# Patient Record
Sex: MALE | Race: WHITE | NOT HISPANIC OR LATINO | Employment: OTHER | URBAN - METROPOLITAN AREA
[De-identification: names, ages, dates, MRNs, and addresses within clinical notes are randomized per-mention and may not be internally consistent; named-entity substitution may affect disease eponyms.]

---

## 2017-01-16 ENCOUNTER — CARE COORDINATION (OUTPATIENT)
Dept: NEPHROLOGY | Facility: CLINIC | Age: 67
End: 2017-01-16

## 2017-01-17 NOTE — PROGRESS NOTES
"Reason for Call    Followed up with patient regarding home BP. States he's feeling great, no issues with medications. No symptoms at this time.    1/17: 143/85  1/16: 137/90  1/15: 153/84  1/14: 129/88  1/13: 142/86  1/12: 157/95  1/11: 151/89    Currently prescribed losartan 25mg daily, hydrochlorothiazide 25mg daily, and amlodipine 10mg.    Patient asked about 12/19 lab results, wasn't sure what they meant. Reviewed with Dr. Garcia, who said they were looking for abnormal cells, which they did not find. \"No concerns\".    Collaboration    Dr. Garcia updated. They look fine. Just low salt diet. Thank you.       Plan    1. Continue medications as prescribed.  2. Follow low sodium diet.    Patient was given an opportunity to ask questions and have those questions answered to his satisfaction.  Patient verbalized understanding of instructions provided and agreed to plan of care.    Arianna Rael RN     "

## 2017-02-10 ENCOUNTER — APPOINTMENT (RX ONLY)
Dept: URBAN - METROPOLITAN AREA CLINIC 143 | Facility: CLINIC | Age: 67
Setting detail: DERMATOLOGY
End: 2017-02-10

## 2017-02-10 DIAGNOSIS — Z85.820 PERSONAL HISTORY OF MALIGNANT MELANOMA OF SKIN: ICD-10-CM

## 2017-02-10 DIAGNOSIS — L57.0 ACTINIC KERATOSIS: ICD-10-CM

## 2017-02-10 DIAGNOSIS — L08.9 LOCAL INFECTION OF THE SKIN AND SUBCUTANEOUS TISSUE, UNSPECIFIED: ICD-10-CM

## 2017-02-10 DIAGNOSIS — L72.0 EPIDERMAL CYST: ICD-10-CM

## 2017-02-10 PROBLEM — D48.5 NEOPLASM OF UNCERTAIN BEHAVIOR OF SKIN: Status: ACTIVE | Noted: 2017-02-10

## 2017-02-10 PROCEDURE — 10060 I&D ABSCESS SIMPLE/SINGLE: CPT

## 2017-02-10 PROCEDURE — ? PRESCRIPTION

## 2017-02-10 PROCEDURE — 11100: CPT

## 2017-02-10 PROCEDURE — ? ORDER TESTS

## 2017-02-10 PROCEDURE — ? BIOPSY BY SHAVE METHOD

## 2017-02-10 PROCEDURE — ? DEFER

## 2017-02-10 PROCEDURE — 99203 OFFICE O/P NEW LOW 30 MIN: CPT | Mod: 25

## 2017-02-10 PROCEDURE — ? INCISION AND DRAINAGE

## 2017-02-10 PROCEDURE — ? COUNSELING

## 2017-02-10 RX ORDER — CEFDINIR 300 MG/1
CAPSULE ORAL
Qty: 20 | Refills: 0 | Status: ERX

## 2017-02-10 ASSESSMENT — LOCATION DETAILED DESCRIPTION DERM
LOCATION DETAILED: INFERIOR THORACIC SPINE
LOCATION DETAILED: RIGHT MEDIAL UPPER BACK
LOCATION DETAILED: RIGHT MID TEMPLE
LOCATION DETAILED: RIGHT MID TEMPLE
LOCATION DETAILED: RIGHT CENTRAL MALAR CHEEK
LOCATION DETAILED: RIGHT CENTRAL LATERAL NECK
LOCATION DETAILED: RIGHT INFERIOR CENTRAL MALAR CHEEK
LOCATION DETAILED: INFERIOR THORACIC SPINE

## 2017-02-10 ASSESSMENT — LOCATION SIMPLE DESCRIPTION DERM
LOCATION SIMPLE: RIGHT TEMPLE
LOCATION SIMPLE: RIGHT TEMPLE
LOCATION SIMPLE: NECK
LOCATION SIMPLE: UPPER BACK
LOCATION SIMPLE: RIGHT CHEEK
LOCATION SIMPLE: RIGHT UPPER BACK
LOCATION SIMPLE: UPPER BACK
LOCATION SIMPLE: RIGHT CHEEK

## 2017-02-10 ASSESSMENT — LOCATION ZONE DERM
LOCATION ZONE: FACE
LOCATION ZONE: TRUNK
LOCATION ZONE: NECK
LOCATION ZONE: TRUNK
LOCATION ZONE: FACE

## 2017-02-10 NOTE — PROCEDURE: BIOPSY BY SHAVE METHOD
Type Of Destruction Used: Curettage
Size Of Lesion In Cm: 0
Cryotherapy Text: The wound bed was treated with cryotherapy after the biopsy was performed.
Consent: Written consent was obtained and risks were reviewed including but not limited to scarring, infection, bleeding, scabbing, incomplete removal, nerve damage and allergy to anesthesia.
Wound Care: Petrolatum
Bill For Surgical Tray: no
Notification Instructions: Patient will be notified of biopsy results. However, patient instructed to call the office if not contacted within 2 weeks.
Billing Type: Third-Party Bill
Silver Nitrate Text: The wound bed was treated with silver nitrate after the biopsy was performed.
Anesthesia Type: 1% lidocaine with epinephrine
Path Notes (To The Dermatopathologist): R/O MM vs other
Electrodesiccation And Curettage Text: The wound bed was treated with electrodesiccation and curettage after the biopsy was performed.
Biopsy Method: Dermablade
Electrodesiccation Text: The wound bed was treated with electrodesiccation after the biopsy was performed.
Dressing: bandage
Post-Care Instructions: I reviewed with the patient in detail post-care instructions. Patient is to keep the biopsy site dry overnight, and then apply bacitracin twice daily until healed. Patient may apply hydrogen peroxide soaks to remove any crusting.
Hemostasis: Drysol
Anesthesia Volume In Cc: 0.5
Biopsy Type: H and E
Detail Level: Detailed

## 2017-02-10 NOTE — PROCEDURE: MIPS QUALITY
Quality 110: Preventive Care And Screening: Influenza Immunization: Influenza Immunization Administered during Influenza season
Quality 47: Advance Care Plan: Advance Care Planning discussed and documented; advance care plan or surrogate decision maker documented in the medical record.
Quality 111:Pneumonia Vaccination Status For Older Adults: Pneumococcal Vaccination Previously Received
Quality 155 (Denominator): Falls Plan Of Care: Plan of Care not Documented, Reason not Otherwise Specified
Quality 226: Preventive Care And Screening: Tobacco Use: Screening And Cessation Intervention: Patient screened for tobacco and never smoked
Quality 154 Part B: Falls: Risk Screening (Should Be Reported With Measure 155.): Patient screened for future fall risk; documentation of no falls in the past year or only one fall without injury in the past year
Quality 154 Part A: Falls: Risk Assessment (Should Be Reported With Measure 155.): Falls risk assessment completed and documented in the past 12 months.
Detail Level: Detailed
Quality 131: Pain Assessment And Follow-Up: Pain assessment using a standardized tool is documented as negative, no follow-up plan required
Quality 431: Preventive Care And Screening: Unhealthy Alcohol Use - Screening: Patient screened for unhealthy alcohol use using a single question and scores less than 2 times per year

## 2017-02-10 NOTE — PROCEDURE: INCISION AND DRAINAGE
Epidermal Sutures: 4-0 Ethilon
Consent was obtained and risks were reviewed including but not limited to delayed wound healing, infection, need for multiple I and D's, and pain.
Anesthesia Type: 1% lidocaine with epinephrine
Dressing: dry sterile dressing
Method: 11 blade
Lesion Type: Abscess
Drainage Type?: purulent  and cyst-like
Render Postcare In Note?: No
Post-Care Instructions: I reviewed with the patient in detail post-care instructions. Patient should keep wound covered and call the office should any redness, pain, swelling or worsening occur.
Detail Level: Detailed
Anesthesia Volume In Cc: 2
Preparation Text: The area was prepped in the usual clean fashion.
Drainage Amount?: moderate
Curette Text (Optional): After the contents were expressed a curette was used to partially remove the cyst wall.
Wound Care: Petrolatum
Suture Text: The incision was partially closed with
Curette: Yes
Epidermal Closure: simple interrupted
Size Of Lesion In Cm (Optional But May Be Required For Some Insurances): 0

## 2017-02-21 ENCOUNTER — TELEPHONE (OUTPATIENT)
Dept: TRANSPLANT | Facility: CLINIC | Age: 67
End: 2017-02-21

## 2017-02-21 NOTE — TELEPHONE ENCOUNTER
Call from Eric who is in [place], stating that he isn't feeling well.  Has fatigue, urinary frequency w/ no pain / burning, excess thirst.  No fever. Reviewed w/ Dr. Jimenez.  Please fax order to Eric's lab of choice asking for:  All post liver labs, UA / UC, Hgb A1C.  Pt is aware that he should be fasting.  Order faxed to [facility], [phone].

## 2017-02-21 NOTE — LETTER
OUTPATIENT OR TRANSITIONAL CARE  LABORATORY TEST ORDER  Eric Andrew  YOB: 1950  Issue Date: February 21, 2017   [facility] MR#: [id number]          Diagnoses: [x]      Liver Transplant (ICD-9 V42.7)   [x]      Urinary Frequency (ICD 10 R35.0)      Please draw the following lab tests:            [x]      Hemogram, White Blood Count, Platelets   [x]      Basic Metabolic Panel (Sodium, Potassium, Chloride, CO2, Creatinine, Urea Nitrogen, Glucose,     Calcium)  [x]      UA / UC  [x]      Hepatic panel (Albumin, Alk Phos, ALT, AST, LDH, Direct and Total Bili)  [x]      Hgb A1C        If you have any questions, please call Naomi in [facility]- [phone].                                        Please fax results to [phone] as soon as they are available.    .

## 2017-02-22 ENCOUNTER — TELEPHONE (OUTPATIENT)
Dept: TRANSPLANT | Facility: CLINIC | Age: 67
End: 2017-02-22

## 2017-02-22 NOTE — TELEPHONE ENCOUNTER
Call from Eric.  Had labs drawn this morning, I still haven't received the results.  He is still feeling poorly, stopped and bought a glucose meter.  Blood glucose per meter >500.  Suggested he go to local ER.  He wants to go to an urgent care, told him that's fine, he can explore but if they won't treat there he needs to go ER.  He will do.

## 2017-02-23 ENCOUNTER — TELEPHONE (OUTPATIENT)
Dept: GASTROENTEROLOGY | Facility: CLINIC | Age: 67
End: 2017-02-23

## 2017-02-23 NOTE — TELEPHONE ENCOUNTER
"Pt called back to tell Arianna neph RN about labs below. Spoke w/ Triage RN who assessed.    2/10/17: Sebaceous cyst on back removed, given antibiotics.    2/22/17: Polydipsia, polyuria, attributed to antibiotics. Glucose monitor showed fasting , went to ED. Labs showed BG of 581. Given fluids, 12 U insulin, BG decreased to 480. D/C'd w/ metformin.     2/23/17: 1st dose metformin 1100. Sx: polyuria, polydipsia (moderate, decreased from previous night), fatigue, felt \"punky\"/under the weather. Denied additional Sx. Time of call BG. 544. Ate Atkins bar, low carb cereal for breakfast at 0900. Triage RN advised pt return to ED, pt agreed. Message sent to Lashanda Liang LPN. FYI: Arianna Real, RN  "

## 2017-02-23 NOTE — TELEPHONE ENCOUNTER
DATE:  2/23/2017   TIME OF RECEIPT FROM LAB:  08:26  LAB TEST:  Fasting Blood Glucose  LAB VALUE:  565 on 2/22/17  RESULTS GIVEN WITH READ-BACK TO (PROVIDER):  KATI LARSON  TIME LAB VALUE REPORTED TO PROVIDER:   08:29

## 2017-02-27 ENCOUNTER — APPOINTMENT (RX ONLY)
Dept: URBAN - METROPOLITAN AREA CLINIC 143 | Facility: CLINIC | Age: 67
Setting detail: DERMATOLOGY
End: 2017-02-27

## 2017-02-27 DIAGNOSIS — Z85.820 PERSONAL HISTORY OF MALIGNANT MELANOMA OF SKIN: ICD-10-CM

## 2017-02-27 DIAGNOSIS — L21.8 OTHER SEBORRHEIC DERMATITIS: ICD-10-CM

## 2017-02-27 DIAGNOSIS — Z85.828 PERSONAL HISTORY OF OTHER MALIGNANT NEOPLASM OF SKIN: ICD-10-CM

## 2017-02-27 DIAGNOSIS — L81.4 OTHER MELANIN HYPERPIGMENTATION: ICD-10-CM

## 2017-02-27 DIAGNOSIS — D18.0 HEMANGIOMA: ICD-10-CM

## 2017-02-27 DIAGNOSIS — L57.0 ACTINIC KERATOSIS: ICD-10-CM

## 2017-02-27 DIAGNOSIS — D22 MELANOCYTIC NEVI: ICD-10-CM

## 2017-02-27 PROBLEM — D22.5 MELANOCYTIC NEVI OF TRUNK: Status: ACTIVE | Noted: 2017-02-27

## 2017-02-27 PROBLEM — D03.59 MELANOMA IN SITU OF OTHER PART OF TRUNK: Status: ACTIVE | Noted: 2017-02-27

## 2017-02-27 PROBLEM — D18.01 HEMANGIOMA OF SKIN AND SUBCUTANEOUS TISSUE: Status: ACTIVE | Noted: 2017-02-27

## 2017-02-27 PROBLEM — K75.9 INFLAMMATORY LIVER DISEASE, UNSPECIFIED: Status: ACTIVE | Noted: 2017-02-27

## 2017-02-27 PROCEDURE — ? PRESCRIPTION

## 2017-02-27 PROCEDURE — ? LIQUID NITROGEN

## 2017-02-27 PROCEDURE — 99214 OFFICE O/P EST MOD 30 MIN: CPT | Mod: 25

## 2017-02-27 PROCEDURE — ? COUNSELING

## 2017-02-27 PROCEDURE — 17000 DESTRUCT PREMALG LESION: CPT

## 2017-02-27 PROCEDURE — ? DEFER

## 2017-02-27 PROCEDURE — 17003 DESTRUCT PREMALG LES 2-14: CPT

## 2017-02-27 RX ORDER — SELENIUM SULFIDE 22.5 MG/ML
SHAMPOO TOPICAL
Qty: 1 | Refills: 3 | Status: ERX | COMMUNITY
Start: 2017-02-27

## 2017-02-27 RX ADMIN — SELENIUM SULFIDE: 22.5 SHAMPOO TOPICAL at 22:25

## 2017-02-27 ASSESSMENT — LOCATION DETAILED DESCRIPTION DERM
LOCATION DETAILED: RIGHT SUPERIOR PREAURICULAR CHEEK
LOCATION DETAILED: RIGHT SUPERIOR UPPER BACK
LOCATION DETAILED: LEFT SUPERIOR PARIETAL SCALP
LOCATION DETAILED: RIGHT SUPERIOR LATERAL NECK
LOCATION DETAILED: LEFT SUPERIOR LATERAL NECK
LOCATION DETAILED: GLABELLA

## 2017-02-27 ASSESSMENT — LOCATION SIMPLE DESCRIPTION DERM
LOCATION SIMPLE: RIGHT CHEEK
LOCATION SIMPLE: GLABELLA
LOCATION SIMPLE: NECK
LOCATION SIMPLE: RIGHT UPPER BACK
LOCATION SIMPLE: SCALP

## 2017-02-27 ASSESSMENT — LOCATION ZONE DERM
LOCATION ZONE: FACE
LOCATION ZONE: SCALP
LOCATION ZONE: TRUNK
LOCATION ZONE: NECK

## 2017-02-27 NOTE — PROCEDURE: MIPS QUALITY
Quality 226: Preventive Care And Screening: Tobacco Use: Screening And Cessation Intervention: Patient screened for tobacco and never smoked
Quality 110: Preventive Care And Screening: Influenza Immunization: Influenza Immunization Administered during Influenza season
Quality 431: Preventive Care And Screening: Unhealthy Alcohol Use - Screening: Patient screened for unhealthy alcohol use using a single question and scores less than 2 times per year
Quality 154 Part A: Falls: Risk Assessment (Should Be Reported With Measure 155.): Falls risk assessment completed and documented in the past 12 months.
Quality 155 (Denominator): Falls Plan Of Care: Plan of Care not Documented, Reason not Otherwise Specified
Detail Level: Detailed
Quality 131: Pain Assessment And Follow-Up: Pain assessment using a standardized tool is documented as negative, no follow-up plan required
Quality 154 Part B: Falls: Risk Screening (Should Be Reported With Measure 155.): Patient screened for future fall risk; documentation of no falls in the past year or only one fall without injury in the past year

## 2017-02-27 NOTE — PROCEDURE: LIQUID NITROGEN
Detail Level: Zone
Render Post-Care Instructions In Note?: no
Post-Care Instructions: I reviewed with the patient in detail post-care instructions. Patient is to wear sunprotection, and avoid picking at any of the treated lesions. Pt may apply Vaseline to crusted or scabbing areas.
Total Number Of Aks Treated: 4
Number Of Freeze-Thaw Cycles: 1 freeze-thaw cycle
Consent: The patient's consent was obtained including but not limited to risks of crusting, scabbing, blistering, scarring, darker or lighter pigmentary change, recurrence, incomplete removal and infection.
Duration Of Freeze Thaw-Cycle (Seconds): 0

## 2017-03-07 ENCOUNTER — APPOINTMENT (RX ONLY)
Dept: URBAN - METROPOLITAN AREA CLINIC 143 | Facility: CLINIC | Age: 67
Setting detail: DERMATOLOGY
End: 2017-03-07

## 2017-03-07 PROBLEM — D03.59 MELANOMA IN SITU OF OTHER PART OF TRUNK: Status: ACTIVE | Noted: 2017-03-07

## 2017-03-07 PROCEDURE — 11604 EXC TR-EXT MAL+MARG 3.1-4 CM: CPT | Mod: 79

## 2017-03-07 PROCEDURE — ? EXCISION

## 2017-03-07 NOTE — PROCEDURE: EXCISION
Secondary Defect Width (In Cm): 0
Epidermal Closure: simple interrupted
Tissue Cultured Epidermal Autograft Text: The defect edges were debeveled with a #15 scalpel blade.  Given the location of the defect, shape of the defect and the proximity to free margins a tissue cultured epidermal autograft was deemed most appropriate.  The graft was then trimmed to fit the size of the defect.  The graft was then placed in the primary defect and oriented appropriately.
Interpolation Flap Text: A decision was made to reconstruct the defect utilizing an interpolation axial flap and a staged reconstruction.  A telfa template was made of the defect.  This telfa template was then used to outline the interpolation flap.  The donor area for the pedicle flap was then injected with anesthesia.  The flap was excised through the skin and subcutaneous tissue down to the layer of the underlying musculature.  The interpolation flap was carefully excised within this deep plane to maintain its blood supply.  The edges of the donor site were undermined.   The donor site was closed in a primary fashion.  The pedicle was then rotated into position and sutured.  Once the tube was sutured into place, adequate blood supply was confirmed with blanching and refill.  The pedicle was then wrapped with xeroform gauze and dressed appropriately with a telfa and gauze bandage to ensure continued blood supply and protect the attached pedicle.
Modified Advancement Flap Text: The defect edges were debeveled with a #15 scalpel blade.  Given the location of the defect, shape of the defect and the proximity to free margins a modified advancement flap was deemed most appropriate.  Using a sterile surgical marker, an appropriate advancement flap was drawn incorporating the defect and placing the expected incisions within the relaxed skin tension lines where possible.    The area thus outlined was incised deep to adipose tissue with a #15 scalpel blade.  The skin margins were undermined to an appropriate distance in all directions utilizing iris scissors.
V-Y Flap Text: The defect edges were debeveled with a #15 scalpel blade.  Given the location of the defect, shape of the defect and the proximity to free margins a V-Y flap was deemed most appropriate.  Using a sterile surgical marker, an appropriate advancement flap was drawn incorporating the defect and placing the expected incisions within the relaxed skin tension lines where possible.    The area thus outlined was incised deep to adipose tissue with a #15 scalpel blade.  The skin margins were undermined to an appropriate distance in all directions utilizing iris scissors.
Mastoid Interpolation Flap Text: A decision was made to reconstruct the defect utilizing an interpolation axial flap and a staged reconstruction.  A telfa template was made of the defect.  This telfa template was then used to outline the mastoid interpolation flap.  The donor area for the pedicle flap was then injected with anesthesia.  The flap was excised through the skin and subcutaneous tissue down to the layer of the underlying musculature.  The pedicle flap was carefully excised within this deep plane to maintain its blood supply.  The edges of the donor site were undermined.   The donor site was closed in a primary fashion.  The pedicle was then rotated into position and sutured.  Once the tube was sutured into place, adequate blood supply was confirmed with blanching and refill.  The pedicle was then wrapped with xeroform gauze and dressed appropriately with a telfa and gauze bandage to ensure continued blood supply and protect the attached pedicle.
Accession #: [id number]
Anesthesia Type: 1% lidocaine with epinephrine
Render Post-Care Instructions In Note?: yes
Epidermal Sutures: 3-0 Nylon
Dermal Autograft Text: The defect edges were debeveled with a #15 scalpel blade.  Given the location of the defect, shape of the defect and the proximity to free margins a dermal autograft was deemed most appropriate.  Using a sterile surgical marker, the primary defect shape was transferred to the donor site. The area thus outlined was incised deep to adipose tissue with a #15 scalpel blade.  The harvested graft was then trimmed of adipose and epidermal tissue until only dermis was left.  The skin graft was then placed in the primary defect and oriented appropriately.
Burow's Advancement Flap Text: The defect edges were debeveled with a #15 scalpel blade.  Given the location of the defect and the proximity to free margins a Burow's advancement flap was deemed most appropriate.  Using a sterile surgical marker, the appropriate advancement flap was drawn incorporating the defect and placing the expected incisions within the relaxed skin tension lines where possible.    The area thus outlined was incised deep to adipose tissue with a #15 scalpel blade.  The skin margins were undermined to an appropriate distance in all directions utilizing iris scissors.
Intermediate / Complex Repair - Final Wound Length In Cm: 5
Detail Level: Detailed
Xenograft Text: The defect edges were debeveled with a #15 scalpel blade.  Given the location of the defect, shape of the defect and the proximity to free margins a xenograft was deemed most appropriate.  The graft was then trimmed to fit the size of the defect.  The graft was then placed in the primary defect and oriented appropriately.
Excisional Biopsy Additional Text (Leave Blank If You Do Not Want): The margin was drawn around the clinically apparent lesion. An elliptical shape was then drawn on the skin incorporating the lesion and margins.  Incisions were then made along these lines to the appropriate tissue plane and the lesion was extirpated.
Cartilage Graft Text: The defect edges were debeveled with a #15 scalpel blade.  Given the location of the defect, shape of the defect, the fact the defect involved a full thickness cartilage defect a cartilage graft was deemed most appropriate.  An appropriate donor site was identified, cleansed, and anesthetized. The cartilage graft was then harvested and transferred to the recipient site, oriented appropriately and then sutured into place.  The secondary defect was then repaired using a primary closure.
Mucosal Advancement Flap Text: Given the location of the defect, shape of the defect and the proximity to free margins a mucosal advancement flap was deemed most appropriate. Incisions were made with a 15 blade scalpel in the appropriate fashion along the cutaneous vermillion border and the mucosal lip. The remaining actinically damaged mucosal tissue was excised.  The mucosal advancement flap was then elevated to the gingival sulcus with care taken to preserve the neurovascular structures and advanced into the primary defect. Care was taken to ensure that precise realignment of the vermillion border was achieved.
Complex Repair And Single Advancement Flap Text: The defect edges were debeveled with a #15 scalpel blade.  The primary defect was closed partially with a complex linear closure.  Given the location of the remaining defect, shape of the defect and the proximity to free margins a single advancement flap was deemed most appropriate for complete closure of the defect.  Using a sterile surgical marker, an appropriate advancement flap was drawn incorporating the defect and placing the expected incisions within the relaxed skin tension lines where possible.    The area thus outlined was incised deep to adipose tissue with a #15 scalpel blade.  The skin margins were undermined to an appropriate distance in all directions utilizing iris scissors.
Cheek-To-Nose Interpolation Flap Text: A decision was made to reconstruct the defect utilizing an interpolation axial flap and a staged reconstruction.  A telfa template was made of the defect.  This telfa template was then used to outline the Cheek-To-Nose Interpolation flap.  The donor area for the pedicle flap was then injected with anesthesia.  The flap was excised through the skin and subcutaneous tissue down to the layer of the underlying musculature.  The interpolation flap was carefully excised within this deep plane to maintain its blood supply.  The edges of the donor site were undermined.   The donor site was closed in a primary fashion.  The pedicle was then rotated into position and sutured.  Once the tube was sutured into place, adequate blood supply was confirmed with blanching and refill.  The pedicle was then wrapped with xeroform gauze and dressed appropriately with a telfa and gauze bandage to ensure continued blood supply and protect the attached pedicle.
Complex Repair And Modified Advancement Flap Text: The defect edges were debeveled with a #15 scalpel blade.  The primary defect was closed partially with a complex linear closure.  Given the location of the remaining defect, shape of the defect and the proximity to free margins a modified advancement flap was deemed most appropriate for complete closure of the defect.  Using a sterile surgical marker, an appropriate advancement flap was drawn incorporating the defect and placing the expected incisions within the relaxed skin tension lines where possible.    The area thus outlined was incised deep to adipose tissue with a #15 scalpel blade.  The skin margins were undermined to an appropriate distance in all directions utilizing iris scissors.
Melolabial Interpolation Flap Text: A decision was made to reconstruct the defect utilizing an interpolation axial flap and a staged reconstruction.  A telfa template was made of the defect.  This telfa template was then used to outline the melolabial interpolation flap.  The donor area for the pedicle flap was then injected with anesthesia.  The flap was excised through the skin and subcutaneous tissue down to the layer of the underlying musculature.  The pedicle flap was carefully excised within this deep plane to maintain its blood supply.  The edges of the donor site were undermined.   The donor site was closed in a primary fashion.  The pedicle was then rotated into position and sutured.  Once the tube was sutured into place, adequate blood supply was confirmed with blanching and refill.  The pedicle was then wrapped with xeroform gauze and dressed appropriately with a telfa and gauze bandage to ensure continued blood supply and protect the attached pedicle.
Billing Type: Third-Party Bill
O-Z Plasty Text: The defect edges were debeveled with a #15 scalpel blade.  Given the location of the defect, shape of the defect and the proximity to free margins an O-Z plasty (double transposition flap) was deemed most appropriate.  Using a sterile surgical marker, the appropriate transposition flaps were drawn incorporating the defect and placing the expected incisions within the relaxed skin tension lines where possible.    The area thus outlined was incised deep to adipose tissue with a #15 scalpel blade.  The skin margins were undermined to an appropriate distance in all directions utilizing iris scissors.  Hemostasis was achieved with electrocautery.  The flaps were then transposed into place, one clockwise and the other counterclockwise, and anchored with interrupted buried subcutaneous sutures.
Complex Repair And Z Plasty Text: The defect edges were debeveled with a #15 scalpel blade.  The primary defect was closed partially with a complex linear closure.  Given the location of the remaining defect, shape of the defect and the proximity to free margins a Z plasty was deemed most appropriate for complete closure of the defect.  Using a sterile surgical marker, an appropriate advancement flap was drawn incorporating the defect and placing the expected incisions within the relaxed skin tension lines where possible.    The area thus outlined was incised deep to adipose tissue with a #15 scalpel blade.  The skin margins were undermined to an appropriate distance in all directions utilizing iris scissors.
O-T Plasty Text: The defect edges were debeveled with a #15 scalpel blade.  Given the location of the defect, shape of the defect and the proximity to free margins an O-T plasty was deemed most appropriate.  Using a sterile surgical marker, an appropriate O-T plasty was drawn incorporating the defect and placing the expected incisions within the relaxed skin tension lines where possible.    The area thus outlined was incised deep to adipose tissue with a #15 scalpel blade.  The skin margins were undermined to an appropriate distance in all directions utilizing iris scissors.
Crescentic Advancement Flap Text: The defect edges were debeveled with a #15 scalpel blade.  Given the location of the defect and the proximity to free margins a crescentic advancement flap was deemed most appropriate.  Using a sterile surgical marker, the appropriate advancement flap was drawn incorporating the defect and placing the expected incisions within the relaxed skin tension lines where possible.    The area thus outlined was incised deep to adipose tissue with a #15 scalpel blade.  The skin margins were undermined to an appropriate distance in all directions utilizing iris scissors.
Surgeon (Optional): Dr Ulrich
Patient Will Remove Sutures At Home?: No
Post-Care Instructions: I reviewed with the patient in detail post-care instructions. Patient is not to engage in any heavy lifting, exercise, or swimming for the next 14 days. Should the patient develop any fevers, chills, bleeding, severe pain patient will contact the office immediately.
Complex Repair And A-T Advancement Flap Text: The defect edges were debeveled with a #15 scalpel blade.  The primary defect was closed partially with a complex linear closure.  Given the location of the remaining defect, shape of the defect and the proximity to free margins an A-T advancement flap was deemed most appropriate for complete closure of the defect.  Using a sterile surgical marker, an appropriate advancement flap was drawn incorporating the defect and placing the expected incisions within the relaxed skin tension lines where possible.    The area thus outlined was incised deep to adipose tissue with a #15 scalpel blade.  The skin margins were undermined to an appropriate distance in all directions utilizing iris scissors.
Island Pedicle Flap-Requiring Vessel Identification Text: The defect edges were debeveled with a #15 scalpel blade.  Given the location of the defect, shape of the defect and the proximity to free margins an island pedicle advancement flap was deemed most appropriate.  Using a sterile surgical marker, an appropriate advancement flap was drawn, based on the axial vessel mentioned above, incorporating the defect, outlining the appropriate donor tissue and placing the expected incisions within the relaxed skin tension lines where possible.    The area thus outlined was incised deep to adipose tissue with a #15 scalpel blade.  The skin margins were undermined to an appropriate distance in all directions around the primary defect and laterally outward around the island pedicle utilizing iris scissors.  There was minimal undermining beneath the pedicle flap.
O-T Advancement Flap Text: The defect edges were debeveled with a #15 scalpel blade.  Given the location of the defect, shape of the defect and the proximity to free margins an O-T advancement flap was deemed most appropriate.  Using a sterile surgical marker, an appropriate advancement flap was drawn incorporating the defect and placing the expected incisions within the relaxed skin tension lines where possible.    The area thus outlined was incised deep to adipose tissue with a #15 scalpel blade.  The skin margins were undermined to an appropriate distance in all directions utilizing iris scissors.
Bilobed Flap Text: The defect edges were debeveled with a #15 scalpel blade.  Given the location of the defect and the proximity to free margins a bilobe flap was deemed most appropriate.  Using a sterile surgical marker, an appropriate bilobe flap drawn around the defect.    The area thus outlined was incised deep to adipose tissue with a #15 scalpel blade.  The skin margins were undermined to an appropriate distance in all directions utilizing iris scissors.
Perilesional Excision Additional Text (Leave Blank If You Do Not Want): The margin was drawn around the clinically apparent lesion. Incisions were then made along these lines to the appropriate tissue plane and the lesion was extirpated.
S Plasty Text: Given the location and shape of the defect, and the orientation of relaxed skin tension lines, an S-plasty was deemed most appropriate for repair.  Using a sterile surgical marker, the appropriate outline of the S-plasty was drawn, incorporating the defect and placing the expected incisions within the relaxed skin tension lines where possible.  The area thus outlined was incised deep to adipose tissue with a #15 scalpel blade.  The skin margins were undermined to an appropriate distance in all directions utilizing iris scissors. The skin flaps were advanced over the defect.  The opposing margins were then approximated with interrupted buried subcutaneous sutures.
Posterior Auricular Interpolation Flap Text: A decision was made to reconstruct the defect utilizing an interpolation axial flap and a staged reconstruction.  A telfa template was made of the defect.  This telfa template was then used to outline the posterior auricular interpolation flap.  The donor area for the pedicle flap was then injected with anesthesia.  The flap was excised through the skin and subcutaneous tissue down to the layer of the underlying musculature.  The pedicle flap was carefully excised within this deep plane to maintain its blood supply.  The edges of the donor site were undermined.   The donor site was closed in a primary fashion.  The pedicle was then rotated into position and sutured.  Once the tube was sutured into place, adequate blood supply was confirmed with blanching and refill.  The pedicle was then wrapped with xeroform gauze and dressed appropriately with a telfa and gauze bandage to ensure continued blood supply and protect the attached pedicle.
Bilobed Transposition Flap Text: The defect edges were debeveled with a #15 scalpel blade.  Given the location of the defect and the proximity to free margins a bilobed transposition flap was deemed most appropriate.  Using a sterile surgical marker, an appropriate bilobe flap drawn around the defect.    The area thus outlined was incised deep to adipose tissue with a #15 scalpel blade.  The skin margins were undermined to an appropriate distance in all directions utilizing iris scissors.
Z Plasty Text: The lesion was extirpated to the level of the fat with a #15 scalpel blade.  Given the location of the defect, shape of the defect and the proximity to free margins a Z-plasty was deemed most appropriate for repair.  Using a sterile surgical marker, the appropriate transposition arms of the Z-plasty were drawn incorporating the defect and placing the expected incisions within the relaxed skin tension lines where possible.    The area thus outlined was incised deep to adipose tissue with a #15 scalpel blade.  The skin margins were undermined to an appropriate distance in all directions utilizing iris scissors.  The opposing transposition arms were then transposed into place in opposite direction and anchored with interrupted buried subcutaneous sutures.
Estimated Blood Loss (Cc): minimal
Saucerization Excision Additional Text (Leave Blank If You Do Not Want): The margin was drawn around the clinically apparent lesion.  Incisions were then made along these lines, in a tangential fashion, to the appropriate tissue plane and the lesion was extirpated.
Island Pedicle Flap With Canthal Suspension Text: The defect edges were debeveled with a #15 scalpel blade.  Given the location of the defect, shape of the defect and the proximity to free margins an island pedicle advancement flap was deemed most appropriate.  Using a sterile surgical marker, an appropriate advancement flap was drawn incorporating the defect, outlining the appropriate donor tissue and placing the expected incisions within the relaxed skin tension lines where possible. The area thus outlined was incised deep to adipose tissue with a #15 scalpel blade.  The skin margins were undermined to an appropriate distance in all directions around the primary defect and laterally outward around the island pedicle utilizing iris scissors.  There was minimal undermining beneath the pedicle flap. A suspension suture was placed in the canthal tendon to prevent tension and prevent ectropion.
Complex Repair And Xenograft Text: The defect edges were debeveled with a #15 scalpel blade.  The primary defect was closed partially with a complex linear closure.  Given the location of the defect, shape of the defect and the proximity to free margins an tissue cultured epidermal autograft was deemed most appropriate to repair the remaining defect.  The graft was trimmed to fit the size of the remaining defect.  The graft was then placed in the primary defect, oriented appropriately, and sutured into place.
Complex Repair And Double Advancement Flap Text: The defect edges were debeveled with a #15 scalpel blade.  The primary defect was closed partially with a complex linear closure.  Given the location of the remaining defect, shape of the defect and the proximity to free margins a double advancement flap was deemed most appropriate for complete closure of the defect.  Using a sterile surgical marker, an appropriate advancement flap was drawn incorporating the defect and placing the expected incisions within the relaxed skin tension lines where possible.    The area thus outlined was incised deep to adipose tissue with a #15 scalpel blade.  The skin margins were undermined to an appropriate distance in all directions utilizing iris scissors.
Additional Anesthesia Volume In Cc: 6
Island Pedicle Flap Text: The defect edges were debeveled with a #15 scalpel blade.  Given the location of the defect, shape of the defect and the proximity to free margins an island pedicle advancement flap was deemed most appropriate.  Using a sterile surgical marker, an appropriate advancement flap was drawn incorporating the defect, outlining the appropriate donor tissue and placing the expected incisions within the relaxed skin tension lines where possible.    The area thus outlined was incised deep to adipose tissue with a #15 scalpel blade.  The skin margins were undermined to an appropriate distance in all directions around the primary defect and laterally outward around the island pedicle utilizing iris scissors.  There was minimal undermining beneath the pedicle flap.
Complex Repair And Bilobe Flap Text: The defect edges were debeveled with a #15 scalpel blade.  The primary defect was closed partially with a complex linear closure.  Given the location of the remaining defect, shape of the defect and the proximity to free margins a bilobe flap was deemed most appropriate for complete closure of the defect.  Using a sterile surgical marker, an appropriate advancement flap was drawn incorporating the defect and placing the expected incisions within the relaxed skin tension lines where possible.    The area thus outlined was incised deep to adipose tissue with a #15 scalpel blade.  The skin margins were undermined to an appropriate distance in all directions utilizing iris scissors.
O-L Flap Text: The defect edges were debeveled with a #15 scalpel blade.  Given the location of the defect, shape of the defect and the proximity to free margins an O-L flap was deemed most appropriate.  Using a sterile surgical marker, an appropriate advancement flap was drawn incorporating the defect and placing the expected incisions within the relaxed skin tension lines where possible.    The area thus outlined was incised deep to adipose tissue with a #15 scalpel blade.  The skin margins were undermined to an appropriate distance in all directions utilizing iris scissors.
Purse String (Intermediate) Text: Given the location of the defect and the characteristics of the surrounding skin a pursestring intermediate closure was deemed most appropriate.  Undermining was performed circumfirentially around the surgical defect.  A purstring suture was then placed and tightened.
Complex Repair Preamble Text (Leave Blank If You Do Not Want): Extensive wide undermining was performed.
Repair Type: None (Simple)
Complex Repair And W Plasty Text: The defect edges were debeveled with a #15 scalpel blade.  The primary defect was closed partially with a complex linear closure.  Given the location of the remaining defect, shape of the defect and the proximity to free margins a W plasty was deemed most appropriate for complete closure of the defect.  Using a sterile surgical marker, an appropriate advancement flap was drawn incorporating the defect and placing the expected incisions within the relaxed skin tension lines where possible.    The area thus outlined was incised deep to adipose tissue with a #15 scalpel blade.  The skin margins were undermined to an appropriate distance in all directions utilizing iris scissors.
Slit Excision Additional Text (Leave Blank If You Do Not Want): A linear line was drawn on the skin overlying the lesion. An incision was made slowly until the lesion was visualized.  Once visualized, the lesion was removed with blunt dissection.
Size Of Lesion In Cm: 1.5
Complex Repair And V-Y Plasty Text: The defect edges were debeveled with a #15 scalpel blade.  The primary defect was closed partially with a complex linear closure.  Given the location of the remaining defect, shape of the defect and the proximity to free margins a V-Y plasty was deemed most appropriate for complete closure of the defect.  Using a sterile surgical marker, an appropriate advancement flap was drawn incorporating the defect and placing the expected incisions within the relaxed skin tension lines where possible.    The area thus outlined was incised deep to adipose tissue with a #15 scalpel blade.  The skin margins were undermined to an appropriate distance in all directions utilizing iris scissors.
Complex Repair And O-L Flap Text: The defect edges were debeveled with a #15 scalpel blade.  The primary defect was closed partially with a complex linear closure.  Given the location of the remaining defect, shape of the defect and the proximity to free margins an O-L flap was deemed most appropriate for complete closure of the defect.  Using a sterile surgical marker, an appropriate flap was drawn incorporating the defect and placing the expected incisions within the relaxed skin tension lines where possible.    The area thus outlined was incised deep to adipose tissue with a #15 scalpel blade.  The skin margins were undermined to an appropriate distance in all directions utilizing iris scissors.
Muscle Hinge Flap Text: The defect edges were debeveled with a #15 scalpel blade.  Given the size, depth and location of the defect and the proximity to free margins a muscle hinge flap was deemed most appropriate.  Using a sterile surgical marker, an appropriate hinge flap was drawn incorporating the defect. The area thus outlined was incised with a #15 scalpel blade.  The skin margins were undermined to an appropriate distance in all directions utilizing iris scissors.
Advancement-Rotation Flap Text: The defect edges were debeveled with a #15 scalpel blade.  Given the location of the defect, shape of the defect and the proximity to free margins an advancement-rotation flap was deemed most appropriate.  Using a sterile surgical marker, an appropriate flap was drawn incorporating the defect and placing the expected incisions within the relaxed skin tension lines where possible. The area thus outlined was incised deep to adipose tissue with a #15 scalpel blade.  The skin margins were undermined to an appropriate distance in all directions utilizing iris scissors.
Complex Repair And O-T Advancement Flap Text: The defect edges were debeveled with a #15 scalpel blade.  The primary defect was closed partially with a complex linear closure.  Given the location of the remaining defect, shape of the defect and the proximity to free margins an O-T advancement flap was deemed most appropriate for complete closure of the defect.  Using a sterile surgical marker, an appropriate advancement flap was drawn incorporating the defect and placing the expected incisions within the relaxed skin tension lines where possible.    The area thus outlined was incised deep to adipose tissue with a #15 scalpel blade.  The skin margins were undermined to an appropriate distance in all directions utilizing iris scissors.
Ftsg Text: The defect edges were debeveled with a #15 scalpel blade.  Given the location of the defect, shape of the defect and the proximity to free margins a full thickness skin graft was deemed most appropriate.  Using a sterile surgical marker, the primary defect shape was transferred to the donor site. The area thus outlined was incised deep to adipose tissue with a #15 scalpel blade.  The harvested graft was then trimmed of adipose tissue until only dermis and epidermis was left.  The skin margins of the secondary defect were undermined to an appropriate distance in all directions utilizing iris scissors.  The secondary defect was closed with interrupted buried subcutaneous sutures.  The skin edges were then re-apposed with running  sutures.  The skin graft was then placed in the primary defect and oriented appropriately.
V-Y Plasty Text: The defect edges were debeveled with a #15 scalpel blade.  Given the location of the defect, shape of the defect and the proximity to free margins an V-Y advancement flap was deemed most appropriate.  Using a sterile surgical marker, an appropriate advancement flap was drawn incorporating the defect and placing the expected incisions within the relaxed skin tension lines where possible.    The area thus outlined was incised deep to adipose tissue with a #15 scalpel blade.  The skin margins were undermined to an appropriate distance in all directions utilizing iris scissors.
Dorsal Nasal Flap Text: The defect edges were debeveled with a #15 scalpel blade.  Given the location of the defect and the proximity to free margins a dorsal nasal flap was deemed most appropriate.  Using a sterile surgical marker, an appropriate dorsal nasal flap was drawn around the defect.    The area thus outlined was incised deep to adipose tissue with a #15 scalpel blade.  The skin margins were undermined to an appropriate distance in all directions utilizing iris scissors.
Deep Sutures: 3-0 Vicryl
Complex Repair And Ftsg Text: The defect edges were debeveled with a #15 scalpel blade.  The primary defect was closed partially with a complex linear closure.  Given the location of the defect, shape of the defect and the proximity to free margins a full thickness skin graft was deemed most appropriate to repair the remaining defect.  The graft was trimmed to fit the size of the remaining defect.  The graft was then placed in the primary defect, oriented appropriately, and sutured into place.
Complex Repair And Split-Thickness Skin Graft Text: The defect edges were debeveled with a #15 scalpel blade.  The primary defect was closed partially with a complex linear closure.  Given the location of the defect, shape of the defect and the proximity to free margins a split thickness skin graft was deemed most appropriate to repair the remaining defect.  The graft was trimmed to fit the size of the remaining defect.  The graft was then placed in the primary defect, oriented appropriately, and sutured into place.
Complex Repair And Dorsal Nasal Flap Text: The defect edges were debeveled with a #15 scalpel blade.  The primary defect was closed partially with a complex linear closure.  Given the location of the remaining defect, shape of the defect and the proximity to free margins a dorsal nasal flap was deemed most appropriate for complete closure of the defect.  Using a sterile surgical marker, an appropriate flap was drawn incorporating the defect and placing the expected incisions within the relaxed skin tension lines where possible.    The area thus outlined was incised deep to adipose tissue with a #15 scalpel blade.  The skin margins were undermined to an appropriate distance in all directions utilizing iris scissors.
Dressing: dry sterile dressing
Complex Repair And Melolabial Flap Text: The defect edges were debeveled with a #15 scalpel blade.  The primary defect was closed partially with a complex linear closure.  Given the location of the remaining defect, shape of the defect and the proximity to free margins a melolabial flap was deemed most appropriate for complete closure of the defect.  Using a sterile surgical marker, an appropriate advancement flap was drawn incorporating the defect and placing the expected incisions within the relaxed skin tension lines where possible.    The area thus outlined was incised deep to adipose tissue with a #15 scalpel blade.  The skin margins were undermined to an appropriate distance in all directions utilizing iris scissors.
Advancement Flap (Single) Text: The defect edges were debeveled with a #15 scalpel blade.  Given the location of the defect and the proximity to free margins a single advancement flap was deemed most appropriate.  Using a sterile surgical marker, an appropriate advancement flap was drawn incorporating the defect and placing the expected incisions within the relaxed skin tension lines where possible.    The area thus outlined was incised deep to adipose tissue with a #15 scalpel blade.  The skin margins were undermined to an appropriate distance in all directions utilizing iris scissors.
Anesthesia Volume In Cc: 3
Complex Repair And Double M Plasty Text: The defect edges were debeveled with a #15 scalpel blade.  The primary defect was closed partially with a complex linear closure.  Given the location of the remaining defect, shape of the defect and the proximity to free margins a double M plasty was deemed most appropriate for complete closure of the defect.  Using a sterile surgical marker, an appropriate advancement flap was drawn incorporating the defect and placing the expected incisions within the relaxed skin tension lines where possible.    The area thus outlined was incised deep to adipose tissue with a #15 scalpel blade.  The skin margins were undermined to an appropriate distance in all directions utilizing iris scissors.
Complex Repair And Rhombic Flap Text: The defect edges were debeveled with a #15 scalpel blade.  The primary defect was closed partially with a complex linear closure.  Given the location of the remaining defect, shape of the defect and the proximity to free margins a rhombic flap was deemed most appropriate for complete closure of the defect.  Using a sterile surgical marker, an appropriate advancement flap was drawn incorporating the defect and placing the expected incisions within the relaxed skin tension lines where possible.    The area thus outlined was incised deep to adipose tissue with a #15 scalpel blade.  The skin margins were undermined to an appropriate distance in all directions utilizing iris scissors.
Excision Depth: adipose tissue
Bilateral Helical Rim Advancement Flap Text: The defect edges were debeveled with a #15 blade scalpel.  Given the location of the defect and the proximity to free margins (helical rim) a bilateral helical rim advancement flap was deemed most appropriate.  Using a sterile surgical marker, the appropriate advancement flaps were drawn incorporating the defect and placing the expected incisions between the helical rim and antihelix where possible.  The area thus outlined was incised through and through with a #15 scalpel blade.  With a skin hook and iris scissors, the flaps were gently and sharply undermined and freed up.
Bi-Rhombic Flap Text: The defect edges were debeveled with a #15 scalpel blade.  Given the location of the defect and the proximity to free margins a bi-rhombic flap was deemed most appropriate.  Using a sterile surgical marker, an appropriate rhombic flap was drawn incorporating the defect. The area thus outlined was incised deep to adipose tissue with a #15 scalpel blade.  The skin margins were undermined to an appropriate distance in all directions utilizing iris scissors.
Suture Removal: 10 days
A-T Advancement Flap Text: The defect edges were debeveled with a #15 scalpel blade.  Given the location of the defect, shape of the defect and the proximity to free margins an A-T advancement flap was deemed most appropriate.  Using a sterile surgical marker, an appropriate advancement flap was drawn incorporating the defect and placing the expected incisions within the relaxed skin tension lines where possible.    The area thus outlined was incised deep to adipose tissue with a #15 scalpel blade.  The skin margins were undermined to an appropriate distance in all directions utilizing iris scissors.
Path Notes (To The Dermatopathologist): Please check margins.
Curvilinear Excision Additional Text (Leave Blank If You Do Not Want): The margin was drawn around the clinically apparent lesion.  A curvilinear shape was then drawn on the skin incorporating the lesion and margins.  Incisions were then made along these lines to the appropriate tissue plane and the lesion was extirpated.
Body Location Override (Optional - Billing Will Still Be Based On Selected Body Map Location If Applicable): Lt inferior lateral mid back
Excision Method: Elliptical
Double Island Pedicle Flap Text: The defect edges were debeveled with a #15 scalpel blade.  Given the location of the defect, shape of the defect and the proximity to free margins a double island pedicle advancement flap was deemed most appropriate.  Using a sterile surgical marker, an appropriate advancement flap was drawn incorporating the defect, outlining the appropriate donor tissue and placing the expected incisions within the relaxed skin tension lines where possible.    The area thus outlined was incised deep to adipose tissue with a #15 scalpel blade.  The skin margins were undermined to an appropriate distance in all directions around the primary defect and laterally outward around the island pedicle utilizing iris scissors.  There was minimal undermining beneath the pedicle flap.
Keystone Flap Text: The defect edges were debeveled with a #15 scalpel blade.  Given the location of the defect, shape of the defect a keystone flap was deemed most appropriate.  Using a sterile surgical marker, an appropriate keystone flap was drawn incorporating the defect, outlining the appropriate donor tissue and placing the expected incisions within the relaxed skin tension lines where possible. The area thus outlined was incised deep to adipose tissue with a #15 scalpel blade.  The skin margins were undermined to an appropriate distance in all directions around the primary defect and laterally outward around the flap utilizing iris scissors.
Epidermal Autograft Text: The defect edges were debeveled with a #15 scalpel blade.  Given the location of the defect, shape of the defect and the proximity to free margins an epidermal autograft was deemed most appropriate.  Using a sterile surgical marker, the primary defect shape was transferred to the donor site. The epidermal graft was then harvested.  The skin graft was then placed in the primary defect and oriented appropriately.
Rotation Flap Text: The defect edges were debeveled with a #15 scalpel blade.  Given the location of the defect, shape of the defect and the proximity to free margins a rotation flap was deemed most appropriate.  Using a sterile surgical marker, an appropriate rotation flap was drawn incorporating the defect and placing the expected incisions within the relaxed skin tension lines where possible.    The area thus outlined was incised deep to adipose tissue with a #15 scalpel blade.  The skin margins were undermined to an appropriate distance in all directions utilizing iris scissors.
Fusiform Excision Additional Text (Leave Blank If You Do Not Want): The margin was drawn around the clinically apparent lesion.  A fusiform shape was then drawn on the skin incorporating the lesion and margins.  Incisions were then made along these lines to the appropriate tissue plane and the lesion was extirpated.
No Repair - Repaired With Adjacent Surgical Defect Text (Leave Blank If You Do Not Want): After the excision the defect was repaired concurrently with another surgical defect which was in close approximation.
Hemostasis: Electrocautery
Advancement Flap (Double) Text: The defect edges were debeveled with a #15 scalpel blade.  Given the location of the defect and the proximity to free margins a double advancement flap was deemed most appropriate.  Using a sterile surgical marker, the appropriate advancement flaps were drawn incorporating the defect and placing the expected incisions within the relaxed skin tension lines where possible.    The area thus outlined was incised deep to adipose tissue with a #15 scalpel blade.  The skin margins were undermined to an appropriate distance in all directions utilizing iris scissors.
Complex Repair And M Plasty Text: The defect edges were debeveled with a #15 scalpel blade.  The primary defect was closed partially with a complex linear closure.  Given the location of the remaining defect, shape of the defect and the proximity to free margins an M plasty was deemed most appropriate for complete closure of the defect.  Using a sterile surgical marker, an appropriate advancement flap was drawn incorporating the defect and placing the expected incisions within the relaxed skin tension lines where possible.    The area thus outlined was incised deep to adipose tissue with a #15 scalpel blade.  The skin margins were undermined to an appropriate distance in all directions utilizing iris scissors.
Alar Island Pedicle Flap Text: The defect edges were debeveled with a #15 scalpel blade.  Given the location of the defect, shape of the defect and the proximity to the alar rim an island pedicle advancement flap was deemed most appropriate.  Using a sterile surgical marker, an appropriate advancement flap was drawn incorporating the defect, outlining the appropriate donor tissue and placing the expected incisions within the nasal ala running parallel to the alar rim. The area thus outlined was incised with a #15 scalpel blade.  The skin margins were undermined minimally to an appropriate distance in all directions around the primary defect and laterally outward around the island pedicle utilizing iris scissors.  There was minimal undermining beneath the pedicle flap.
Ear Star Wedge Flap Text: The defect edges were debeveled with a #15 blade scalpel.  Given the location of the defect and the proximity to free margins (helical rim) an ear star wedge flap was deemed most appropriate.  Using a sterile surgical marker, the appropriate flap was drawn incorporating the defect and placing the expected incisions between the helical rim and antihelix where possible.  The area thus outlined was incised through and through with a #15 scalpel blade.
Pre-Excision Curettage Text (Leave Blank If You Do Not Want): Prior to drawing the surgical margin the visible lesion was removed with electrodesiccation and curettage to clearly define the lesion size.
Paramedian Forehead Flap Text: A decision was made to reconstruct the defect utilizing an interpolation axial flap and a staged reconstruction.  A telfa template was made of the defect.  This telfa template was then used to outline the paramedian forehead pedicle flap.  The donor area for the pedicle flap was then injected with anesthesia.  The flap was excised through the skin and subcutaneous tissue down to the layer of the underlying musculature.  The pedicle flap was carefully excised within this deep plane to maintain its blood supply.  The edges of the donor site were undermined.   The donor site was closed in a primary fashion.  The pedicle was then rotated into position and sutured.  Once the tube was sutured into place, adequate blood supply was confirmed with blanching and refill.  The pedicle was then wrapped with xeroform gauze and dressed appropriately with a telfa and gauze bandage to ensure continued blood supply and protect the attached pedicle.
Complex Repair And Dermal Autograft Text: The defect edges were debeveled with a #15 scalpel blade.  The primary defect was closed partially with a complex linear closure.  Given the location of the defect, shape of the defect and the proximity to free margins an dermal autograft was deemed most appropriate to repair the remaining defect.  The graft was trimmed to fit the size of the remaining defect.  The graft was then placed in the primary defect, oriented appropriately, and sutured into place.
Melolabial Transposition Flap Text: The defect edges were debeveled with a #15 scalpel blade.  Given the location of the defect and the proximity to free margins a melolabial flap was deemed most appropriate.  Using a sterile surgical marker, an appropriate melolabial transposition flap was drawn incorporating the defect.    The area thus outlined was incised deep to adipose tissue with a #15 scalpel blade.  The skin margins were undermined to an appropriate distance in all directions utilizing iris scissors.
H Plasty Text: Given the location of the defect, shape of the defect and the proximity to free margins a H-plasty was deemed most appropriate for repair.  Using a sterile surgical marker, the appropriate advancement arms of the H-plasty were drawn incorporating the defect and placing the expected incisions within the relaxed skin tension lines where possible. The area thus outlined was incised deep to adipose tissue with a #15 scalpel blade. The skin margins were undermined to an appropriate distance in all directions utilizing iris scissors.  The opposing advancement arms were then advanced into place in opposite direction and anchored with interrupted buried subcutaneous sutures.
Elliptical Excision Additional Text (Leave Blank If You Do Not Want): The margin was drawn around the clinically apparent lesion.  An elliptical shape was then drawn on the skin incorporating the lesion and margins.  Incisions were then made along these lines to the appropriate tissue plane and the lesion was extirpated.
Complex Repair And Transposition Flap Text: The defect edges were debeveled with a #15 scalpel blade.  The primary defect was closed partially with a complex linear closure.  Given the location of the remaining defect, shape of the defect and the proximity to free margins a transposition flap was deemed most appropriate for complete closure of the defect.  Using a sterile surgical marker, an appropriate advancement flap was drawn incorporating the defect and placing the expected incisions within the relaxed skin tension lines where possible.    The area thus outlined was incised deep to adipose tissue with a #15 scalpel blade.  The skin margins were undermined to an appropriate distance in all directions utilizing iris scissors.
Cheek Interpolation Flap Text: A decision was made to reconstruct the defect utilizing an interpolation axial flap and a staged reconstruction.  A telfa template was made of the defect.  This telfa template was then used to outline the Cheek Interpolation flap.  The donor area for the pedicle flap was then injected with anesthesia.  The flap was excised through the skin and subcutaneous tissue down to the layer of the underlying musculature.  The interpolation flap was carefully excised within this deep plane to maintain its blood supply.  The edges of the donor site were undermined.   The donor site was closed in a primary fashion.  The pedicle was then rotated into position and sutured.  Once the tube was sutured into place, adequate blood supply was confirmed with blanching and refill.  The pedicle was then wrapped with xeroform gauze and dressed appropriately with a telfa and gauze bandage to ensure continued blood supply and protect the attached pedicle.
Skin Substitute Text: The defect edges were debeveled with a #15 scalpel blade.  Given the location of the defect, shape of the defect and the proximity to free margins a skin substitute graft was deemed most appropriate.  The graft material was trimmed to fit the size of the defect. The graft was then placed in the primary defect and oriented appropriately.
Rhombic Flap Text: The defect edges were debeveled with a #15 scalpel blade.  Given the location of the defect and the proximity to free margins a rhombic flap was deemed most appropriate.  Using a sterile surgical marker, an appropriate rhombic flap was drawn incorporating the defect.    The area thus outlined was incised deep to adipose tissue with a #15 scalpel blade.  The skin margins were undermined to an appropriate distance in all directions utilizing iris scissors.
Hatchet Flap Text: The defect edges were debeveled with a #15 scalpel blade.  Given the location of the defect, shape of the defect and the proximity to free margins a hatchet flap was deemed most appropriate.  Using a sterile surgical marker, an appropriate hatchet flap was drawn incorporating the defect and placing the expected incisions within the relaxed skin tension lines where possible.    The area thus outlined was incised deep to adipose tissue with a #15 scalpel blade.  The skin margins were undermined to an appropriate distance in all directions utilizing iris scissors.
Composite Graft Text: The defect edges were debeveled with a #15 scalpel blade.  Given the location of the defect, shape of the defect, the proximity to free margins and the fact the defect was full thickness a composite graft was deemed most appropriate.  The defect was outline and then transferred to the donor site.  A full thickness graft was then excised from the donor site. The graft was then placed in the primary defect, oriented appropriately and then sutured into place.  The secondary defect was then repaired using a primary closure.
W Plasty Text: The lesion was extirpated to the level of the fat with a #15 scalpel blade.  Given the location of the defect, shape of the defect and the proximity to free margins a W-plasty was deemed most appropriate for repair.  Using a sterile surgical marker, the appropriate transposition arms of the W-plasty were drawn incorporating the defect and placing the expected incisions within the relaxed skin tension lines where possible.    The area thus outlined was incised deep to adipose tissue with a #15 scalpel blade.  The skin margins were undermined to an appropriate distance in all directions utilizing iris scissors.  The opposing transposition arms were then transposed into place in opposite direction and anchored with interrupted buried subcutaneous sutures.
Transposition Flap Text: The defect edges were debeveled with a #15 scalpel blade.  Given the location of the defect and the proximity to free margins a transposition flap was deemed most appropriate.  Using a sterile surgical marker, an appropriate transposition flap was drawn incorporating the defect.    The area thus outlined was incised deep to adipose tissue with a #15 scalpel blade.  The skin margins were undermined to an appropriate distance in all directions utilizing iris scissors.
Home Suture Removal Text: Patient was provided a home suture removal kit and will remove their sutures at home.  If they have any questions or difficulties they will call the office.
Split-Thickness Skin Graft Text: The defect edges were debeveled with a #15 scalpel blade.  Given the location of the defect, shape of the defect and the proximity to free margins a split thickness skin graft was deemed most appropriate.  Using a sterile surgical marker, the primary defect shape was transferred to the donor site. The split thickness graft was then harvested.  The skin graft was then placed in the primary defect and oriented appropriately.
Trilobed Flap Text: The defect edges were debeveled with a #15 scalpel blade.  Given the location of the defect and the proximity to free margins a trilobed flap was deemed most appropriate.  Using a sterile surgical marker, an appropriate trilobed flap drawn around the defect.    The area thus outlined was incised deep to adipose tissue with a #15 scalpel blade.  The skin margins were undermined to an appropriate distance in all directions utilizing iris scissors.
Consent was obtained from the patient. The risks and benefits to therapy were discussed in detail. Specifically, the risks of infection, scarring, bleeding, prolonged wound healing, incomplete removal, allergy to anesthesia, nerve injury and recurrence were addressed. Prior to the procedure, the treatment site was clearly identified and confirmed by the patient. All components of Universal Protocol/PAUSE Rule completed.
Helical Rim Advancement Flap Text: The defect edges were debeveled with a #15 blade scalpel.  Given the location of the defect and the proximity to free margins (helical rim) a double helical rim advancement flap was deemed most appropriate.  Using a sterile surgical marker, the appropriate advancement flaps were drawn incorporating the defect and placing the expected incisions between the helical rim and antihelix where possible.  The area thus outlined was incised through and through with a #15 scalpel blade.  With a skin hook and iris scissors, the flaps were gently and sharply undermined and freed up.
Intermediate Repair Preamble Text (Leave Blank If You Do Not Want): Undermining was performed with blunt dissection.
Complex Repair And Skin Substitute Graft Text: The defect edges were debeveled with a #15 scalpel blade.  The primary defect was closed partially with a complex linear closure.  Given the location of the remaining defect, shape of the defect and the proximity to free margins a skin substitute graft was deemed most appropriate to repair the remaining defect.  The graft was trimmed to fit the size of the remaining defect.  The graft was then placed in the primary defect, oriented appropriately, and sutured into place.
Complex Repair And Epidermal Autograft Text: The defect edges were debeveled with a #15 scalpel blade.  The primary defect was closed partially with a complex linear closure.  Given the location of the defect, shape of the defect and the proximity to free margins an epidermal autograft was deemed most appropriate to repair the remaining defect.  The graft was trimmed to fit the size of the remaining defect.  The graft was then placed in the primary defect, oriented appropriately, and sutured into place.
Size Of Margin In Cm: 1
Wound Care: Vaseline
Complex Repair And Rotation Flap Text: The defect edges were debeveled with a #15 scalpel blade.  The primary defect was closed partially with a complex linear closure.  Given the location of the remaining defect, shape of the defect and the proximity to free margins a rotation flap was deemed most appropriate for complete closure of the defect.  Using a sterile surgical marker, an appropriate advancement flap was drawn incorporating the defect and placing the expected incisions within the relaxed skin tension lines where possible.    The area thus outlined was incised deep to adipose tissue with a #15 scalpel blade.  The skin margins were undermined to an appropriate distance in all directions utilizing iris scissors.
Lip Wedge Excision Repair Text: Given the location of the defect and the proximity to free margins a full thickness wedge repair was deemed most appropriate.  Using a sterile surgical marker, the appropriate repair was drawn incorporating the defect and placing the expected incisions perpendicular to the vermillion border.  The vermillion border was also meticulously outlined to ensure appropriate reapproximation during the repair.  The area thus outlined was incised through and through with a #15 scalpel blade.  The muscularis and dermis were reaproximated with deep sutures following hemostasis. Care was taken to realign the vermillion border before proceeding with the superficial closure.  Once the vermillion was realigned the superfical and mucosal closure was finished.
Spiral Flap Text: The defect edges were debeveled with a #15 scalpel blade.  Given the location of the defect, shape of the defect and the proximity to free margins a spiral flap was deemed most appropriate.  Using a sterile surgical marker, an appropriate rotation flap was drawn incorporating the defect and placing the expected incisions within the relaxed skin tension lines where possible. The area thus outlined was incised deep to adipose tissue with a #15 scalpel blade.  The skin margins were undermined to an appropriate distance in all directions utilizing iris scissors.
Scalpel Size: 15 blade

## 2017-03-08 ENCOUNTER — DOCUMENTATION ONLY (OUTPATIENT)
Dept: TRANSPLANT | Facility: CLINIC | Age: 67
End: 2017-03-08

## 2017-03-08 NOTE — PROGRESS NOTES
Annual chart review completed.    Pt doing labs at regular advised interval.  [facility] within the past year.

## 2017-03-21 ENCOUNTER — APPOINTMENT (RX ONLY)
Dept: URBAN - METROPOLITAN AREA CLINIC 143 | Facility: CLINIC | Age: 67
Setting detail: DERMATOLOGY
End: 2017-03-21

## 2017-03-21 DIAGNOSIS — Z48.02 ENCOUNTER FOR REMOVAL OF SUTURES: ICD-10-CM

## 2017-03-21 PROBLEM — Z85.828 PERSONAL HISTORY OF OTHER MALIGNANT NEOPLASM OF SKIN: Status: ACTIVE | Noted: 2017-03-21

## 2017-03-21 PROCEDURE — ? SUTURE REMOVAL (GLOBAL PERIOD)

## 2017-03-21 PROCEDURE — ? OTHER

## 2017-03-21 ASSESSMENT — LOCATION DETAILED DESCRIPTION DERM
LOCATION DETAILED: LEFT INFERIOR LATERAL MIDBACK
LOCATION DETAILED: LEFT INFERIOR MEDIAL MIDBACK

## 2017-03-21 ASSESSMENT — LOCATION SIMPLE DESCRIPTION DERM
LOCATION SIMPLE: LEFT LOWER BACK
LOCATION SIMPLE: LEFT LOWER BACK

## 2017-03-21 ASSESSMENT — LOCATION ZONE DERM
LOCATION ZONE: TRUNK
LOCATION ZONE: TRUNK

## 2017-03-21 NOTE — PROCEDURE: SUTURE REMOVAL (GLOBAL PERIOD)
Detail Level: Detailed
Add 62906 Cpt? (Important Note: In 2017 The Use Of 28578 Is Being Tracked By Cms To Determine Future Global Period Reimbursement For Global Periods): no

## 2017-03-27 ENCOUNTER — TELEPHONE (OUTPATIENT)
Dept: PHARMACY | Facility: CLINIC | Age: 67
End: 2017-03-27

## 2017-04-25 DIAGNOSIS — I10 HTN (HYPERTENSION): Primary | ICD-10-CM

## 2017-04-25 NOTE — NURSING NOTE
Labs per clinic 2A protocol.  Follow up/HTN  Last OV: 12/19/16  Birdie Rasheed LPN  Nephrology  [facility]  [phone]

## 2017-05-01 ENCOUNTER — OFFICE VISIT (OUTPATIENT)
Dept: NEPHROLOGY | Facility: CLINIC | Age: 67
End: 2017-05-01
Attending: INTERNAL MEDICINE
Payer: MEDICARE

## 2017-05-01 VITALS
WEIGHT: 259.2 LBS | HEIGHT: 75 IN | HEART RATE: 67 BPM | TEMPERATURE: 98.1 F | BODY MASS INDEX: 32.23 KG/M2 | SYSTOLIC BLOOD PRESSURE: 133 MMHG | OXYGEN SATURATION: 98 % | DIASTOLIC BLOOD PRESSURE: 82 MMHG

## 2017-05-01 DIAGNOSIS — I10 ESSENTIAL HYPERTENSION: ICD-10-CM

## 2017-05-01 DIAGNOSIS — Z94.4 LIVER REPLACED BY TRANSPLANT (H): ICD-10-CM

## 2017-05-01 DIAGNOSIS — I10 HTN (HYPERTENSION): ICD-10-CM

## 2017-05-01 DIAGNOSIS — N18.30 CKD (CHRONIC KIDNEY DISEASE) STAGE 3, GFR 30-59 ML/MIN (H): Primary | ICD-10-CM

## 2017-05-01 LAB
ALBUMIN SERPL-MCNC: 3.9 G/DL (ref 3.4–5)
ALP SERPL-CCNC: 79 U/L (ref 40–150)
ALT SERPL W P-5'-P-CCNC: 19 U/L (ref 0–70)
ANION GAP SERPL CALCULATED.3IONS-SCNC: 10 MMOL/L (ref 3–14)
AST SERPL W P-5'-P-CCNC: 12 U/L (ref 0–45)
BILIRUB DIRECT SERPL-MCNC: <0.1 MG/DL (ref 0–0.2)
BILIRUB SERPL-MCNC: 0.6 MG/DL (ref 0.2–1.3)
BUN SERPL-MCNC: 43 MG/DL (ref 7–30)
CALCIUM SERPL-MCNC: 8.8 MG/DL (ref 8.5–10.1)
CHLORIDE SERPL-SCNC: 106 MMOL/L (ref 94–109)
CO2 SERPL-SCNC: 26 MMOL/L (ref 20–32)
CREAT SERPL-MCNC: 1.65 MG/DL (ref 0.66–1.25)
CREAT UR-MCNC: 88 MG/DL
ERYTHROCYTE [DISTWIDTH] IN BLOOD BY AUTOMATED COUNT: 14.4 % (ref 10–15)
GFR SERPL CREATININE-BSD FRML MDRD: 42 ML/MIN/1.7M2
GLUCOSE SERPL-MCNC: 114 MG/DL (ref 70–99)
HCT VFR BLD AUTO: 37.1 % (ref 40–53)
HGB BLD-MCNC: 12.5 G/DL (ref 13.3–17.7)
MCH RBC QN AUTO: 31.3 PG (ref 26.5–33)
MCHC RBC AUTO-ENTMCNC: 33.7 G/DL (ref 31.5–36.5)
MCV RBC AUTO: 93 FL (ref 78–100)
PHOSPHATE SERPL-MCNC: 3 MG/DL (ref 2.5–4.5)
PLATELET # BLD AUTO: 115 10E9/L (ref 150–450)
POTASSIUM SERPL-SCNC: 4.4 MMOL/L (ref 3.4–5.3)
PROT SERPL-MCNC: 7.1 G/DL (ref 6.8–8.8)
PROT UR-MCNC: 0.24 G/L
PROT/CREAT 24H UR: 0.28 G/G CR (ref 0–0.2)
RBC # BLD AUTO: 3.99 10E12/L (ref 4.4–5.9)
SODIUM SERPL-SCNC: 142 MMOL/L (ref 133–144)
WBC # BLD AUTO: 5.8 10E9/L (ref 4–11)

## 2017-05-01 PROCEDURE — 99213 OFFICE O/P EST LOW 20 MIN: CPT | Mod: ZF

## 2017-05-01 PROCEDURE — 84075 ASSAY ALKALINE PHOSPHATASE: CPT | Performed by: INTERNAL MEDICINE

## 2017-05-01 PROCEDURE — 85027 COMPLETE CBC AUTOMATED: CPT | Performed by: INTERNAL MEDICINE

## 2017-05-01 PROCEDURE — 36415 COLL VENOUS BLD VENIPUNCTURE: CPT | Performed by: INTERNAL MEDICINE

## 2017-05-01 PROCEDURE — 82248 BILIRUBIN DIRECT: CPT | Performed by: INTERNAL MEDICINE

## 2017-05-01 PROCEDURE — 84450 TRANSFERASE (AST) (SGOT): CPT | Performed by: INTERNAL MEDICINE

## 2017-05-01 PROCEDURE — 84460 ALANINE AMINO (ALT) (SGPT): CPT | Performed by: INTERNAL MEDICINE

## 2017-05-01 PROCEDURE — 80069 RENAL FUNCTION PANEL: CPT | Performed by: INTERNAL MEDICINE

## 2017-05-01 PROCEDURE — 82247 BILIRUBIN TOTAL: CPT | Performed by: INTERNAL MEDICINE

## 2017-05-01 PROCEDURE — 84156 ASSAY OF PROTEIN URINE: CPT | Performed by: INTERNAL MEDICINE

## 2017-05-01 PROCEDURE — 84155 ASSAY OF PROTEIN SERUM: CPT | Performed by: INTERNAL MEDICINE

## 2017-05-01 ASSESSMENT — PAIN SCALES - GENERAL: PAINLEVEL: NO PAIN (0)

## 2017-05-01 NOTE — MR AVS SNAPSHOT
After Visit Summary   5/1/2017    Eric Andrew    MRN: [id number]           Patient Information     Date Of Birth          1950        Visit Information        Provider Department      5/1/2017 4:30 PM Yusef Jose MD [facility] Nephrology         Follow-ups after your visit        Follow-up notes from your care team     Return in about 6 months (around 11/1/2017).      Your next 10 appointments already scheduled     Jun 02, 2017  9:10 AM CDT   (Arrive by 8:55 AM)   Return Liver Transplant with Katherine Jimenez MD   [facility] Hepatology ([facility])    [address] [place] [id number] [phone]            Nov 20, 2017  3:05 PM CST   (Arrive by 2:35 PM)   Return Visit with Erum Bonner MD   [facility] Nephrology ([facility])    [address] [place] [address] [id number] [phone]              Who to contact     If you have questions or need follow up information about today's clinic visit or your schedule please contact [facility] NEPHROLOGY directly at [phone].  Normal or non-critical lab and imaging results will be communicated to you by MyChart, letter or phone within 4 business days after the clinic has received the results. If you do not hear from us within 7 days, please contact the clinic through Anjukehart or phone. If you have a critical or abnormal lab result, we will notify you by phone as soon as possible.  Submit refill requests through Viibar or call your pharmacy and they will forward the refill request to us. Please allow 3 business days for your refill to be completed.          Additional Information About Your Visit        Anjukehart Information     Viibar gives you secure access to your electronic health record. If you see a primary care provider, you can also send messages to your care team and make appointments. If you have questions, please call your primary  "care clinic.  If you do not have a primary care provider, please call [phone] and they will assist you.        Care EveryWhere ID     This is your Care EveryWhere ID. This could be used by other organizations to access your [place] medical records  [id number]        Your Vitals Were     Pulse Temperature Height Pulse Oximetry BMI (Body Mass Index)       67 98.1  F (36.7  C) (Oral) 1.905 m (6' 3\") 98% 32.4 kg/m2        Blood Pressure from Last 3 Encounters:   05/01/17 133/82   12/30/16 (!) 167/95   12/19/16 162/83    Weight from Last 3 Encounters:   05/01/17 117.6 kg (259 lb 3.2 oz)   12/19/16 117.4 kg (258 lb 12.8 oz)   12/09/16 117.8 kg (259 lb 9.6 oz)              Today, you had the following     No orders found for display         Today's Medication Changes          These changes are accurate as of: 5/1/17  5:26 PM.  If you have any questions, ask your nurse or doctor.               Stop taking these medicines if you haven't already. Please contact your care team if you have questions.     Amlodipine Besylate-Valsartan 5-160 MG Tabs   Stopped by:  Yusef Jose MD           cholecalciferol 1000 UNITS capsule   Commonly known as:  vitamin  -D   Stopped by:  Yusef Jose MD                    Primary Care Provider Office Phone # Fax #    [place] [phone]       [facility] [address] [address]        Thank you!     Thank you for choosing [facility] NEPH[facility]OLOG[facility]  for your care. Our goal is always to provide you with excellent care. Hearing back from our patients is one way we can continue to improve our services. Please take a few minutes to complete the written survey that you may receive in the mail after your visit with us. Thank you!             Your Updated Medication List - Protect others around you: Learn how to safely use, store and throw away your medicines at www.disposemymeds.org.          This list is accurate as of: 5/1/17  5:26 PM.  " Always use your most recent med list.                   Brand Name Dispense Instructions for use    AMLODIPINE BESYLATE PO      Take 10 mg by mouth       CALCIUM + D PO      Take 1 tablet by mouth daily       hydrochlorothiazide 12.5 MG capsule    MICROZIDE    30 capsule    Take 2 capsules (25 mg) by mouth daily       LANTUS SOLOSTAR 100 UNIT/ML injection   Generic drug:  insulin glargine          losartan 25 MG tablet    COZAAR    90 tablet    Take 1 tablet (25 mg) by mouth daily       metFORMIN 1000 MG tablet    GLUCOPHAGE         MULTIVITAL Tabs          sildenafil 50 MG cap/tab    REVATIO/VIAGRA     Take 50 mg by mouth daily as needed for erectile dysfunction       tacrolimus capsule     150 capsule    Take 2 mg in the morning and 3 mg in the evening

## 2017-05-01 NOTE — LETTER
5/1/2017      RE: Eric Andrew  [address] [address] [facility] [address]       [facility]  NEPHROLOGY CLINIC FOLLOW UP VISIT NOTE    Date of encounter 5/1/2017        Assessment and Plan:  67 year old pleasant male with PMH of alpha 1 anti trypsin deficiency, cirrhosis status post liver transplant in 3/2015, HTN  and  CKD since  1/2015, recent diagnosis of Diabetes in 2/2017 with Hba1c of 11.5 in 2/2017 . Exact etiology unclear.  Most likely in the setting of hemodynamic effects from  underlying liver disease in the past  . Baseline serum Cr appears to be 1.4-1.7. Long term use of CNI also may be contributing. Last seen in Nephrology clinic in 12 /2016.     Recommendations :-    1. CKD stage 3b . Baseline serum Cr appears to be 1.4-1.7. Exact etiology of CKD unclear. Patient noted to have decreased kidney function since 1/2015. Worsening of kidney function noticed around transplant time. No RRT required in past. No hematuria or proteinuria in past. Underlying CKD most likely from hemodynamic  changes in the setting of liver disease and the use of CNI since transplant may also be contributing. Also from long standing HTN  Serum Cr 1.65 during clinic appointment.  Mild proteinuria of 0.28 g . No RBC or WBC in UA. Normal kidney imaging in March 2015 which rules out structural kidney disease.    - avoid nephrotoxins. Monitor serum tacrolimus levels closely.    - renal dosing of medications.    - normal SPEP,UPEP, serum and urine immunofixation and serum light chains.     2. BP :- good BP control.  Continue amlodipine 10 mg daily, HCTZ 25 mg daily and losartan 25 mg daily.     3. euvolemic on exam.     4. Serum K 4.4.     5. Elevated PTH of 168--> 123. Serum phosphorus 3.0 . Serum vitamin D 27.         6. Hb 12.5 . Normal iron stores.    Discussed with Dr Bonner. Follow up with Dr Bonner in 6 months. Patient to get renal panel, Hb, urine protein studies, vitamin D level.       Assessment and plan was  discussed with patient and he voiced his understanding and agreement.    Reason for Visit:  Mr. Andrew is here for follow up of CKD and HTN    HPI:     66 year old pleasant male with PMH of alpha 1 anti trypsin deficiency, cirrhosis status post liver transplant in 3/2015, HTN  referred to Nephrology clinic for CKD evaluation and management. Patient with CKD since  1/2015. Exact etiology unclear.  Most likely in the setting of hemodynamic effects from  underlying liver disease in the past  . Baseline serum Cr appears to be 1.4-1.7. Long term use of CNI also may be contributing. Last seen in Nephrology clinic in 12/2016.        Since last appointment in Nephrology clinic patient was started on losartan  for BP control. Patient was in [place] for winter and he had a hospitalization for symptomatic hyperglycemia and new diagnosis of diabetes.      patient reported feeling well. Good appetite. No nausea, vomiting or diarrhea. No SOB.       ROS:   A comprehensive review of systems was obtained and negative, except as noted in the HPI or PMH.    Active Medical Problems:  Patient Active Problem List   Diagnosis     Alpha-1-antitrypsin deficiency (H)     Anemia     Thrombocytopenia (H)     Liver replaced by transplant (H)     Immunosuppressed status (H)     EZEQUIEL (acute kidney injury) (H)     Edema     Hives     Hypomagnesemia       Personal Hx:  Social History     Social History     Marital status:      Spouse name: N/A     Number of children: N/A     Years of education: N/A     Occupational History     Not on file.     Social History Main Topics     Smoking status: Never Smoker     Smokeless tobacco: Never Used      Comment: [id number] occational smoker     Alcohol use 0.0 oz/week     0 Standard drinks or equivalent per week      Comment: 1 glass of wine per month     Drug use: No     Sexual activity: No     Other Topics Concern     Not on file     Social History Narrative       Allergies:  Allergies   Allergen  "Reactions     Lisinopril Cough     Meropenem Hives and Swelling     Developed hives and lip swelling while on meropenem and micafungin.  Resolved with discontinuation.  Unclear which was cause.     Micafungin Hives and Swelling     Developed hives and lip swelling while on meropenem and micafungin.  Resolved with discontinuation.  Unclear which was cause.       Medications:  Prior to Admission medications    Medication Sig Start Date End Date Taking? Authorizing Provider   losartan (COZAAR) 25 MG tablet Take 1 tablet (25 mg) by mouth daily 12/19/16  Yes Erum Bonner MD   PROGRAF 1 MG PO CAPSULE Take 2 mg in the morning and 3 mg in the evening 12/9/16  Yes Katherine Jimenez MD   hydrochlorothiazide (MICROZIDE) 12.5 MG capsule Take 2 capsules (25 mg) by mouth daily 9/27/16  Yes Katherine Jimenez MD   cholecalciferol (VITAMIN  -D) 1000 UNITS capsule Take 1 capsule (1,000 Units) by mouth daily 8/8/16  Yes Erum Bonner MD   Amlodipine Besylate-Valsartan 5-160 MG TABS Take 10 mg by mouth daily 3/16/16  Yes Reported, Patient   sildenafil (VIAGRA) 50 MG tablet Take 50 mg by mouth daily as needed for erectile dysfunction   Yes Reported, Patient   Calcium Carbonate-Vitamin D (CALCIUM + D PO) Take 1 tablet by mouth daily    Yes Reported, Patient   Multiple Vitamins-Minerals (MULTIVITAL) TABS    Yes Reported, Patient       Vitals:  /82 (BP Location: Left arm, Cuff Size: Adult Large)  Pulse 67  Temp 98.1  F (36.7  C) (Oral)  Ht 1.905 m (6' 3\")  Wt 117.6 kg (259 lb 3.2 oz)  SpO2 98%  BMI 32.4 kg/m2    Exam:   GENERAL APPEARANCE: alert and no distress  HENT: mouth without ulcers or lesions  LYMPHATICS: no cervical or supraclavicular nodes  RESP: lungs clear to auscultation - no rales, rhonchi or wheezes  CV: regular rhythm, normal rate, no rub, no murmur  EDEMA: no LE edema bilaterally  ABDOMEN: soft, nondistended, nontender, bowel sounds normal  MS: extremities normal - no gross " deformities noted, no evidence of inflammation in joints, no muscle tenderness  SKIN: no rash    Results:     Electrolytes/Renal -   Recent Labs   Lab Test  05/01/17   1531  12/30/16   1333  12/19/16   1339   08/06/16   0945   10/29/15   1129  09/22/15   0910  08/31/15   0905   NA  142  140  141   < >  142   < >  141  141  142   POTASSIUM  4.4  3.7  3.6   < >  4.7   < >  4.8  5.2  5.3   CHLORIDE  106  105  105   < >  109   < >  110*  110*  112*   CO2  26  27  28   < >  27   < >  25  26  25   BUN  43*  38*  38*   < >  35*   < >  46*  41*  43*   CR  1.65*  1.69*  1.75*   < >  1.50*   < >  1.56*  1.52*  1.66*   GLC  114*  206*  223*   < >  157*   < >  118*  95  129*   SHAYNE  8.8  8.9  9.0   < >  8.7   < >  8.6  8.9  8.4*   MAG   --    --    --    --    --    --   1.7  2.3  1.7   PHOS  3.0   --   2.8   --   2.3*   --   3.4  2.9  3.4    < > = values in this interval not displayed.       CBC -   Recent Labs   Lab Test  05/01/17   1531  12/19/16   1339  12/09/16   0823   WBC  5.8  5.4  4.8   HGB  12.5*  13.2*  13.8   PLT  115*  114*  119*       LFTs -   Recent Labs   Lab Test  05/01/17   1531  12/19/16   1339  12/09/16   0823  09/29/16   0928   ALKPHOS  79   --   100  92   BILITOTAL  0.6   --   0.5  0.5   ALT  19   --   23  20   AST  12   --   12  17   PROTTOTAL  7.1   --   7.2  7.3   ALBUMIN  3.9  3.7  3.8  3.9       Coags -   Recent Labs   Lab Test  04/30/15   0907  04/11/15   2000  04/06/15   0521   03/31/15   1250  03/31/15   1142   INR  1.28*  1.05  1.24*   < >  2.76*  3.04*   PTT   --   31   --    --   111*  62*    < > = values in this interval not displayed.       Iron Panel -   Recent Labs   Lab Test  08/06/16   0945  11/10/14   1051  10/30/14   1443   IRON  62  142  131   IRONSAT  29  98*  83*   RAINE  125  673*  905*       Endocrine -   Recent Labs   Lab Test  04/19/16   0852  04/01/15   0358  07/15/14   0711   A1C  5.6  Unable to Calculate  MICAELA MCGARRY IN U4D @0808 BY HN     --    TSH   --    --   3.66         Attestation:  This patient has been seen and evaluated by me, Erum Bonner MD on 5/1/17 .  Discussed with the fellow or resident and agree with the findings and plan in this note.     I have reviewed  Medications, Vital Signs and Labs.    Erum Bonner MD  [facility]  Department of Medicine  Division of Renal Disease and Hypertension  [phone]

## 2017-05-01 NOTE — NURSING NOTE
"Chief Complaint   Patient presents with     RECHECK     Kidney follow up       Initial /82 (BP Location: Left arm, Cuff Size: Adult Large)  Pulse 67  Temp 98.1  F (36.7  C) (Oral)  Ht 1.905 m (6' 3\")  Wt 117.6 kg (259 lb 3.2 oz)  SpO2 98%  BMI 32.4 kg/m2 Estimated body mass index is 32.4 kg/(m^2) as calculated from the following:    Height as of this encounter: 1.905 m (6' 3\").    Weight as of this encounter: 117.6 kg (259 lb 3.2 oz).  Medication Reconciliation: complete   JES JEAN CMA      "

## 2017-05-05 NOTE — PROGRESS NOTES
[facility]  NEPHROLOGY CLINIC FOLLOW UP VISIT NOTE    Date of encounter 5/1/2017        Assessment and Plan:  67 year old pleasant male with PMH of alpha 1 anti trypsin deficiency, cirrhosis status post liver transplant in 3/2015, HTN  and  CKD since  1/2015, recent diagnosis of Diabetes in 2/2017 with Hba1c of 11.5 in 2/2017 . Exact etiology unclear.  Most likely in the setting of hemodynamic effects from  underlying liver disease in the past  . Baseline serum Cr appears to be 1.4-1.7. Long term use of CNI also may be contributing. Last seen in Nephrology clinic in 12 /2016.     Recommendations :-    1. CKD stage 3b . Baseline serum Cr appears to be 1.4-1.7. Exact etiology of CKD unclear. Patient noted to have decreased kidney function since 1/2015. Worsening of kidney function noticed around transplant time. No RRT required in past. No hematuria or proteinuria in past. Underlying CKD most likely from hemodynamic  changes in the setting of liver disease and the use of CNI since transplant may also be contributing. Also from long standing HTN  Serum Cr 1.65 during clinic appointment.  Mild proteinuria of 0.28 g . No RBC or WBC in UA. Normal kidney imaging in March 2015 which rules out structural kidney disease.    - avoid nephrotoxins. Monitor serum tacrolimus levels closely.    - renal dosing of medications.    - normal SPEP,UPEP, serum and urine immunofixation and serum light chains.     2. BP :- good BP control.  Continue amlodipine 10 mg daily, HCTZ 25 mg daily and losartan 25 mg daily.     3. euvolemic on exam.     4. Serum K 4.4.     5. Elevated PTH of 168--> 123. Serum phosphorus 3.0 . Serum vitamin D 27.         6. Hb 12.5 . Normal iron stores.    Discussed with Dr Bonner. Follow up with Dr Bonner in 6 months. Patient to get renal panel, Hb, urine protein studies, vitamin D level.       Assessment and plan was discussed with patient and he voiced his understanding and agreement.    Reason  for Visit:  Mr. Andrew is here for follow up of CKD and HTN    HPI:     66 year old pleasant male with PMH of alpha 1 anti trypsin deficiency, cirrhosis status post liver transplant in 3/2015, HTN  referred to Nephrology clinic for CKD evaluation and management. Patient with CKD since  1/2015. Exact etiology unclear.  Most likely in the setting of hemodynamic effects from  underlying liver disease in the past  . Baseline serum Cr appears to be 1.4-1.7. Long term use of CNI also may be contributing. Last seen in Nephrology clinic in 12/2016.        Since last appointment in Nephrology clinic patient was started on losartan  for BP control. Patient was in [place] for winter and he had a hospitalization for symptomatic hyperglycemia and new diagnosis of diabetes.      patient reported feeling well. Good appetite. No nausea, vomiting or diarrhea. No SOB.       ROS:   A comprehensive review of systems was obtained and negative, except as noted in the HPI or PMH.    Active Medical Problems:  Patient Active Problem List   Diagnosis     Alpha-1-antitrypsin deficiency (H)     Anemia     Thrombocytopenia (H)     Liver replaced by transplant (H)     Immunosuppressed status (H)     EZEQUIEL (acute kidney injury) (H)     Edema     Hives     Hypomagnesemia       Personal Hx:  Social History     Social History     Marital status:      Spouse name: N/A     Number of children: N/A     Years of education: N/A     Occupational History     Not on file.     Social History Main Topics     Smoking status: Never Smoker     Smokeless tobacco: Never Used      Comment: [id number] occational smoker     Alcohol use 0.0 oz/week     0 Standard drinks or equivalent per week      Comment: 1 glass of wine per month     Drug use: No     Sexual activity: No     Other Topics Concern     Not on file     Social History Narrative       Allergies:  Allergies   Allergen Reactions     Lisinopril Cough     Meropenem Hives and Swelling     Developed hives  "and lip swelling while on meropenem and micafungin.  Resolved with discontinuation.  Unclear which was cause.     Micafungin Hives and Swelling     Developed hives and lip swelling while on meropenem and micafungin.  Resolved with discontinuation.  Unclear which was cause.       Medications:  Prior to Admission medications    Medication Sig Start Date End Date Taking? Authorizing Provider   losartan (COZAAR) 25 MG tablet Take 1 tablet (25 mg) by mouth daily 12/19/16  Yes Erum Bonner MD   PROGRAF 1 MG PO CAPSULE Take 2 mg in the morning and 3 mg in the evening 12/9/16  Yes Katherine Jimenez MD   hydrochlorothiazide (MICROZIDE) 12.5 MG capsule Take 2 capsules (25 mg) by mouth daily 9/27/16  Yes Katherine Jimenez MD   cholecalciferol (VITAMIN  -D) 1000 UNITS capsule Take 1 capsule (1,000 Units) by mouth daily 8/8/16  Yes Erum Bonner MD   Amlodipine Besylate-Valsartan 5-160 MG TABS Take 10 mg by mouth daily 3/16/16  Yes Reported, Patient   sildenafil (VIAGRA) 50 MG tablet Take 50 mg by mouth daily as needed for erectile dysfunction   Yes Reported, Patient   Calcium Carbonate-Vitamin D (CALCIUM + D PO) Take 1 tablet by mouth daily    Yes Reported, Patient   Multiple Vitamins-Minerals (MULTIVITAL) TABS    Yes Reported, Patient       Vitals:  /82 (BP Location: Left arm, Cuff Size: Adult Large)  Pulse 67  Temp 98.1  F (36.7  C) (Oral)  Ht 1.905 m (6' 3\")  Wt 117.6 kg (259 lb 3.2 oz)  SpO2 98%  BMI 32.4 kg/m2    Exam:   GENERAL APPEARANCE: alert and no distress  HENT: mouth without ulcers or lesions  LYMPHATICS: no cervical or supraclavicular nodes  RESP: lungs clear to auscultation - no rales, rhonchi or wheezes  CV: regular rhythm, normal rate, no rub, no murmur  EDEMA: no LE edema bilaterally  ABDOMEN: soft, nondistended, nontender, bowel sounds normal  MS: extremities normal - no gross deformities noted, no evidence of inflammation in joints, no muscle tenderness  SKIN: " no rash    Results:     Electrolytes/Renal -   Recent Labs   Lab Test  05/01/17   1531  12/30/16   1333  12/19/16   1339   08/06/16   0945   10/29/15   1129  09/22/15   0910  08/31/15   0905   NA  142  140  141   < >  142   < >  141  141  142   POTASSIUM  4.4  3.7  3.6   < >  4.7   < >  4.8  5.2  5.3   CHLORIDE  106  105  105   < >  109   < >  110*  110*  112*   CO2  26  27  28   < >  27   < >  25  26  25   BUN  43*  38*  38*   < >  35*   < >  46*  41*  43*   CR  1.65*  1.69*  1.75*   < >  1.50*   < >  1.56*  1.52*  1.66*   GLC  114*  206*  223*   < >  157*   < >  118*  95  129*   SHAYNE  8.8  8.9  9.0   < >  8.7   < >  8.6  8.9  8.4*   MAG   --    --    --    --    --    --   1.7  2.3  1.7   PHOS  3.0   --   2.8   --   2.3*   --   3.4  2.9  3.4    < > = values in this interval not displayed.       CBC -   Recent Labs   Lab Test  05/01/17   1531  12/19/16   1339  12/09/16   0823   WBC  5.8  5.4  4.8   HGB  12.5*  13.2*  13.8   PLT  115*  114*  119*       LFTs -   Recent Labs   Lab Test  05/01/17   1531  12/19/16   1339  12/09/16   0823  09/29/16   0928   ALKPHOS  79   --   100  92   BILITOTAL  0.6   --   0.5  0.5   ALT  19   --   23  20   AST  12   --   12  17   PROTTOTAL  7.1   --   7.2  7.3   ALBUMIN  3.9  3.7  3.8  3.9       Coags -   Recent Labs   Lab Test  04/30/15   0907  04/11/15   2000  04/06/15   0521   03/31/15   1250  03/31/15   1142   INR  1.28*  1.05  1.24*   < >  2.76*  3.04*   PTT   --   31   --    --   111*  62*    < > = values in this interval not displayed.       Iron Panel -   Recent Labs   Lab Test  08/06/16   0945  11/10/14   1051  10/30/14   1443   IRON  62  142  131   IRONSAT  29  98*  83*   RAINE  125  673*  905*       Endocrine -   Recent Labs   Lab Test  04/19/16   0852  04/01/15   0358  07/15/14   0711   A1C  5.6  Unable to Calculate  MICAELA VÁZQUEZELISSA IN U4D @0808 BY HN     --    TSH   --    --   3.66        Attestation:  This patient has been seen and evaluated by me, Erum Bonner MD on  5/1/17 .  Discussed with the fellow or resident and agree with the findings and plan in this note.     I have reviewed  Medications, Vital Signs and Labs.    Erum Bonner MD  [facility]  Department of Medicine  Division of Renal Disease and Hypertension  [phone]

## 2017-05-24 DIAGNOSIS — I10 BENIGN ESSENTIAL HYPERTENSION: ICD-10-CM

## 2017-05-24 DIAGNOSIS — N18.30 CKD (CHRONIC KIDNEY DISEASE) STAGE 3, GFR 30-59 ML/MIN (H): ICD-10-CM

## 2017-05-24 RX ORDER — LOSARTAN POTASSIUM 25 MG/1
25 TABLET ORAL DAILY
Qty: 90 TABLET | Refills: 3 | Status: SHIPPED | OUTPATIENT
Start: 2017-05-24 | End: 2018-06-29

## 2017-05-24 NOTE — TELEPHONE ENCOUNTER
Last Office Visit with Nephrologist:  5/1/17.  Medication refilled per Nephrology Clinic protocol.     Arianna Real RN

## 2017-06-01 ENCOUNTER — TELEPHONE (OUTPATIENT)
Dept: TRANSPLANT | Facility: CLINIC | Age: 67
End: 2017-06-01

## 2017-06-01 DIAGNOSIS — Z94.4 LIVER REPLACED BY TRANSPLANT (H): Primary | ICD-10-CM

## 2017-06-01 DIAGNOSIS — R73.9 HYPERGLYCEMIA: ICD-10-CM

## 2017-06-01 NOTE — PROGRESS NOTES
"S: 67 year old male s/p DDLT 3/31/15 for alpha-1-antitrypsin deficiency  EXPLANT: NO HCC.  IS: Prograf. Kidney function has not been normal, but has been stable. Has mild proteinuria.  LABS: Up to date. Liver tests look great. ALT 19, AST 12. Aphos 79.   REJECTION: None.  BILIARY ISSUES: None  STENT: Removed 5/28/15 by endoscopy  KIDNEY FUNCTION:  Sees Dr. Bonner. Elevated creatinine since just prior to LT  Creatinine   Date Value Ref Range Status   05/01/2017 1.65 (H) 0.66 - 1.25 mg/dL Final     Creatinine   Date Value Ref Range Status   06/02/2017 1.53 (H) 0.66 - 1.25 mg/dL Final   ]  BP: Good. Amlodipine. Managed by PCP and nephrology. BP at home in 130s.  PREV: UTD on screening (colonoscopy 2014, derm within last year, has h/o melanoma)  DISEASE RECURRENCE: N/A  OTHER ISSUES: Weight gain.  New dx of diabetes on insulin and metformin. Has diarrhea in am about 1-2 times per week. 4-5 BM per day.    SOC: Here with wife. They continue with traveling. They are going to [facility] and [place] by train.   ROS: 10 point ROS neg other than the symptoms noted above in the HPI.    O  Vitals: /82  Pulse 60  Temp 97.9  F (36.6  C) (Oral)  Ht 1.905 m (6' 3\")  Wt 118.3 kg (260 lb 12.8 oz)  SpO2 97%  BMI 32.6 kg/m2  BMI= Body mass index is 32.6 kg/(m^2).GENERAL:  Very pleasant, well-appearing, in no acute distress.    HEENT:  No icterus, no oral lesions.    LYMPH:  No supraclavicular or cervical lymphadenopathy.    CARDIOVASCULAR:  Regular rate and rhythm.    CHEST:  Lungs are clear.    ABDOMEN:  Bowel sounds are present.  Abdomen is soft, nontender, nondistended.  Scar is well healed.      EXTREMITIES:  No edema.    SKIN:  No rash.    NEUROLOGIC:  Speech is fluent and clear.  No asterixis or tremor.      Creatinine   Date Value Ref Range Status   05/01/2017 1.65 (H) 0.66 - 1.25 mg/dL Final   ]   Lab Results   Component Value Date    BILITOTAL 0.6 05/01/2017    BILITOTAL 0.5 12/09/2016    BILITOTAL 0.5 09/29/2016 "    BILITOTAL 0.5 08/06/2016    BILITOTAL 0.5 06/09/2016      Lab Results   Component Value Date    ALT 19 05/01/2017      Lab Results   Component Value Date    ALBUMIN 3.9 05/01/2017       Hemoglobin   Date Value Ref Range Status   05/01/2017 12.5 (L) 13.3 - 17.7 g/dL Final   ]    Current Outpatient Prescriptions   Medication     losartan (COZAAR) 25 MG tablet     insulin glargine (LANTUS SOLOSTAR) 100 UNIT/ML injection     metFORMIN (GLUCOPHAGE) 1000 MG tablet     AMLODIPINE BESYLATE PO     PROGRAF 1 MG PO CAPSULE     hydrochlorothiazide (MICROZIDE) 12.5 MG capsule     sildenafil (VIAGRA) 50 MG tablet     Calcium Carbonate-Vitamin D (CALCIUM + D PO)     Multiple Vitamins-Minerals (MULTIVITAL) TABS     No current facility-administered medications for this visit.          A/P  67 year old male s/p LT 2015 for A1AT.   Medically doing well.     CKD, stable. No changes to IS as I do not think we will have much movement given creat elevated before LT and has been stable for all of this time. Will keep tac at around 5.    No changes to medications today. Labs up to date as is cancer screening.   Labs every 3 months.  We discussed long-term complications of liver transplantation/immunosuppression, including kidney disease, cardiovascular disease, DM, HTN, and skin cancer, as well as recommendations for cancer screening. Will see back in 1 year.   This was a 25 minute visit, over 50% counseling and coordination of care.

## 2017-06-01 NOTE — TELEPHONE ENCOUNTER
Call from Eric - he will be coming in for an appt w/ Dr. Jimenez tomorrow, wants to have labs tomorrow morning.  Megha, please place standing order for the following labs:  Metabolic panel, CBCp, hepatic panel, prograf level q month.  Tomorrow he also needs:  Lipid panel, UA, urine protein creat ratio and Hgb A1C.

## 2017-06-02 ENCOUNTER — TELEPHONE (OUTPATIENT)
Dept: TRANSPLANT | Facility: CLINIC | Age: 67
End: 2017-06-02

## 2017-06-02 ENCOUNTER — OFFICE VISIT (OUTPATIENT)
Dept: GASTROENTEROLOGY | Facility: CLINIC | Age: 67
End: 2017-06-02
Attending: INTERNAL MEDICINE
Payer: MEDICARE

## 2017-06-02 VITALS
WEIGHT: 260.8 LBS | TEMPERATURE: 97.9 F | HEART RATE: 60 BPM | BODY MASS INDEX: 32.43 KG/M2 | SYSTOLIC BLOOD PRESSURE: 159 MMHG | DIASTOLIC BLOOD PRESSURE: 82 MMHG | OXYGEN SATURATION: 97 % | HEIGHT: 75 IN

## 2017-06-02 DIAGNOSIS — Z94.4 LIVER TRANSPLANTED (H): ICD-10-CM

## 2017-06-02 DIAGNOSIS — Z79.52 LONG TERM CURRENT USE OF SYSTEMIC STEROIDS: ICD-10-CM

## 2017-06-02 DIAGNOSIS — K76.9 CHRONIC LIVER DISEASE: Primary | ICD-10-CM

## 2017-06-02 DIAGNOSIS — Z94.4 LIVER REPLACED BY TRANSPLANT (H): ICD-10-CM

## 2017-06-02 DIAGNOSIS — Z94.4 LIVER REPLACED BY TRANSPLANT (H): Primary | ICD-10-CM

## 2017-06-02 DIAGNOSIS — R73.9 HYPERGLYCEMIA: ICD-10-CM

## 2017-06-02 DIAGNOSIS — D84.9 IMMUNOSUPPRESSED STATUS (H): ICD-10-CM

## 2017-06-02 LAB
ALBUMIN SERPL-MCNC: 3.8 G/DL (ref 3.4–5)
ALBUMIN UR-MCNC: 30 MG/DL
ALP SERPL-CCNC: 80 U/L (ref 40–150)
ALT SERPL W P-5'-P-CCNC: 17 U/L (ref 0–70)
ANION GAP SERPL CALCULATED.3IONS-SCNC: 7 MMOL/L (ref 3–14)
APPEARANCE UR: CLEAR
AST SERPL W P-5'-P-CCNC: 13 U/L (ref 0–45)
BILIRUB DIRECT SERPL-MCNC: <0.1 MG/DL (ref 0–0.2)
BILIRUB SERPL-MCNC: 0.4 MG/DL (ref 0.2–1.3)
BILIRUB UR QL STRIP: NEGATIVE
BUN SERPL-MCNC: 34 MG/DL (ref 7–30)
CALCIUM SERPL-MCNC: 8.7 MG/DL (ref 8.5–10.1)
CHLORIDE SERPL-SCNC: 107 MMOL/L (ref 94–109)
CHOLEST SERPL-MCNC: 153 MG/DL
CO2 SERPL-SCNC: 27 MMOL/L (ref 20–32)
COLOR UR AUTO: YELLOW
CREAT SERPL-MCNC: 1.53 MG/DL (ref 0.66–1.25)
CREAT UR-MCNC: 117 MG/DL
ERYTHROCYTE [DISTWIDTH] IN BLOOD BY AUTOMATED COUNT: 14.6 % (ref 10–15)
GFR SERPL CREATININE-BSD FRML MDRD: 46 ML/MIN/1.7M2
GLUCOSE SERPL-MCNC: 131 MG/DL (ref 70–99)
GLUCOSE UR STRIP-MCNC: NEGATIVE MG/DL
HBA1C MFR BLD: 5.5 % (ref 4.3–6)
HCT VFR BLD AUTO: 38.8 % (ref 40–53)
HDLC SERPL-MCNC: 36 MG/DL
HGB BLD-MCNC: 13 G/DL (ref 13.3–17.7)
HGB UR QL STRIP: NEGATIVE
HYALINE CASTS #/AREA URNS LPF: 1 /LPF (ref 0–2)
KETONES UR STRIP-MCNC: NEGATIVE MG/DL
LDLC SERPL CALC-MCNC: 89 MG/DL
LEUKOCYTE ESTERASE UR QL STRIP: NEGATIVE
MCH RBC QN AUTO: 30.9 PG (ref 26.5–33)
MCHC RBC AUTO-ENTMCNC: 33.5 G/DL (ref 31.5–36.5)
MCV RBC AUTO: 92 FL (ref 78–100)
MUCOUS THREADS #/AREA URNS LPF: PRESENT /LPF
NITRATE UR QL: NEGATIVE
NONHDLC SERPL-MCNC: 118 MG/DL
PH UR STRIP: 5 PH (ref 5–7)
PLATELET # BLD AUTO: 106 10E9/L (ref 150–450)
POTASSIUM SERPL-SCNC: 4.2 MMOL/L (ref 3.4–5.3)
PROT SERPL-MCNC: 7.3 G/DL (ref 6.8–8.8)
PROT UR-MCNC: 0.33 G/L
PROT/CREAT 24H UR: 0.28 G/G CR (ref 0–0.2)
RBC # BLD AUTO: 4.21 10E12/L (ref 4.4–5.9)
RBC #/AREA URNS AUTO: 0 /HPF (ref 0–2)
SODIUM SERPL-SCNC: 141 MMOL/L (ref 133–144)
SP GR UR STRIP: 1.02 (ref 1–1.03)
TACROLIMUS BLD-MCNC: 5.3 UG/L (ref 5–15)
TME LAST DOSE: NORMAL H
TRIGL SERPL-MCNC: 144 MG/DL
URN SPEC COLLECT METH UR: ABNORMAL
UROBILINOGEN UR STRIP-MCNC: 0 MG/DL (ref 0–2)
WBC # BLD AUTO: 4.8 10E9/L (ref 4–11)
WBC #/AREA URNS AUTO: <1 /HPF (ref 0–2)

## 2017-06-02 PROCEDURE — 99212 OFFICE O/P EST SF 10 MIN: CPT | Mod: ZF

## 2017-06-02 PROCEDURE — 80076 HEPATIC FUNCTION PANEL: CPT | Performed by: INTERNAL MEDICINE

## 2017-06-02 PROCEDURE — 80197 ASSAY OF TACROLIMUS: CPT | Performed by: INTERNAL MEDICINE

## 2017-06-02 PROCEDURE — 80048 BASIC METABOLIC PNL TOTAL CA: CPT | Performed by: INTERNAL MEDICINE

## 2017-06-02 PROCEDURE — 81001 URINALYSIS AUTO W/SCOPE: CPT | Performed by: INTERNAL MEDICINE

## 2017-06-02 PROCEDURE — 85027 COMPLETE CBC AUTOMATED: CPT | Performed by: INTERNAL MEDICINE

## 2017-06-02 PROCEDURE — 36415 COLL VENOUS BLD VENIPUNCTURE: CPT | Performed by: INTERNAL MEDICINE

## 2017-06-02 PROCEDURE — 84156 ASSAY OF PROTEIN URINE: CPT | Performed by: INTERNAL MEDICINE

## 2017-06-02 PROCEDURE — 83036 HEMOGLOBIN GLYCOSYLATED A1C: CPT | Performed by: INTERNAL MEDICINE

## 2017-06-02 PROCEDURE — 80061 LIPID PANEL: CPT | Performed by: INTERNAL MEDICINE

## 2017-06-02 ASSESSMENT — PAIN SCALES - GENERAL: PAINLEVEL: NO PAIN (0)

## 2017-06-02 NOTE — LETTER
"6/2/2017      RE: Eric Andrew  [address] [facility] [address]       S: 67 year old male s/p DDLT 3/31/15 for alpha-1-antitrypsin deficiency  EXPLANT: NO HCC.  IS: Prograf. Kidney function has not been normal, but has been stable. Has mild proteinuria.  LABS: Up to date. Liver tests look great. ALT 19, AST 12. Aphos 79.   REJECTION: None.  BILIARY ISSUES: None  STENT: Removed 5/28/15 by endoscopy  KIDNEY FUNCTION:  Sees Dr. Bonner. Elevated creatinine since just prior to LT  Creatinine   Date Value Ref Range Status   05/01/2017 1.65 (H) 0.66 - 1.25 mg/dL Final     Creatinine   Date Value Ref Range Status   06/02/2017 1.53 (H) 0.66 - 1.25 mg/dL Final   ]  BP: Good. Amlodipine. Managed by PCP and nephrology. BP at home in 130s.  PREV: UTD on screening (colonoscopy 2014, derm within last year, has h/o melanoma)  DISEASE RECURRENCE: N/A  OTHER ISSUES: Weight gain.  New dx of diabetes on insulin and metformin. Has diarrhea in am about 1-2 times per week. 4-5 BM per day.    SOC: Here with wife. They continue with traveling. They are going to [facility] and [place] by train.   ROS: 10 point ROS neg other than the symptoms noted above in the HPI.    O  Vitals: /82  Pulse 60  Temp 97.9  F (36.6  C) (Oral)  Ht 1.905 m (6' 3\")  Wt 118.3 kg (260 lb 12.8 oz)  SpO2 97%  BMI 32.6 kg/m2  BMI= Body mass index is 32.6 kg/(m^2).GENERAL:  Very pleasant, well-appearing, in no acute distress.    HEENT:  No icterus, no oral lesions.    LYMPH:  No supraclavicular or cervical lymphadenopathy.    CARDIOVASCULAR:  Regular rate and rhythm.    CHEST:  Lungs are clear.    ABDOMEN:  Bowel sounds are present.  Abdomen is soft, nontender, nondistended.  Scar is well healed.      EXTREMITIES:  No edema.    SKIN:  No rash.    NEUROLOGIC:  Speech is fluent and clear.  No asterixis or tremor.      Creatinine   Date Value Ref Range Status   05/01/2017 1.65 (H) 0.66 - 1.25 mg/dL Final   ]   Lab Results   Component Value Date    " BILITOTAL 0.6 05/01/2017    BILITOTAL 0.5 12/09/2016    BILITOTAL 0.5 09/29/2016    BILITOTAL 0.5 08/06/2016    BILITOTAL 0.5 06/09/2016      Lab Results   Component Value Date    ALT 19 05/01/2017      Lab Results   Component Value Date    ALBUMIN 3.9 05/01/2017       Hemoglobin   Date Value Ref Range Status   05/01/2017 12.5 (L) 13.3 - 17.7 g/dL Final   ]    Current Outpatient Prescriptions   Medication     losartan (COZAAR) 25 MG tablet     insulin glargine (LANTUS SOLOSTAR) 100 UNIT/ML injection     metFORMIN (GLUCOPHAGE) 1000 MG tablet     AMLODIPINE BESYLATE PO     PROGRAF 1 MG PO CAPSULE     hydrochlorothiazide (MICROZIDE) 12.5 MG capsule     sildenafil (VIAGRA) 50 MG tablet     Calcium Carbonate-Vitamin D (CALCIUM + D PO)     Multiple Vitamins-Minerals (MULTIVITAL) TABS     No current facility-administered medications for this visit.          A/P  67 year old male s/p LT 2015 for A1AT.   Medically doing well.     CKD, stable. No changes to IS as I do not think we will have much movement given creat elevated before LT and has been stable for all of this time. Will keep tac at around 5.    No changes to medications today. Labs up to date as is cancer screening.   Labs every 3 months.  We discussed long-term complications of liver transplantation/immunosuppression, including kidney disease, cardiovascular disease, DM, HTN, and skin cancer, as well as recommendations for cancer screening. Will see back in 1 year.   This was a 25 minute visit, over 50% counseling and coordination of care.     Katherine Jimenez MD

## 2017-06-02 NOTE — TELEPHONE ENCOUNTER
Per Dr. Jimenez, Eric is due to for repeat DEXA scan due to liver disease, post liver transplant, long term use of medications, use of steroids.  Order placed.  LM for Eric asking him to call and schedule

## 2017-06-02 NOTE — MR AVS SNAPSHOT
After Visit Summary   6/2/2017    Eric Andrew    MRN: [id number]           Patient Information     Date Of Birth          1950        Visit Information        Provider Department      6/2/2017 9:10 AM Katherine Jimenez MD [facility] Hepatology        Today's Diagnoses     Liver replaced by transplant (H)    -  1    Immunosuppressed status (H)           Follow-ups after your visit        Follow-up notes from your care team     Return in about 1 year (around 6/2/2018).      Your next 10 appointments already scheduled     Nov 20, 2017  3:05 PM CST   (Arrive by 2:35 PM)   Return Visit with Erum Bonner MD   [facility] Nephrology ([facility] [place])    [address]  [address] [facility] [address] [phone]              Future tests that were ordered for you today     Open Standing Orders        Priority Remaining Interval Expires Ordered    Basic metabolic panel Routine 11/12 Monthly 6/1/2018 6/1/2017    CBC with platelets Routine 11/12 Monthly 6/1/2018 6/1/2017    Hepatic panel Routine 11/12 Monthly 6/1/2018 6/1/2017    Tacrolimus level Routine 11/12 Monthly 6/1/2018 6/1/2017            Who to contact     If you have questions or need follow up information about today's clinic visit or your schedule please contact [facility] HEPATOLOGY directly at [phone].  Normal or non-critical lab and imaging results will be communicated to you by MyChart, letter or phone within 4 business days after the clinic has received the results. If you do not hear from us within 7 days, please contact the clinic through MyChart or phone. If you have a critical or abnormal lab result, we will notify you by phone as soon as possible.  Submit refill requests through [employer] or call your pharmacy and they will forward the refill request to us. Please allow 3 business days for your refill to be completed.          Additional Information About Your Visit        MyChart  "Information     [employer] gives you secure access to your electronic health record. If you see a primary care provider, you can also send messages to your care team and make appointments. If you have questions, please call your primary care clinic.  If you do not have a primary care provider, please call [phone] and they will assist you.        Care EveryWhere ID     This is your Care EveryWhere ID. This could be used by other organizations to access your [place] medical records  [id number]        Your Vitals Were     Pulse Temperature Height Pulse Oximetry BMI (Body Mass Index)       60 97.9  F (36.6  C) (Oral) 1.905 m (6' 3\") 97% 32.6 kg/m2        Blood Pressure from Last 3 Encounters:   06/02/17 159/82   05/01/17 133/82   12/30/16 (!) 167/95    Weight from Last 3 Encounters:   06/02/17 118.3 kg (260 lb 12.8 oz)   05/01/17 117.6 kg (259 lb 3.2 oz)   12/19/16 117.4 kg (258 lb 12.8 oz)              Today, you had the following     No orders found for display       Primary Care Provider Office Phone # Fax #    A[facility] Devine [phone]       [facility] [address] [address]        Thank you!     Thank you for choosing [facility] HEPATOLOGY  for your care. Our goal is always to provide you with excellent care. Hearing back from our patients is one way we can continue to improve our services. Please take a few minutes to complete the written survey that you may receive in the mail after your visit with us. Thank you!             Your Updated Medication List - Protect others around you: Learn how to safely use, store and throw away your medicines at www.disposemymeds.org.          This list is accurate as of: 6/2/17 10:24 AM.  Always use your most recent med list.                   Brand Name Dispense Instructions for use    AMLODIPINE BESYLATE PO      Take 10 mg by mouth       CALCIUM + D PO      Take 1 tablet by mouth daily       hydrochlorothiazide 12.5 MG capsule "    MICROZIDE    30 capsule    Take 2 capsules (25 mg) by mouth daily       LANTUS SOLOSTAR 100 UNIT/ML injection   Generic drug:  insulin glargine          losartan 25 MG tablet    COZAAR    90 tablet    Take 1 tablet (25 mg) by mouth daily       metFORMIN 1000 MG tablet    GLUCOPHAGE         MULTIVITAL Tabs          sildenafil 50 MG cap/tab    REVATIO/VIAGRA     Take 50 mg by mouth daily as needed for erectile dysfunction       tacrolimus capsule     150 capsule    Take 2 mg in the morning and 3 mg in the evening

## 2017-06-02 NOTE — NURSING NOTE
Chief Complaint   Patient presents with     RECHECK     Post Liver TXP   Pt roomed, vitals, meds, and allergies reviewed with pt. Pt ready for provider.  Brian Pope, CMA

## 2017-08-18 DIAGNOSIS — Z94.4 LIVER TRANSPLANTED (H): ICD-10-CM

## 2017-08-18 RX ORDER — TACROLIMUS 1 MG/1
2 CAPSULE, GELATIN COATED ORAL 2 TIMES DAILY
Qty: 120 CAPSULE | Refills: 11 | Status: SHIPPED | OUTPATIENT
Start: 2017-08-18 | End: 2018-10-08

## 2017-08-18 NOTE — TELEPHONE ENCOUNTER
Called pt, says his Prograf was changed to 2 mg bid after his last labs in June. Will refill for this and pt will check labs in a couple weeks.

## 2017-08-18 NOTE — TELEPHONE ENCOUNTER
Patient states new dose is 2mg bid, please send new rx    Thank you    Agata Lorenzo   [facility]  [phone]

## 2017-08-29 ENCOUNTER — HOSPITAL ENCOUNTER (OUTPATIENT)
Dept: LAB | Facility: CLINIC | Age: 67
Discharge: HOME | End: 2017-08-29
Attending: INTERNAL MEDICINE | Admitting: INTERNAL MEDICINE
Payer: MEDICARE

## 2017-08-29 DIAGNOSIS — Z94.4 LIVER REPLACED BY TRANSPLANT (H): ICD-10-CM

## 2017-08-29 LAB
ALBUMIN SERPL-MCNC: 3.9 G/DL (ref 3.4–5)
ALP SERPL-CCNC: 83 U/L (ref 40–150)
ALT SERPL W P-5'-P-CCNC: 23 U/L (ref 0–70)
ANION GAP SERPL CALCULATED.3IONS-SCNC: 6 MMOL/L (ref 3–14)
AST SERPL W P-5'-P-CCNC: 13 U/L (ref 0–45)
BILIRUB DIRECT SERPL-MCNC: 0.1 MG/DL (ref 0–0.2)
BILIRUB SERPL-MCNC: 0.4 MG/DL (ref 0.2–1.3)
BUN SERPL-MCNC: 32 MG/DL (ref 7–30)
CALCIUM SERPL-MCNC: 8.8 MG/DL (ref 8.5–10.1)
CHLORIDE SERPL-SCNC: 106 MMOL/L (ref 94–109)
CO2 SERPL-SCNC: 27 MMOL/L (ref 20–32)
CREAT SERPL-MCNC: 1.63 MG/DL (ref 0.66–1.25)
ERYTHROCYTE [DISTWIDTH] IN BLOOD BY AUTOMATED COUNT: 14.8 % (ref 10–15)
GFR SERPL CREATININE-BSD FRML MDRD: 42 ML/MIN/1.7M2
GLUCOSE SERPL-MCNC: 133 MG/DL (ref 70–99)
HCT VFR BLD AUTO: 39.5 % (ref 40–53)
HGB BLD-MCNC: 13.5 G/DL (ref 13.3–17.7)
MCH RBC QN AUTO: 31.8 PG (ref 26.5–33)
MCHC RBC AUTO-ENTMCNC: 34.2 G/DL (ref 31.5–36.5)
MCV RBC AUTO: 93 FL (ref 78–100)
PLATELET # BLD AUTO: 116 10E9/L (ref 150–450)
POTASSIUM SERPL-SCNC: 4.2 MMOL/L (ref 3.4–5.3)
PROT SERPL-MCNC: 7.4 G/DL (ref 6.8–8.8)
RBC # BLD AUTO: 4.25 10E12/L (ref 4.4–5.9)
SODIUM SERPL-SCNC: 139 MMOL/L (ref 133–144)
TACROLIMUS BLD-MCNC: 4.5 UG/L (ref 5–15)
TME LAST DOSE: ABNORMAL H
WBC # BLD AUTO: 5.3 10E9/L (ref 4–11)

## 2017-08-29 PROCEDURE — 80076 HEPATIC FUNCTION PANEL: CPT | Performed by: INTERNAL MEDICINE

## 2017-08-29 PROCEDURE — 85027 COMPLETE CBC AUTOMATED: CPT | Performed by: INTERNAL MEDICINE

## 2017-08-29 PROCEDURE — 80048 BASIC METABOLIC PNL TOTAL CA: CPT | Performed by: INTERNAL MEDICINE

## 2017-08-29 PROCEDURE — 36415 COLL VENOUS BLD VENIPUNCTURE: CPT | Performed by: INTERNAL MEDICINE

## 2017-08-29 PROCEDURE — 80197 ASSAY OF TACROLIMUS: CPT | Performed by: INTERNAL MEDICINE

## 2017-10-31 ENCOUNTER — HOSPITAL ENCOUNTER (OUTPATIENT)
Dept: LAB | Facility: CLINIC | Age: 67
Discharge: HOME | End: 2017-10-31
Attending: INTERNAL MEDICINE | Admitting: INTERNAL MEDICINE
Payer: MEDICARE

## 2017-10-31 DIAGNOSIS — Z94.4 LIVER REPLACED BY TRANSPLANT (H): ICD-10-CM

## 2017-10-31 LAB
ALBUMIN SERPL-MCNC: 4 G/DL (ref 3.4–5)
ALP SERPL-CCNC: 87 U/L (ref 40–150)
ALT SERPL W P-5'-P-CCNC: 21 U/L (ref 0–70)
ANION GAP SERPL CALCULATED.3IONS-SCNC: 7 MMOL/L (ref 3–14)
AST SERPL W P-5'-P-CCNC: 12 U/L (ref 0–45)
BILIRUB DIRECT SERPL-MCNC: 0.1 MG/DL (ref 0–0.2)
BILIRUB SERPL-MCNC: 0.4 MG/DL (ref 0.2–1.3)
BUN SERPL-MCNC: 36 MG/DL (ref 7–30)
CALCIUM SERPL-MCNC: 9 MG/DL (ref 8.5–10.1)
CHLORIDE SERPL-SCNC: 105 MMOL/L (ref 94–109)
CO2 SERPL-SCNC: 28 MMOL/L (ref 20–32)
CREAT SERPL-MCNC: 1.65 MG/DL (ref 0.66–1.25)
ERYTHROCYTE [DISTWIDTH] IN BLOOD BY AUTOMATED COUNT: 14.8 % (ref 10–15)
GFR SERPL CREATININE-BSD FRML MDRD: 42 ML/MIN/1.7M2
GLUCOSE SERPL-MCNC: 114 MG/DL (ref 70–99)
HCT VFR BLD AUTO: 40.1 % (ref 40–53)
HGB BLD-MCNC: 13.8 G/DL (ref 13.3–17.7)
MCH RBC QN AUTO: 31.5 PG (ref 26.5–33)
MCHC RBC AUTO-ENTMCNC: 34.4 G/DL (ref 31.5–36.5)
MCV RBC AUTO: 92 FL (ref 78–100)
PLATELET # BLD AUTO: 116 10E9/L (ref 150–450)
POTASSIUM SERPL-SCNC: 3.9 MMOL/L (ref 3.4–5.3)
PROT SERPL-MCNC: 7.5 G/DL (ref 6.8–8.8)
RBC # BLD AUTO: 4.38 10E12/L (ref 4.4–5.9)
SODIUM SERPL-SCNC: 140 MMOL/L (ref 133–144)
TACROLIMUS BLD-MCNC: 5.4 UG/L (ref 5–15)
TME LAST DOSE: NORMAL H
WBC # BLD AUTO: 4.9 10E9/L (ref 4–11)

## 2017-10-31 PROCEDURE — 36415 COLL VENOUS BLD VENIPUNCTURE: CPT | Performed by: INTERNAL MEDICINE

## 2017-10-31 PROCEDURE — 85027 COMPLETE CBC AUTOMATED: CPT | Performed by: INTERNAL MEDICINE

## 2017-10-31 PROCEDURE — 80076 HEPATIC FUNCTION PANEL: CPT | Performed by: INTERNAL MEDICINE

## 2017-10-31 PROCEDURE — 80048 BASIC METABOLIC PNL TOTAL CA: CPT | Performed by: INTERNAL MEDICINE

## 2017-10-31 PROCEDURE — 80197 ASSAY OF TACROLIMUS: CPT | Performed by: INTERNAL MEDICINE

## 2017-11-14 DIAGNOSIS — N18.30 CKD (CHRONIC KIDNEY DISEASE) STAGE 3, GFR 30-59 ML/MIN (H): Primary | ICD-10-CM

## 2017-11-19 ASSESSMENT — ENCOUNTER SYMPTOMS
SYNCOPE: 0
HYPERTENSION: 0
SLEEP DISTURBANCES DUE TO BREATHING: 0
ORTHOPNEA: 0
HYPOTENSION: 0
PALPITATIONS: 0
EXERCISE INTOLERANCE: 1
LEG PAIN: 0
LIGHT-HEADEDNESS: 0

## 2017-11-22 ENCOUNTER — OFFICE VISIT (OUTPATIENT)
Dept: NEPHROLOGY | Facility: CLINIC | Age: 67
End: 2017-11-22
Attending: INTERNAL MEDICINE
Payer: MEDICARE

## 2017-11-22 VITALS
DIASTOLIC BLOOD PRESSURE: 79 MMHG | HEART RATE: 71 BPM | HEIGHT: 75 IN | OXYGEN SATURATION: 97 % | WEIGHT: 274 LBS | BODY MASS INDEX: 34.07 KG/M2 | SYSTOLIC BLOOD PRESSURE: 123 MMHG

## 2017-11-22 DIAGNOSIS — I15.1 HYPERTENSION SECONDARY TO OTHER RENAL DISORDERS: ICD-10-CM

## 2017-11-22 DIAGNOSIS — N18.30 CKD (CHRONIC KIDNEY DISEASE) STAGE 3, GFR 30-59 ML/MIN (H): Primary | ICD-10-CM

## 2017-11-22 PROCEDURE — 99212 OFFICE O/P EST SF 10 MIN: CPT | Mod: ZF

## 2017-11-22 ASSESSMENT — PAIN SCALES - GENERAL: PAINLEVEL: NO PAIN (0)

## 2017-11-22 NOTE — PATIENT INSTRUCTIONS
Your kidney function is stable in stage 3b kidney disease.  If the plan after Monday to do cath or angiogram, then would recommends to hold HCTZ,Losartan, Metformin prior to the procedure, also recommends you to receive IV fluid normal saline hydration during the procedure.  Please call our clinic if you are scheduled for IV contrast.   Will follow up with you in 3 month.

## 2017-11-22 NOTE — MR AVS SNAPSHOT
After Visit Summary   11/22/2017    Eric Andrew    MRN: [id number]           Patient Information     Date Of Birth          1950        Visit Information        Provider Department      11/22/2017 2:00 PM Padma Wagoner MD [facility] Nephrology        Care Instructions    Your kidney function is stable in stage 3b kidney disease.  If the plan after Monday to do cath or angiogram, then would recommends to hold HCTZ,Losartan, Metformin prior to the procedure, also recommends you to receive IV fluid normal saline hydration during the procedure.  Please call our clinic if you are scheduled for IV contrast.   Will follow up with you in 3 month.           Follow-ups after your visit        Follow-up notes from your care team     Return in about 3 months (around 2/22/2018).      Your next 10 appointments already scheduled     Jun 05, 2018 10:10 AM CDT   (Arrive by 9:55 AM)   Return Liver Transplant with Katherine Jimenez MD   [facility] Hepatology ([facility])    [address] [place] [id number] [phone]              Who to contact     If you have questions or need follow up information about today's clinic visit or your schedule please contact [facility] NEPHROLOGY directly at [phone].  Normal or non-critical lab and imaging results will be communicated to you by [employer], letter or phone within 4 business days after the clinic has received the results. If you do not hear from us within 7 days, please contact the clinic through [employer] or phone. If you have a critical or abnormal lab result, we will notify you by phone as soon as possible.  Submit refill requests through [email or web address] or call your pharmacy and they will forward the refill request to us. Please allow 3 business days for your refill to be completed.          Additional Information About Your Visit        [employer] Information     [email or web address] gives you secure access to your  "electronic health record. If you see a primary care provider, you can also send messages to your care team and make appointments. If you have questions, please call your primary care clinic.  If you do not have a primary care provider, please call [phone] and they will assist you.        Care EveryWhere ID     This is your Care EveryWhere ID. This could be used by other organizations to access your [facility] medical records  [id number]        Your Vitals Were     Pulse Height Pulse Oximetry BMI (Body Mass Index)          71 1.905 m (6' 3\") 97% 34.25 kg/m2         Blood Pressure from Last 3 Encounters:   11/22/17 123/79   06/02/17 159/82   05/01/17 133/82    Weight from Last 3 Encounters:   11/22/17 124.3 kg (274 lb)   06/02/17 118.3 kg (260 lb 12.8 oz)   05/01/17 117.6 kg (259 lb 3.2 oz)              Today, you had the following     No orders found for display       Primary Care Provider Office Phone # Fax #    [place] [phone]       [facility] [address]   [facility] [address]        Equal Access to Services     T[facility] HospitalROSEANNE : Hadii aad ku hadasho Soomaali, waaxda luqadaha, qaybta kaalmada adeegyada, natali shore . So [facility] [phone].    ATENCIÓN: Si habla español, tiene a garza disposición servicios gratuitos de asistencia lingüística. Llame al [phone].    We comply with applicable federal civil rights laws and [place] laws. We do not discriminate on the basis of race, color, national origin, age, disability, sex, sexual orientation, or gender identity.            Thank you!     Thank you for choosing [facility] NEPHROLOGY  for your care. Our goal is always to provide you with excellent care. Hearing back from our patients is one way we can continue to improve our services. Please take a few minutes to complete the written survey that you may receive in the mail after your visit with us. Thank you!             Your Updated " Medication List - Protect others around you: Learn how to safely use, store and throw away your medicines at www.disposemymeds.org.          This list is accurate as of: 11/22/17  3:43 PM.  Always use your most recent med list.                   Brand Name Dispense Instructions for use Diagnosis    AMLODIPINE BESYLATE PO      Take 10 mg by mouth        CALCIUM + D PO      Take 1 tablet by mouth daily        hydrochlorothiazide 12.5 MG capsule    MICROZIDE    30 capsule    Take 2 capsules (25 mg) by mouth daily        LANTUS SOLOSTAR 100 UNIT/ML injection   Generic drug:  insulin glargine           losartan 25 MG tablet    COZAAR    90 tablet    Take 1 tablet (25 mg) by mouth daily    Benign essential hypertension, CKD (chronic kidney disease) stage 3, GFR 30-59 ml/min       metFORMIN 1000 MG tablet    GLUCOPHAGE          MULTIVITAL Tabs           sildenafil 50 MG tablet    VIAGRA     Take 50 mg by mouth daily as needed for erectile dysfunction        tacrolimus 1 MG capsule     120 capsule    Take 2 capsules (2 mg) by mouth 2 times daily    Liver transplanted (H)

## 2017-11-22 NOTE — NURSING NOTE
"Chief Complaint   Patient presents with     RECHECK     Follow up for EZEQUIEL       Initial /79  Pulse 71  Ht 1.905 m (6' 3\")  Wt 124.3 kg (274 lb)  SpO2 97%  BMI 34.25 kg/m2 Estimated body mass index is 34.25 kg/(m^2) as calculated from the following:    Height as of this encounter: 1.905 m (6' 3\").    Weight as of this encounter: 124.3 kg (274 lb).  Medication Reconciliation: complete   Hilaria Jaramillo CMA    "

## 2017-11-22 NOTE — PROGRESS NOTES
[facility]  NEPHROLOGY CLINIC FOLLOW UP VISIT NOTE    Date of encounter 11/22/2017        Assessment and Plan:  67 year old pleasant male with PMH of alpha 1 anti trypsin deficiency, cirrhosis status post liver transplant in 3/2015, HTN  and  CKD since  1/2015, recent diagnosis of Diabetes in 2/2017 with Hba1c of 11.5 in 2/2017 . Exact etiology unclear.  Most likely in the setting of hemodynamic effects from  underlying liver disease in the past  . Baseline serum Cr appears to be 1.4-1.7. Long term use of CNI also may be contributing.  Recommendations :-    1. CKD stage 3b . Baseline serum Cr appears to be 1.4-1.7. Kidney function declined since his liver transplant 2015  and since then he remain in stage 3B CKD.   - now Cr 1.6 with no proteinuria.   - he is scheduled for stress test cardiac evaluation Monday and possible cath if the result is +ve.  Will communicate with his cardiologiest if cath is needed then the recommendation is to stop HCTZ, Losartan, Metformin prior to the procedure. Hydration with IVF normal saline during the procedure. Try to use minimal contrast as possible.     2.  HTN - common after transplant and generally CNI causes increased activity of thiazide sensitive sodium channel and patients with CNI tend to do better with calcium channel blocker.   BP is at goal.  Continue amlodipine 10 mg daily, HCTZ 25 mg daily and losartan 25 mg daily.      3. euvolemic on exam.     4. Serum K 3.9.     5. Elevated PTH of 168--> 123. Ca 9, Serum phosphorus 3.0 . Serum vitamin D 27.         6. Hb 13.8 . Normal iron stores.    Patient seen and discussed  with Dr. Bonner.  Padma Wagoner  Nephrology Fellow  Pager: [phone]       Assessment and plan was discussed with patient and he voiced his understanding and agreement.    Attestation:  This patient has been seen and evaluated by me, Erum Bonner MD on 11/22/17 .  Discussed with the fellow or resident and agree with the findings and  plan in this note.     I have reviewed  Medications, Vital Signs and Labs    Erum Bonner MD  [facility]  Department of Medicine  Division of Renal Disease and Hypertension  [phone]       Reason for Visit:  Mr. Andrew is here for follow up of CKD and HTN    HPI:     66 year old pleasant male with PMH of alpha 1 anti trypsin deficiency, cirrhosis status post liver transplant in 3/2015, HTN  referred to Nephrology clinic for CKD evaluation and management. Patient with CKD since  1/2015. Exact etiology unclear.  Most likely in the setting of hemodynamic effects from  underlying liver disease in the past  . Baseline serum Cr appears to be 1.4-1.7. Long term use of CNI also may be contributing. 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 He is scheduled for stress test next Monday and if that +ve he might need an angiogram.   He denies any other complaint.             ROS:   A comprehensive review of systems was obtained and negative, except as noted in the HPI or PMH.    Active Medical Problems:  Patient Active Problem List   Diagnosis     Alpha-1-antitrypsin deficiency (H)     Anemia     Thrombocytopenia (H)     Liver replaced by transplant (H)     Immunosuppressed status (H)     EZEQUIEL (acute kidney injury) (H)     Edema     Hives     Hypomagnesemia       Personal Hx:  Social History     Social History     Marital status:      Spouse name: N/A     Number of children: N/A     Years of education: N/A     Occupational History     Not on file.     Social History Main Topics     Smoking status: Never Smoker     Smokeless tobacco: Never Used      Comment: [id number] occational smoker     Alcohol use 0.0 oz/week     0 Standard drinks or  "equivalent per week      Comment: 1 glass of wine per month     Drug use: No     Sexual activity: No     Other Topics Concern     Not on file     Social History Narrative       Allergies:  Allergies   Allergen Reactions     Lisinopril Cough     Meropenem Hives and Swelling     Developed hives and lip swelling while on meropenem and micafungin.  Resolved with discontinuation.  Unclear which was cause.     Micafungin Hives and Swelling     Developed hives and lip swelling while on meropenem and micafungin.  Resolved with discontinuation.  Unclear which was cause.       Medications:    Current Outpatient Prescriptions on File Prior to Visit:  PROGRAF (BRAND) 1 MG CAPSULE Take 2 capsules (2 mg) by mouth 2 times daily   losartan (COZAAR) 25 MG tablet Take 1 tablet (25 mg) by mouth daily   insulin glargine (LANTUS SOLOSTAR) 100 UNIT/ML injection    metFORMIN (GLUCOPHAGE) 1000 MG tablet    AMLODIPINE BESYLATE PO Take 10 mg by mouth   hydrochlorothiazide (MICROZIDE) 12.5 MG capsule Take 2 capsules (25 mg) by mouth daily   sildenafil (VIAGRA) 50 MG tablet Take 50 mg by mouth daily as needed for erectile dysfunction   Calcium Carbonate-Vitamin D (CALCIUM + D PO) Take 1 tablet by mouth daily    Multiple Vitamins-Minerals (MULTIVITAL) TABS      No current facility-administered medications on file prior to visit.         Vitals:  /79  Pulse 71  Ht 1.905 m (6' 3\")  Wt 124.3 kg (274 lb)  SpO2 97%  BMI 34.25 kg/m2    Exam:   GENERAL APPEARANCE: alert and no distress  HENT: mouth without ulcers or lesions  LYMPHATICS: no cervical or supraclavicular nodes  RESP: lungs clear to auscultation - no rales, rhonchi or wheezes  CV: regular rhythm, normal rate, no rub, no murmur  EDEMA: no LE edema bilaterally  ABDOMEN: soft, nondistended, nontender, bowel sounds normal  MS: extremities normal - no gross deformities noted, no evidence of inflammation in joints, no muscle tenderness  SKIN: no rash    Results:     Electrolytes/Renal " -   Recent Labs   Lab Test  10/31/17   0813  08/29/17   0841  06/02/17   0859  05/01/17   1531   12/19/16   1339   08/06/16   0945   10/29/15   1129  09/22/15   0910  08/31/15   0905   NA  140  139  141  142   < >  141   < >  142   < >  141  141  142   POTASSIUM  3.9  4.2  4.2  4.4   < >  3.6   < >  4.7   < >  4.8  5.2  5.3   CHLORIDE  105  106  107  106   < >  105   < >  109   < >  110*  110*  112*   CO2  28  27  27  26   < >  28   < >  27   < >  25  26  25   BUN  36*  32*  34*  43*   < >  38*   < >  35*   < >  46*  41*  43*   CR  1.65*  1.63*  1.53*  1.65*   < >  1.75*   < >  1.50*   < >  1.56*  1.52*  1.66*   GLC  114*  133*  131*  114*   < >  223*   < >  157*   < >  118*  95  129*   SHAYNE  9.0  8.8  8.7  8.8   < >  9.0   < >  8.7   < >  8.6  8.9  8.4*   MAG   --    --    --    --    --    --    --    --    --   1.7  2.3  1.7   PHOS   --    --    --   3.0   --   2.8   --   2.3*   --   3.4  2.9  3.4    < > = values in this interval not displayed.       CBC -   Recent Labs   Lab Test  10/31/17   0813  08/29/17   0841  06/02/17   0859   WBC  4.9  5.3  4.8   HGB  13.8  13.5  13.0*   PLT  116*  116*  106*       LFTs -   Recent Labs   Lab Test  10/31/17   0813  08/29/17   0841  06/02/17   0859   ALKPHOS  87  83  80   BILITOTAL  0.4  0.4  0.4   ALT  21  23  17   AST  12  13  13   PROTTOTAL  7.5  7.4  7.3   ALBUMIN  4.0  3.9  3.8       Coags -   Recent Labs   Lab Test  04/30/15   0907  04/11/15   2000  04/06/15   0521   03/31/15   1250  03/31/15   1142   INR  1.28*  1.05  1.24*   < >  2.76*  3.04*   PTT   --   31   --    --   111*  62*    < > = values in this interval not displayed.       Iron Panel -   Recent Labs   Lab Test  08/06/16   0945  11/10/14   1051  10/30/14   1443   IRON  62  142  131   IRONSAT  29  98*  83*   RAINE  125  673*  905*       Endocrine -   Recent Labs   Lab Test  06/02/17   0859  04/19/16   0852  04/01/15   0358  07/15/14   0711   A1C  5.5  5.6  Unable to Calculate  MICAELA MCGARRY IN U4D @0808 BY  HN     --    TSH   --    --    --   3.66         Answers for HPI/ROS submitted by the patient on 11/19/2017   General Symptoms: No  Skin Symptoms: No  HENT Symptoms: No  EYE SYMPTOMS: No  HEART SYMPTOMS: Yes  LUNG SYMPTOMS: No  INTESTINAL SYMPTOMS: No  URINARY SYMPTOMS: No  REPRODUCTIVE SYMPTOMS: Yes  SKELETAL SYMPTOMS: No  BLOOD SYMPTOMS: No  NERVOUS SYSTEM SYMPTOMS: No  MENTAL HEALTH SYMPTOMS: No  Chest pain or pressure: Yes  Fast or irregular heartbeat: No  Pain in legs with walking: No  Trouble breathing while lying down: No  Fingers or toes appear blue: No  High blood pressure: No  Low blood pressure: No  Fainting: No  Murmurs: No  Pacemaker: No  Varicose veins: No  Edema or swelling: No  Wake up at night with shortness of breath: No  Light-headedness: No  Exercise intolerance: Yes  Scrotal pain or swelling: No  Erectile dysfunction: Yes  Penile discharge: No  Genital ulcers: No  Reduced libido: No

## 2017-11-22 NOTE — LETTER
11/22/2017      RE: Eric Andrew  [address] [address] [facility] [address]       [facility]  NEPHROLOGY CLINIC FOLLOW UP VISIT NOTE    Date of encounter 11/22/2017        Assessment and Plan:  67 year old pleasant male with PMH of alpha 1 anti trypsin deficiency, cirrhosis status post liver transplant in 3/2015, HTN  and  CKD since  1/2015, recent diagnosis of Diabetes in 2/2017 with Hba1c of 11.5 in 2/2017 . Exact etiology unclear.  Most likely in the setting of hemodynamic effects from  underlying liver disease in the past  . Baseline serum Cr appears to be 1.4-1.7. Long term use of CNI also may be contributing.  Recommendations :-    1. CKD stage 3b . Baseline serum Cr appears to be 1.4-1.7. Kidney function declined since his liver transplant 2015  and since then he remain in stage 3B CKD.   - now Cr 1.6 with no proteinuria.   - he is scheduled for stress test cardiac evaluation Monday and possible cath if the result is +ve.  Will communicate with his cardiologiest if cath is needed then the recommendation is to stop HCTZ, Losartan, Metformin prior to the procedure. Hydration with IVF normal saline during the procedure. Try to use minimal contrast as possible.     2.  HTN - common after transplant and generally CNI causes increased activity of thiazide sensitive sodium channel and patients with CNI tend to do better with calcium channel blocker.   BP is at goal.  Continue amlodipine 10 mg daily, HCTZ 25 mg daily and losartan 25 mg daily.      3. euvolemic on exam.     4. Serum K 3.9.     5. Elevated PTH of 168--> 123. Ca 9, Serum phosphorus 3.0 . Serum vitamin D 27.         6. Hb 13.8 . Normal iron stores.    Patient seen and discussed  with Dr. Bonner.  Padma Wagoner  Nephrology Fellow  Pager: [phone]       Assessment and plan was discussed with patient and he voiced his understanding and agreement.    Attestation:  This patient has been seen and evaluated by me, Erum Bonner MD  on 11/22/17 .  Discussed with the fellow or resident and agree with the findings and plan in this note.     I have reviewed  Medications, Vital Signs and Labs    Erum Bonner MD  [facility]  Department of Medicine  Division of Renal Disease and Hypertension  [phone]       Reason for Visit:  Mr. Andrew is here for follow up of CKD and HTN    HPI:     66 year old pleasant male with PMH of alpha 1 anti trypsin deficiency, cirrhosis status post liver transplant in 3/2015, HTN  referred to Nephrology clinic for CKD evaluation and management. Patient with CKD since  1/2015. Exact etiology unclear.  Most likely in the setting of hemodynamic effects from  underlying liver disease in the past  . Baseline serum Cr appears to be 1.4-1.7. Long term use of CNI also may be contributing. 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 He is scheduled for stress test next Monday and if that +ve he might need an angiogram.   He denies any other complaint.             ROS:   A comprehensive review of systems was obtained and negative, except as noted in the HPI or PMH.    Active Medical Problems:  Patient Active Problem List   Diagnosis     Alpha-1-antitrypsin deficiency (H)     Anemia     Thrombocytopenia (H)     Liver replaced by transplant (H)     Immunosuppressed status (H)     EZEQUIEL (acute kidney injury) (H)     Edema     Hives     Hypomagnesemia       Personal Hx:  Social History     Social History     Marital status:      Spouse name: N/A     Number of children: N/A     Years of education: N/A     Occupational History     Not on file.     Social History Main Topics     Smoking status: Never Smoker     Smokeless tobacco: Never Used      Comment:  "[id number] occational smoker     Alcohol use 0.0 oz/week     0 Standard drinks or equivalent per week      Comment: 1 glass of wine per month     Drug use: No     Sexual activity: No     Other Topics Concern     Not on file     Social History Narrative       Allergies:  Allergies   Allergen Reactions     Lisinopril Cough     Meropenem Hives and Swelling     Developed hives and lip swelling while on meropenem and micafungin.  Resolved with discontinuation.  Unclear which was cause.     Micafungin Hives and Swelling     Developed hives and lip swelling while on meropenem and micafungin.  Resolved with discontinuation.  Unclear which was cause.       Medications:    Current Outpatient Prescriptions on File Prior to Visit:  PROGRAF (BRAND) 1 MG CAPSULE Take 2 capsules (2 mg) by mouth 2 times daily   losartan (COZAAR) 25 MG tablet Take 1 tablet (25 mg) by mouth daily   insulin glargine (LANTUS SOLOSTAR) 100 UNIT/ML injection    metFORMIN (GLUCOPHAGE) 1000 MG tablet    AMLODIPINE BESYLATE PO Take 10 mg by mouth   hydrochlorothiazide (MICROZIDE) 12.5 MG capsule Take 2 capsules (25 mg) by mouth daily   sildenafil (VIAGRA) 50 MG tablet Take 50 mg by mouth daily as needed for erectile dysfunction   Calcium Carbonate-Vitamin D (CALCIUM + D PO) Take 1 tablet by mouth daily    Multiple Vitamins-Minerals (MULTIVITAL) TABS      No current facility-administered medications on file prior to visit.         Vitals:  /79  Pulse 71  Ht 1.905 m (6' 3\")  Wt 124.3 kg (274 lb)  SpO2 97%  BMI 34.25 kg/m2    Exam:   GENERAL APPEARANCE: alert and no distress  HENT: mouth without ulcers or lesions  LYMPHATICS: no cervical or supraclavicular nodes  RESP: lungs clear to auscultation - no rales, rhonchi or wheezes  CV: regular rhythm, normal rate, no rub, no murmur  EDEMA: no LE edema bilaterally  ABDOMEN: soft, nondistended, nontender, bowel sounds normal  MS: extremities normal - no gross deformities noted, no evidence of inflammation " in joints, no muscle tenderness  SKIN: no rash    Results:     Electrolytes/Renal -   Recent Labs   Lab Test  10/31/17   0813  08/29/17   0841  06/02/17   0859  05/01/17   1531   12/19/16   1339   08/06/16   0945   10/29/15   1129  09/22/15   0910  08/31/15   0905   NA  140  139  141  142   < >  141   < >  142   < >  141  141  142   POTASSIUM  3.9  4.2  4.2  4.4   < >  3.6   < >  4.7   < >  4.8  5.2  5.3   CHLORIDE  105  106  107  106   < >  105   < >  109   < >  110*  110*  112*   CO2  28  27  27  26   < >  28   < >  27   < >  25  26  25   BUN  36*  32*  34*  43*   < >  38*   < >  35*   < >  46*  41*  43*   CR  1.65*  1.63*  1.53*  1.65*   < >  1.75*   < >  1.50*   < >  1.56*  1.52*  1.66*   GLC  114*  133*  131*  114*   < >  223*   < >  157*   < >  118*  95  129*   SHAYNE  9.0  8.8  8.7  8.8   < >  9.0   < >  8.7   < >  8.6  8.9  8.4*   MAG   --    --    --    --    --    --    --    --    --   1.7  2.3  1.7   PHOS   --    --    --   3.0   --   2.8   --   2.3*   --   3.4  2.9  3.4    < > = values in this interval not displayed.       CBC -   Recent Labs   Lab Test  10/31/17   0813  08/29/17   0841  06/02/17   0859   WBC  4.9  5.3  4.8   HGB  13.8  13.5  13.0*   PLT  116*  116*  106*       LFTs -   Recent Labs   Lab Test  10/31/17   0813  08/29/17   0841  06/02/17   0859   ALKPHOS  87  83  80   BILITOTAL  0.4  0.4  0.4   ALT  21  23  17   AST  12  13  13   PROTTOTAL  7.5  7.4  7.3   ALBUMIN  4.0  3.9  3.8       Coags -   Recent Labs   Lab Test  04/30/15   0907  04/11/15   2000  04/06/15   0521   03/31/15   1250  03/31/15   1142   INR  1.28*  1.05  1.24*   < >  2.76*  3.04*   PTT   --   31   --    --   111*  62*    < > = values in this interval not displayed.       Iron Panel -   Recent Labs   Lab Test  08/06/16   0945  11/10/14   1051  10/30/14   1443   IRON  62  142  131   IRONSAT  29  98*  83*   RAINE  125  673*  905*       Endocrine -   Recent Labs   Lab Test  06/02/17   0859  04/19/16   0852  04/01/15   0358   07/15/14   0711   A1C  5.5  5.6  Unable to Calculate  MICAELA MCGARRY IN U4D @0808 BY HN     --    TSH   --    --    --   3.66       Padma Wagoner MD

## 2017-11-30 ENCOUNTER — TELEPHONE (OUTPATIENT)
Dept: TRANSPLANT | Facility: CLINIC | Age: 67
End: 2017-11-30

## 2017-12-20 ENCOUNTER — TELEPHONE (OUTPATIENT)
Dept: TRANSPLANT | Facility: CLINIC | Age: 67
End: 2017-12-20

## 2018-02-12 ENCOUNTER — DOCUMENTATION ONLY (OUTPATIENT)
Dept: TRANSPLANT | Facility: CLINIC | Age: 68
End: 2018-02-12

## 2018-03-20 ENCOUNTER — TELEPHONE (OUTPATIENT)
Dept: PHARMACY | Facility: CLINIC | Age: 68
End: 2018-03-20

## 2018-04-05 ENCOUNTER — HOSPITAL ENCOUNTER (OUTPATIENT)
Dept: LAB | Facility: CLINIC | Age: 68
Discharge: HOME | End: 2018-04-05
Attending: INTERNAL MEDICINE | Admitting: INTERNAL MEDICINE
Payer: MEDICARE

## 2018-04-05 DIAGNOSIS — Z94.4 LIVER REPLACED BY TRANSPLANT (H): ICD-10-CM

## 2018-04-05 LAB
ALBUMIN SERPL-MCNC: 3.8 G/DL (ref 3.4–5)
ALP SERPL-CCNC: 92 U/L (ref 40–150)
ALT SERPL W P-5'-P-CCNC: 21 U/L (ref 0–70)
ANION GAP SERPL CALCULATED.3IONS-SCNC: 6 MMOL/L (ref 3–14)
AST SERPL W P-5'-P-CCNC: 12 U/L (ref 0–45)
BILIRUB DIRECT SERPL-MCNC: 0.1 MG/DL (ref 0–0.2)
BILIRUB SERPL-MCNC: 0.5 MG/DL (ref 0.2–1.3)
BUN SERPL-MCNC: 36 MG/DL (ref 7–30)
CALCIUM SERPL-MCNC: 8.8 MG/DL (ref 8.5–10.1)
CHLORIDE SERPL-SCNC: 108 MMOL/L (ref 94–109)
CO2 SERPL-SCNC: 27 MMOL/L (ref 20–32)
CREAT SERPL-MCNC: 1.58 MG/DL (ref 0.66–1.25)
ERYTHROCYTE [DISTWIDTH] IN BLOOD BY AUTOMATED COUNT: 14.6 % (ref 10–15)
GFR SERPL CREATININE-BSD FRML MDRD: 44 ML/MIN/1.7M2
GLUCOSE SERPL-MCNC: 176 MG/DL (ref 70–99)
HCT VFR BLD AUTO: 41.1 % (ref 40–53)
HGB BLD-MCNC: 13.8 G/DL (ref 13.3–17.7)
MCH RBC QN AUTO: 31.5 PG (ref 26.5–33)
MCHC RBC AUTO-ENTMCNC: 33.6 G/DL (ref 31.5–36.5)
MCV RBC AUTO: 94 FL (ref 78–100)
PLATELET # BLD AUTO: 114 10E9/L (ref 150–450)
POTASSIUM SERPL-SCNC: 4.4 MMOL/L (ref 3.4–5.3)
PROT SERPL-MCNC: 7.5 G/DL (ref 6.8–8.8)
RBC # BLD AUTO: 4.38 10E12/L (ref 4.4–5.9)
SODIUM SERPL-SCNC: 141 MMOL/L (ref 133–144)
TACROLIMUS BLD-MCNC: 5.5 UG/L (ref 5–15)
TME LAST DOSE: NORMAL H
WBC # BLD AUTO: 5.8 10E9/L (ref 4–11)

## 2018-04-05 PROCEDURE — 80197 ASSAY OF TACROLIMUS: CPT | Performed by: INTERNAL MEDICINE

## 2018-04-05 PROCEDURE — 80076 HEPATIC FUNCTION PANEL: CPT | Performed by: INTERNAL MEDICINE

## 2018-04-05 PROCEDURE — 85027 COMPLETE CBC AUTOMATED: CPT | Performed by: INTERNAL MEDICINE

## 2018-04-05 PROCEDURE — 36415 COLL VENOUS BLD VENIPUNCTURE: CPT | Performed by: INTERNAL MEDICINE

## 2018-04-05 PROCEDURE — 80048 BASIC METABOLIC PNL TOTAL CA: CPT | Performed by: INTERNAL MEDICINE

## 2018-06-06 ENCOUNTER — TELEPHONE (OUTPATIENT)
Dept: TRANSPLANT | Facility: CLINIC | Age: 68
End: 2018-06-06

## 2018-06-06 ENCOUNTER — HOSPITAL ENCOUNTER (OUTPATIENT)
Dept: LAB | Facility: CLINIC | Age: 68
Discharge: HOME | End: 2018-06-06
Attending: INTERNAL MEDICINE | Admitting: INTERNAL MEDICINE
Payer: MEDICARE

## 2018-06-06 DIAGNOSIS — Z94.4 LIVER REPLACED BY TRANSPLANT (H): Primary | ICD-10-CM

## 2018-06-06 DIAGNOSIS — Z94.4 LIVER REPLACED BY TRANSPLANT (H): ICD-10-CM

## 2018-06-06 LAB
ALBUMIN SERPL-MCNC: 3.8 G/DL (ref 3.4–5)
ALP SERPL-CCNC: 89 U/L (ref 40–150)
ALT SERPL W P-5'-P-CCNC: 16 U/L (ref 0–70)
ANION GAP SERPL CALCULATED.3IONS-SCNC: 6 MMOL/L (ref 3–14)
AST SERPL W P-5'-P-CCNC: 15 U/L (ref 0–45)
BILIRUB DIRECT SERPL-MCNC: <0.1 MG/DL (ref 0–0.2)
BILIRUB SERPL-MCNC: 0.3 MG/DL (ref 0.2–1.3)
BUN SERPL-MCNC: 35 MG/DL (ref 7–30)
CALCIUM SERPL-MCNC: 8.9 MG/DL (ref 8.5–10.1)
CHLORIDE SERPL-SCNC: 111 MMOL/L (ref 94–109)
CO2 SERPL-SCNC: 25 MMOL/L (ref 20–32)
CREAT SERPL-MCNC: 1.67 MG/DL (ref 0.66–1.25)
ERYTHROCYTE [DISTWIDTH] IN BLOOD BY AUTOMATED COUNT: 14.8 % (ref 10–15)
GFR SERPL CREATININE-BSD FRML MDRD: 41 ML/MIN/1.7M2
GLUCOSE SERPL-MCNC: 171 MG/DL (ref 70–99)
HCT VFR BLD AUTO: 39.6 % (ref 40–53)
HGB BLD-MCNC: 13.3 G/DL (ref 13.3–17.7)
MCH RBC QN AUTO: 31.5 PG (ref 26.5–33)
MCHC RBC AUTO-ENTMCNC: 33.6 G/DL (ref 31.5–36.5)
MCV RBC AUTO: 94 FL (ref 78–100)
PLATELET # BLD AUTO: 119 10E9/L (ref 150–450)
POTASSIUM SERPL-SCNC: 4.2 MMOL/L (ref 3.4–5.3)
PROT SERPL-MCNC: 7.2 G/DL (ref 6.8–8.8)
RBC # BLD AUTO: 4.22 10E12/L (ref 4.4–5.9)
SODIUM SERPL-SCNC: 142 MMOL/L (ref 133–144)
TACROLIMUS BLD-MCNC: 5.5 UG/L (ref 5–15)
TME LAST DOSE: NORMAL H
WBC # BLD AUTO: 5.2 10E9/L (ref 4–11)

## 2018-06-06 PROCEDURE — 80076 HEPATIC FUNCTION PANEL: CPT | Performed by: INTERNAL MEDICINE

## 2018-06-06 PROCEDURE — 80048 BASIC METABOLIC PNL TOTAL CA: CPT | Performed by: INTERNAL MEDICINE

## 2018-06-06 PROCEDURE — 85027 COMPLETE CBC AUTOMATED: CPT | Performed by: INTERNAL MEDICINE

## 2018-06-06 PROCEDURE — 80197 ASSAY OF TACROLIMUS: CPT | Performed by: INTERNAL MEDICINE

## 2018-06-06 PROCEDURE — 36415 COLL VENOUS BLD VENIPUNCTURE: CPT | Performed by: INTERNAL MEDICINE

## 2018-06-06 NOTE — TELEPHONE ENCOUNTER
Provider Call: Transplant Lab/Orders  Route to LPN  Post Transplant Days: 1163  When patient is less than 60 days post-transplant, route high priority  Reason for Call: Annual lab reorder  Liver patients reporting abnormal lab results: Route to RN and Page  Document lab facility information when provider is calling about annual lab orders. Delete facility wildcards when not needed.  Facility Name: [facility]  Facility Location: [facility]    Callback needed? No     Lab is drawing the pt this morning 6/6/18,  but needs UTD order in the EPIC.

## 2018-06-08 ENCOUNTER — OFFICE VISIT (OUTPATIENT)
Dept: GASTROENTEROLOGY | Facility: CLINIC | Age: 68
End: 2018-06-08
Attending: INTERNAL MEDICINE
Payer: MEDICARE

## 2018-06-08 VITALS
DIASTOLIC BLOOD PRESSURE: 84 MMHG | WEIGHT: 274.6 LBS | OXYGEN SATURATION: 98 % | HEART RATE: 75 BPM | BODY MASS INDEX: 37.19 KG/M2 | TEMPERATURE: 97.9 F | SYSTOLIC BLOOD PRESSURE: 172 MMHG | HEIGHT: 72 IN

## 2018-06-08 DIAGNOSIS — R79.89 ELEVATED SERUM CREATININE: ICD-10-CM

## 2018-06-08 DIAGNOSIS — Z94.4 LIVER REPLACED BY TRANSPLANT (H): Primary | ICD-10-CM

## 2018-06-08 PROCEDURE — G0463 HOSPITAL OUTPT CLINIC VISIT: HCPCS | Mod: ZF

## 2018-06-08 RX ORDER — METOPROLOL SUCCINATE 25 MG/1
50 TABLET, EXTENDED RELEASE ORAL EVERY MORNING
COMMUNITY
Start: 2017-11-30

## 2018-06-08 RX ORDER — ATORVASTATIN CALCIUM 20 MG/1
20 TABLET, FILM COATED ORAL EVERY EVENING
COMMUNITY
Start: 2017-12-22 | End: 2022-05-19

## 2018-06-08 ASSESSMENT — PAIN SCALES - GENERAL: PAINLEVEL: NO PAIN (0)

## 2018-06-08 NOTE — NURSING NOTE
Here for post liver transplant follow-up. Looking great but has gained weight.  Has been travelling a lot.  Reports feeling well.   Creatinine is stable high at 1.67.  We discussed the possibility of taking off prograf and starting a different IS regimen.  Eric is very reluctant to do this.  He has diabetes and HTN.  We asked him to pay close attention to his BP and address w/ his PCP if elevated.  He is seeing his PCP and dermatology (history of melanoma and basal cell CA) next week. Dr. Jimenez would like Eric to see nephrology again to discuss elevated creatinine and HTN.   Reviewed recent labs and assisted with interpretation.     Complaints:  none    Current immunosuppression:    Prograf monotherapy    Med changes: None today.  Considering taking off prograf    Lab frequency:  q 2-3 mos    Follow-up:  Hepatology, derm and PCP annually. Reviewed need for annual follow-up here with hepatology and dermatology.

## 2018-06-08 NOTE — PROGRESS NOTES
"A/P  67 year old male s/p LT 2015 for A1AT.   Medically doing well.      CKD, stable. Discussed possibility of weaning off tac on to another regimen to attempt to address kidney function. He is happy with his IS right now and will think about it, knowing there is no guarantee that it will get better and underlying DM and HTN can play a role as well..     No changes to medications today. Labs up to date as is cancer screening.   Labs every 3 months.  We discussed long-term complications of liver transplantation/immunosuppression, including kidney disease, cardiovascular disease, DM, HTN, and skin cancer, as well as recommendations for cancer screening. Will see back in 3-4 mo.   This was a 25 minute visit, over 50% counseling and coordination of care.   Katherine Jimenez MD  Hepatology/Liver Transplant  Medical Director, Liver Transplantation  [facility]  ===================================================================          S: 68 year old male s/p DDLT 3/31/15 for alpha-1-antitrypsin deficiency  EXPLANT: NO HCC.  IS: Prograf. Kidney function has not been normal, but has been stable. Has mild proteinuria.  LABS: Up to date. Liver tests look great, normal.   REJECTION: None.  BILIARY ISSUES: None  STENT: Removed 5/28/15 by endoscopy  KIDNEY FUNCTION:  Sees Dr. Bonner. Elevated creatinine since just prior to LT  Creatinine   Date Value Ref Range Status   06/06/2018 1.67 (H) 0.66 - 1.25 mg/dL Final   ]    BP: Good. Amlodipine. Managed by PCP and nephrology. BP at home in 130s.Today is high because he was running late.  PREV: UTD on screening (colonoscopy 2014, derm next week has h/o melanoma)  DISEASE RECURRENCE: N/A  OTHER ISSUES: Weight gain.  New dx of diabetes on insulin and metformin. Cotnrol is \"fine\"      SOC: He and his wife continue with traveling.   ROS: 10 point ROS neg other than the symptoms noted above in the HPI.     O  Vitals: /84  Pulse 75  Temp 97.9  F (36.6  C) (Oral)  " Ht 1.829 m (6')  Wt 124.6 kg (274 lb 9.6 oz)  SpO2 98%  BMI 37.24 kg/m2  BMI= Body mass index is 37.24 kg/(m^2).  GENERAL:  Very pleasant, well-appearing, in no acute distress.    HEENT:  No icterus, no oral lesions.    LYMPH:  No supraclavicular or cervical lymphadenopathy.    CARDIOVASCULAR:  Regular rate and rhythm.    CHEST:  Lungs are clear.    ABDOMEN:  Bowel sounds are present.  Abdomen is soft, nontender, nondistended.  Scar is well healed.      EXTREMITIES:  No edema.    SKIN:  No rash.    NEUROLOGIC:  Speech is fluent and clear.  No asterixis or tremor.             Creatinine   Date Value Ref Range Status   05/01/2017 1.65 (H) 0.66 - 1.25 mg/dL Final   ]         Lab Results   Component Value Date     BILITOTAL 0.6 05/01/2017     BILITOTAL 0.5 12/09/2016     BILITOTAL 0.5 09/29/2016     BILITOTAL 0.5 08/06/2016     BILITOTAL 0.5 06/09/2016             Lab Results   Component Value Date     ALT 19 05/01/2017             Lab Results   Component Value Date     ALBUMIN 3.9 05/01/2017             Hemoglobin   Date Value Ref Range Status   05/01/2017 12.5 (L) 13.3 - 17.7 g/dL Final   ]         Current Outpatient Prescriptions   Medication     losartan (COZAAR) 25 MG tablet     insulin glargine (LANTUS SOLOSTAR) 100 UNIT/ML injection     metFORMIN (GLUCOPHAGE) 1000 MG tablet     AMLODIPINE BESYLATE PO     PROGRAF 1 MG PO CAPSULE     hydrochlorothiazide (MICROZIDE) 12.5 MG capsule     sildenafil (VIAGRA) 50 MG tablet     Calcium Carbonate-Vitamin D (CALCIUM + D PO)     Multiple Vitamins-Minerals (MULTIVITAL) TABS      No current facility-administered medications for this visit.

## 2018-06-08 NOTE — LETTER
"6/8/2018      RE: Eric Andrew  [address] [facility] [address]       A/P  67 year old male s/p LT 2015 for A1AT.   Medically doing well.      CKD, stable. Discussed possibility of weaning off tac on to another regimen to attempt to address kidney function. He is happy with his IS right now and will think about it, knowing there is no guarantee that it will get better and underlying DM and HTN can play a role as well..     No changes to medications today. Labs up to date as is cancer screening.   Labs every 3 months.  We discussed long-term complications of liver transplantation/immunosuppression, including kidney disease, cardiovascular disease, DM, HTN, and skin cancer, as well as recommendations for cancer screening. Will see back in 3-4 mo.   This was a 25 minute visit, over 50% counseling and coordination of care.   Katherine Jimenez MD  Hepatology/Liver Transplant  Medical Director, Liver Transplantation  [facility]  ===================================================================          S: 68 year old male s/p DDLT 3/31/15 for alpha-1-antitrypsin deficiency  EXPLANT: NO HCC.  IS: Prograf. Kidney function has not been normal, but has been stable. Has mild proteinuria.  LABS: Up to date. Liver tests look great, normal.   REJECTION: None.  BILIARY ISSUES: None  STENT: Removed 5/28/15 by endoscopy  KIDNEY FUNCTION:  Sees Dr. Bonner. Elevated creatinine since just prior to LT  Creatinine   Date Value Ref Range Status   06/06/2018 1.67 (H) 0.66 - 1.25 mg/dL Final   ]    BP: Good. Amlodipine. Managed by PCP and nephrology. BP at home in 130s.Today is high because he was running late.  PREV: UTD on screening (colonoscopy 2014, derm next week has h/o melanoma)  DISEASE RECURRENCE: N/A  OTHER ISSUES: Weight gain.  New dx of diabetes on insulin and metformin. Cotnrol is \"fine\"      SOC: He and his wife continue with traveling.   ROS: 10 point ROS neg other than the symptoms noted above " in the HPI.     O  Vitals: /84  Pulse 75  Temp 97.9  F (36.6  C) (Oral)  Ht 1.829 m (6')  Wt 124.6 kg (274 lb 9.6 oz)  SpO2 98%  BMI 37.24 kg/m2  BMI= Body mass index is 37.24 kg/(m^2).  GENERAL:  Very pleasant, well-appearing, in no acute distress.    HEENT:  No icterus, no oral lesions.    LYMPH:  No supraclavicular or cervical lymphadenopathy.    CARDIOVASCULAR:  Regular rate and rhythm.    CHEST:  Lungs are clear.    ABDOMEN:  Bowel sounds are present.  Abdomen is soft, nontender, nondistended.  Scar is well healed.      EXTREMITIES:  No edema.    SKIN:  No rash.    NEUROLOGIC:  Speech is fluent and clear.  No asterixis or tremor.             Creatinine   Date Value Ref Range Status   05/01/2017 1.65 (H) 0.66 - 1.25 mg/dL Final   ]         Lab Results   Component Value Date     BILITOTAL 0.6 05/01/2017     BILITOTAL 0.5 12/09/2016     BILITOTAL 0.5 09/29/2016     BILITOTAL 0.5 08/06/2016     BILITOTAL 0.5 06/09/2016             Lab Results   Component Value Date     ALT 19 05/01/2017             Lab Results   Component Value Date     ALBUMIN 3.9 05/01/2017             Hemoglobin   Date Value Ref Range Status   05/01/2017 12.5 (L) 13.3 - 17.7 g/dL Final   ]         Current Outpatient Prescriptions   Medication     losartan (COZAAR) 25 MG tablet     insulin glargine (LANTUS SOLOSTAR) 100 UNIT/ML injection     metFORMIN (GLUCOPHAGE) 1000 MG tablet     AMLODIPINE BESYLATE PO     PROGRAF 1 MG PO CAPSULE     hydrochlorothiazide (MICROZIDE) 12.5 MG capsule     sildenafil (VIAGRA) 50 MG tablet     Calcium Carbonate-Vitamin D (CALCIUM + D PO)     Multiple Vitamins-Minerals (MULTIVITAL) TABS      No current facility-administered medications for this visit.            Katherine Jimenez MD

## 2018-06-08 NOTE — MR AVS SNAPSHOT
After Visit Summary   6/8/2018    Eric Andrew    MRN: [id number]           Patient Information     Date Of Birth          1950        Visit Information        Provider Department      6/8/2018 9:30 AM Katherine Jimenez MD [facility] Hepatology        Today's Diagnoses     Liver replaced by transplant (H)    -  1    Elevated serum creatinine           Follow-ups after your visit        Additional Services     NEPHROLOGY ADULT REFERRAL       Your provider has referred you to:  Adult nephrology for discussin of HTN management and ? Need to wean off prograf    Please be aware that coverage of these services is subject to the terms and limitations of your health insurance plan.  Call member services at your health plan with any benefit or coverage questions.      Reason for referral:  Hypertension and elevated creat in post liver transplant pt on prograf  Please bring the following to your appointment:    >>   Any x-rays, CTs or MRIs which have been performed.  Contact the facility where they were done to arrange for  prior to your scheduled appointment.  Any new CT, MRI or other procedures ordered by your specialist must be performed at a [place] facility or coordinated by your clinic's referral office.    >>   List of current medications   >>   This referral request   >>   Any documents/labs given to you for this referral                  Follow-up notes from your care team     Return in about 3 months (around 9/8/2018).      Your next 10 appointments already scheduled     Jun 14, 2018 10:00 AM CDT   (Arrive by 9:45 AM)   NEW GENERAL with Vicki Mantilla OD   [facility] Ophthalmology ([facility])    [address] [place] [address]  [address] [facility] [id number] [phone]            Dec 14, 2018  9:30 AM CST   (Arrive by 9:15 AM)   Return Liver Transplant with Katherine Jimenez MD   [facility] Hepatology ([facility])     [address]  [address]  [facility] [id number] [phone]              Who to contact     If you have questions or need follow up information about today's clinic visit or your schedule please contact [facility] HEPATOLOGY directly at [phone].  Normal or non-critical lab and imaging results will be communicated to you by MyChart, letter or phone within 4 business days after the clinic has received the results. If you do not hear from us within 7 days, please contact the clinic through [employer] or phone. If you have a critical or abnormal lab result, we will notify you by phone as soon as possible.  Submit refill requests through [facility] or call your pharmacy and they will forward the refill request to us. Please allow 3 business days for your refill to be completed.          Additional Information About Your Visit        [employer] Information     [facility] gives you secure access to your electronic health record. If you see a primary care provider, you can also send messages to your care team and make appointments. If you have questions, please call your primary care clinic.  If you do not have a primary care provider, please call [phone] and they will assist you.        Care EveryWhere ID     This is your Care EveryWhere ID. This could be used by other organizations to access your [facility] medical records  [id number]        Your Vitals Were     Pulse Temperature Height Pulse Oximetry BMI (Body Mass Index)       75 97.9  F (36.6  C) (Oral) 1.829 m (6') 98% 37.24 kg/m2        Blood Pressure from Last 3 Encounters:   06/08/18 172/84   11/22/17 123/79   06/02/17 159/82    Weight from Last 3 Encounters:   06/08/18 124.6 kg (274 lb 9.6 oz)   11/22/17 124.3 kg (274 lb)   06/02/17 118.3 kg (260 lb 12.8 oz)              We Performed the Following     NEPHROLOGY ADULT REFERRAL          Today's Medication Changes          These changes are accurate as of 6/8/18 11:59 PM.  If you have any questions, ask  your nurse or doctor.               Stop taking these medicines if you haven't already. Please contact your care team if you have questions.     hydrochlorothiazide 12.5 MG capsule   Commonly known as:  MICROZIDE   Stopped by:  Katherine Jimenez MD                    Primary Care Provider Office Phone # Fax #    [place]ine [phone]       [facility] [address]   [facility] [address]        Equal Access to Services     [facility]: Hadii aad ku hadasho Soomaali, waaxda luqadaha, qaybta kaalmada adeegyada, waxay idiin hayaan adeeg kharash la'aan . So [facility] [phone].    ATENCIÓN: Si habla español, tiene a garza disposición servicios gratuitos de asistencia lingüística. [facility] [phone].    We comply with applicable federal civil rights laws and [place] laws. We do not discriminate on the basis of race, color, national origin, age, disability, sex, sexual orientation, or gender identity.            Thank you!     Thank you for choosing [facility] HEPATOLOGY  for your care. Our goal is always to provide you with excellent care. Hearing back from our patients is one way we can continue to improve our services. Please take a few minutes to complete the written survey that you may receive in the mail after your visit with us. Thank you!             Your Updated Medication List - Protect others around you: Learn how to safely use, store and throw away your medicines at www.disposemymeds.org.          This list is accurate as of 6/8/18 11:59 PM.  Always use your most recent med list.                   Brand Name Dispense Instructions for use Diagnosis    AMLODIPINE BESYLATE PO      Take 10 mg by mouth        atorvastatin 20 MG tablet    LIPITOR          CALCIUM + D PO      Take 1 tablet by mouth daily        LANTUS SOLOSTAR 100 UNIT/ML injection   Generic drug:  insulin glargine           losartan 25 MG tablet    COZAAR    90 tablet    Take 1 tablet (25 mg) by mouth  daily    Benign essential hypertension, CKD (chronic kidney disease) stage 3, GFR 30-59 ml/min       metFORMIN 1000 MG tablet    GLUCOPHAGE          metoprolol succinate 25 MG 24 hr tablet    TOPROL-XL          MULTIVITAL Tabs           sildenafil 50 MG tablet    VIAGRA     Take 50 mg by mouth daily as needed for erectile dysfunction        tacrolimus 1 MG capsule     120 capsule    Take 2 capsules (2 mg) by mouth 2 times daily    Liver transplanted (H)

## 2018-06-14 ENCOUNTER — OFFICE VISIT (OUTPATIENT)
Dept: OPHTHALMOLOGY | Facility: CLINIC | Age: 68
End: 2018-06-14
Payer: MEDICARE

## 2018-06-14 DIAGNOSIS — H25.13 NUCLEAR SENILE CATARACT OF BOTH EYES: Primary | ICD-10-CM

## 2018-06-14 DIAGNOSIS — H01.021 SQUAMOUS BLEPHARITIS OF UPPER EYELIDS OF BOTH EYES: ICD-10-CM

## 2018-06-14 DIAGNOSIS — H52.4 MYOPIA OF BOTH EYES WITH ASTIGMATISM AND PRESBYOPIA: ICD-10-CM

## 2018-06-14 DIAGNOSIS — H52.13 MYOPIA OF BOTH EYES WITH ASTIGMATISM AND PRESBYOPIA: ICD-10-CM

## 2018-06-14 DIAGNOSIS — H52.203 MYOPIA OF BOTH EYES WITH ASTIGMATISM AND PRESBYOPIA: ICD-10-CM

## 2018-06-14 DIAGNOSIS — H01.024 SQUAMOUS BLEPHARITIS OF UPPER EYELIDS OF BOTH EYES: ICD-10-CM

## 2018-06-14 ASSESSMENT — REFRACTION_MANIFEST
OD_ADD: +2.25
OS_AXIS: 040
OD_CYLINDER: +1.25
OS_CYLINDER: +0.50
OD_SPHERE: -7.50
OS_ADD: +2.25
OD_AXIS: 175
OS_SPHERE: -8.50

## 2018-06-14 ASSESSMENT — VISUAL ACUITY
OD_CC: 20/20
METHOD: SNELLEN - LINEAR
CORRECTION_TYPE: CONTACTS
OS_CC: 20/25

## 2018-06-14 ASSESSMENT — CUP TO DISC RATIO
OD_RATIO: 0.20
OS_RATIO: 0.20

## 2018-06-14 ASSESSMENT — REFRACTION_WEARINGRX
OD_AXIS: 177
SPECS_TYPE: BIFOCAL
OS_ADD: +2.25
OD_SPHERE: -8.25
OD_CYLINDER: +1.00
OS_SPHERE: -9.00
OS_AXIS: 029
OS_CYLINDER: +1.50
OD_ADD: +2.25

## 2018-06-14 ASSESSMENT — SLIT LAMP EXAM - LIDS
COMMENTS: BLEPHARITIS
COMMENTS: BLEPHARITIS

## 2018-06-14 ASSESSMENT — TONOMETRY
OS_IOP_MMHG: 15
OD_IOP_MMHG: 15
IOP_METHOD: ICARE

## 2018-06-14 ASSESSMENT — CONF VISUAL FIELD
METHOD: COUNTING FINGERS
OS_NORMAL: 1
OD_NORMAL: 1

## 2018-06-14 NOTE — PROGRESS NOTES
A/P  1.) Cataracts OU  -Not visually symptomatic at this time  -Monitor    2.) h/o Melanoma   -No ocular abnormalities today  -Iris nevus OS, nonsuspicious  -Continue to monitor. Reviewed with pt to self-monitor ocular surface at home. We will continue to monitor whole eye annually    3.) Myopia/Astigmatism/Presbyopia OU  -CL wearer, no update today per pt preference    4.) Blepharitis OU  -Asymptomatic. Lid scrubs prn    Monitor 1 year routine, sooner prn    I have confirmed the patient's CC, HPI and reviewed Past Medical History, Past Surgical History, Social History, Family History, Problem List, Medication List and agree with Tech note.     Vicki Mantilla, AMANDA FAAO

## 2018-06-14 NOTE — MR AVS SNAPSHOT
After Visit Summary   6/14/2018    Eric Andrew    MRN: [id number]           Patient Information     Date Of Birth          1950        Visit Information        Provider Department      6/14/2018 10:00 AM Vicki Mantilla OD [facility] Ophthalmology        Today's Diagnoses     Nuclear senile cataract of both eyes    -  1    Squamous blepharitis of upper eyelids of both eyes        Myopia of both eyes with astigmatism and presbyopia           Follow-ups after your visit        Your next 10 appointments already scheduled     Dec 14, 2018  9:30 AM CST   (Arrive by 9:15 AM)   Return Liver Transplant with Katherine Jimenez MD   [facility] Hepatology ([facility])    [address]  [address]  [facility] [address] [phone]              Who to contact     Please call your clinic at [phone] to:    Ask questions about your health    Make or cancel appointments    Discuss your medicines    Learn about your test results    Speak to your doctor            Additional Information About Your Visit        [employer] Information     [employer] gives you secure access to your electronic health record. If you see a primary care provider, you can also send messages to your care team and make appointments. If you have questions, please call your primary care clinic.  If you do not have a primary care provider, please call [phone] and they will assist you.      [employer] is an electronic gateway that provides easy, online access to your medical records. With [employer], you can request a clinic appointment, read your test results, renew a prescription or communicate with your care team.     To access your existing account, please contact your [facility] or call [phone] for assistance.        Care EveryWhere ID     This is your Care EveryWhere ID. This could be used by other organizations to access your [facility]  records  [id number]         Blood Pressure from Last 3 Encounters:   06/08/18 172/84   11/22/17 123/79   06/02/17 159/82    Weight from Last 3 Encounters:   06/08/18 124.6 kg (274 lb 9.6 oz)   11/22/17 124.3 kg (274 lb)   06/02/17 118.3 kg (260 lb 12.8 oz)              Today, you had the following     No orders found for display       Primary Care Provider Office Phone # Fax #    [place] [phone]       [facility] [address]   [facility] [address]        Equal Access to Services     [facility]: Hadii marissa [place], waaxda marla, qaybta kaalmada andi, natali shore . So [facility] [phone].    ATENCIÓN: Si habla español, tiene a garza disposición servicios gratuitos de asistencia lingüística. Llame al [phone].    We comply with applicable federal civil rights laws and [place] laws. We do not discriminate on the basis of race, color, national origin, age, disability, sex, sexual orientation, or gender identity.            Thank you!     Thank you for choosing [facility] your care. Our goal is always to provide you with excellent care. Hearing back from our patients is one way we can continue to improve our services. Please take a few minutes to complete the written survey that you may receive in the mail after your visit with us. Thank you!             Your Updated Medication List - Protect others around you: Learn how to safely use, store and throw away your medicines at www.disposemymeds.org.          This list is accurate as of 6/14/18 10:51 AM.  Always use your most recent med list.                   Brand Name Dispense Instructions for use Diagnosis    AMLODIPINE BESYLATE PO      Take 10 mg by mouth        atorvastatin 20 MG tablet    LIPITOR          CALCIUM + D PO      Take 1 tablet by mouth daily        LANTUS SOLOSTAR 100 UNIT/ML injection   Generic drug:  insulin glargine           losartan 25 MG  tablet    COZAAR    90 tablet    Take 1 tablet (25 mg) by mouth daily    Benign essential hypertension, CKD (chronic kidney disease) stage 3, GFR 30-59 ml/min       metFORMIN 1000 MG tablet    GLUCOPHAGE          metoprolol succinate 25 MG 24 hr tablet    TOPROL-XL          MULTIVITAL Tabs           sildenafil 50 MG tablet    VIAGRA     Take 50 mg by mouth daily as needed for erectile dysfunction        tacrolimus 1 MG capsule     120 capsule    Take 2 capsules (2 mg) by mouth 2 times daily    Liver transplanted (H)

## 2018-06-14 NOTE — NURSING NOTE
Chief Complaints and History of Present Illnesses   Patient presents with     Annual Eye Exam     HPI    Affected eye(s):  Both   Symptoms:     No blurred vision      Frequency:  Constant       Do you have eye pain now?:  No      Comments:  Patient states vision has been stable since last eye exam, both eyes. Denies eye pain or irritation. Does not take eye drops.    Wears CLs dailies but does not need them today    Sophy MARTINEZ 10:01 AM June 14, 2018

## 2018-06-29 ENCOUNTER — TELEPHONE (OUTPATIENT)
Dept: NEPHROLOGY | Facility: CLINIC | Age: 68
End: 2018-06-29

## 2018-06-29 DIAGNOSIS — N18.30 CKD (CHRONIC KIDNEY DISEASE) STAGE 3, GFR 30-59 ML/MIN (H): ICD-10-CM

## 2018-06-29 DIAGNOSIS — I10 BENIGN ESSENTIAL HYPERTENSION: ICD-10-CM

## 2018-06-29 RX ORDER — LOSARTAN POTASSIUM 25 MG/1
25 TABLET ORAL DAILY
Qty: 90 TABLET | Refills: 3 | Status: SHIPPED | OUTPATIENT
Start: 2018-06-29

## 2018-07-24 ENCOUNTER — TELEPHONE (OUTPATIENT)
Dept: TRANSPLANT | Facility: CLINIC | Age: 68
End: 2018-07-24

## 2018-07-24 NOTE — TELEPHONE ENCOUNTER
Patient Call: General  Route to LPN    Reason for call: Pt left VM 7/24/18 at 10:29am. Pt has developed gout and would like to talk to his transplant team regarding treatments and what to expect w/ regarding his transplant.     Call back needed? Yes    Return Call Needed  Same as documented in contacts section  When to return call?: Greater than one day: Route standard priority

## 2018-07-26 NOTE — TELEPHONE ENCOUNTER
Spoke to Eric.  He says his gout is getting better but was wondering if this happens again if if would be ok for him to get steroids.  I told him yes.  I also mentioned that he could consider colchicine / allopurinol.

## 2018-07-30 DIAGNOSIS — R60.9 EDEMA: Primary | ICD-10-CM

## 2018-07-30 DIAGNOSIS — E55.9 VITAMIN D DEFICIENCY: ICD-10-CM

## 2018-07-30 DIAGNOSIS — R79.89 ELEVATED SERUM CREATININE: ICD-10-CM

## 2018-08-01 ENCOUNTER — OFFICE VISIT (OUTPATIENT)
Dept: NEPHROLOGY | Facility: CLINIC | Age: 68
End: 2018-08-01
Attending: INTERNAL MEDICINE
Payer: MEDICARE

## 2018-08-01 VITALS
HEART RATE: 73 BPM | DIASTOLIC BLOOD PRESSURE: 85 MMHG | HEIGHT: 72 IN | WEIGHT: 273.4 LBS | SYSTOLIC BLOOD PRESSURE: 131 MMHG | BODY MASS INDEX: 37.03 KG/M2 | RESPIRATION RATE: 16 BRPM

## 2018-08-01 DIAGNOSIS — R79.89 ELEVATED SERUM CREATININE: ICD-10-CM

## 2018-08-01 DIAGNOSIS — R60.9 EDEMA: ICD-10-CM

## 2018-08-01 DIAGNOSIS — Z94.4 LIVER REPLACED BY TRANSPLANT (H): ICD-10-CM

## 2018-08-01 DIAGNOSIS — E55.9 VITAMIN D DEFICIENCY: ICD-10-CM

## 2018-08-01 LAB
ALBUMIN SERPL-MCNC: 3.9 G/DL (ref 3.4–5)
ALBUMIN UR-MCNC: 30 MG/DL
ANION GAP SERPL CALCULATED.3IONS-SCNC: 8 MMOL/L (ref 3–14)
APPEARANCE UR: CLEAR
BILIRUB UR QL STRIP: NEGATIVE
BUN SERPL-MCNC: 35 MG/DL (ref 7–30)
CALCIUM SERPL-MCNC: 9.4 MG/DL (ref 8.5–10.1)
CHLORIDE SERPL-SCNC: 106 MMOL/L (ref 94–109)
CHOLEST SERPL-MCNC: 124 MG/DL
CO2 SERPL-SCNC: 26 MMOL/L (ref 20–32)
COLOR UR AUTO: YELLOW
CREAT SERPL-MCNC: 1.68 MG/DL (ref 0.66–1.25)
CREAT UR-MCNC: 127 MG/DL
ERYTHROCYTE [DISTWIDTH] IN BLOOD BY AUTOMATED COUNT: 14.4 % (ref 10–15)
GFR SERPL CREATININE-BSD FRML MDRD: 41 ML/MIN/1.7M2
GLUCOSE SERPL-MCNC: 116 MG/DL (ref 70–99)
GLUCOSE UR STRIP-MCNC: NEGATIVE MG/DL
HCT VFR BLD AUTO: 40.9 % (ref 40–53)
HDLC SERPL-MCNC: 28 MG/DL
HGB BLD-MCNC: 13.3 G/DL (ref 13.3–17.7)
HGB UR QL STRIP: NEGATIVE
KETONES UR STRIP-MCNC: NEGATIVE MG/DL
LDLC SERPL CALC-MCNC: 43 MG/DL
LEUKOCYTE ESTERASE UR QL STRIP: NEGATIVE
MCH RBC QN AUTO: 30.6 PG (ref 26.5–33)
MCHC RBC AUTO-ENTMCNC: 32.5 G/DL (ref 31.5–36.5)
MCV RBC AUTO: 94 FL (ref 78–100)
MICROALBUMIN UR-MCNC: 143 MG/L
MICROALBUMIN/CREAT UR: 112.6 MG/G CR (ref 0–17)
MUCOUS THREADS #/AREA URNS LPF: PRESENT /LPF
NITRATE UR QL: NEGATIVE
NONHDLC SERPL-MCNC: 96 MG/DL
PH UR STRIP: 5 PH (ref 5–7)
PHOSPHATE SERPL-MCNC: 3.4 MG/DL (ref 2.5–4.5)
PLATELET # BLD AUTO: 140 10E9/L (ref 150–450)
POTASSIUM SERPL-SCNC: 4.8 MMOL/L (ref 3.4–5.3)
PROT UR-MCNC: 0.34 G/L
PROT/CREAT 24H UR: 0.27 G/G CR (ref 0–0.2)
PTH-INTACT SERPL-MCNC: 102 PG/ML (ref 18–80)
RBC # BLD AUTO: 4.34 10E12/L (ref 4.4–5.9)
RBC #/AREA URNS AUTO: 0 /HPF (ref 0–2)
SODIUM SERPL-SCNC: 140 MMOL/L (ref 133–144)
SOURCE: ABNORMAL
SP GR UR STRIP: 1.02 (ref 1–1.03)
TRIGL SERPL-MCNC: 264 MG/DL
UROBILINOGEN UR STRIP-MCNC: 0 MG/DL (ref 0–2)
WBC # BLD AUTO: 6.1 10E9/L (ref 4–11)
WBC #/AREA URNS AUTO: 0 /HPF (ref 0–5)

## 2018-08-01 PROCEDURE — 36415 COLL VENOUS BLD VENIPUNCTURE: CPT | Performed by: INTERNAL MEDICINE

## 2018-08-01 PROCEDURE — 85027 COMPLETE CBC AUTOMATED: CPT | Performed by: INTERNAL MEDICINE

## 2018-08-01 PROCEDURE — 82306 VITAMIN D 25 HYDROXY: CPT | Performed by: INTERNAL MEDICINE

## 2018-08-01 PROCEDURE — 80061 LIPID PANEL: CPT | Performed by: INTERNAL MEDICINE

## 2018-08-01 PROCEDURE — 84156 ASSAY OF PROTEIN URINE: CPT | Performed by: INTERNAL MEDICINE

## 2018-08-01 PROCEDURE — G0463 HOSPITAL OUTPT CLINIC VISIT: HCPCS | Mod: ZF

## 2018-08-01 PROCEDURE — 80069 RENAL FUNCTION PANEL: CPT | Performed by: INTERNAL MEDICINE

## 2018-08-01 PROCEDURE — 82043 UR ALBUMIN QUANTITATIVE: CPT | Performed by: INTERNAL MEDICINE

## 2018-08-01 PROCEDURE — 81001 URINALYSIS AUTO W/SCOPE: CPT | Performed by: INTERNAL MEDICINE

## 2018-08-01 PROCEDURE — 83970 ASSAY OF PARATHORMONE: CPT | Performed by: INTERNAL MEDICINE

## 2018-08-01 RX ORDER — INSULIN GLARGINE 100 [IU]/ML
45 INJECTION, SOLUTION SUBCUTANEOUS AT BEDTIME
COMMUNITY
Start: 2018-06-08

## 2018-08-01 ASSESSMENT — PAIN SCALES - GENERAL: PAINLEVEL: NO PAIN (0)

## 2018-08-01 NOTE — PROGRESS NOTES
Assessment and Plan:   68 year old pleasant male with PMH of alpha 1 anti trypsin deficiency, cirrhosis status post liver transplant in 3/2015, HTN  and  CKD since  1/2015, recent diagnosis of Diabetes in 2/2017 with Hba1c of 11.5 in 2/2017 . Exact etiology unclear.  Most likely in the setting of hemodynamic effects from  underlying liver disease in the past  . Baseline serum Cr appears to be 1.6 to 1.7. Long term use of CNI also may be contributing.    CKD stage 3b: baseline cr 1.6, no proteinuria, pt had jose after liver transplant since 2015 and never recovered now cr 1.6, at baseline.  -- cont suppurative care, avoid nephrotoxic medication   -- follow up in 6 month.      BP and volume: controlled, on losartan 25mg and metoprolol 25mg, amlodipine 10 mg, euvolemic in exam.     Electrolyte and acid base: K 4.8, bicrab 26 no issue.      Anemia: hgb stable 13.3 no issue      BMD: ca 9.4, albumin 3.9, phos 3.4.  Vit D 27,  no issue     S/p Liver transplant: 3/31/2015 on tacrolimus 2mg bid, tacro level 5.5    DM2: controlled now, last A1c 5.5.  --  on metformin 1000 mg.        Patient seen and discussed  with Dr. aCrol Wagoner  Nephrology Fellow  Pager: [phone]      Assessment and plan was discussed with patient and he voiced his understanding and agreement.    Reason for Visit:  Eric Andrew is a 68 year old male with CKD from unresolved JOSE during livertransplant, who presents for routine follow up.     Interval history:  Since last visit pt had a follow up with cardiology and had heart cath no major coronary disease only 10 to 20% blockage, he has no specific complaint now, he feels fine, energy level is okay he is active exercising 3 times a week no chest pain no shortness breath, appetite is good, no nausea vomiting or diarrhea, no major weight changes, no fever chills or sweating, no leg edema, no skin changes, no headache, no lightheadedness or dizziness, no urinary symptoms.    Home  BP: controlled    Baseline Cr: 1.6    ROS:  A comprehensive review of systems was obtained and negative, except as noted in the HPI or PMH.    Active Medical Problems:  Patient Active Problem List   Diagnosis     Alpha-1-antitrypsin deficiency (H)     Anemia     Thrombocytopenia (H)     Liver replaced by transplant (H)     Immunosuppressed status (H)     EZEQUIEL (acute kidney injury) (H)     Edema     Hives     Hypomagnesemia       Personal Hx:   Social History     Social History     Marital status:      Spouse name: N/A     Number of children: N/A     Years of education: N/A     Occupational History     Not on file.     Social History Main Topics     Smoking status: Never Smoker     Smokeless tobacco: Never Used      Comment: [id number] occational smoker     Alcohol use 0.0 oz/week     0 Standard drinks or equivalent per week      Comment: 1 glass of wine per month     Drug use: No     Sexual activity: No     Other Topics Concern     Not on file     Social History Narrative       Allergies:  Allergies   Allergen Reactions     Lisinopril Cough     Meropenem Hives and Swelling     Developed hives and lip swelling while on meropenem and micafungin.  Resolved with discontinuation.  Unclear which was cause.     Micafungin Hives and Swelling     Developed hives and lip swelling while on meropenem and micafungin.  Resolved with discontinuation.  Unclear which was cause.       Medications:  Current Outpatient Prescriptions   Medication     AMLODIPINE BESYLATE PO     atorvastatin (LIPITOR) 20 MG tablet     BASAGLAR 100 UNIT/ML injection     Calcium Carbonate-Vitamin D (CALCIUM + D PO)     losartan (COZAAR) 25 MG tablet     metFORMIN (GLUCOPHAGE) 1000 MG tablet     metoprolol succinate (TOPROL-XL) 25 MG 24 hr tablet     Multiple Vitamins-Minerals (MULTIVITAL) TABS     PROGRAF (BRAND) 1 MG CAPSULE     sildenafil (VIAGRA) 50 MG tablet     No current facility-administered medications for this visit.        Vitals:  /85   Pulse 73  Resp 16  Ht 1.829 m (6')  Wt 124 kg (273 lb 6.4 oz)  BMI 37.08 kg/m2    Exam:  GENERAL APPEARANCE: alert and no distress  HENT: mouth without ulcers or lesions  LYMPHATICS: no cervical or supraclavicular nodes  RESP: lungs clear to auscultation - no rales, rhonchi or wheezes  CV: regular rhythm, normal rate, no rub, no murmur  EDEMA: no LE edema bilaterally  ABDOMEN: soft, nondistended, nontender, bowel sounds normal  MS: extremities normal - no gross deformities noted, no evidence of inflammation in joints, no muscle tenderness  SKIN: no rash    Results:  Electrolytes/Renal -   Recent Labs   Lab Test  08/01/18   1420  06/06/18   0955  04/05/18   0822   05/01/17   1531   12/19/16   1339   10/29/15   1129  09/22/15   0910  08/31/15   0905   NA  140  142  141   < >  142   < >  141   < >  141  141  142   POTASSIUM  4.8  4.2  4.4   < >  4.4   < >  3.6   < >  4.8  5.2  5.3   CHLORIDE  106  111*  108   < >  106   < >  105   < >  110*  110*  112*   CO2  26  25  27   < >  26   < >  28   < >  25  26  25   BUN  35*  35*  36*   < >  43*   < >  38*   < >  46*  41*  43*   CR  1.68*  1.67*  1.58*   < >  1.65*   < >  1.75*   < >  1.56*  1.52*  1.66*   GLC  116*  171*  176*   < >  114*   < >  223*   < >  118*  95  129*   SHAYNE  9.4  8.9  8.8   < >  8.8   < >  9.0   < >  8.6  8.9  8.4*   MAG   --    --    --    --    --    --    --    --   1.7  2.3  1.7   PHOS  3.4   --    --    --   3.0   --   2.8   < >  3.4  2.9  3.4    < > = values in this interval not displayed.       CBC -   Recent Labs   Lab Test  08/01/18   1420  06/06/18   0955  04/05/18   0822   WBC  6.1  5.2  5.8   HGB  13.3  13.3  13.8   PLT  140*  119*  114*       LFTs -   Recent Labs   Lab Test  08/01/18   1420  06/06/18   0955  04/05/18   0822  10/31/17   0813   ALKPHOS   --   89  92  87   BILITOTAL   --   0.3  0.5  0.4   ALT   --   16  21  21   AST   --   15  12  12   PROTTOTAL   --   7.2  7.5  7.5   ALBUMIN  3.9  3.8  3.8  4.0       Coags -   Recent  Labs   Lab Test  04/30/15   0907  04/11/15   2000  04/06/15   0521   03/31/15   1250  03/31/15   1142   INR  1.28*  1.05  1.24*   < >  2.76*  3.04*   PTT   --   31   --    --   111*  62*    < > = values in this interval not displayed.       Iron Panel -   Recent Labs   Lab Test  08/06/16   0945  11/10/14   1051  10/30/14   1443   IRON  62  142  131   IRONSAT  29  98*  83*   RAINE  125  673*  905*       Endocrine -   Recent Labs   Lab Test  06/02/17   0859  04/19/16   0852  04/01/15   0358  07/15/14   0711   A1C  5.5  5.6  Unable to Calculate  MICAELA MCGARRY IN U4D @0808 BY HN     --    TSH   --    --    --   3.66     This patient has been evaluated by me, Arnel Medina MD, on 8/1/18.  I discussed with the fellow, Dr. Wagoner, and agree with the findings and plan in this note.  I have reviewed 8/1/18 medications, vital signs and labs.        Total time I spent was >40 minutes, and more than 50% of face to face time with the patient was spent in counseling and/or coordination of care regarding principles of multidisciplinary care, medication management, and CKD education.    Arnel Medina MD

## 2018-08-01 NOTE — LETTER
8/1/2018      RE: Eric Andrew  [address] [facility] [address]       Assessment and Plan:   68 year old pleasant male with PMH of alpha 1 anti trypsin deficiency, cirrhosis status post liver transplant in 3/2015, HTN  and  CKD since  1/2015, recent diagnosis of Diabetes in 2/2017 with Hba1c of 11.5 in 2/2017 . Exact etiology unclear.  Most likely in the setting of hemodynamic effects from  underlying liver disease in the past  . Baseline serum Cr appears to be 1.6 to 1.7. Long term use of CNI also may be contributing.    CKD stage 3b: baseline cr 1.6, no proteinuria, pt had jose after liver transplant since 2015 and never recovered now cr 1.6, at baseline.  -- cont suppurative care, avoid nephrotoxic medication   -- follow up in 6 month.      BP and volume: controlled, on losartan 25mg and metoprolol 25mg, amlodipine 10 mg, euvolemic in exam.     Electrolyte and acid base: K 4.8, bicrab 26 no issue.      Anemia: hgb stable 13.3 no issue      BMD: ca 9.4, albumin 3.9, phos 3.4.  Vit D 27,  no issue     S/p Liver transplant: 3/31/2015 on tacrolimus 2mg bid, tacro level 5.5    DM2: controlled now, last A1c 5.5.  --  on metformin 1000 mg.        Patient seen and discussed  with Dr. Carol Wagoner  Nephrology Fellow  Pager: [phone]      Assessment and plan was discussed with patient and he voiced his understanding and agreement.    Reason for Visit:  Eric Andrew is a 68 year old male with CKD from unresolved JOSE during livertransplant, who presents for routine follow up.     Interval history:  Since last visit pt had a follow up with cardiology and had heart cath no major coronary disease only 10 to 20% blockage, he has no specific complaint now, he feels fine, energy level is okay he is active exercising 3 times a week no chest pain no shortness breath, appetite is good, no nausea vomiting or diarrhea, no major weight changes, no fever chills or sweating, no leg edema, no skin  changes, no headache, no lightheadedness or dizziness, no urinary symptoms.    Home BP: controlled    Baseline Cr: 1.6    ROS:  A comprehensive review of systems was obtained and negative, except as noted in the HPI or PMH.    Active Medical Problems:  Patient Active Problem List   Diagnosis     Alpha-1-antitrypsin deficiency (H)     Anemia     Thrombocytopenia (H)     Liver replaced by transplant (H)     Immunosuppressed status (H)     EZEQUIEL (acute kidney injury) (H)     Edema     Hives     Hypomagnesemia       Personal Hx:   Social History     Social History     Marital status:      Spouse name: N/A     Number of children: N/A     Years of education: N/A     Occupational History     Not on file.     Social History Main Topics     Smoking status: Never Smoker     Smokeless tobacco: Never Used      Comment: [id number] occational smoker     Alcohol use 0.0 oz/week     0 Standard drinks or equivalent per week      Comment: 1 glass of wine per month     Drug use: No     Sexual activity: No     Other Topics Concern     Not on file     Social History Narrative       Allergies:  Allergies   Allergen Reactions     Lisinopril Cough     Meropenem Hives and Swelling     Developed hives and lip swelling while on meropenem and micafungin.  Resolved with discontinuation.  Unclear which was cause.     Micafungin Hives and Swelling     Developed hives and lip swelling while on meropenem and micafungin.  Resolved with discontinuation.  Unclear which was cause.       Medications:  Current Outpatient Prescriptions   Medication     AMLODIPINE BESYLATE PO     atorvastatin (LIPITOR) 20 MG tablet     BASAGLAR 100 UNIT/ML injection     Calcium Carbonate-Vitamin D (CALCIUM + D PO)     losartan (COZAAR) 25 MG tablet     metFORMIN (GLUCOPHAGE) 1000 MG tablet     metoprolol succinate (TOPROL-XL) 25 MG 24 hr tablet     Multiple Vitamins-Minerals (MULTIVITAL) TABS     PROGRAF (BRAND) 1 MG CAPSULE     sildenafil (VIAGRA) 50 MG tablet     No  current facility-administered medications for this visit.        Vitals:  /85  Pulse 73  Resp 16  Ht 1.829 m (6')  Wt 124 kg (273 lb 6.4 oz)  BMI 37.08 kg/m2    Exam:  GENERAL APPEARANCE: alert and no distress  HENT: mouth without ulcers or lesions  LYMPHATICS: no cervical or supraclavicular nodes  RESP: lungs clear to auscultation - no rales, rhonchi or wheezes  CV: regular rhythm, normal rate, no rub, no murmur  EDEMA: no LE edema bilaterally  ABDOMEN: soft, nondistended, nontender, bowel sounds normal  MS: extremities normal - no gross deformities noted, no evidence of inflammation in joints, no muscle tenderness  SKIN: no rash    Results:  Electrolytes/Renal -   Recent Labs   Lab Test  08/01/18   1420  06/06/18   0955  04/05/18   0822   05/01/17   1531   12/19/16   1339   10/29/15   1129  09/22/15   0910  08/31/15   0905   NA  140  142  141   < >  142   < >  141   < >  141  141  142   POTASSIUM  4.8  4.2  4.4   < >  4.4   < >  3.6   < >  4.8  5.2  5.3   CHLORIDE  106  111*  108   < >  106   < >  105   < >  110*  110*  112*   CO2  26  25  27   < >  26   < >  28   < >  25  26  25   BUN  35*  35*  36*   < >  43*   < >  38*   < >  46*  41*  43*   CR  1.68*  1.67*  1.58*   < >  1.65*   < >  1.75*   < >  1.56*  1.52*  1.66*   GLC  116*  171*  176*   < >  114*   < >  223*   < >  118*  95  129*   SHAYNE  9.4  8.9  8.8   < >  8.8   < >  9.0   < >  8.6  8.9  8.4*   MAG   --    --    --    --    --    --    --    --   1.7  2.3  1.7   PHOS  3.4   --    --    --   3.0   --   2.8   < >  3.4  2.9  3.4    < > = values in this interval not displayed.       CBC -   Recent Labs   Lab Test  08/01/18   1420  06/06/18   0955  04/05/18   0822   WBC  6.1  5.2  5.8   HGB  13.3  13.3  13.8   PLT  140*  119*  114*       LFTs -   Recent Labs   Lab Test  08/01/18   1420  06/06/18   0955  04/05/18   0822  10/31/17   0813   ALKPHOS   --   89  92  87   BILITOTAL   --   0.3  0.5  0.4   ALT   --   16  21  21   AST   --   15  12  12    PROTTOTAL   --   7.2  7.5  7.5   ALBUMIN  3.9  3.8  3.8  4.0       Coags -   Recent Labs   Lab Test  04/30/15   0907  04/11/15   2000  04/06/15   0521   03/31/15   1250  03/31/15   1142   INR  1.28*  1.05  1.24*   < >  2.76*  3.04*   PTT   --   31   --    --   111*  62*    < > = values in this interval not displayed.       Iron Panel -   Recent Labs   Lab Test  08/06/16   0945  11/10/14   1051  10/30/14   1443   IRON  62  142  131   IRONSAT  29  98*  83*   RAINE  125  673*  905*       Endocrine -   Recent Labs   Lab Test  06/02/17   0859  04/19/16   0852  04/01/15   0358  07/15/14   0711   A1C  5.5  5.6  Unable to Calculate  MICAELA MCGARRY IN U4D @0808 BY HN     --    TSH   --    --    --   3.66     This patient has been evaluated by me, Arnel Medina MD, on 8/1/18.  I discussed with the fellow, Dr. Wagoner, and agree with the findings and plan in this note.  I have reviewed 8/1/18 medications, vital signs and labs.        Total time I spent was >40 minutes, and more than 50% of face to face time with the patient was spent in counseling and/or coordination of care regarding principles of multidisciplinary care, medication management, and CKD education.    MD Padma Gordon MD

## 2018-08-01 NOTE — NURSING NOTE
Chief Complaint   Patient presents with     RECHECK     Kidney follow up     /85  Pulse 73  Resp 16  Ht 1.829 m (6')  Wt 124 kg (273 lb 6.4 oz)  BMI 37.08 kg/m2.  JES JEAN CMA

## 2018-08-01 NOTE — MR AVS SNAPSHOT
After Visit Summary   8/1/2018    Eric Andrew    MRN: [id number]           Patient Information     Date Of Birth          1950        Visit Information        Provider Department      8/1/2018 2:00 PM Padma Wagoner MD [facility]        Today's Diagnoses     Elevated serum creatinine          Care Instructions    Key function stable , follow up in 6 month           Follow-ups after your visit        Follow-up notes from your care team     Return in about 6 months (around 2/1/2019).      Your next 10 appointments already scheduled     Dec 14, 2018  9:30 AM CST   (Arrive by 9:15 AM)   Return Liver Transplant with Katherine Jimenez MD   [facility] ([facility])    [address] [place] [address]  [address]  [facility] [address] [phone]            Apr 24, 2019  2:00 PM CDT   (Arrive by 1:30 PM)   Return Visit with Padma Wagoner MD   [facility] ([facility])    [address] [place] [address]  [address]  [facility] [address] [phone]              Who to contact     If you have questions or need follow up information about today's clinic visit or your schedule please contact [facility] directly at [phone].  Normal or non-critical lab and imaging results will be communicated to you by [employer], letter or phone within 4 business days after the clinic has received the results. If you do not hear from us within 7 days, please contact the clinic through [employer] or phone. If you have a critical or abnormal lab result, we will notify you by phone as soon as possible.  Submit refill requests through [employer] or call your pharmacy and they will forward the refill request to us. Please allow 3 business days for your refill to be completed.          Additional Information About Your Visit        [employer] Information     [employer] gives you secure access to your electronic health record. If you see a primary  care provider, you can also send messages to your care team and make appointments. If you have questions, please call your primary care clinic.  If you do not have a primary care provider, please call [phone] and they will assist you.        Care EveryWhere ID     This is your Care EveryWhere ID. This could be used by other organizations to access your [place] medical records  [id number]        Your Vitals Were     Pulse Respirations Height BMI (Body Mass Index)          73 16 1.829 m (6') 37.08 kg/m2         Blood Pressure from Last 3 Encounters:   08/01/18 131/85   06/08/18 172/84   11/22/17 123/79    Weight from Last 3 Encounters:   08/01/18 124 kg (273 lb 6.4 oz)   06/08/18 124.6 kg (274 lb 9.6 oz)   11/22/17 124.3 kg (274 lb)              Today, you had the following     No orders found for display       Primary Care Provider Office Phone # Fax #    [facility] [phone]       [facility] [address] [facility] [address]  [facility] [address]        Equal Access to Services     [facility]: Hadii aad ku hadasho Soomaali, waaxda luqadaha, qaybta kaalmada adeegyada, natali abarca hayolayinka shore . So [facility] [phone].    ATENCIÓN: Si habla español, tiene a garza disposición servicios gratuitos de asistencia lingüística. [facility] [phone].    We comply with applicable federal civil rights laws and [place] laws. We do not discriminate on the basis of race, color, national origin, age, disability, sex, sexual orientation, or gender identity.            Thank you!     Thank you for choosing [facility] NEPHROLOG[facility]  for your care. Our goal is always to provide you with excellent care. Hearing back from our patients is one way we can continue to improve our services. Please take a few minutes to complete the written survey that you may receive in the mail after your visit with us. Thank you!             Your Updated Medication List - Protect others around you: Learn how to safely  use, store and throw away your medicines at www.disposemymeds.org.          This list is accurate as of 8/1/18 11:59 PM.  Always use your most recent med list.                   Brand Name Dispense Instructions for use Diagnosis    AMLODIPINE BESYLATE PO      Take 10 mg by mouth        atorvastatin 20 MG tablet    LIPITOR          BASAGLAR 100 UNIT/ML injection      Inject 38 Units Subcutaneous        CALCIUM + D PO      Take 1 tablet by mouth daily        losartan 25 MG tablet    COZAAR    90 tablet    Take 1 tablet (25 mg) by mouth daily    Benign essential hypertension, CKD (chronic kidney disease) stage 3, GFR 30-59 ml/min       metFORMIN 1000 MG tablet    GLUCOPHAGE          metoprolol succinate 25 MG 24 hr tablet    TOPROL-XL          MULTIVITAL Tabs           sildenafil 50 MG tablet    VIAGRA     Take 50 mg by mouth daily as needed for erectile dysfunction        tacrolimus 1 MG capsule     120 capsule    Take 2 capsules (2 mg) by mouth 2 times daily    Liver transplanted (H)

## 2018-08-02 LAB
DEPRECATED CALCIDIOL+CALCIFEROL SERPL-MC: <45 UG/L (ref 20–75)
VITAMIN D2 SERPL-MCNC: <5 UG/L
VITAMIN D3 SERPL-MCNC: 40 UG/L

## 2018-09-17 ENCOUNTER — HOSPITAL ENCOUNTER (OUTPATIENT)
Dept: LAB | Facility: CLINIC | Age: 68
Discharge: HOME | End: 2018-09-17
Attending: INTERNAL MEDICINE | Admitting: INTERNAL MEDICINE
Payer: MEDICARE

## 2018-09-17 DIAGNOSIS — Z94.4 LIVER REPLACED BY TRANSPLANT (H): ICD-10-CM

## 2018-09-17 LAB
ALBUMIN SERPL-MCNC: 3.8 G/DL (ref 3.4–5)
ALP SERPL-CCNC: 89 U/L (ref 40–150)
ALT SERPL W P-5'-P-CCNC: 20 U/L (ref 0–70)
ANION GAP SERPL CALCULATED.3IONS-SCNC: 9 MMOL/L (ref 3–14)
AST SERPL W P-5'-P-CCNC: 11 U/L (ref 0–45)
BILIRUB DIRECT SERPL-MCNC: <0.1 MG/DL (ref 0–0.2)
BILIRUB SERPL-MCNC: 0.3 MG/DL (ref 0.2–1.3)
BUN SERPL-MCNC: 36 MG/DL (ref 7–30)
CALCIUM SERPL-MCNC: 9.1 MG/DL (ref 8.5–10.1)
CHLORIDE SERPL-SCNC: 106 MMOL/L (ref 94–109)
CO2 SERPL-SCNC: 24 MMOL/L (ref 20–32)
CREAT SERPL-MCNC: 1.54 MG/DL (ref 0.66–1.25)
ERYTHROCYTE [DISTWIDTH] IN BLOOD BY AUTOMATED COUNT: 15.1 % (ref 10–15)
GFR SERPL CREATININE-BSD FRML MDRD: 45 ML/MIN/1.7M2
GLUCOSE SERPL-MCNC: 184 MG/DL (ref 70–99)
HCT VFR BLD AUTO: 38.3 % (ref 40–53)
HGB BLD-MCNC: 12.6 G/DL (ref 13.3–17.7)
MCH RBC QN AUTO: 31 PG (ref 26.5–33)
MCHC RBC AUTO-ENTMCNC: 32.9 G/DL (ref 31.5–36.5)
MCV RBC AUTO: 94 FL (ref 78–100)
PLATELET # BLD AUTO: 115 10E9/L (ref 150–450)
POTASSIUM SERPL-SCNC: 4.2 MMOL/L (ref 3.4–5.3)
PROT SERPL-MCNC: 7.4 G/DL (ref 6.8–8.8)
RBC # BLD AUTO: 4.06 10E12/L (ref 4.4–5.9)
SODIUM SERPL-SCNC: 139 MMOL/L (ref 133–144)
TACROLIMUS BLD-MCNC: 5.1 UG/L (ref 5–15)
TME LAST DOSE: NORMAL H
WBC # BLD AUTO: 5.9 10E9/L (ref 4–11)

## 2018-09-17 PROCEDURE — 80197 ASSAY OF TACROLIMUS: CPT | Performed by: INTERNAL MEDICINE

## 2018-09-17 PROCEDURE — 80076 HEPATIC FUNCTION PANEL: CPT | Performed by: INTERNAL MEDICINE

## 2018-09-17 PROCEDURE — 80048 BASIC METABOLIC PNL TOTAL CA: CPT | Performed by: INTERNAL MEDICINE

## 2018-09-17 PROCEDURE — 85027 COMPLETE CBC AUTOMATED: CPT | Performed by: INTERNAL MEDICINE

## 2018-09-17 PROCEDURE — 36415 COLL VENOUS BLD VENIPUNCTURE: CPT | Performed by: INTERNAL MEDICINE

## 2018-10-08 DIAGNOSIS — Z94.4 LIVER TRANSPLANTED (H): ICD-10-CM

## 2018-10-08 RX ORDER — TACROLIMUS 1 MG/1
2 CAPSULE, GELATIN COATED ORAL 2 TIMES DAILY
Qty: 120 CAPSULE | Refills: 11 | Status: SHIPPED | OUTPATIENT
Start: 2018-10-08 | End: 2018-12-17

## 2018-10-08 RX ORDER — TACROLIMUS 1 MG/1
2 CAPSULE, GELATIN COATED ORAL 2 TIMES DAILY
Qty: 120 CAPSULE | Refills: 11 | Status: CANCELLED | OUTPATIENT
Start: 2018-10-08

## 2018-10-08 NOTE — TELEPHONE ENCOUNTER
Tacrolimus 1MG  Qty 360  Last Fill 07/02/2018    Thank you  Hilaria Becker [facility]

## 2018-12-07 ENCOUNTER — TELEPHONE (OUTPATIENT)
Dept: GASTROENTEROLOGY | Facility: CLINIC | Age: 68
End: 2018-12-07

## 2018-12-07 NOTE — TELEPHONE ENCOUNTER
[facility] Call Center    Phone Message    May a detailed message be left on voicemail: yes    Reason for Call: Other: Patient would like a call to discuss care plan moving forward as his 12/14  appt. was cancelled. Patient will be out of [place] for the winter and next avail appt. with Dr. Jimenez isn't until end of February. Patient wants to know what he should do.  Requesting to speak with his transplant coordinator Naomi.    Action Taken: Message routed to:  [facility] KARAN

## 2018-12-10 NOTE — TELEPHONE ENCOUNTER
"Renal appt note says \"patient in [place] until April\".    Dr. Jimenez had to bump her 12/14 clinic, and he likely wants to wait until he comes back in April to see her again.  "

## 2018-12-10 NOTE — TELEPHONE ENCOUNTER
[facility] Call Center     Phone Message     May a detailed message be left on voicemail: yes     Reason for Call: Other: Patient would like a call to discuss care plan moving forward as his 12/14  appt. was cancelled. Patient will be out of [place] for the winter and next avail appt. with Dr. Jimenez isn't until end of February. Patient wants to know what he should do.  Requesting to speak with his transplant coordinator Naomi.     Action Taken: Message routed to:  [facility] KARAN

## 2018-12-10 NOTE — TELEPHONE ENCOUNTER
Call to Eric.  Apologized for cancellation of appt, suggested he call and reschedule, told him to call if he still needs me.

## 2018-12-10 NOTE — TELEPHONE ENCOUNTER
Call back to Eric.  He will get labs this week and reschedule appt w/ Dr. Jimenez for when he returns in April

## 2018-12-14 ENCOUNTER — HOSPITAL ENCOUNTER (OUTPATIENT)
Dept: LAB | Facility: CLINIC | Age: 68
Discharge: HOME | End: 2018-12-14
Attending: INTERNAL MEDICINE | Admitting: INTERNAL MEDICINE
Payer: MEDICARE

## 2018-12-14 DIAGNOSIS — Z94.4 LIVER REPLACED BY TRANSPLANT (H): ICD-10-CM

## 2018-12-14 LAB
ALBUMIN SERPL-MCNC: 3.6 G/DL (ref 3.4–5)
ALP SERPL-CCNC: 86 U/L (ref 40–150)
ALT SERPL W P-5'-P-CCNC: 18 U/L (ref 0–70)
ANION GAP SERPL CALCULATED.3IONS-SCNC: 5 MMOL/L (ref 3–14)
AST SERPL W P-5'-P-CCNC: 9 U/L (ref 0–45)
BILIRUB DIRECT SERPL-MCNC: 0.1 MG/DL (ref 0–0.2)
BILIRUB SERPL-MCNC: 0.4 MG/DL (ref 0.2–1.3)
BUN SERPL-MCNC: 30 MG/DL (ref 7–30)
CALCIUM SERPL-MCNC: 8.6 MG/DL (ref 8.5–10.1)
CHLORIDE SERPL-SCNC: 108 MMOL/L (ref 94–109)
CO2 SERPL-SCNC: 26 MMOL/L (ref 20–32)
CREAT SERPL-MCNC: 1.66 MG/DL (ref 0.66–1.25)
ERYTHROCYTE [DISTWIDTH] IN BLOOD BY AUTOMATED COUNT: 14.6 % (ref 10–15)
GFR SERPL CREATININE-BSD FRML MDRD: 41 ML/MIN/1.7M2
GLUCOSE SERPL-MCNC: 170 MG/DL (ref 70–99)
HCT VFR BLD AUTO: 40.6 % (ref 40–53)
HGB BLD-MCNC: 13.3 G/DL (ref 13.3–17.7)
MCH RBC QN AUTO: 30.9 PG (ref 26.5–33)
MCHC RBC AUTO-ENTMCNC: 32.8 G/DL (ref 31.5–36.5)
MCV RBC AUTO: 94 FL (ref 78–100)
PLATELET # BLD AUTO: 124 10E9/L (ref 150–450)
POTASSIUM SERPL-SCNC: 4.5 MMOL/L (ref 3.4–5.3)
PROT SERPL-MCNC: 7.2 G/DL (ref 6.8–8.8)
RBC # BLD AUTO: 4.3 10E12/L (ref 4.4–5.9)
SODIUM SERPL-SCNC: 139 MMOL/L (ref 133–144)
TACROLIMUS BLD-MCNC: 6.8 UG/L (ref 5–15)
TME LAST DOSE: NORMAL H
WBC # BLD AUTO: 5.9 10E9/L (ref 4–11)

## 2018-12-14 PROCEDURE — 80048 BASIC METABOLIC PNL TOTAL CA: CPT | Performed by: INTERNAL MEDICINE

## 2018-12-14 PROCEDURE — 80197 ASSAY OF TACROLIMUS: CPT | Performed by: INTERNAL MEDICINE

## 2018-12-14 PROCEDURE — 80076 HEPATIC FUNCTION PANEL: CPT | Performed by: INTERNAL MEDICINE

## 2018-12-14 PROCEDURE — 85027 COMPLETE CBC AUTOMATED: CPT | Performed by: INTERNAL MEDICINE

## 2018-12-14 PROCEDURE — 36415 COLL VENOUS BLD VENIPUNCTURE: CPT | Performed by: INTERNAL MEDICINE

## 2018-12-17 ENCOUNTER — TELEPHONE (OUTPATIENT)
Dept: TRANSPLANT | Facility: CLINIC | Age: 68
End: 2018-12-17

## 2018-12-17 DIAGNOSIS — Z94.4 LIVER TRANSPLANTED (H): Primary | ICD-10-CM

## 2018-12-17 NOTE — TELEPHONE ENCOUNTER
Tacrolimus level 6.8, creat 1.66.    Please instruct patient to decrease tacrolimus dose to 1 mg in the morning and 2 mg in the evening.

## 2018-12-17 NOTE — TELEPHONE ENCOUNTER
Called Eric and he will change his dose as recommended. He is also not on Brand Prograf so will change to Tacrolimus

## 2018-12-28 RX ORDER — TACROLIMUS 1 MG/1
CAPSULE ORAL
Qty: 90 CAPSULE | Refills: 11 | Status: SHIPPED | OUTPATIENT
Start: 2018-12-28 | End: 2019-04-03

## 2019-01-18 ENCOUNTER — APPOINTMENT (RX ONLY)
Dept: URBAN - METROPOLITAN AREA CLINIC 142 | Facility: CLINIC | Age: 69
Setting detail: DERMATOLOGY
End: 2019-01-18

## 2019-01-18 DIAGNOSIS — L81.4 OTHER MELANIN HYPERPIGMENTATION: ICD-10-CM

## 2019-01-18 DIAGNOSIS — L57.0 ACTINIC KERATOSIS: ICD-10-CM

## 2019-01-18 DIAGNOSIS — D18.0 HEMANGIOMA: ICD-10-CM

## 2019-01-18 DIAGNOSIS — Z86.006 PERSONAL HISTORY OF MELANOMA IN-SITU: ICD-10-CM

## 2019-01-18 DIAGNOSIS — D22 MELANOCYTIC NEVI: ICD-10-CM

## 2019-01-18 DIAGNOSIS — Z85.828 PERSONAL HISTORY OF OTHER MALIGNANT NEOPLASM OF SKIN: ICD-10-CM

## 2019-01-18 PROBLEM — I10 ESSENTIAL (PRIMARY) HYPERTENSION: Status: ACTIVE | Noted: 2019-01-18

## 2019-01-18 PROBLEM — D18.01 HEMANGIOMA OF SKIN AND SUBCUTANEOUS TISSUE: Status: ACTIVE | Noted: 2019-01-18

## 2019-01-18 PROBLEM — L85.3 XEROSIS CUTIS: Status: ACTIVE | Noted: 2019-01-18

## 2019-01-18 PROBLEM — D48.5 NEOPLASM OF UNCERTAIN BEHAVIOR OF SKIN: Status: ACTIVE | Noted: 2019-01-18

## 2019-01-18 PROBLEM — Z85.820 PERSONAL HISTORY OF MALIGNANT MELANOMA OF SKIN: Status: ACTIVE | Noted: 2019-01-18

## 2019-01-18 PROBLEM — D22.5 MELANOCYTIC NEVI OF TRUNK: Status: ACTIVE | Noted: 2019-01-18

## 2019-01-18 PROCEDURE — 17004 DESTROY PREMAL LESIONS 15/>: CPT

## 2019-01-18 PROCEDURE — 99214 OFFICE O/P EST MOD 30 MIN: CPT | Mod: 25

## 2019-01-18 PROCEDURE — ? LIQUID NITROGEN

## 2019-01-18 PROCEDURE — ? BIOPSY BY SHAVE METHOD

## 2019-01-18 PROCEDURE — ? COUNSELING

## 2019-01-18 PROCEDURE — 11102 TANGNTL BX SKIN SINGLE LES: CPT | Mod: 59

## 2019-01-18 ASSESSMENT — LOCATION DETAILED DESCRIPTION DERM
LOCATION DETAILED: RIGHT CENTRAL PARIETAL SCALP
LOCATION DETAILED: LEFT INFERIOR LATERAL MALAR CHEEK
LOCATION DETAILED: RIGHT INFERIOR CENTRAL MALAR CHEEK
LOCATION DETAILED: RIGHT PROXIMAL DORSAL FOREARM
LOCATION DETAILED: LEFT SUPERIOR LATERAL MALAR CHEEK
LOCATION DETAILED: LEFT INFERIOR UPPER BACK
LOCATION DETAILED: LEFT LATERAL MALAR CHEEK
LOCATION DETAILED: RIGHT LATERAL FOREHEAD
LOCATION DETAILED: RIGHT SUPERIOR UPPER BACK
LOCATION DETAILED: RIGHT LATERAL MANDIBULAR CHEEK
LOCATION DETAILED: RIGHT SUPERIOR CENTRAL MALAR CHEEK
LOCATION DETAILED: RIGHT CENTRAL MALAR CHEEK
LOCATION DETAILED: NASAL SUPRATIP
LOCATION DETAILED: RIGHT SUPERIOR LATERAL MALAR CHEEK
LOCATION DETAILED: LEFT CENTRAL MALAR CHEEK
LOCATION DETAILED: LEFT ANTERIOR EARLOBE
LOCATION DETAILED: RIGHT VENTRAL LATERAL PROXIMAL FOREARM
LOCATION DETAILED: RIGHT SUPERIOR LATERAL BUCCAL CHEEK
LOCATION DETAILED: LEFT SUPERIOR HELIX
LOCATION DETAILED: LEFT LATERAL MALAR CHEEK

## 2019-01-18 ASSESSMENT — LOCATION SIMPLE DESCRIPTION DERM
LOCATION SIMPLE: RIGHT FOREARM
LOCATION SIMPLE: LEFT CHEEK
LOCATION SIMPLE: LEFT CHEEK
LOCATION SIMPLE: RIGHT FOREARM
LOCATION SIMPLE: RIGHT CHEEK
LOCATION SIMPLE: SCALP
LOCATION SIMPLE: NOSE
LOCATION SIMPLE: LEFT UPPER BACK
LOCATION SIMPLE: RIGHT FOREHEAD
LOCATION SIMPLE: LEFT EAR
LOCATION SIMPLE: RIGHT UPPER BACK

## 2019-01-18 ASSESSMENT — LOCATION ZONE DERM
LOCATION ZONE: SCALP
LOCATION ZONE: TRUNK
LOCATION ZONE: ARM
LOCATION ZONE: FACE
LOCATION ZONE: ARM
LOCATION ZONE: EAR
LOCATION ZONE: FACE
LOCATION ZONE: NOSE
LOCATION ZONE: TRUNK

## 2019-01-18 NOTE — PROCEDURE: BIOPSY BY SHAVE METHOD
Size Of Lesion In Cm: 0
Billing Type: Third-Party Bill
Wound Care: Vaseline
Type Of Destruction Used: Curettage
Bill 53983 For Specimen Handling/Conveyance To Laboratory?: no
Notification Instructions: Patient will be notified of biopsy results. However, patient instructed to call the office if not contacted within 2 weeks.
Consent: Written consent was obtained and risks were reviewed including but not limited to scarring, infection, bleeding, scabbing, incomplete removal, nerve damage and allergy to anesthesia.
Biopsy Type: H and E
Anesthesia Volume In Cc: 0.5
Post-Care Instructions: I reviewed with the patient in detail post-care instructions. Patient is to keep the biopsy site dry overnight, and then apply bacitracin twice daily until healed. Patient may apply hydrogen peroxide soaks to remove any crusting.
Depth Of Biopsy: dermis
Dressing: bandage
Electrodesiccation And Curettage Text: The wound bed was treated with electrodesiccation and curettage after the biopsy was performed.
Was A Bandage Applied: Yes
Detail Level: Detailed
Biopsy Method: Dermablade
Silver Nitrate Text: The wound bed was treated with silver nitrate after the biopsy was performed.
Anesthesia Type: 1% lidocaine with epinephrine
Hemostasis: Drysol
Cryotherapy Text: The wound bed was treated with cryotherapy after the biopsy was performed.
Electrodesiccation Text: The wound bed was treated with electrodesiccation after the biopsy was performed.

## 2019-01-18 NOTE — PROCEDURE: MIPS QUALITY
Quality 131: Pain Assessment And Follow-Up: Pain assessment using a standardized tool is documented as negative, no follow-up plan required
Quality 111:Pneumonia Vaccination Status For Older Adults: Pneumococcal Vaccination Previously Received
Quality 110: Preventive Care And Screening: Influenza Immunization: Influenza Immunization Administered during Influenza season
Quality 226: Preventive Care And Screening: Tobacco Use: Screening And Cessation Intervention: Patient screened for tobacco use and is an ex/non-smoker
Quality 431: Preventive Care And Screening: Unhealthy Alcohol Use - Screening: Patient screened for unhealthy alcohol use using a single question and scores less than 2 times per year
Detail Level: Detailed
Quality 155 (Denominator): Falls Plan Of Care: Plan of Care not Documented, Reason not Otherwise Specified
Quality 154 Part B: Falls: Risk Screening (Should Be Reported With Measure 155.): Patient screened for future fall risk; documentation of no falls in the past year or only one fall without injury in the past year
Quality 47: Advance Care Plan: Advance Care Planning discussed and documented; advance care plan or surrogate decision maker documented in the medical record.
Quality 154 Part A: Falls: Risk Assessment (Should Be Reported With Measure 155.): Falls risk assessment completed and documented in the past 12 months.

## 2019-01-31 ENCOUNTER — APPOINTMENT (RX ONLY)
Dept: URBAN - METROPOLITAN AREA CLINIC 142 | Facility: CLINIC | Age: 69
Setting detail: DERMATOLOGY
End: 2019-01-31

## 2019-01-31 PROBLEM — C44.329 SQUAMOUS CELL CARCINOMA OF SKIN OF OTHER PARTS OF FACE: Status: ACTIVE | Noted: 2019-01-31

## 2019-01-31 PROCEDURE — ? EXCISION

## 2019-01-31 PROCEDURE — 11642 EXC F/E/E/N/L MAL+MRG 1.1-2: CPT

## 2019-01-31 PROCEDURE — 12052 INTMD RPR FACE/MM 2.6-5.0 CM: CPT

## 2019-01-31 NOTE — PROCEDURE: EXCISION
Complex Repair And Tissue Cultured Epidermal Autograft Text: The defect edges were debeveled with a #15 scalpel blade.  The primary defect was closed partially with a complex linear closure.  Given the location of the defect, shape of the defect and the proximity to free margins an tissue cultured epidermal autograft was deemed most appropriate to repair the remaining defect.  The graft was trimmed to fit the size of the remaining defect.  The graft was then placed in the primary defect, oriented appropriately, and sutured into place.
Rhombic Flap Text: The defect edges were debeveled with a #15 scalpel blade.  Given the location of the defect and the proximity to free margins a rhombic flap was deemed most appropriate.  Using a sterile surgical marker, an appropriate rhombic flap was drawn incorporating the defect.    The area thus outlined was incised deep to adipose tissue with a #15 scalpel blade.  The skin margins were undermined to an appropriate distance in all directions utilizing iris scissors.
Show Repair Anesthesia Variables: Yes
Mercedes Flap Text: The defect edges were debeveled with a #15 scalpel blade.  Given the location of the defect, shape of the defect and the proximity to free margins a Mercedes flap was deemed most appropriate.  Using a sterile surgical marker, an appropriate advancement flap was drawn incorporating the defect and placing the expected incisions within the relaxed skin tension lines where possible. The area thus outlined was incised deep to adipose tissue with a #15 scalpel blade.  The skin margins were undermined to an appropriate distance in all directions utilizing iris scissors.
Render Path Notes In Note?: no
Complex Repair And Dermal Autograft Text: The defect edges were debeveled with a #15 scalpel blade.  The primary defect was closed partially with a complex linear closure.  Given the location of the defect, shape of the defect and the proximity to free margins an dermal autograft was deemed most appropriate to repair the remaining defect.  The graft was trimmed to fit the size of the remaining defect.  The graft was then placed in the primary defect, oriented appropriately, and sutured into place.
Complex Repair And Transposition Flap Text: The defect edges were debeveled with a #15 scalpel blade.  The primary defect was closed partially with a complex linear closure.  Given the location of the remaining defect, shape of the defect and the proximity to free margins a transposition flap was deemed most appropriate for complete closure of the defect.  Using a sterile surgical marker, an appropriate advancement flap was drawn incorporating the defect and placing the expected incisions within the relaxed skin tension lines where possible.    The area thus outlined was incised deep to adipose tissue with a #15 scalpel blade.  The skin margins were undermined to an appropriate distance in all directions utilizing iris scissors.
M-Plasty Intermediate Repair Preamble Text (Leave Blank If You Do Not Want): Undermining was performed with blunt dissection.
Repair Performed By Another Provider Text (Leave Blank If You Do Not Want): After the tissue was excised the defect was repaired by another provider.
Post-Care Instructions: I reviewed with the patient in detail post-care instructions. Patient is not to engage in any heavy lifting, exercise, or swimming for the next 14 days. Should the patient develop any fevers, chills, bleeding, severe pain patient will contact the office immediately.
Double M-Plasty Complex Repair Preamble Text (Leave Blank If You Do Not Want): Extensive wide undermining was performed.
Fusiform Excision Additional Text (Leave Blank If You Do Not Want): The margin was drawn around the clinically apparent lesion.  A fusiform shape was then drawn on the skin incorporating the lesion and margins.  Incisions were then made along these lines to the appropriate tissue plane and the lesion was extirpated.
Previous Accession (Optional): [id number]
H Plasty Text: Given the location of the defect, shape of the defect and the proximity to free margins a H-plasty was deemed most appropriate for repair.  Using a sterile surgical marker, the appropriate advancement arms of the H-plasty were drawn incorporating the defect and placing the expected incisions within the relaxed skin tension lines where possible. The area thus outlined was incised deep to adipose tissue with a #15 scalpel blade. The skin margins were undermined to an appropriate distance in all directions utilizing iris scissors.  The opposing advancement arms were then advanced into place in opposite direction and anchored with interrupted buried subcutaneous sutures.
Z Plasty Text: The lesion was extirpated to the level of the fat with a #15 scalpel blade.  Given the location of the defect, shape of the defect and the proximity to free margins a Z-plasty was deemed most appropriate for repair.  Using a sterile surgical marker, the appropriate transposition arms of the Z-plasty were drawn incorporating the defect and placing the expected incisions within the relaxed skin tension lines where possible.    The area thus outlined was incised deep to adipose tissue with a #15 scalpel blade.  The skin margins were undermined to an appropriate distance in all directions utilizing iris scissors.  The opposing transposition arms were then transposed into place in opposite direction and anchored with interrupted buried subcutaneous sutures.
Primary Defect Width (In Cm): 0
Melolabial Interpolation Flap Text: A decision was made to reconstruct the defect utilizing an interpolation axial flap and a staged reconstruction.  A telfa template was made of the defect.  This telfa template was then used to outline the melolabial interpolation flap.  The donor area for the pedicle flap was then injected with anesthesia.  The flap was excised through the skin and subcutaneous tissue down to the layer of the underlying musculature.  The pedicle flap was carefully excised within this deep plane to maintain its blood supply.  The edges of the donor site were undermined.   The donor site was closed in a primary fashion.  The pedicle was then rotated into position and sutured.  Once the tube was sutured into place, adequate blood supply was confirmed with blanching and refill.  The pedicle was then wrapped with xeroform gauze and dressed appropriately with a telfa and gauze bandage to ensure continued blood supply and protect the attached pedicle.
Anesthesia Volume In Cc: 2.5
Bilobed Flap Text: The defect edges were debeveled with a #15 scalpel blade.  Given the location of the defect and the proximity to free margins a bilobe flap was deemed most appropriate.  Using a sterile surgical marker, an appropriate bilobe flap drawn around the defect.    The area thus outlined was incised deep to adipose tissue with a #15 scalpel blade.  The skin margins were undermined to an appropriate distance in all directions utilizing iris scissors.
Complex Repair And M Plasty Text: The defect edges were debeveled with a #15 scalpel blade.  The primary defect was closed partially with a complex linear closure.  Given the location of the remaining defect, shape of the defect and the proximity to free margins an M plasty was deemed most appropriate for complete closure of the defect.  Using a sterile surgical marker, an appropriate advancement flap was drawn incorporating the defect and placing the expected incisions within the relaxed skin tension lines where possible.    The area thus outlined was incised deep to adipose tissue with a #15 scalpel blade.  The skin margins were undermined to an appropriate distance in all directions utilizing iris scissors.
Curvilinear Excision Additional Text (Leave Blank If You Do Not Want): The margin was drawn around the clinically apparent lesion.  A curvilinear shape was then drawn on the skin incorporating the lesion and margins.  Incisions were then made along these lines to the appropriate tissue plane and the lesion was extirpated.
Island Pedicle Flap With Canthal Suspension Text: The defect edges were debeveled with a #15 scalpel blade.  Given the location of the defect, shape of the defect and the proximity to free margins an island pedicle advancement flap was deemed most appropriate.  Using a sterile surgical marker, an appropriate advancement flap was drawn incorporating the defect, outlining the appropriate donor tissue and placing the expected incisions within the relaxed skin tension lines where possible. The area thus outlined was incised deep to adipose tissue with a #15 scalpel blade.  The skin margins were undermined to an appropriate distance in all directions around the primary defect and laterally outward around the island pedicle utilizing iris scissors.  There was minimal undermining beneath the pedicle flap. A suspension suture was placed in the canthal tendon to prevent tension and prevent ectropion.
Anesthesia Type: 1% lidocaine with epinephrine
Epidermal Autograft Text: The defect edges were debeveled with a #15 scalpel blade.  Given the location of the defect, shape of the defect and the proximity to free margins an epidermal autograft was deemed most appropriate.  Using a sterile surgical marker, the primary defect shape was transferred to the donor site. The epidermal graft was then harvested.  The skin graft was then placed in the primary defect and oriented appropriately.
Paramedian Forehead Flap Text: A decision was made to reconstruct the defect utilizing an interpolation axial flap and a staged reconstruction.  A telfa template was made of the defect.  This telfa template was then used to outline the paramedian forehead pedicle flap.  The donor area for the pedicle flap was then injected with anesthesia.  The flap was excised through the skin and subcutaneous tissue down to the layer of the underlying musculature.  The pedicle flap was carefully excised within this deep plane to maintain its blood supply.  The edges of the donor site were undermined.   The donor site was closed in a primary fashion.  The pedicle was then rotated into position and sutured.  Once the tube was sutured into place, adequate blood supply was confirmed with blanching and refill.  The pedicle was then wrapped with xeroform gauze and dressed appropriately with a telfa and gauze bandage to ensure continued blood supply and protect the attached pedicle.
Repair Type: Intermediate
Alar Island Pedicle Flap Text: The defect edges were debeveled with a #15 scalpel blade.  Given the location of the defect, shape of the defect and the proximity to the alar rim an island pedicle advancement flap was deemed most appropriate.  Using a sterile surgical marker, an appropriate advancement flap was drawn incorporating the defect, outlining the appropriate donor tissue and placing the expected incisions within the nasal ala running parallel to the alar rim. The area thus outlined was incised with a #15 scalpel blade.  The skin margins were undermined minimally to an appropriate distance in all directions around the primary defect and laterally outward around the island pedicle utilizing iris scissors.  There was minimal undermining beneath the pedicle flap.
Estimated Blood Loss (Cc): minimal
Complex Repair And Single Advancement Flap Text: The defect edges were debeveled with a #15 scalpel blade.  The primary defect was closed partially with a complex linear closure.  Given the location of the remaining defect, shape of the defect and the proximity to free margins a single advancement flap was deemed most appropriate for complete closure of the defect.  Using a sterile surgical marker, an appropriate advancement flap was drawn incorporating the defect and placing the expected incisions within the relaxed skin tension lines where possible.    The area thus outlined was incised deep to adipose tissue with a #15 scalpel blade.  The skin margins were undermined to an appropriate distance in all directions utilizing iris scissors.
Cartilage Graft Text: The defect edges were debeveled with a #15 scalpel blade.  Given the location of the defect, shape of the defect, the fact the defect involved a full thickness cartilage defect a cartilage graft was deemed most appropriate.  An appropriate donor site was identified, cleansed, and anesthetized. The cartilage graft was then harvested and transferred to the recipient site, oriented appropriately and then sutured into place.  The secondary defect was then repaired using a primary closure.
Complex Repair And Double M Plasty Text: The defect edges were debeveled with a #15 scalpel blade.  The primary defect was closed partially with a complex linear closure.  Given the location of the remaining defect, shape of the defect and the proximity to free margins a double M plasty was deemed most appropriate for complete closure of the defect.  Using a sterile surgical marker, an appropriate advancement flap was drawn incorporating the defect and placing the expected incisions within the relaxed skin tension lines where possible.    The area thus outlined was incised deep to adipose tissue with a #15 scalpel blade.  The skin margins were undermined to an appropriate distance in all directions utilizing iris scissors.
Complex Repair And O-T Advancement Flap Text: The defect edges were debeveled with a #15 scalpel blade.  The primary defect was closed partially with a complex linear closure.  Given the location of the remaining defect, shape of the defect and the proximity to free margins an O-T advancement flap was deemed most appropriate for complete closure of the defect.  Using a sterile surgical marker, an appropriate advancement flap was drawn incorporating the defect and placing the expected incisions within the relaxed skin tension lines where possible.    The area thus outlined was incised deep to adipose tissue with a #15 scalpel blade.  The skin margins were undermined to an appropriate distance in all directions utilizing iris scissors.
Partial Purse String (Simple) Text: Given the location of the defect and the characteristics of the surrounding skin a simple purse string closure was deemed most appropriate.  Undermining was performed circumferentially around the surgical defect.  A purse string suture was then placed and tightened. Wound tension of the circular defect prevented complete closure of the wound.
Composite Graft Text: The defect edges were debeveled with a #15 scalpel blade.  Given the location of the defect, shape of the defect, the proximity to free margins and the fact the defect was full thickness a composite graft was deemed most appropriate.  The defect was outline and then transferred to the donor site.  A full thickness graft was then excised from the donor site. The graft was then placed in the primary defect, oriented appropriately and then sutured into place.  The secondary defect was then repaired using a primary closure.
O-T Plasty Text: The defect edges were debeveled with a #15 scalpel blade.  Given the location of the defect, shape of the defect and the proximity to free margins an O-T plasty was deemed most appropriate.  Using a sterile surgical marker, an appropriate O-T plasty was drawn incorporating the defect and placing the expected incisions within the relaxed skin tension lines where possible.    The area thus outlined was incised deep to adipose tissue with a #15 scalpel blade.  The skin margins were undermined to an appropriate distance in all directions utilizing iris scissors.
Banner Transposition Flap Text: The defect edges were debeveled with a #15 scalpel blade.  Given the location of the defect and the proximity to free margins a Banner transposition flap was deemed most appropriate.  Using a sterile surgical marker, an appropriate flap drawn around the defect. The area thus outlined was incised deep to adipose tissue with a #15 scalpel blade.  The skin margins were undermined to an appropriate distance in all directions utilizing iris scissors.
Split-Thickness Skin Graft Text: The defect edges were debeveled with a #15 scalpel blade.  Given the location of the defect, shape of the defect and the proximity to free margins a split thickness skin graft was deemed most appropriate.  Using a sterile surgical marker, the primary defect shape was transferred to the donor site. The split thickness graft was then harvested.  The skin graft was then placed in the primary defect and oriented appropriately.
Epidermal Sutures: 6-0 Nylon
Advancement Flap (Double) Text: The defect edges were debeveled with a #15 scalpel blade.  Given the location of the defect and the proximity to free margins a double advancement flap was deemed most appropriate.  Using a sterile surgical marker, the appropriate advancement flaps were drawn incorporating the defect and placing the expected incisions within the relaxed skin tension lines where possible.    The area thus outlined was incised deep to adipose tissue with a #15 scalpel blade.  The skin margins were undermined to an appropriate distance in all directions utilizing iris scissors.
Island Pedicle Flap-Requiring Vessel Identification Text: The defect edges were debeveled with a #15 scalpel blade.  Given the location of the defect, shape of the defect and the proximity to free margins an island pedicle advancement flap was deemed most appropriate.  Using a sterile surgical marker, an appropriate advancement flap was drawn, based on the axial vessel mentioned above, incorporating the defect, outlining the appropriate donor tissue and placing the expected incisions within the relaxed skin tension lines where possible.    The area thus outlined was incised deep to adipose tissue with a #15 scalpel blade.  The skin margins were undermined to an appropriate distance in all directions around the primary defect and laterally outward around the island pedicle utilizing iris scissors.  There was minimal undermining beneath the pedicle flap.
Spiral Flap Text: The defect edges were debeveled with a #15 scalpel blade.  Given the location of the defect, shape of the defect and the proximity to free margins a spiral flap was deemed most appropriate.  Using a sterile surgical marker, an appropriate rotation flap was drawn incorporating the defect and placing the expected incisions within the relaxed skin tension lines where possible. The area thus outlined was incised deep to adipose tissue with a #15 scalpel blade.  The skin margins were undermined to an appropriate distance in all directions utilizing iris scissors.
Epidermal Closure Graft Donor Site (Optional): simple interrupted
Anesthesia Volume In Cc: 6
Transposition Flap Text: The defect edges were debeveled with a #15 scalpel blade.  Given the location of the defect and the proximity to free margins a transposition flap was deemed most appropriate.  Using a sterile surgical marker, an appropriate transposition flap was drawn incorporating the defect.    The area thus outlined was incised deep to adipose tissue with a #15 scalpel blade.  The skin margins were undermined to an appropriate distance in all directions utilizing iris scissors.
Graft Donor Site Bandage (Optional-Leave Blank If You Don't Want In Note): Steri-strips and a pressure bandage were applied to the donor site.
Hatchet Flap Text: The defect edges were debeveled with a #15 scalpel blade.  Given the location of the defect, shape of the defect and the proximity to free margins a hatchet flap was deemed most appropriate.  Using a sterile surgical marker, an appropriate hatchet flap was drawn incorporating the defect and placing the expected incisions within the relaxed skin tension lines where possible.    The area thus outlined was incised deep to adipose tissue with a #15 scalpel blade.  The skin margins were undermined to an appropriate distance in all directions utilizing iris scissors.
Advancement Flap (Single) Text: The defect edges were debeveled with a #15 scalpel blade.  Given the location of the defect and the proximity to free margins a single advancement flap was deemed most appropriate.  Using a sterile surgical marker, an appropriate advancement flap was drawn incorporating the defect and placing the expected incisions within the relaxed skin tension lines where possible.    The area thus outlined was incised deep to adipose tissue with a #15 scalpel blade.  The skin margins were undermined to an appropriate distance in all directions utilizing iris scissors.
O-L Flap Text: The defect edges were debeveled with a #15 scalpel blade.  Given the location of the defect, shape of the defect and the proximity to free margins an O-L flap was deemed most appropriate.  Using a sterile surgical marker, an appropriate advancement flap was drawn incorporating the defect and placing the expected incisions within the relaxed skin tension lines where possible.    The area thus outlined was incised deep to adipose tissue with a #15 scalpel blade.  The skin margins were undermined to an appropriate distance in all directions utilizing iris scissors.
Mucosal Advancement Flap Text: Given the location of the defect, shape of the defect and the proximity to free margins a mucosal advancement flap was deemed most appropriate. Incisions were made with a 15 blade scalpel in the appropriate fashion along the cutaneous vermillion border and the mucosal lip. The remaining actinically damaged mucosal tissue was excised.  The mucosal advancement flap was then elevated to the gingival sulcus with care taken to preserve the neurovascular structures and advanced into the primary defect. Care was taken to ensure that precise realignment of the vermillion border was achieved.
Melolabial Transposition Flap Text: The defect edges were debeveled with a #15 scalpel blade.  Given the location of the defect and the proximity to free margins a melolabial flap was deemed most appropriate.  Using a sterile surgical marker, an appropriate melolabial transposition flap was drawn incorporating the defect.    The area thus outlined was incised deep to adipose tissue with a #15 scalpel blade.  The skin margins were undermined to an appropriate distance in all directions utilizing iris scissors.
Excision Depth: adipose tissue
Saucerization Excision Additional Text (Leave Blank If You Do Not Want): The margin was drawn around the clinically apparent lesion.  Incisions were then made along these lines, in a tangential fashion, to the appropriate tissue plane and the lesion was extirpated.
Complex Repair And Skin Substitute Graft Text: The defect edges were debeveled with a #15 scalpel blade.  The primary defect was closed partially with a complex linear closure.  Given the location of the remaining defect, shape of the defect and the proximity to free margins a skin substitute graft was deemed most appropriate to repair the remaining defect.  The graft was trimmed to fit the size of the remaining defect.  The graft was then placed in the primary defect, oriented appropriately, and sutured into place.
Size Of Margin In Cm: 0.2
Complex Repair And Split-Thickness Skin Graft Text: The defect edges were debeveled with a #15 scalpel blade.  The primary defect was closed partially with a complex linear closure.  Given the location of the defect, shape of the defect and the proximity to free margins a split thickness skin graft was deemed most appropriate to repair the remaining defect.  The graft was trimmed to fit the size of the remaining defect.  The graft was then placed in the primary defect, oriented appropriately, and sutured into place.
Bilateral Helical Rim Advancement Flap Text: The defect edges were debeveled with a #15 blade scalpel.  Given the location of the defect and the proximity to free margins (helical rim) a bilateral helical rim advancement flap was deemed most appropriate.  Using a sterile surgical marker, the appropriate advancement flaps were drawn incorporating the defect and placing the expected incisions between the helical rim and antihelix where possible.  The area thus outlined was incised through and through with a #15 scalpel blade.  With a skin hook and iris scissors, the flaps were gently and sharply undermined and freed up.
Bi-Rhombic Flap Text: The defect edges were debeveled with a #15 scalpel blade.  Given the location of the defect and the proximity to free margins a bi-rhombic flap was deemed most appropriate.  Using a sterile surgical marker, an appropriate rhombic flap was drawn incorporating the defect. The area thus outlined was incised deep to adipose tissue with a #15 scalpel blade.  The skin margins were undermined to an appropriate distance in all directions utilizing iris scissors.
Xenograft Text: The defect edges were debeveled with a #15 scalpel blade.  Given the location of the defect, shape of the defect and the proximity to free margins a xenograft was deemed most appropriate.  The graft was then trimmed to fit the size of the defect.  The graft was then placed in the primary defect and oriented appropriately.
A-T Advancement Flap Text: The defect edges were debeveled with a #15 scalpel blade.  Given the location of the defect, shape of the defect and the proximity to free margins an A-T advancement flap was deemed most appropriate.  Using a sterile surgical marker, an appropriate advancement flap was drawn incorporating the defect and placing the expected incisions within the relaxed skin tension lines where possible.    The area thus outlined was incised deep to adipose tissue with a #15 scalpel blade.  The skin margins were undermined to an appropriate distance in all directions utilizing iris scissors.
Pre-Excision Curettage Text (Leave Blank If You Do Not Want): Prior to drawing the surgical margin the visible lesion was removed with electrodesiccation and curettage to clearly define the lesion size.
Excisional Biopsy Additional Text (Leave Blank If You Do Not Want): The margin was drawn around the clinically apparent lesion. An elliptical shape was then drawn on the skin incorporating the lesion and margins.  Incisions were then made along these lines to the appropriate tissue plane and the lesion was extirpated.
Helical Rim Advancement Flap Text: The defect edges were debeveled with a #15 blade scalpel.  Given the location of the defect and the proximity to free margins (helical rim) a double helical rim advancement flap was deemed most appropriate.  Using a sterile surgical marker, the appropriate advancement flaps were drawn incorporating the defect and placing the expected incisions between the helical rim and antihelix where possible.  The area thus outlined was incised through and through with a #15 scalpel blade.  With a skin hook and iris scissors, the flaps were gently and sharply undermined and freed up.
Detail Level: Detailed
S Plasty Text: Given the location and shape of the defect, and the orientation of relaxed skin tension lines, an S-plasty was deemed most appropriate for repair.  Using a sterile surgical marker, the appropriate outline of the S-plasty was drawn, incorporating the defect and placing the expected incisions within the relaxed skin tension lines where possible.  The area thus outlined was incised deep to adipose tissue with a #15 scalpel blade.  The skin margins were undermined to an appropriate distance in all directions utilizing iris scissors. The skin flaps were advanced over the defect.  The opposing margins were then approximated with interrupted buried subcutaneous sutures.
V-Y Flap Text: The defect edges were debeveled with a #15 scalpel blade.  Given the location of the defect, shape of the defect and the proximity to free margins a V-Y flap was deemed most appropriate.  Using a sterile surgical marker, an appropriate advancement flap was drawn incorporating the defect and placing the expected incisions within the relaxed skin tension lines where possible.    The area thus outlined was incised deep to adipose tissue with a #15 scalpel blade.  The skin margins were undermined to an appropriate distance in all directions utilizing iris scissors.
Size Of Lesion In Cm: 0.7
Complex Repair And Rhombic Flap Text: The defect edges were debeveled with a #15 scalpel blade.  The primary defect was closed partially with a complex linear closure.  Given the location of the remaining defect, shape of the defect and the proximity to free margins a rhombic flap was deemed most appropriate for complete closure of the defect.  Using a sterile surgical marker, an appropriate advancement flap was drawn incorporating the defect and placing the expected incisions within the relaxed skin tension lines where possible.    The area thus outlined was incised deep to adipose tissue with a #15 scalpel blade.  The skin margins were undermined to an appropriate distance in all directions utilizing iris scissors.
Cheek-To-Nose Interpolation Flap Text: A decision was made to reconstruct the defect utilizing an interpolation axial flap and a staged reconstruction.  A telfa template was made of the defect.  This telfa template was then used to outline the Cheek-To-Nose Interpolation flap.  The donor area for the pedicle flap was then injected with anesthesia.  The flap was excised through the skin and subcutaneous tissue down to the layer of the underlying musculature.  The interpolation flap was carefully excised within this deep plane to maintain its blood supply.  The edges of the donor site were undermined.   The donor site was closed in a primary fashion.  The pedicle was then rotated into position and sutured.  Once the tube was sutured into place, adequate blood supply was confirmed with blanching and refill.  The pedicle was then wrapped with xeroform gauze and dressed appropriately with a telfa and gauze bandage to ensure continued blood supply and protect the attached pedicle.
Excision Method: Elliptical
Burow's Advancement Flap Text: The defect edges were debeveled with a #15 scalpel blade.  Given the location of the defect and the proximity to free margins a Burow's advancement flap was deemed most appropriate.  Using a sterile surgical marker, the appropriate advancement flap was drawn incorporating the defect and placing the expected incisions within the relaxed skin tension lines where possible.    The area thus outlined was incised deep to adipose tissue with a #15 scalpel blade.  The skin margins were undermined to an appropriate distance in all directions utilizing iris scissors.
Posterior Auricular Interpolation Flap Text: A decision was made to reconstruct the defect utilizing an interpolation axial flap and a staged reconstruction.  A telfa template was made of the defect.  This telfa template was then used to outline the posterior auricular interpolation flap.  The donor area for the pedicle flap was then injected with anesthesia.  The flap was excised through the skin and subcutaneous tissue down to the layer of the underlying musculature.  The pedicle flap was carefully excised within this deep plane to maintain its blood supply.  The edges of the donor site were undermined.   The donor site was closed in a primary fashion.  The pedicle was then rotated into position and sutured.  Once the tube was sutured into place, adequate blood supply was confirmed with blanching and refill.  The pedicle was then wrapped with xeroform gauze and dressed appropriately with a telfa and gauze bandage to ensure continued blood supply and protect the attached pedicle.
Complex Repair And A-T Advancement Flap Text: The defect edges were debeveled with a #15 scalpel blade.  The primary defect was closed partially with a complex linear closure.  Given the location of the remaining defect, shape of the defect and the proximity to free margins an A-T advancement flap was deemed most appropriate for complete closure of the defect.  Using a sterile surgical marker, an appropriate advancement flap was drawn incorporating the defect and placing the expected incisions within the relaxed skin tension lines where possible.    The area thus outlined was incised deep to adipose tissue with a #15 scalpel blade.  The skin margins were undermined to an appropriate distance in all directions utilizing iris scissors.
Complex Repair And O-L Flap Text: The defect edges were debeveled with a #15 scalpel blade.  The primary defect was closed partially with a complex linear closure.  Given the location of the remaining defect, shape of the defect and the proximity to free margins an O-L flap was deemed most appropriate for complete closure of the defect.  Using a sterile surgical marker, an appropriate flap was drawn incorporating the defect and placing the expected incisions within the relaxed skin tension lines where possible.    The area thus outlined was incised deep to adipose tissue with a #15 scalpel blade.  The skin margins were undermined to an appropriate distance in all directions utilizing iris scissors.
Bilobed Transposition Flap Text: The defect edges were debeveled with a #15 scalpel blade.  Given the location of the defect and the proximity to free margins a bilobed transposition flap was deemed most appropriate.  Using a sterile surgical marker, an appropriate bilobe flap drawn around the defect.    The area thus outlined was incised deep to adipose tissue with a #15 scalpel blade.  The skin margins were undermined to an appropriate distance in all directions utilizing iris scissors.
Billing Type: Third-Party Bill
Cheek Interpolation Flap Text: A decision was made to reconstruct the defect utilizing an interpolation axial flap and a staged reconstruction.  A telfa template was made of the defect.  This telfa template was then used to outline the Cheek Interpolation flap.  The donor area for the pedicle flap was then injected with anesthesia.  The flap was excised through the skin and subcutaneous tissue down to the layer of the underlying musculature.  The interpolation flap was carefully excised within this deep plane to maintain its blood supply.  The edges of the donor site were undermined.   The donor site was closed in a primary fashion.  The pedicle was then rotated into position and sutured.  Once the tube was sutured into place, adequate blood supply was confirmed with blanching and refill.  The pedicle was then wrapped with xeroform gauze and dressed appropriately with a telfa and gauze bandage to ensure continued blood supply and protect the attached pedicle.
Partial Purse String (Intermediate) Text: Given the location of the defect and the characteristics of the surrounding skin an intermediate purse string closure was deemed most appropriate.  Undermining was performed circumferentially around the surgical defect.  A purse string suture was then placed and tightened. Wound tension of the circular defect prevented complete closure of the wound.
Rhomboid Transposition Flap Text: The defect edges were debeveled with a #15 scalpel blade.  Given the location of the defect and the proximity to free margins a rhomboid transposition flap was deemed most appropriate.  Using a sterile surgical marker, an appropriate rhomboid flap was drawn incorporating the defect.    The area thus outlined was incised deep to adipose tissue with a #15 scalpel blade.  The skin margins were undermined to an appropriate distance in all directions utilizing iris scissors.
Home Suture Removal Text: Patient was provided a home suture removal kit and will remove their sutures at home.  If they have any questions or difficulties they will call the office.
V-Y Plasty Text: The defect edges were debeveled with a #15 scalpel blade.  Given the location of the defect, shape of the defect and the proximity to free margins an V-Y advancement flap was deemed most appropriate.  Using a sterile surgical marker, an appropriate advancement flap was drawn incorporating the defect and placing the expected incisions within the relaxed skin tension lines where possible.    The area thus outlined was incised deep to adipose tissue with a #15 scalpel blade.  The skin margins were undermined to an appropriate distance in all directions utilizing iris scissors.
Complex Repair And Melolabial Flap Text: The defect edges were debeveled with a #15 scalpel blade.  The primary defect was closed partially with a complex linear closure.  Given the location of the remaining defect, shape of the defect and the proximity to free margins a melolabial flap was deemed most appropriate for complete closure of the defect.  Using a sterile surgical marker, an appropriate advancement flap was drawn incorporating the defect and placing the expected incisions within the relaxed skin tension lines where possible.    The area thus outlined was incised deep to adipose tissue with a #15 scalpel blade.  The skin margins were undermined to an appropriate distance in all directions utilizing iris scissors.
Double Island Pedicle Flap Text: The defect edges were debeveled with a #15 scalpel blade.  Given the location of the defect, shape of the defect and the proximity to free margins a double island pedicle advancement flap was deemed most appropriate.  Using a sterile surgical marker, an appropriate advancement flap was drawn incorporating the defect, outlining the appropriate donor tissue and placing the expected incisions within the relaxed skin tension lines where possible.    The area thus outlined was incised deep to adipose tissue with a #15 scalpel blade.  The skin margins were undermined to an appropriate distance in all directions around the primary defect and laterally outward around the island pedicle utilizing iris scissors.  There was minimal undermining beneath the pedicle flap.
Slit Excision Additional Text (Leave Blank If You Do Not Want): A linear line was drawn on the skin overlying the lesion. An incision was made slowly until the lesion was visualized.  Once visualized, the lesion was removed with blunt dissection.
Suture Removal: 7 days
Mastoid Interpolation Flap Text: A decision was made to reconstruct the defect utilizing an interpolation axial flap and a staged reconstruction.  A telfa template was made of the defect.  This telfa template was then used to outline the mastoid interpolation flap.  The donor area for the pedicle flap was then injected with anesthesia.  The flap was excised through the skin and subcutaneous tissue down to the layer of the underlying musculature.  The pedicle flap was carefully excised within this deep plane to maintain its blood supply.  The edges of the donor site were undermined.   The donor site was closed in a primary fashion.  The pedicle was then rotated into position and sutured.  Once the tube was sutured into place, adequate blood supply was confirmed with blanching and refill.  The pedicle was then wrapped with xeroform gauze and dressed appropriately with a telfa and gauze bandage to ensure continued blood supply and protect the attached pedicle.
Complex Repair And W Plasty Text: The defect edges were debeveled with a #15 scalpel blade.  The primary defect was closed partially with a complex linear closure.  Given the location of the remaining defect, shape of the defect and the proximity to free margins a W plasty was deemed most appropriate for complete closure of the defect.  Using a sterile surgical marker, an appropriate advancement flap was drawn incorporating the defect and placing the expected incisions within the relaxed skin tension lines where possible.    The area thus outlined was incised deep to adipose tissue with a #15 scalpel blade.  The skin margins were undermined to an appropriate distance in all directions utilizing iris scissors.
Complex Repair And Ftsg Text: The defect edges were debeveled with a #15 scalpel blade.  The primary defect was closed partially with a complex linear closure.  Given the location of the defect, shape of the defect and the proximity to free margins a full thickness skin graft was deemed most appropriate to repair the remaining defect.  The graft was trimmed to fit the size of the remaining defect.  The graft was then placed in the primary defect, oriented appropriately, and sutured into place.
Wound Care: Vaseline
No Repair - Repaired With Adjacent Surgical Defect Text (Leave Blank If You Do Not Want): After the excision the defect was repaired concurrently with another surgical defect which was in close approximation.
W Plasty Text: The lesion was extirpated to the level of the fat with a #15 scalpel blade.  Given the location of the defect, shape of the defect and the proximity to free margins a W-plasty was deemed most appropriate for repair.  Using a sterile surgical marker, the appropriate transposition arms of the W-plasty were drawn incorporating the defect and placing the expected incisions within the relaxed skin tension lines where possible.    The area thus outlined was incised deep to adipose tissue with a #15 scalpel blade.  The skin margins were undermined to an appropriate distance in all directions utilizing iris scissors.  The opposing transposition arms were then transposed into place in opposite direction and anchored with interrupted buried subcutaneous sutures.
Dermal Autograft Text: The defect edges were debeveled with a #15 scalpel blade.  Given the location of the defect, shape of the defect and the proximity to free margins a dermal autograft was deemed most appropriate.  Using a sterile surgical marker, the primary defect shape was transferred to the donor site. The area thus outlined was incised deep to adipose tissue with a #15 scalpel blade.  The harvested graft was then trimmed of adipose and epidermal tissue until only dermis was left.  The skin graft was then placed in the primary defect and oriented appropriately.
Tissue Cultured Epidermal Autograft Text: The defect edges were debeveled with a #15 scalpel blade.  Given the location of the defect, shape of the defect and the proximity to free margins a tissue cultured epidermal autograft was deemed most appropriate.  The graft was then trimmed to fit the size of the defect.  The graft was then placed in the primary defect and oriented appropriately.
Crescentic Advancement Flap Text: The defect edges were debeveled with a #15 scalpel blade.  Given the location of the defect and the proximity to free margins a crescentic advancement flap was deemed most appropriate.  Using a sterile surgical marker, the appropriate advancement flap was drawn incorporating the defect and placing the expected incisions within the relaxed skin tension lines where possible.    The area thus outlined was incised deep to adipose tissue with a #15 scalpel blade.  The skin margins were undermined to an appropriate distance in all directions utilizing iris scissors.
Purse String (Intermediate) Text: Given the location of the defect and the characteristics of the surrounding skin a pursestring intermediate closure was deemed most appropriate.  Undermining was performed circumfirentially around the surgical defect.  A purstring suture was then placed and tightened.
Double O-Z Plasty Text: The defect edges were debeveled with a #15 scalpel blade.  Given the location of the defect, shape of the defect and the proximity to free margins a Double O-Z plasty (double transposition flap) was deemed most appropriate.  Using a sterile surgical marker, the appropriate transposition flaps were drawn incorporating the defect and placing the expected incisions within the relaxed skin tension lines where possible. The area thus outlined was incised deep to adipose tissue with a #15 scalpel blade.  The skin margins were undermined to an appropriate distance in all directions utilizing iris scissors.  Hemostasis was achieved with electrocautery.  The flaps were then transposed into place, one clockwise and the other counterclockwise, and anchored with interrupted buried subcutaneous sutures.
Star Wedge Flap Text: The defect edges were debeveled with a #15 scalpel blade.  Given the location of the defect, shape of the defect and the proximity to free margins a star wedge flap was deemed most appropriate.  Using a sterile surgical marker, an appropriate rotation flap was drawn incorporating the defect and placing the expected incisions within the relaxed skin tension lines where possible. The area thus outlined was incised deep to adipose tissue with a #15 scalpel blade.  The skin margins were undermined to an appropriate distance in all directions utilizing iris scissors.
Elliptical Excision Additional Text (Leave Blank If You Do Not Want): The margin was drawn around the clinically apparent lesion.  An elliptical shape was then drawn on the skin incorporating the lesion and margins.  Incisions were then made along these lines to the appropriate tissue plane and the lesion was extirpated.
Complex Repair And Double Advancement Flap Text: The defect edges were debeveled with a #15 scalpel blade.  The primary defect was closed partially with a complex linear closure.  Given the location of the remaining defect, shape of the defect and the proximity to free margins a double advancement flap was deemed most appropriate for complete closure of the defect.  Using a sterile surgical marker, an appropriate advancement flap was drawn incorporating the defect and placing the expected incisions within the relaxed skin tension lines where possible.    The area thus outlined was incised deep to adipose tissue with a #15 scalpel blade.  The skin margins were undermined to an appropriate distance in all directions utilizing iris scissors.
Rotation Flap Text: The defect edges were debeveled with a #15 scalpel blade.  Given the location of the defect, shape of the defect and the proximity to free margins a rotation flap was deemed most appropriate.  Using a sterile surgical marker, an appropriate rotation flap was drawn incorporating the defect and placing the expected incisions within the relaxed skin tension lines where possible.    The area thus outlined was incised deep to adipose tissue with a #15 scalpel blade.  The skin margins were undermined to an appropriate distance in all directions utilizing iris scissors.
O-T Advancement Flap Text: The defect edges were debeveled with a #15 scalpel blade.  Given the location of the defect, shape of the defect and the proximity to free margins an O-T advancement flap was deemed most appropriate.  Using a sterile surgical marker, an appropriate advancement flap was drawn incorporating the defect and placing the expected incisions within the relaxed skin tension lines where possible.    The area thus outlined was incised deep to adipose tissue with a #15 scalpel blade.  The skin margins were undermined to an appropriate distance in all directions utilizing iris scissors.
Dorsal Nasal Flap Text: The defect edges were debeveled with a #15 scalpel blade.  Given the location of the defect and the proximity to free margins a dorsal nasal flap was deemed most appropriate.  Using a sterile surgical marker, an appropriate dorsal nasal flap was drawn around the defect.    The area thus outlined was incised deep to adipose tissue with a #15 scalpel blade.  The skin margins were undermined to an appropriate distance in all directions utilizing iris scissors.
Island Pedicle Flap Text: The defect edges were debeveled with a #15 scalpel blade.  Given the location of the defect, shape of the defect and the proximity to free margins an island pedicle advancement flap was deemed most appropriate.  Using a sterile surgical marker, an appropriate advancement flap was drawn incorporating the defect, outlining the appropriate donor tissue and placing the expected incisions within the relaxed skin tension lines where possible.    The area thus outlined was incised deep to adipose tissue with a #15 scalpel blade.  The skin margins were undermined to an appropriate distance in all directions around the primary defect and laterally outward around the island pedicle utilizing iris scissors.  There was minimal undermining beneath the pedicle flap.
O-Z Plasty Text: The defect edges were debeveled with a #15 scalpel blade.  Given the location of the defect, shape of the defect and the proximity to free margins an O-Z plasty (double transposition flap) was deemed most appropriate.  Using a sterile surgical marker, the appropriate transposition flaps were drawn incorporating the defect and placing the expected incisions within the relaxed skin tension lines where possible.    The area thus outlined was incised deep to adipose tissue with a #15 scalpel blade.  The skin margins were undermined to an appropriate distance in all directions utilizing iris scissors.  Hemostasis was achieved with electrocautery.  The flaps were then transposed into place, one clockwise and the other counterclockwise, and anchored with interrupted buried subcutaneous sutures.
Complex Repair And V-Y Plasty Text: The defect edges were debeveled with a #15 scalpel blade.  The primary defect was closed partially with a complex linear closure.  Given the location of the remaining defect, shape of the defect and the proximity to free margins a V-Y plasty was deemed most appropriate for complete closure of the defect.  Using a sterile surgical marker, an appropriate advancement flap was drawn incorporating the defect and placing the expected incisions within the relaxed skin tension lines where possible.    The area thus outlined was incised deep to adipose tissue with a #15 scalpel blade.  The skin margins were undermined to an appropriate distance in all directions utilizing iris scissors.
Hemostasis: Electrocautery
Interpolation Flap Text: A decision was made to reconstruct the defect utilizing an interpolation axial flap and a staged reconstruction.  A telfa template was made of the defect.  This telfa template was then used to outline the interpolation flap.  The donor area for the pedicle flap was then injected with anesthesia.  The flap was excised through the skin and subcutaneous tissue down to the layer of the underlying musculature.  The interpolation flap was carefully excised within this deep plane to maintain its blood supply.  The edges of the donor site were undermined.   The donor site was closed in a primary fashion.  The pedicle was then rotated into position and sutured.  Once the tube was sutured into place, adequate blood supply was confirmed with blanching and refill.  The pedicle was then wrapped with xeroform gauze and dressed appropriately with a telfa and gauze bandage to ensure continued blood supply and protect the attached pedicle.
Perilesional Excision Additional Text (Leave Blank If You Do Not Want): The margin was drawn around the clinically apparent lesion. Incisions were then made along these lines to the appropriate tissue plane and the lesion was extirpated.
Information: Selecting Yes will display possible errors in your note based on the variables you have selected. This validation is only offered as a suggestion for you. PLEASE NOTE THAT THE VALIDATION TEXT WILL BE REMOVED WHEN YOU FINALIZE YOUR NOTE. IF YOU WANT TO FAX A PRELIMINARY NOTE YOU WILL NEED TO TOGGLE THIS TO 'NO' IF YOU DO NOT WANT IT IN YOUR FAXED NOTE.
Ear Star Wedge Flap Text: The defect edges were debeveled with a #15 blade scalpel.  Given the location of the defect and the proximity to free margins (helical rim) an ear star wedge flap was deemed most appropriate.  Using a sterile surgical marker, the appropriate flap was drawn incorporating the defect and placing the expected incisions between the helical rim and antihelix where possible.  The area thus outlined was incised through and through with a #15 scalpel blade.
Complex Repair And Epidermal Autograft Text: The defect edges were debeveled with a #15 scalpel blade.  The primary defect was closed partially with a complex linear closure.  Given the location of the defect, shape of the defect and the proximity to free margins an epidermal autograft was deemed most appropriate to repair the remaining defect.  The graft was trimmed to fit the size of the remaining defect.  The graft was then placed in the primary defect, oriented appropriately, and sutured into place.
Positioning (Leave Blank If You Do Not Want): The patient was placed in a comfortable position exposing the surgical site.
Deep Sutures: 5-0 Vicryl
Complex Repair And Rotation Flap Text: The defect edges were debeveled with a #15 scalpel blade.  The primary defect was closed partially with a complex linear closure.  Given the location of the remaining defect, shape of the defect and the proximity to free margins a rotation flap was deemed most appropriate for complete closure of the defect.  Using a sterile surgical marker, an appropriate advancement flap was drawn incorporating the defect and placing the expected incisions within the relaxed skin tension lines where possible.    The area thus outlined was incised deep to adipose tissue with a #15 scalpel blade.  The skin margins were undermined to an appropriate distance in all directions utilizing iris scissors.
Modified Advancement Flap Text: The defect edges were debeveled with a #15 scalpel blade.  Given the location of the defect, shape of the defect and the proximity to free margins a modified advancement flap was deemed most appropriate.  Using a sterile surgical marker, an appropriate advancement flap was drawn incorporating the defect and placing the expected incisions within the relaxed skin tension lines where possible.    The area thus outlined was incised deep to adipose tissue with a #15 scalpel blade.  The skin margins were undermined to an appropriate distance in all directions utilizing iris scissors.
Path Notes (To The Dermatopathologist): Please check margins.
Ftsg Text: The defect edges were debeveled with a #15 scalpel blade.  Given the location of the defect, shape of the defect and the proximity to free margins a full thickness skin graft was deemed most appropriate.  Using a sterile surgical marker, the primary defect shape was transferred to the donor site. The area thus outlined was incised deep to adipose tissue with a #15 scalpel blade.  The harvested graft was then trimmed of adipose tissue until only dermis and epidermis was left.  The skin margins of the secondary defect were undermined to an appropriate distance in all directions utilizing iris scissors.  The secondary defect was closed with interrupted buried subcutaneous sutures.  The skin edges were then re-apposed with running  sutures.  The skin graft was then placed in the primary defect and oriented appropriately.
Scalpel Size: 15 blade
Skin Substitute Text: The defect edges were debeveled with a #15 scalpel blade.  Given the location of the defect, shape of the defect and the proximity to free margins a skin substitute graft was deemed most appropriate.  The graft material was trimmed to fit the size of the defect. The graft was then placed in the primary defect and oriented appropriately.
Complex Repair And Z Plasty Text: The defect edges were debeveled with a #15 scalpel blade.  The primary defect was closed partially with a complex linear closure.  Given the location of the remaining defect, shape of the defect and the proximity to free margins a Z plasty was deemed most appropriate for complete closure of the defect.  Using a sterile surgical marker, an appropriate advancement flap was drawn incorporating the defect and placing the expected incisions within the relaxed skin tension lines where possible.    The area thus outlined was incised deep to adipose tissue with a #15 scalpel blade.  The skin margins were undermined to an appropriate distance in all directions utilizing iris scissors.
Dressing: pressure dressing
Trilobed Flap Text: The defect edges were debeveled with a #15 scalpel blade.  Given the location of the defect and the proximity to free margins a trilobed flap was deemed most appropriate.  Using a sterile surgical marker, an appropriate trilobed flap drawn around the defect.    The area thus outlined was incised deep to adipose tissue with a #15 scalpel blade.  The skin margins were undermined to an appropriate distance in all directions utilizing iris scissors.
Purse String (Simple) Text: Given the location of the defect and the characteristics of the surrounding skin a purse string simple closure was deemed most appropriate.  Undermining was performed circumferentially around the surgical defect.  A purse string suture was then placed and tightened.
Consent was obtained from the patient. The risks and benefits to therapy were discussed in detail. Specifically, the risks of infection, scarring, bleeding, prolonged wound healing, incomplete removal, allergy to anesthesia, nerve injury and recurrence were addressed. Prior to the procedure, the treatment site was clearly identified and confirmed by the patient. All components of Universal Protocol/PAUSE Rule completed.
Intermediate / Complex Repair - Final Wound Length In Cm: 3.8
Muscle Hinge Flap Text: The defect edges were debeveled with a #15 scalpel blade.  Given the size, depth and location of the defect and the proximity to free margins a muscle hinge flap was deemed most appropriate.  Using a sterile surgical marker, an appropriate hinge flap was drawn incorporating the defect. The area thus outlined was incised with a #15 scalpel blade.  The skin margins were undermined to an appropriate distance in all directions utilizing iris scissors.
Keystone Flap Text: The defect edges were debeveled with a #15 scalpel blade.  Given the location of the defect, shape of the defect a keystone flap was deemed most appropriate.  Using a sterile surgical marker, an appropriate keystone flap was drawn incorporating the defect, outlining the appropriate donor tissue and placing the expected incisions within the relaxed skin tension lines where possible. The area thus outlined was incised deep to adipose tissue with a #15 scalpel blade.  The skin margins were undermined to an appropriate distance in all directions around the primary defect and laterally outward around the flap utilizing iris scissors.
Complex Repair And Bilobe Flap Text: The defect edges were debeveled with a #15 scalpel blade.  The primary defect was closed partially with a complex linear closure.  Given the location of the remaining defect, shape of the defect and the proximity to free margins a bilobe flap was deemed most appropriate for complete closure of the defect.  Using a sterile surgical marker, an appropriate advancement flap was drawn incorporating the defect and placing the expected incisions within the relaxed skin tension lines where possible.    The area thus outlined was incised deep to adipose tissue with a #15 scalpel blade.  The skin margins were undermined to an appropriate distance in all directions utilizing iris scissors.
Lip Wedge Excision Repair Text: Given the location of the defect and the proximity to free margins a full thickness wedge repair was deemed most appropriate.  Using a sterile surgical marker, the appropriate repair was drawn incorporating the defect and placing the expected incisions perpendicular to the vermillion border.  The vermillion border was also meticulously outlined to ensure appropriate reapproximation during the repair.  The area thus outlined was incised through and through with a #15 scalpel blade.  The muscularis and dermis were reaproximated with deep sutures following hemostasis. Care was taken to realign the vermillion border before proceeding with the superficial closure.  Once the vermillion was realigned the superfical and mucosal closure was finished.
Complex Repair And Dorsal Nasal Flap Text: The defect edges were debeveled with a #15 scalpel blade.  The primary defect was closed partially with a complex linear closure.  Given the location of the remaining defect, shape of the defect and the proximity to free margins a dorsal nasal flap was deemed most appropriate for complete closure of the defect.  Using a sterile surgical marker, an appropriate flap was drawn incorporating the defect and placing the expected incisions within the relaxed skin tension lines where possible.    The area thus outlined was incised deep to adipose tissue with a #15 scalpel blade.  The skin margins were undermined to an appropriate distance in all directions utilizing iris scissors.
Complex Repair And Modified Advancement Flap Text: The defect edges were debeveled with a #15 scalpel blade.  The primary defect was closed partially with a complex linear closure.  Given the location of the remaining defect, shape of the defect and the proximity to free margins a modified advancement flap was deemed most appropriate for complete closure of the defect.  Using a sterile surgical marker, an appropriate advancement flap was drawn incorporating the defect and placing the expected incisions within the relaxed skin tension lines where possible.    The area thus outlined was incised deep to adipose tissue with a #15 scalpel blade.  The skin margins were undermined to an appropriate distance in all directions utilizing iris scissors.
Advancement-Rotation Flap Text: The defect edges were debeveled with a #15 scalpel blade.  Given the location of the defect, shape of the defect and the proximity to free margins an advancement-rotation flap was deemed most appropriate.  Using a sterile surgical marker, an appropriate flap was drawn incorporating the defect and placing the expected incisions within the relaxed skin tension lines where possible. The area thus outlined was incised deep to adipose tissue with a #15 scalpel blade.  The skin margins were undermined to an appropriate distance in all directions utilizing iris scissors.

## 2019-01-31 NOTE — PROCEDURE: MIPS QUALITY
Quality 111:Pneumonia Vaccination Status For Older Adults: Pneumococcal Vaccination Previously Received
Quality 431: Preventive Care And Screening: Unhealthy Alcohol Use - Screening: Patient screened for unhealthy alcohol use using a single question and scores less than 2 times per year
Quality 226: Preventive Care And Screening: Tobacco Use: Screening And Cessation Intervention: Patient screened for tobacco use and is an ex/non-smoker
Quality 131: Pain Assessment And Follow-Up: Pain assessment using a standardized tool is documented as negative, no follow-up plan required
Quality 110: Preventive Care And Screening: Influenza Immunization: Influenza Immunization Administered during Influenza season
Quality 154 Part A: Falls: Risk Assessment (Should Be Reported With Measure 155.): Falls risk assessment completed and documented in the past 12 months.
Quality 47: Advance Care Plan: Advance Care Planning discussed and documented; advance care plan or surrogate decision maker documented in the medical record.
Quality 155 (Denominator): Falls Plan Of Care: Plan of Care not Documented, Reason not Otherwise Specified
Detail Level: Detailed
Quality 154 Part B: Falls: Risk Screening (Should Be Reported With Measure 155.): Patient screened for future fall risk; documentation of no falls in the past year or only one fall without injury in the past year

## 2019-02-07 ENCOUNTER — APPOINTMENT (RX ONLY)
Dept: URBAN - METROPOLITAN AREA CLINIC 142 | Facility: CLINIC | Age: 69
Setting detail: DERMATOLOGY
End: 2019-02-07

## 2019-02-07 ENCOUNTER — APPOINTMENT (RX ONLY)
Dept: URBAN - METROPOLITAN AREA CLINIC 143 | Facility: CLINIC | Age: 69
Setting detail: DERMATOLOGY
End: 2019-02-07

## 2019-02-07 DIAGNOSIS — Z48.02 ENCOUNTER FOR REMOVAL OF SUTURES: ICD-10-CM

## 2019-02-07 PROCEDURE — ? SUTURE REMOVAL (GLOBAL PERIOD)

## 2019-02-07 ASSESSMENT — LOCATION SIMPLE DESCRIPTION DERM
LOCATION SIMPLE: LEFT CHEEK
LOCATION SIMPLE: LEFT CHEEK

## 2019-02-07 ASSESSMENT — LOCATION DETAILED DESCRIPTION DERM
LOCATION DETAILED: LEFT SUPERIOR LATERAL MALAR CHEEK
LOCATION DETAILED: LEFT SUPERIOR LATERAL MALAR CHEEK

## 2019-02-07 ASSESSMENT — LOCATION ZONE DERM
LOCATION ZONE: FACE
LOCATION ZONE: FACE

## 2019-02-07 NOTE — PROCEDURE: SUTURE REMOVAL (GLOBAL PERIOD)
Add 46923 Cpt? (Important Note: In 2017 The Use Of 80971 Is Being Tracked By Cms To Determine Future Global Period Reimbursement For Global Periods): no
Detail Level: Detailed

## 2019-03-07 ENCOUNTER — TRANSFERRED RECORDS (OUTPATIENT)
Dept: HEALTH INFORMATION MANAGEMENT | Facility: CLINIC | Age: 69
End: 2019-03-07

## 2019-03-07 ENCOUNTER — APPOINTMENT (RX ONLY)
Dept: URBAN - METROPOLITAN AREA CLINIC 143 | Facility: CLINIC | Age: 69
Setting detail: DERMATOLOGY
End: 2019-03-07

## 2019-03-07 DIAGNOSIS — L57.0 ACTINIC KERATOSIS: ICD-10-CM

## 2019-03-07 PROBLEM — D48.5 NEOPLASM OF UNCERTAIN BEHAVIOR OF SKIN: Status: ACTIVE | Noted: 2019-03-07

## 2019-03-07 PROCEDURE — ? BIOPSY BY PUNCH METHOD

## 2019-03-07 PROCEDURE — 17003 DESTRUCT PREMALG LES 2-14: CPT

## 2019-03-07 PROCEDURE — 17000 DESTRUCT PREMALG LESION: CPT | Mod: 59

## 2019-03-07 PROCEDURE — ? COUNSELING

## 2019-03-07 PROCEDURE — ? LIQUID NITROGEN

## 2019-03-07 PROCEDURE — 11104 PUNCH BX SKIN SINGLE LESION: CPT

## 2019-03-07 ASSESSMENT — LOCATION SIMPLE DESCRIPTION DERM
LOCATION SIMPLE: LEFT ZYGOMA
LOCATION SIMPLE: LEFT CHEEK

## 2019-03-07 ASSESSMENT — LOCATION DETAILED DESCRIPTION DERM
LOCATION DETAILED: LEFT LATERAL MALAR CHEEK
LOCATION DETAILED: LEFT CENTRAL ZYGOMA

## 2019-03-07 ASSESSMENT — LOCATION ZONE DERM: LOCATION ZONE: FACE

## 2019-03-07 NOTE — PROCEDURE: MIPS QUALITY
Quality 111:Pneumonia Vaccination Status For Older Adults: Pneumococcal Vaccination Previously Received
Quality 154 Part A: Falls: Risk Assessment (Should Be Reported With Measure 155.): Falls risk assessment completed and documented in the past 12 months.
Quality 154 Part B: Falls: Risk Screening (Should Be Reported With Measure 155.): Patient screened for future fall risk; documentation of no falls in the past year or only one fall without injury in the past year
Quality 110: Preventive Care And Screening: Influenza Immunization: Influenza Immunization Administered during Influenza season
Quality 131: Pain Assessment And Follow-Up: Pain assessment using a standardized tool is documented as negative, no follow-up plan required
Quality 431: Preventive Care And Screening: Unhealthy Alcohol Use - Screening: Patient screened for unhealthy alcohol use using a single question and scores less than 2 times per year
Quality 226: Preventive Care And Screening: Tobacco Use: Screening And Cessation Intervention: Patient screened for tobacco use and is an ex/non-smoker
Quality 155 (Denominator): Falls Plan Of Care: Plan of Care not Documented, Reason not Otherwise Specified
Quality 47: Advance Care Plan: Advance Care Planning discussed and documented; advance care plan or surrogate decision maker documented in the medical record.
Detail Level: Detailed

## 2019-03-07 NOTE — PROCEDURE: BIOPSY BY PUNCH METHOD
Dressing: bandage
Biopsy Type: H and E
X Depth Of Punch In Cm (Optional): 0
Anesthesia Type: 1% lidocaine with epinephrine
Detail Level: Detailed
Notification Instructions: Patient will be notified of biopsy results. However, patient instructed to call the office if not contacted within 2 weeks.
Patient Will Remove Sutures At Home?: No
Post-Care Instructions: I reviewed with the patient in detail post-care instructions. Patient is to keep the biopsy site dry overnight, and then apply bacitracin twice daily until healed. Patient may apply hydrogen peroxide soaks to remove any crusting.
Home Suture Removal Text: Patient was provided a home suture removal kit and will remove their sutures at home.  If they have any questions or difficulties they will call the office.
Epidermal Sutures: 3-0 Nylon
Suture Removal: 14 days
Consent: Written consent was obtained and risks were reviewed including but not limited to scarring, infection, bleeding, scabbing, incomplete removal, nerve damage and allergy to anesthesia.
Hemostasis: None
Billing Type: Third-Party Bill
Wound Care: Vaseline
Was A Bandage Applied: Yes
Anesthesia Volume In Cc: 0.5
Punch Size In Mm: 5

## 2019-03-14 ENCOUNTER — APPOINTMENT (RX ONLY)
Dept: URBAN - METROPOLITAN AREA CLINIC 143 | Facility: CLINIC | Age: 69
Setting detail: DERMATOLOGY
End: 2019-03-14

## 2019-03-14 DIAGNOSIS — Z48.02 ENCOUNTER FOR REMOVAL OF SUTURES: ICD-10-CM

## 2019-03-14 PROCEDURE — ? SUTURE REMOVAL (GLOBAL PERIOD)

## 2019-03-14 ASSESSMENT — LOCATION SIMPLE DESCRIPTION DERM: LOCATION SIMPLE: LEFT CHEEK

## 2019-03-14 ASSESSMENT — LOCATION DETAILED DESCRIPTION DERM: LOCATION DETAILED: LEFT INFERIOR PREAURICULAR CHEEK

## 2019-03-14 ASSESSMENT — LOCATION ZONE DERM: LOCATION ZONE: FACE

## 2019-03-14 NOTE — PROCEDURE: MIPS QUALITY
Quality 131: Pain Assessment And Follow-Up: Pain assessment using a standardized tool is documented as negative, no follow-up plan required
Quality 110: Preventive Care And Screening: Influenza Immunization: Influenza Immunization Administered during Influenza season
Quality 226: Preventive Care And Screening: Tobacco Use: Screening And Cessation Intervention: Patient screened for tobacco use and is an ex/non-smoker
Quality 431: Preventive Care And Screening: Unhealthy Alcohol Use - Screening: Patient screened for unhealthy alcohol use using a single question and scores less than 2 times per year
Quality 154 Part B: Falls: Risk Screening (Should Be Reported With Measure 155.): Patient screened for future fall risk; documentation of no falls in the past year or only one fall without injury in the past year
Quality 47: Advance Care Plan: Advance Care Planning discussed and documented; advance care plan or surrogate decision maker documented in the medical record.
Detail Level: Detailed
Quality 155 (Denominator): Falls Plan Of Care: Plan of Care not Documented, Reason not Otherwise Specified
Quality 154 Part A: Falls: Risk Assessment (Should Be Reported With Measure 155.): Falls risk assessment completed and documented in the past 12 months.
Quality 111:Pneumonia Vaccination Status For Older Adults: Pneumococcal Vaccination Previously Received

## 2019-03-14 NOTE — PROCEDURE: SUTURE REMOVAL (GLOBAL PERIOD)
Detail Level: Detailed
Add 69459 Cpt? (Important Note: In 2017 The Use Of 08561 Is Being Tracked By Cms To Determine Future Global Period Reimbursement For Global Periods): no

## 2019-03-21 ENCOUNTER — TELEPHONE (OUTPATIENT)
Dept: TRANSPLANT | Facility: CLINIC | Age: 69
End: 2019-03-21

## 2019-03-21 NOTE — TELEPHONE ENCOUNTER
"Call to Eric.   Eric noticed a lump (size of a nickel but thicker)on his left jaw, close to his ear in Feb, went to see a dermatologist in [place] and had it biopsied.  Jhony came back as invasive squamous cell carcinoma.  He will send report.  He jumped on a plane and came back here to see his dermatologist in [place] who told him he believes this is \"out of his league\" and suggested he call us.  At the time that I spoke to Eric he was boarding a plane back to [place].  He will come back here for treatment / any appointments that we can make for him.  He would prefer it be next week, Thursday or later.    Call to Dr. Jimenez asking her to call me to advise.  "

## 2019-03-21 NOTE — TELEPHONE ENCOUNTER
Call from Eric's local dermatologist, Dr. Gallo in [place].  He wanted to stress urgency of getting Eric here to be seen.  Message to Dr. Leventhal for guidance as  is on vacation.

## 2019-03-21 NOTE — TELEPHONE ENCOUNTER
Patient Call:  Eric, just found out he has cancer.  Also, he is leaving on a flight this AM., [phone]   Flying out in one hour    Call back needed? Yes    Return Call Needed  Same as documented in contacts section  When to return call?: Same day: Route High Priority

## 2019-03-21 NOTE — TELEPHONE ENCOUNTER
Spoke to ENT triage nurse, gave her all information.  She asked me to place referral to ENT.  Referral placed.

## 2019-03-21 NOTE — TELEPHONE ENCOUNTER
Left detailed message requesting labs tomorrow however would like to discuss with pt having a trough level drawn.

## 2019-03-21 NOTE — TELEPHONE ENCOUNTER
Spoke to Eric, explained that we really want to make sure his prograf level is as low as possible in light of new cancer.  Last level was in the 6's in December.    Also told him that if he hasn't heard about an appointment w/ ENT / head and neck surgeon by Monday to give me a call.

## 2019-03-21 NOTE — TELEPHONE ENCOUNTER
Spoke to pt who states that he will be in [place] until April 4th. Pt is hoping he can wait until then to have blood drawn as he has not had a good experience getting it done in [place]. Please advise.

## 2019-03-21 NOTE — TELEPHONE ENCOUNTER
Reviewed plan with Dr. Leventhal (Dr. Jimenez on vacation).  Awaiting call from ENT  Would like to run prograf level 3-5.   Last prograf level was in December and was 6 and dose was lowered.  Please call Eric and ask him to get repeat labs w/ accurate drug level (remind of importance of 12 hour trough( tomorrow if possible).  Goal for prograf is 3-5.

## 2019-03-25 ENCOUNTER — TELEPHONE (OUTPATIENT)
Dept: TRANSPLANT | Facility: CLINIC | Age: 69
End: 2019-03-25

## 2019-03-25 NOTE — TELEPHONE ENCOUNTER
Email from lenard stating that he still hasn't heard about an appointment.  Called and spoke to supervisor in ENT clinic.  Said she will call me back.

## 2019-03-26 DIAGNOSIS — C76.0 HEAD AND NECK CANCER (H): Primary | ICD-10-CM

## 2019-03-26 NOTE — TELEPHONE ENCOUNTER
Date of appointment: 4/1/19   Diagnosis/reason for appointment:  Referring provider/facility:  Who called:    Recent Studies  Imaging: PET Scheduled for 4/1/19  Pathology:Requested slides from [facility] in [place]-  Labs:  Previous chemo/radiation (if known):    Records requested from:  Records received from:    Additional information:I was asked to collect records and schedule a PET scan, even though apt was scheduled by someone else

## 2019-03-28 PROCEDURE — 00000346 ZZHCL STATISTIC REVIEW OUTSIDE SLIDES TC 88321: Performed by: OTOLARYNGOLOGY

## 2019-03-30 NOTE — PROGRESS NOTES
Dear Dr. Jimenez:    I had the pleasure of meeting Eric Andrew in consultation today at the [facility] at your request.     History of Present Illness:   Eric Andrew is a 68-year-old man who is referred for.  He reportedly noticed a mass on the mandible back in February 2019.  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  He underwent surgery by dermatologist in [place] for this.  Per his report this was not done with Mohs surgery but was told that he had negative margins.  He does not recall it being very big or very deep.  He says that shortly after the surgery he noticed a lump along his mandible/below the ear.  He went in for his 1 month follow-up with a dermatologist and at that time the mass had increased in size.  She thought it was a reactive node but offered a biopsy.  It looks like from review of the notes that they proceeded with a punch biopsy of the parotid mass.  This was consistent with invasive squamous cell carcinoma without any epidermal involvement.  He returned to [place] to see his dermatologist in [place].  He contacted his transplant team for recommendations but at that point had returned back to [place].  In the interim his Prograf level has been adjusted by the transplant team.  He he is scheduled to have a PET scan later today.  He denies any facial weakness or numbness.  He has no ear pain.    He has had multiple previous skin cancers.  He has a history of fair skin.  He never had blistering sunburns as a child.  He now is vigilant about using sunscreen.    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      Past medical history: Alpha I antitrypsin deficiency, liver cirrhosis, history of liver transplant in March 2015, hypertension, chronic kidney disease, diabetes, thrombocytopenia    Past surgical history: liver transplant, bilateral knee surgery - no anesthesia or bleeding  issues    Social history: Spends the shin in [place].  .  He is a retired .  He smokes for a few years and quit back in 1973.  He has no chewing tobacco use.  He has 1 alcoholic beverage about 3 times per week.  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    Family history: Family members with skin cancers.      MEDICATIONS:     Current Outpatient Medications   Medication Sig Dispense Refill     AMLODIPINE BESYLATE PO Take 10 mg by mouth       atorvastatin (LIPITOR) 20 MG tablet        BASAGLAR 100 UNIT/ML injection Inject 38 Units Subcutaneous       Calcium Carbonate-Vitamin D (CALCIUM + D PO) Take 1 tablet by mouth daily        losartan (COZAAR) 25 MG tablet Take 1 tablet (25 mg) by mouth daily 90 tablet 3     metFORMIN (GLUCOPHAGE) 1000 MG tablet        metoprolol succinate (TOPROL-XL) 25 MG 24 hr tablet        Multiple Vitamins-Minerals (MULTIVITAL) TABS        sildenafil (VIAGRA) 50 MG tablet Take 50 mg by mouth daily as needed for erectile dysfunction       tacrolimus (GENERIC EQUIVALENT) 1 MG capsule Take 1 mg in the AM, and 2 mg in the PM. 12 hours apart 90 capsule 11       ALLERGIES:    Allergies   Allergen Reactions     Lisinopril Cough     Meropenem Hives and Swelling     Developed hives and lip swelling while on meropenem and micafungin.  Resolved with discontinuation.  Unclear which was cause.     Micafungin Hives and Swelling     Developed hives and lip swelling while on meropenem and micafungin.  Resolved with discontinuation.  Unclear which was cause.       HABITS/SOCIAL HISTORY:   Spends the shin in [place].  .  He is a retired .   He smokes for a few years and quit back in 1973.  He has no chewing tobacco use.  He has 1 alcoholic beverage about 3 times per week.   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    Social History     Socioeconomic History     Marital status:      Spouse name: Not on file     Number of children: Not on file     Years of education: Not on  file     Highest education level: Not on file   Occupational History     Not on file   Social Needs     Financial resource strain: Not on file     Food insecurity:     Worry: Not on file     Inability: Not on file     Transportation needs:     Medical: Not on file     Non-medical: Not on file   Tobacco Use     Smoking status: Never Smoker     Smokeless tobacco: Never Used     Tobacco comment: [id number] occational smoker   Substance and Sexual Activity     Alcohol use: Yes     Alcohol/week: 0.0 oz     Comment: 1 glass of wine per month     Drug use: No     Sexual activity: No   Lifestyle     Physical activity:     Days per week: Not on file     Minutes per session: Not on file     Stress: Not on file   Relationships     Social connections:     Talks on phone: Not on file     Gets together: Not on file     Attends Hindu service: Not on file     Active member of club or organization: Not on file     Attends meetings of clubs or organizations: Not on file     Relationship status: Not on file     Intimate partner violence:     Fear of current or ex partner: Not on file     Emotionally abused: Not on file     Physically abused: Not on file     Forced sexual activity: Not on file   Other Topics Concern     Parent/sibling w/ CABG, MI or angioplasty before 65F 55M? Not Asked   Social History Narrative     Not on file       PAST MEDICAL HISTORY:   Past Medical History:   Diagnosis Date     Alpha-1-antitrypsin deficiency (H)      Ascites     Pre-transplant     Cirrhosis (H)     Alpha-1-antitrypsin deficiency, s/p liver transplant 3/31/15     HTN (hypertension)      Lentigo maligna melanoma (H) 2009    lentigo maligna melanoma excised on 01/29/2009 with a repeat wide excision on 03/30/2009.      Liver replaced by transplant (H) 03/31/2015     Nephrolithiasis      Osteoarthritis      Portal hypertension (H)     Pre-transplant        PAST SURGICAL HISTORY:   Past Surgical History:   Procedure Laterality Date     COLONOSCOPY  N/A 2014    Procedure: COLONOSCOPY;  Surgeon: Katherine Jimenez MD;  Location:  GI     ESOPHAGOSCOPY, GASTROSCOPY, DUODENOSCOPY (EGD), COMBINED N/A 2014    Procedure: COMBINED ESOPHAGOSCOPY, GASTROSCOPY, DUODENOSCOPY (EGD), BIOPSY SINGLE OR MULTIPLE;  Surgeon: Katherine Jimenez MD;  Location:  GI     ESOPHAGOSCOPY, GASTROSCOPY, DUODENOSCOPY (EGD), COMBINED N/A 2015    Procedure: COMBINED ESOPHAGOSCOPY, GASTROSCOPY, DUODENOSCOPY (EGD), REMOVE FOREIGN BODY;  Surgeon: Katherine Jimenez MD;  Location:  GI     ORTHOPEDIC SURGERY       TRANSPLANT LIVER RECIPIENT  DONOR N/A 3/31/2015    Procedure: TRANSPLANT LIVER RECIPIENT  DONOR;  Surgeon: Elia Mehta MD;  Location:  OR       FAMILY HISTORY:    Family History   Problem Relation Age of Onset     Glaucoma No family hx of      Macular Degeneration No family hx of        REVIEW OF SYSTEMS:  12 point ROS was negative other than the symptoms noted above in the HPI.  Patient Supplied Answers to Review of Systems  [facility] ROS 3/28/2019   Musculoskeletal Back pain         PHYSICAL EXAMINATION:   Ht 1.829 m (6')   Wt 125.2 kg (276 lb)   BMI 37.43 kg/m     Appearance:   normal; NAD, age-appropriate appearance, well-developed, normal habitus   Communication:   normal; communicates verbally, normal voice quality   Head/Face:   inspection -  Area of healed punch biopsy overlying left parotid mass   Salivary glands -visible fullness of the left parotid gland, palpable partially 1.5 cm mass within the superficial lobe of the parotid just anterior to the lobule, nontender, mobile, not involving the overlying skin   Facial strength -  Normal and symmetric bilateral; H/B I/VI   Skin:  normal, no rash   Ocular Motility:  normal occular movements   Ears:  auricle (AD) -  normal  EAC (AD) -  cerumen  TM (AD) -  Unable to visualize  auricle (AS) -  normal  EAC (AS) -  normal  TM (AS) -  Normal,no  effusion  Normal clinical speech reception   Nose:  Ext. inspection -  Normal   Oral Cavity:  lips -  Normal mucosa, oral competence, and stoma size   Age-appropriate dentition, healthy gingival mucosa   Hard palate, buccal, floor of mouth mucosa normal   Tongue - normal movement, no lesions   Oropharynx:  mucosa -  Normal, no lesions  soft palate -  Normal, no lesions, no asymmetry, normal elevation   Neck: No visible mass or asymmetry   Normal palpation, no tenderness, no tracheal deviation  Normal range of motion   Lymphatic:  no abnormal nodes   Cardiovascular:  warm, pink, well-perfused extremities without swelling, tenderness, or edema   Respiratory:  Normal respiratory effort, no stridor   Neuro/Psych.:  mood/affect -  normal  mental status -  normal  cranial nerves -  normal            RESULTS REVIEWED:   I reviewed the pathology report from [place] and the multiple transplant clinic telephone encounters     IMPRESSION AND PLAN:   Eric Andrew is a 68-year-old man who has a history of a liver transplant and recent squamous cell carcinoma of the left cheek who now has 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  I discussed with the patient that typically treatment is with surgical resection followed by postoperative radiation with or without chemotherapy.    He is scheduled for a PET scan later today.  We will plan on reviewing it at tumor conference to finalize his treatment plan.  I would anticipate that he is going to be recommended for a parotidectomy with a neck dissection.    We reviewed the surgical procedure in detail.  We reviewed the planned surgical incision.  Given the fact that the dermatologist performed a punch biopsy of the parotid mass based on our review of the pathology report he will require excision of the overlying skin.  I would anticipate that he can have primary closure with some wide undermining using redundant tissue.  We discussed the parotidectomy in detail including the risks with  the procedure.  We spent considerable time reviewing the facial nerve anatomy and the risk of postoperative weakness.  He does not have obvious nerve involvement but the tumor is sitting over the location of the marginal mandibular nerve.  If there is gross tumor involvement of the nerve it would have to be sacrificed.  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  Even if the nerve was preserved there may be some weakness of the nerve from the dissection.  We will plan on close facial nerve monitoring during the procedure.  We discussed the possibility of using steroids postoperatively to help with any swelling and expedite his recovery.  He would also likely benefit from facial nerve rehab pending his outcome.      We also discussed the neck dissection.  He is concerned because his left carotid is the only one that has blood flow.  We discussed that there is a low risk of carotid injury based on the information that we have at hand there is no obvious tumor involvement in the carotid.  We are still waiting on the final results of the PET scan.  We reviewed the fact that we regularly dissect the nodes off the carotid without difficulty but we will certainly take extra precaution for him.  We discussed the nerves that are at risk with the surgery including the hypoglossal nerve, spinal accessory nerve, phrenic nerve, vagus/recurrent laryngeal nerve.  We discussed that he will have numbness of his incision at least temporary if not permanent numbness of the earlobe.  I am concerned that the greater auricular nerve fully to be sacrificed given the location of the tumor.    We would plan on a postoperative admission and consultation of the transplant team while he is in the hospital to ensure he gets his medications appropriately.  He would have ALEKSEY drains in place and will likely go home with the ALEKSEY drains.  He and his wife are comfortable with this  since they have had this with their previous surgeries.    We did review that he will likely need postoperative radiation.  Pending the final pathology he may need chemotherapy.  We reviewed the time course of radiation and chemotherapy.  We will work on getting him a preoperative visit with the radiation team in anticipation.  We discussed dental clearance before radiation.  He has an appointment with his own dentist in about a week.  We gave him paperwork so he can try to get a radiation clearance at that time.  We had him briefly meet with speech pathology today to discuss their role in his treatment.    We have scheduled him for a PAC appointment and have found a surgical date for him.  I will see him back on the day of surgery    Thank you very much for the opportunity to participate in the care of your patient.      Johanna Moreland M.D.  Otolaryngology- Head & Neck Surgery      This note was dictated with voice recognition software and then edited. Please excuse any unintentional errors.         CC:  Katherine Jimenez MD  [address]  [facility] [address]      Naomi Rodriguez RN  Liver Coordinator  [facility]      Aston Devine MD  [facility]  [address]  [facility] [id number]

## 2019-04-01 ENCOUNTER — HOSPITAL ENCOUNTER (OUTPATIENT)
Dept: PET IMAGING | Facility: CLINIC | Age: 69
End: 2019-04-01
Attending: OTOLARYNGOLOGY
Payer: MEDICARE

## 2019-04-01 ENCOUNTER — PRE VISIT (OUTPATIENT)
Dept: OTOLARYNGOLOGY | Facility: CLINIC | Age: 69
End: 2019-04-01

## 2019-04-01 ENCOUNTER — TELEPHONE (OUTPATIENT)
Dept: OTOLARYNGOLOGY | Facility: CLINIC | Age: 69
End: 2019-04-01

## 2019-04-01 ENCOUNTER — TELEPHONE (OUTPATIENT)
Dept: TRANSPLANT | Facility: CLINIC | Age: 69
End: 2019-04-01

## 2019-04-01 ENCOUNTER — HOSPITAL ENCOUNTER (OUTPATIENT)
Dept: PET IMAGING | Facility: CLINIC | Age: 69
Discharge: HOME | End: 2019-04-01
Attending: OTOLARYNGOLOGY | Admitting: OTOLARYNGOLOGY
Payer: MEDICARE

## 2019-04-01 ENCOUNTER — OFFICE VISIT (OUTPATIENT)
Dept: OTOLARYNGOLOGY | Facility: CLINIC | Age: 69
End: 2019-04-01
Payer: MEDICARE

## 2019-04-01 ENCOUNTER — ALLIED HEALTH/NURSE VISIT (OUTPATIENT)
Dept: SPEECH THERAPY | Facility: CLINIC | Age: 69
End: 2019-04-01

## 2019-04-01 VITALS — BODY MASS INDEX: 37.38 KG/M2 | HEIGHT: 72 IN | WEIGHT: 276 LBS

## 2019-04-01 DIAGNOSIS — C76.0 HEAD AND NECK CANCER (H): ICD-10-CM

## 2019-04-01 DIAGNOSIS — C07 MALIGNANT NEOPLASM OF PAROTID GLAND (H): Primary | ICD-10-CM

## 2019-04-01 DIAGNOSIS — Z94.4 LIVER REPLACED BY TRANSPLANT (H): ICD-10-CM

## 2019-04-01 LAB
ALBUMIN SERPL-MCNC: 3.9 G/DL (ref 3.4–5)
ALP SERPL-CCNC: 103 U/L (ref 40–150)
ALT SERPL W P-5'-P-CCNC: 32 U/L (ref 0–70)
ANION GAP SERPL CALCULATED.3IONS-SCNC: 8 MMOL/L (ref 3–14)
AST SERPL W P-5'-P-CCNC: 22 U/L (ref 0–45)
BILIRUB DIRECT SERPL-MCNC: 0.1 MG/DL (ref 0–0.2)
BILIRUB SERPL-MCNC: 0.4 MG/DL (ref 0.2–1.3)
BUN SERPL-MCNC: 35 MG/DL (ref 7–30)
CALCIUM SERPL-MCNC: 9.2 MG/DL (ref 8.5–10.1)
CHLORIDE SERPL-SCNC: 107 MMOL/L (ref 94–109)
CO2 SERPL-SCNC: 23 MMOL/L (ref 20–32)
CREAT SERPL-MCNC: 1.57 MG/DL (ref 0.66–1.25)
ERYTHROCYTE [DISTWIDTH] IN BLOOD BY AUTOMATED COUNT: 14.8 % (ref 10–15)
GFR SERPL CREATININE-BSD FRML MDRD: 45 ML/MIN/{1.73_M2}
GLUCOSE BLDC GLUCOMTR-MCNC: 134 MG/DL (ref 70–99)
GLUCOSE SERPL-MCNC: 198 MG/DL (ref 70–99)
HCT VFR BLD AUTO: 41.8 % (ref 40–53)
HGB BLD-MCNC: 13.5 G/DL (ref 13.3–17.7)
MCH RBC QN AUTO: 29.9 PG (ref 26.5–33)
MCHC RBC AUTO-ENTMCNC: 32.3 G/DL (ref 31.5–36.5)
MCV RBC AUTO: 93 FL (ref 78–100)
PLATELET # BLD AUTO: 103 10E9/L (ref 150–450)
POTASSIUM SERPL-SCNC: 4.4 MMOL/L (ref 3.4–5.3)
PROT SERPL-MCNC: 7.3 G/DL (ref 6.8–8.8)
RBC # BLD AUTO: 4.51 10E12/L (ref 4.4–5.9)
SODIUM SERPL-SCNC: 138 MMOL/L (ref 133–144)
TACROLIMUS BLD-MCNC: 6.5 UG/L (ref 5–15)
TME LAST DOSE: NORMAL H
WBC # BLD AUTO: 3.9 10E9/L (ref 4–11)

## 2019-04-01 PROCEDURE — 71260 CT THORAX DX C+: CPT

## 2019-04-01 PROCEDURE — 25000128 H RX IP 250 OP 636: Performed by: OTOLARYNGOLOGY

## 2019-04-01 PROCEDURE — 82962 GLUCOSE BLOOD TEST: CPT

## 2019-04-01 PROCEDURE — 74177 CT ABD & PELVIS W/CONTRAST: CPT

## 2019-04-01 PROCEDURE — A9552 F18 FDG: HCPCS | Performed by: OTOLARYNGOLOGY

## 2019-04-01 PROCEDURE — 70491 CT SOFT TISSUE NECK W/DYE: CPT

## 2019-04-01 PROCEDURE — 34300033 ZZH RX 343: Performed by: OTOLARYNGOLOGY

## 2019-04-01 RX ORDER — IOPAMIDOL 755 MG/ML
45-135 INJECTION, SOLUTION INTRAVASCULAR ONCE
Status: COMPLETED | OUTPATIENT
Start: 2019-04-01 | End: 2019-04-01

## 2019-04-01 RX ORDER — CLINDAMYCIN PHOSPHATE 600 MG/50ML
600 INJECTION, SOLUTION INTRAVENOUS
Status: CANCELLED | OUTPATIENT
Start: 2019-04-11

## 2019-04-01 RX ADMIN — IOPAMIDOL 135 ML: 755 INJECTION, SOLUTION INTRAVENOUS at 14:32

## 2019-04-01 RX ADMIN — FLUDEOXYGLUCOSE F-18 16.1 MCI.: 500 INJECTION, SOLUTION INTRAVENOUS at 13:30

## 2019-04-01 ASSESSMENT — PAIN SCALES - GENERAL: PAINLEVEL: NO PAIN (1)

## 2019-04-01 ASSESSMENT — MIFFLIN-ST. JEOR: SCORE: 2064.93

## 2019-04-01 NOTE — LETTER
4/1/2019       RE: Eric Andrew  [address] [place] [address]     Dear Colleague,    Thank you for referring your patient, Eric Andrew, to the [facility] at [facility]. Please see a copy of my visit note below.    Dear Dr. Jimenez:    I had the pleasure of meeting Eric Andrew in consultation today at the [facility] at your request.     History of Present Illness:   Eric Andrew is a 68-year-old man who is referred for.  He reportedly noticed a mass on the mandible back in February 2019.  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  He underwent surgery by dermatologist in [place] for this.  Per his report this was not done with Mohs surgery but was told that he had negative margins.  He does not recall it being very big or very deep.  He says that shortly after the surgery he noticed a lump along his mandible/below the ear.  He went in for his 1 month follow-up with a dermatologist and at that time the mass had increased in size.  She thought it was a reactive node but offered a biopsy.  It looks like from review of the notes that they proceeded with a punch biopsy of the parotid mass.  This was consistent with invasive squamous cell carcinoma without any epidermal involvement.  He returned to [place] to see his dermatologist in [place].  He contacted his transplant team for recommendations but at that point had returned back to [place].  In the interim his Prograf level has been adjusted by the transplant team.  He he is scheduled to have a PET scan later today.  He denies any facial weakness or numbness.  He has no ear pain.    He has had multiple previous skin cancers.  He has a history of fair skin.  He never had blistering sunburns as a child.  He now is vigilant about using sunscreen.    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      Past medical history: Alpha I antitrypsin deficiency, liver cirrhosis, history of liver transplant in March 2015, hypertension, chronic kidney disease, diabetes, thrombocytopenia    Past surgical history: liver transplant, bilateral knee surgery - no anesthesia or bleeding issues    Social history: Spends the shin in [place].  .  He is a retired .  He smokes for a few years and quit back in 1973.  He has no chewing tobacco use.  He has 1 alcoholic beverage about 3 times per week.  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    Family history: Family members with skin cancers.      MEDICATIONS:     Current Outpatient Medications   Medication Sig Dispense Refill     AMLODIPINE BESYLATE PO Take 10 mg by mouth       atorvastatin (LIPITOR) 20 MG tablet        BASAGLAR 100 UNIT/ML injection Inject 38 Units Subcutaneous       Calcium Carbonate-Vitamin D (CALCIUM + D PO) Take 1 tablet by mouth daily        losartan (COZAAR) 25 MG tablet Take 1 tablet (25 mg) by mouth daily 90 tablet 3     metFORMIN (GLUCOPHAGE) 1000 MG tablet        metoprolol succinate (TOPROL-XL) 25 MG 24 hr tablet        Multiple Vitamins-Minerals (MULTIVITAL) TABS        sildenafil (VIAGRA) 50 MG tablet Take 50 mg by mouth daily as needed for erectile dysfunction       tacrolimus (GENERIC EQUIVALENT) 1 MG capsule Take 1 mg in the AM, and 2 mg in the PM. 12 hours apart 90 capsule 11       ALLERGIES:    Allergies   Allergen Reactions     Lisinopril Cough     Meropenem Hives and Swelling     Developed hives and lip swelling while on meropenem and micafungin.  Resolved with discontinuation.  Unclear which was cause.     Micafungin Hives and Swelling     Developed hives and lip swelling while on meropenem and micafungin.  Resolved with discontinuation.  Unclear which was cause.       HABITS/SOCIAL HISTORY:   Spends the shin in [place].  .  He is a retired .   He smokes for a few years and quit back in 1973.  He has  no chewing tobacco use.  He has 1 alcoholic beverage about 3 times per week.   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    Social History     Socioeconomic History     Marital status:      Spouse name: Not on file     Number of children: Not on file     Years of education: Not on file     Highest education level: Not on file   Occupational History     Not on file   Social Needs     Financial resource strain: Not on file     Food insecurity:     Worry: Not on file     Inability: Not on file     Transportation needs:     Medical: Not on file     Non-medical: Not on file   Tobacco Use     Smoking status: Never Smoker     Smokeless tobacco: Never Used     Tobacco comment: 6420-1842 occational smoker   Substance and Sexual Activity     Alcohol use: Yes     Alcohol/week: 0.0 oz     Comment: 1 glass of wine per month     Drug use: No     Sexual activity: No   Lifestyle     Physical activity:     Days per week: Not on file     Minutes per session: Not on file     Stress: Not on file   Relationships     Social connections:     Talks on phone: Not on file     Gets together: Not on file     Attends Protestant service: Not on file     Active member of club or organization: Not on file     Attends meetings of clubs or organizations: Not on file     Relationship status: Not on file     Intimate partner violence:     Fear of current or ex partner: Not on file     Emotionally abused: Not on file     Physically abused: Not on file     Forced sexual activity: Not on file   Other Topics Concern     Parent/sibling w/ CABG, MI or angioplasty before 65F 55M? Not Asked   Social History Narrative     Not on file       PAST MEDICAL HISTORY:   Past Medical History:   Diagnosis Date     Alpha-1-antitrypsin deficiency (H)      Ascites     Pre-transplant     Cirrhosis (H)     Alpha-1-antitrypsin deficiency, s/p liver transplant 3/31/15     HTN (hypertension)      Lentigo maligna melanoma (H) 2009    lentigo maligna melanoma excised on  2009 with a repeat wide excision on 2009.      Liver replaced by transplant (H) 2015     Nephrolithiasis      Osteoarthritis      Portal hypertension (H)     Pre-transplant        PAST SURGICAL HISTORY:   Past Surgical History:   Procedure Laterality Date     COLONOSCOPY N/A 2014    Procedure: COLONOSCOPY;  Surgeon: Katherine Jimenez MD;  Location:  GI     ESOPHAGOSCOPY, GASTROSCOPY, DUODENOSCOPY (EGD), COMBINED N/A 2014    Procedure: COMBINED ESOPHAGOSCOPY, GASTROSCOPY, DUODENOSCOPY (EGD), BIOPSY SINGLE OR MULTIPLE;  Surgeon: Katherine Jimenez MD;  Location:  GI     ESOPHAGOSCOPY, GASTROSCOPY, DUODENOSCOPY (EGD), COMBINED N/A 2015    Procedure: COMBINED ESOPHAGOSCOPY, GASTROSCOPY, DUODENOSCOPY (EGD), REMOVE FOREIGN BODY;  Surgeon: Katherine Jimenez MD;  Location:  GI     ORTHOPEDIC SURGERY       TRANSPLANT LIVER RECIPIENT  DONOR N/A 3/31/2015    Procedure: TRANSPLANT LIVER RECIPIENT  DONOR;  Surgeon: Elia Mehta MD;  Location:  OR       FAMILY HISTORY:    Family History   Problem Relation Age of Onset     Glaucoma No family hx of      Macular Degeneration No family hx of        REVIEW OF SYSTEMS:  12 point ROS was negative other than the symptoms noted above in the HPI.  Patient Supplied Answers to Review of Systems  [facility] ENT ROS 3/28/2019   Musculoskeletal Back pain         PHYSICAL EXAMINATION:   Ht 1.829 m (6')   Wt 125.2 kg (276 lb)   BMI 37.43 kg/m      Appearance:   normal; NAD, age-appropriate appearance, well-developed, normal habitus   Communication:   normal; communicates verbally, normal voice quality   Head/Face:   inspection -  Area of healed punch biopsy overlying left parotid mass   Salivary glands -visible fullness of the left parotid gland, palpable partially 1.5 cm mass within the superficial lobe of the parotid just anterior to the lobule, nontender, mobile, not involving the overlying skin    Facial strength -  Normal and symmetric bilateral; H/B I/VI   Skin:  normal, no rash   Ocular Motility:  normal occular movements   Ears:  auricle (AD) -  normal  EAC (AD) -  cerumen  TM (AD) -  Unable to visualize  auricle (AS) -  normal  EAC (AS) -  normal  TM (AS) -  Normal,no effusion  Normal clinical speech reception   Nose:  Ext. inspection -  Normal   Oral Cavity:  lips -  Normal mucosa, oral competence, and stoma size   Age-appropriate dentition, healthy gingival mucosa   Hard palate, buccal, floor of mouth mucosa normal   Tongue - normal movement, no lesions   Oropharynx:  mucosa -  Normal, no lesions  soft palate -  Normal, no lesions, no asymmetry, normal elevation   Neck: No visible mass or asymmetry   Normal palpation, no tenderness, no tracheal deviation  Normal range of motion   Lymphatic:  no abnormal nodes   Cardiovascular:  warm, pink, well-perfused extremities without swelling, tenderness, or edema   Respiratory:  Normal respiratory effort, no stridor   Neuro/Psych.:  mood/affect -  normal  mental status -  normal  cranial nerves -  normal            RESULTS REVIEWED:   I reviewed the pathology report from [place] and the multiple transplant clinic telephone encounters     IMPRESSION AND PLAN:   Eric Andrew is a 68-year-old man who has a history of a liver transplant and recent squamous cell carcinoma of the left cheek who now has 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  I discussed with the patient that typically treatment is with surgical resection followed by postoperative radiation with or without chemotherapy.    He is scheduled for a PET scan later today.  We will plan on reviewing it at tumor conference to finalize his treatment plan.  I would anticipate that he is going to be recommended for a parotidectomy with a neck dissection.    We reviewed the surgical procedure in detail.  We reviewed the planned surgical incision.  Given the fact that the dermatologist performed a punch biopsy of  the parotid mass based on our review of the pathology report he will require excision of the overlying skin.  I would anticipate that he can have primary closure with some wide undermining using redundant tissue.  We discussed the parotidectomy in detail including the risks with the procedure.  We spent considerable time reviewing the facial nerve anatomy and the risk of postoperative weakness.  He does not have obvious nerve involvement but the tumor is sitting over the location of the marginal mandibular nerve.  If there is gross tumor involvement of the nerve it would have to be sacrificed.  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  Even if the nerve was preserved there may be some weakness of the nerve from the dissection.  We will plan on close facial nerve monitoring during the procedure.  We discussed the possibility of using steroids postoperatively to help with any swelling and expedite his recovery.  He would also likely benefit from facial nerve rehab pending his outcome.      We also discussed the neck dissection.  He is concerned because his left carotid is the only one that has blood flow.  We discussed that there is a low risk of carotid injury based on the information that we have at hand there is no obvious tumor involvement in the carotid.  We are still waiting on the final results of the PET scan.  We reviewed the fact that we regularly dissect the nodes off the carotid without difficulty but we will certainly take extra precaution for him.  We discussed the nerves that are at risk with the surgery including the hypoglossal nerve, spinal accessory nerve, phrenic nerve, vagus/recurrent laryngeal nerve.  We discussed that he will have numbness of his incision at least temporary if not permanent numbness of the earlobe.  I am concerned that the greater auricular nerve fully to be sacrificed given the location of the tumor.    We  would plan on a postoperative admission and consultation of the transplant team while he is in the hospital to ensure he gets his medications appropriately.  He would have ALEKSEY drains in place and will likely go home with the ALEKSEY drains.  He and his wife are comfortable with this since they have had this with their previous surgeries.    We did review that he will likely need postoperative radiation.  Pending the final pathology he may need chemotherapy.  We reviewed the time course of radiation and chemotherapy.  We will work on getting him a preoperative visit with the radiation team in anticipation.  We discussed dental clearance before radiation.  He has an appointment with his own dentist in about a week.  We gave him paperwork so he can try to get a radiation clearance at that time.  We had him briefly meet with speech pathology today to discuss their role in his treatment.    We have scheduled him for a PAC appointment and have found a surgical date for him.  I will see him back on the day of surgery    Thank you very much for the opportunity to participate in the care of your patient.      Johanna Moreland M.D.  Otolaryngology- Head & Neck Surgery      This note was dictated with voice recognition software and then edited. Please excuse any unintentional errors.         CC:  Katherine Jimenez MD  [address]  [facility] [address]      Naomi Rodriguez, RN  Liver Coordinator  [facility]      Aston Devine MD  [facility]  [address]  [facility] [address]

## 2019-04-01 NOTE — NURSING NOTE
Chief Complaint   Patient presents with     Consult     new SCC under jaw - mass left     Tamir Ware, EMT

## 2019-04-01 NOTE — TELEPHONE ENCOUNTER
Patient is scheduled for surgery with .  Spoke or left message with: patient while in ENT Clinic today.  Date of Surgery: 4/11/19  Location:  OR [facility]  Informed patient they will need an adult  Yes  Pre-op with surgeon (if applicable): N/A  H&P: Scheduled with PAC  Additional imaging/appointments: N/A  Surgery packet: Given to patient by nurse coordinator while in ENT Clinic today.  Additional comments: Postop appt 4/19/19

## 2019-04-01 NOTE — TELEPHONE ENCOUNTER
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 Moreland  Who called:Pool message      Previous chemo/radiation (if known):No    Records in Epic    Additional information:

## 2019-04-01 NOTE — PROGRESS NOTES
Pt seen for allied health provider visit at the request of Dr. Moreland. Introduced self and services. Gave patient and his family a brief overview of SLP role in his care. He was inquiring about his vacation planned in late July. Discussed briefly the changes he may experience such as mucositis, dysgeusia, and xerostomia along with possible need for PEG placement as complications to his planned trip. Pt will benefit from formal evaluation and full education/training following surgery and definitive plan of care. Time spent with patient 7 minutes.

## 2019-04-02 DIAGNOSIS — Z94.4 LIVER TRANSPLANTED (H): ICD-10-CM

## 2019-04-03 RX ORDER — TACROLIMUS 1 MG/1
1 CAPSULE ORAL EVERY 12 HOURS
Qty: 180 CAPSULE | Refills: 3 | Status: SHIPPED | OUTPATIENT
Start: 2019-04-03 | End: 2020-04-06

## 2019-04-04 ENCOUNTER — PATIENT OUTREACH (OUTPATIENT)
Dept: OTOLARYNGOLOGY | Facility: CLINIC | Age: 69
End: 2019-04-04

## 2019-04-04 NOTE — PROGRESS NOTES
Department of Radiation Oncology  [address] [address]  [phone]       Consultation Note    Name: Eric Andrew MRN: [id number]   : 1950   Date of Service: 2019  Referring: Dr. 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 Regarding his current complaint, he first presented with a squamous cell carcinoma of the left cheek in 2019.  Per patient, he underwent surgery by dermatologist in [place] with negative margins.  He was told that it was not Mohs surgery. Shortly after the surgery, he noticed a lump along his mandible and below his left ear.  At 1 month follow-up with his dermatologist, he underwent a punch biopsy of the mass that reportedly was consistent with invasive squamous cell carcinoma without any epidermal involvement.  These slides are being reviewed by [facility] pathology.    Mr. Andrew was referred to Dr. Moreland 4/1 chest 19.  At that visit, Dr. Moreland noted a palpable, nontender, mobile, 1.5 cm mass within the superficial lobe of the left parotid.  There were no cranial nerve deficits or lymphadenopathy.  Dr. Moreland ordered a PET scan with plans to discuss the results and ENT tumor board today.  PET/CT on 19 did not demonstrate any evidence of metastatic disease.    ***      Past Medical History:    Alpha-1 antitrypsin deficiency    Liver cirrhosis status post orthotopic liver transplant in 3/2015    Hypertension    Chronic kidney disease    Diabetes mellitus    Thrombocytopenia    Past Surgical History:    Liver transplant    Bilateral knee surgery    Chemotherapy  History:  Denies    Radiation History:  Denies    Pregnant: Not Applicable  Implanted Cardiac Devices: No    Medications:    Amlodipine    Atorvastatin    Vascular insulin    Calcium carbonate-vitamin D    Losartan    Metformin    Metoprolol succinate    Multivitamin    Sildenafil    Tacrolimus    Allergies:    Lisinopril    Meropenem/micafungin: Developed hives and lip swelling while on meropenem and micafungin.  Resolved with discontinuation.  Unclear which was cause.    Social History:  Tobacco: Former smoker, quit in 1973  Alcohol: 3 beverages per week  Employment: Former   , spends the shin in [place].    Family History:    Multiple family members with skin cancer    Review of Systems   A 10-point review of systems was performed. Pertinent findings are noted in the HPI.    Physical Exam   ECOG Status: 0    Vitals:  B/P: ***  T: ***  P: ***  Weight: ***  Pain: {0-10:247743}/10    Gen: Alert, in NAD  Head: NC/AT  Eyes: PERRL, EOMI, sclera anicteric  Ears: No external auricular lesions  Nose/sinus: No rhinorrhea or epistaxis  Oral cavity/oropharynx: MMM, no visible oral cavity lesions, FOM and BOT are soft to palpation  Neck: Full ROM, supple, no palpable adenopathy  Pulm: No wheezing, stridor or respiratory distress  CV: Extremities are warm and well-perfused, no cyanosis, no pedal edema  Abdominal: Normal bowel sounds, soft, nontender, no masses  Musculoskeletal: Normal bulk and tone  Skin: Normal color and turgor  Neuro: A/Ox3, CN II-XII intact, normal gait    Imaging/Path/Labs   Imaging: Reviewed  PET/CT 4/1/19  IMPRESSION:   In this patient with newly diagnosed squamous cell carcinoma of left  inferior preauricular cheek:  1. No evidence for metastatic disease in the chest abdomen pelvis.   2. A few tiny lung nodules, 2 of which are demonstrated to be  unchanged from abdominal CT 9/29/2013.   3. Please refer to head neck PET/CT report for pertinent information.    Path: Reviewed outside  pathology, [facility] consult pending    Labs: Reviewed    Assessment    Mr. 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  He is planning to undergo resection with neck dissection by Dr. Moreland.     Plan   We discussed with the patient that his exact treatment plan would depend on the results of his surgery and pathology, however it is likely that he will require between 5-7 weeks of daily external beam radiotherapy.  The addition of chemotherapy is unlikely but may be considered if necessary.    We reviewed the risks, benefits, and technical aspects of radiotherapy to this area.  The patient and his wife had many questions, which were answered to the best of her ability.  They agreed with the tentative plan as outlined above.    We will plan to bring the patient back for follow-up and CT simulation approximately 3 weeks after his surgery, with the plan to begin treatment between 4-6 weeks post surgery.    Thank you for allowing us to assist in the care of this patient.  We are happy to answer any questions or follow-up if necessary.    The patient was seen and examined with  ***, who agrees with the above assessment and plan.    Guero Shaikh MD PGY-2   Radiation Oncology, [facility], [place]  [phone] clinic  Pager [phone]

## 2019-04-04 NOTE — PROGRESS NOTES
Called patient with the following PET scan results:  IMPRESSION:     1.9 x 1.3 cm FDG avid enhancing left parotid mass most compatible with  metastasis from known history of skin squamous cell cancer.  Please refer to the whole body PET CT performed as a separate report,  for the findings of the remainder of the body.                                                                     IMPRESSION:   In this patient with newly diagnosed squamous cell carcinoma of left  inferior preauricular cheek:  1. No evidence for metastatic disease in the chest abdomen pelvis.   2. A few tiny lung nodules, 2 of which are demonstrated to be  unchanged from abdominal CT 9/29/2013.   3. Please refer to head neck PET/CT report for pertinent information.      Patient will be reviewed at tumor board tomorrow and we will call with recommendations. Current plan is for total parotidectomy and neck dissection with post-op radiation +/- chemo depending on pathology results. Patient scheduled for consult with Dr. Floyd tomorrow and surgery for 4/12. Patient was encouraged to call with further questions or concerns.    Lolis Montez, RN, BSN

## 2019-04-05 ENCOUNTER — PRE VISIT (OUTPATIENT)
Dept: RADIATION ONCOLOGY | Facility: CLINIC | Age: 69
End: 2019-04-05

## 2019-04-05 ENCOUNTER — OFFICE VISIT (OUTPATIENT)
Dept: SURGERY | Facility: CLINIC | Age: 69
End: 2019-04-05
Payer: MEDICARE

## 2019-04-05 ENCOUNTER — ANESTHESIA EVENT (OUTPATIENT)
Dept: SURGERY | Facility: CLINIC | Age: 69
DRG: 827 | End: 2019-04-05
Payer: MEDICARE

## 2019-04-05 ENCOUNTER — OFFICE VISIT (OUTPATIENT)
Dept: RADIATION ONCOLOGY | Facility: CLINIC | Age: 69
End: 2019-04-05
Attending: RADIOLOGY
Payer: MEDICARE

## 2019-04-05 VITALS
SYSTOLIC BLOOD PRESSURE: 128 MMHG | WEIGHT: 280 LBS | DIASTOLIC BLOOD PRESSURE: 76 MMHG | HEART RATE: 88 BPM | BODY MASS INDEX: 37.97 KG/M2

## 2019-04-05 VITALS
HEART RATE: 88 BPM | DIASTOLIC BLOOD PRESSURE: 76 MMHG | SYSTOLIC BLOOD PRESSURE: 128 MMHG | TEMPERATURE: 98 F | BODY MASS INDEX: 37.93 KG/M2 | OXYGEN SATURATION: 98 % | HEIGHT: 72 IN | RESPIRATION RATE: 18 BRPM | WEIGHT: 280 LBS

## 2019-04-05 DIAGNOSIS — C44.320 SQUAMOUS CELL CARCINOMA OF SKIN OF FACE: ICD-10-CM

## 2019-04-05 DIAGNOSIS — Z01.818 PRE-OP EXAMINATION: ICD-10-CM

## 2019-04-05 DIAGNOSIS — C07 MALIGNANT NEOPLASM OF PAROTID GLAND (H): Primary | ICD-10-CM

## 2019-04-05 DIAGNOSIS — C07 MALIGNANT NEOPLASM OF PAROTID GLAND (H): ICD-10-CM

## 2019-04-05 LAB
COPATH REPORT: NORMAL
HBA1C MFR BLD: 6.2 % (ref 0–5.6)

## 2019-04-05 PROCEDURE — G0463 HOSPITAL OUTPT CLINIC VISIT: HCPCS | Performed by: RADIOLOGY

## 2019-04-05 ASSESSMENT — ENCOUNTER SYMPTOMS
DOUBLE VISION: 0
FOCAL WEAKNESS: 0
DIARRHEA: 0
CONSTIPATION: 0
INSOMNIA: 0
PHOTOPHOBIA: 0
HALLUCINATIONS: 0
FEVER: 0
WEIGHT LOSS: 0
STRIDOR: 0
WEAKNESS: 0
MEMORY LOSS: 0
FLANK PAIN: 0
BRUISES/BLEEDS EASILY: 0
COUGH: 1
HEARTBURN: 0
CHILLS: 0
NERVOUS/ANXIOUS: 0
DIZZINESS: 0
POLYDIPSIA: 0
SENSORY CHANGE: 0
TINGLING: 0
LOSS OF CONSCIOUSNESS: 0
SPEECH CHANGE: 0
BACK PAIN: 0
NAUSEA: 0
FALLS: 0
SINUS PAIN: 0
EYE PAIN: 0
WHEEZING: 0
SPUTUM PRODUCTION: 0
PND: 0
EYE REDNESS: 0
BLURRED VISION: 0
VOMITING: 0
ORTHOPNEA: 0
HEMATURIA: 0
DEPRESSION: 0
BLOOD IN STOOL: 0
SORE THROAT: 0
SEIZURES: 0
DIAPHORESIS: 0
CLAUDICATION: 0
FREQUENCY: 0
EYE DISCHARGE: 0
TREMORS: 0
DYSURIA: 0
SHORTNESS OF BREATH: 0
NECK PAIN: 0
MYALGIAS: 0
PALPITATIONS: 0
ABDOMINAL PAIN: 0
HEADACHES: 0
HEMOPTYSIS: 0

## 2019-04-05 ASSESSMENT — LIFESTYLE VARIABLES
TOBACCO_USE: 1
SUBSTANCE_ABUSE: 0

## 2019-04-05 ASSESSMENT — MIFFLIN-ST. JEOR: SCORE: 2083.07

## 2019-04-05 NOTE — LETTER
4/5/2019      RE: Eric Andrew  [address] [place] [address]         [facility]    INITIAL PATIENT ASSESSMENT    Diagnosis: Parotid Cancer    Prior radiation therapy: None    Prior chemotherapy: None    Prior hormonal therapy:No    Pain Eval:  Denies    Psychosocial  Living arrangements: Lives with wife  Fall Risk: independent   referral needs: Not needed    Advanced Directive: Yes - Location: On file  Implantable Cardiac Device? No        Nurse face-to-face time: Level 5:  over 15 min face to face time  Review of Systems   Constitutional: Negative for chills, diaphoresis, fever, malaise/fatigue and weight loss.   HENT: Negative for congestion, ear discharge, ear pain, hearing loss, nosebleeds, sinus pain, sore throat and tinnitus.    Eyes: Negative for blurred vision, double vision, photophobia, pain, discharge and redness.   Respiratory: Positive for cough. Negative for hemoptysis, sputum production, shortness of breath, wheezing and stridor.    Cardiovascular: Negative for chest pain, palpitations, orthopnea, claudication, leg swelling and PND.   Gastrointestinal: Negative for abdominal pain, blood in stool, constipation, diarrhea, heartburn, melena, nausea and vomiting.   Genitourinary: Negative for dysuria, flank pain, frequency, hematuria and urgency.   Musculoskeletal: Negative for back pain, falls, joint pain, myalgias and neck pain.   Skin: Negative for itching and rash.   Neurological: Negative for dizziness, tingling, tremors, sensory change, speech change, focal weakness, seizures, loss of consciousness, weakness and headaches.   Endo/Heme/Allergies: Negative for environmental allergies and polydipsia. Does not bruise/bleed easily.   Psychiatric/Behavioral: Negative for depression, hallucinations, memory loss, substance abuse and suicidal ideas. The patient is not nervous/anxious and does not have insomnia.                     Department of Radiation Oncology  [facility]  [address] [address]  [phone]       Consultation Note    Name: Eric Andrew MRN: [id number]   : 1950   Date of Service: 2019  Referring: Dr. 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 Andrew is a 68 year old male with a history of immunosuppression secondary to liver transplantation in  who underwent an excision of a squamous cell carcinoma of the left cheek by a dermatologist in [place] in 2019. Shortly after this, he did noticed the development of an enlarging pre-auricular mass. He underwent a punch biopsy of this lesion on 3/7/2019 with outside pathology reportedly demonstrating an infiltrating invasive squamous cell carcinoma without extension to the epidermis.    Mr. Andrew had a staging PET/CT performed on 2019 which demonstrated a 1.9 x 1.3 cm enhancing mass (SUVmax 8.7) within the superficial lobe of the left parotid gland which extended to the overlying fat and abutted the skin. There was no evidence of additional cervical adenopathy nor metastatic disease. He was seen by Dr. Moreland of head and neck surgery who recommended a left total parotidectomy and neck dissection with resection of the overlying skin given the previous punch biopsy and concern for tumor seeding. He is currently scheduled surgery on 2019 and is referred to radiation oncology clinic today for a further discussion regarding the use of adjuvant radiotherapy in the treatment of his disease.    On exam today, Mr. Andrew reports that he is doing well and has no pressing concerns or complaints. He feels that his left preauricular mass has continued to increase in size since it was first biopsied last month. He has a history of several prior cutaneous head and neck cancers however none that he recalls  arising from the left face prior to the most recent lesion which was excised this past January. He otherwise reports no odynophagia, dysphagia, visual changes, auditory dysfunction, fever/chills, nausea/vomiting, dyspnea, chest pain or abdominal pain and his remaining 10-point ROS are negative.    Past Medical History:    Portal hypertension    Osteoarthritis    Nephrolithiasis    Liver transportation    Lentigo maligna melanoma    Hypertension    Cirrhosis    Ascites    Alpha 1 antitrypsin deficiency    Past Surgical History:    Liver transplantation (3/31/2015)    Chemotherapy History:  None    Radiation History:  None    Pregnant: Not Applicable  Implanted Cardiac Devices: No    Medications:  Current Outpatient Medications   Medication Sig Dispense Refill     AMLODIPINE BESYLATE PO Take 10 mg by mouth every morning        aspirin (ASA) 81 MG tablet Take 81 mg by mouth every morning ON HOLD FOR SURGERY SINCE 04/03/2019       atorvastatin (LIPITOR) 20 MG tablet Take 20 mg by mouth every evening        BASAGLAR 100 UNIT/ML injection Inject 45 Units Subcutaneous At Bedtime        Calcium Carbonate-Vitamin D (CALCIUM + D PO) Take 1 tablet by mouth every morning        diphenhydrAMINE HCl (BENADRYL PO) Take by mouth nightly as needed       losartan (COZAAR) 25 MG tablet Take 1 tablet (25 mg) by mouth daily (Patient taking differently: Take 50 mg by mouth every morning ) 90 tablet 3     metFORMIN (GLUCOPHAGE) 1000 MG tablet Take 1,000 mg by mouth 2 times daily (with meals)        metoprolol succinate (TOPROL-XL) 25 MG 24 hr tablet Take 25 mg by mouth every morning        Multiple Vitamins-Minerals (MULTIVITAL) TABS Take 1 tablet by mouth every morning        sildenafil (VIAGRA) 50 MG tablet Take 50 mg by mouth daily as needed for erectile dysfunction       tacrolimus (GENERIC EQUIVALENT) 1 MG capsule Take 1 capsule (1 mg) by mouth every 12 hours (Patient taking differently: Take 1 mg by mouth 2 times daily ) 180 capsule  3      Allergies:    Lisinopril    Meropenem    Micafungin    Social History:  Tobacco: Distant history of light smoking  Alcohol: One glass of wine per month  Lives in [place], [place]    Family History:    Noncontributory    Review of Systems   A 10-point review of systems was performed. Pertinent findings are noted in the HPI.    Physical Exam   ECOG Status: 0    Vitals:  B/P: 128/76  T: 36.7  C  P: 88  Weight: 127 kg  Pain: 0/10    Gen: Alert, in NAD  Head: NC/AT  Eyes: PERRL, EOMI, sclera anicteric  Ears: No external auricular lesions. There is an approximately 2.5 cm firm but mobile mass immediately anterior to the left ear overlying the left mandibular ramus. There are biopsy-related cutaneous changes immediately overlying this mass without any evidence of cutaneous involvement by the tumor.   Nose/sinus: No rhinorrhea or epistaxis  Oral cavity/oropharynx: MMM, no visible oral cavity lesions, FOM and BOT are soft to palpation  Neck: Full ROM, supple, no palpable cervical adenopathy aside from the preauricular mass as described above  Pulm: No wheezing, stridor or respiratory distress  CV: Extremities are warm and well-perfused, no cyanosis  Musculoskeletal: Normal bulk and tone  Skin: Normal color and turgor. No cutaneous lesions.  Neuro: A/Ox3, CN II-XII intact    Imaging/Path/Labs   Imaging: Reviewed    Path: Reviewed    Labs: Reviewed    Assessment    Mr. 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 He is scheduled to undergo surgical resection by Dr. Moreland next week.      Plan   I had a long discussion with Mr. Andrew regarding his diagnostic workup to date. His pathology from the most recent outside biopsy of the left parotid mass is currently pending review here at the [facility] however I do not anticipate any marked changes to the outside diagnosis of a metastatic  squamous cell carcinoma. Based upon his constellation of clinical attributes including his immunosuppressed status and osiris involvement, he is likely at high risk of locoregional disease relapse following surgical resection alone. Although I will wait for final pathology prior to making definitive recommendations for adjuvant therapy,  I did explain that I would likely recommend that he receive adjuvant radiotherapy for improved local disease control.    I reviewed a potential adjuvant treatment course consisting of 60-66 Gy delivered in 30-33 fractions to the left parotid bed and undissected neck. I reviewed the potential acute and late-term radiation-induced toxicities associated with this regimen and answered Mr. Andrew and his wife's questions to their stated satisfaction. I will have him return to radiation oncology clinic following surgery to review his pathology and formulate a definitive plan of care moving forward. In the interim, I provided him with my contact information with instructions to call or to return should he have any additional questions following today's visit.    Grabiel Floyd MD/PhD    Dept of Radiation Oncology  [facility]

## 2019-04-05 NOTE — PROGRESS NOTES
HPI    INITIAL PATIENT ASSESSMENT    Diagnosis: Parotid Cancer    Prior radiation therapy: None    Prior chemotherapy: None    Prior hormonal therapy:No    Pain Eval:  Denies    Psychosocial  Living arrangements: Lives with wife  Fall Risk: independent   referral needs: Not needed    Advanced Directive: Yes - Location: On file  Implantable Cardiac Device? No        Nurse face-to-face time: Level 5:  over 15 min face to face time  Review of Systems   Constitutional: Negative for chills, diaphoresis, fever, malaise/fatigue and weight loss.   HENT: Negative for congestion, ear discharge, ear pain, hearing loss, nosebleeds, sinus pain, sore throat and tinnitus.    Eyes: Negative for blurred vision, double vision, photophobia, pain, discharge and redness.   Respiratory: Positive for cough. Negative for hemoptysis, sputum production, shortness of breath, wheezing and stridor.    Cardiovascular: Negative for chest pain, palpitations, orthopnea, claudication, leg swelling and PND.   Gastrointestinal: Negative for abdominal pain, blood in stool, constipation, diarrhea, heartburn, melena, nausea and vomiting.   Genitourinary: Negative for dysuria, flank pain, frequency, hematuria and urgency.   Musculoskeletal: Negative for back pain, falls, joint pain, myalgias and neck pain.   Skin: Negative for itching and rash.   Neurological: Negative for dizziness, tingling, tremors, sensory change, speech change, focal weakness, seizures, loss of consciousness, weakness and headaches.   Endo/Heme/Allergies: Negative for environmental allergies and polydipsia. Does not bruise/bleed easily.   Psychiatric/Behavioral: Negative for depression, hallucinations, memory loss, substance abuse and suicidal ideas. The patient is not nervous/anxious and does not have insomnia.

## 2019-04-05 NOTE — PATIENT INSTRUCTIONS
Preparing for Your Surgery      Name:  Eric Andrew   MRN:  [id number]   :  1950   Today's Date:  2019     Arriving for surgery:  Surgery date:  19  Arrival time:  05:30 am  Please come to:       [facility] [address]  [address] [place]  [address]    - ? parking is available in front of the hospital      -    Please proceed to [address] on the 3rd floor. [phone]?     - ?If you are in need of directions, wheelchair or escort please stop at the Information Desk in the lobby.  Inform the information person that you are here for surgery; a wheelchair and escort to [address] will be provided.?     What can I eat or drink?  -  You may have solid food or milk products until 8 hours prior to your surgery.  -  You may have water, apple juice or 7up/Sprite until 2 hours prior to your surgery.    Which medicines can I take?  Stop Aspirin, vitamins and supplements one week prior to surgery.  Hold Ibuprofen for 24 hours and/or Naproxen for 48 hours prior to surgery.   -  Do NOT take these medications in the morning, the day of surgery:  Losartan + metformin if normally taken in the morning.  Hold viagra 24 hours before surgery.  -  Please take these medications the day of surgery:  Tylenol if needed; take all other medications normally taken in the morning.  Take 80% of 38 units = 30 units of basaglar the night before surgery.    How do I prepare myself?  -  Take two showers: one the night before surgery; and one the morning of surgery.         Use Scrubcare or Hibiclens to wash from neck down.  You may use your own     shampoo and conditioner. No other hair products.   -  Do NOT use lotion, powder, deodorant, or antiperspirant the day of your surgery.  -  Do NOT wear any jewelry  - Do not bring your own medications to the hospital, except for inhalers and eye   drops.  -  Bring your ID and insurance card.    Questions or Concerns:  -If you have questions or  concerns regarding the day of surgery, please call [phone].     -For questions after surgery please call your surgeons office.

## 2019-04-05 NOTE — PROGRESS NOTES
Department of Radiation Oncology  [address] [address]  [phone]       Consultation Note    Name: Eric Andrew MRN: [id number]   : 1950   Date of Service: 2019  Referring: Dr. 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 Andrew is a 68 year old male with a history of immunosuppression secondary to liver transplantation in  who underwent an excision of a squamous cell carcinoma of the left cheek by a dermatologist in [place] in 2019. Shortly after this, he did noticed the development of an enlarging pre-auricular mass. He underwent a punch biopsy of this lesion on 3/7/2019 with outside pathology reportedly demonstrating an infiltrating invasive squamous cell carcinoma without extension to the epidermis.    Mr. Andrew had a staging PET/CT performed on 2019 which demonstrated a 1.9 x 1.3 cm enhancing mass (SUVmax 8.7) within the superficial lobe of the left parotid gland which extended to the overlying fat and abutted the skin. There was no evidence of additional cervical adenopathy nor metastatic disease. He was seen by Dr. Moreland of head and neck surgery who recommended a left total parotidectomy and neck dissection with resection of the overlying skin given the previous punch biopsy and concern for tumor seeding. He is currently scheduled surgery on 2019 and is referred to radiation oncology clinic today for a further discussion regarding the use of adjuvant radiotherapy in the treatment of his disease.    On exam today, Mr. Andrew reports that he is doing well and has no pressing concerns or complaints. He feels that his left preauricular mass has continued to increase in size since it was first biopsied last month. He has a history of several prior cutaneous head  and neck cancers however none that he recalls arising from the left face prior to the most recent lesion which was excised this past January. He otherwise reports no odynophagia, dysphagia, visual changes, auditory dysfunction, fever/chills, nausea/vomiting, dyspnea, chest pain or abdominal pain and his remaining 10-point ROS are negative.    Past Medical History:    Portal hypertension    Osteoarthritis    Nephrolithiasis    Liver transportation    Lentigo maligna melanoma    Hypertension    Cirrhosis    Ascites    Alpha 1 antitrypsin deficiency    Past Surgical History:    Liver transplantation (3/31/2015)    Chemotherapy History:  None    Radiation History:  None    Pregnant: Not Applicable  Implanted Cardiac Devices: No    Medications:  Current Outpatient Medications   Medication Sig Dispense Refill     AMLODIPINE BESYLATE PO Take 10 mg by mouth every morning        aspirin (ASA) 81 MG tablet Take 81 mg by mouth every morning ON HOLD FOR SURGERY SINCE 04/03/2019       atorvastatin (LIPITOR) 20 MG tablet Take 20 mg by mouth every evening        BASAGLAR 100 UNIT/ML injection Inject 45 Units Subcutaneous At Bedtime        Calcium Carbonate-Vitamin D (CALCIUM + D PO) Take 1 tablet by mouth every morning        diphenhydrAMINE HCl (BENADRYL PO) Take by mouth nightly as needed       losartan (COZAAR) 25 MG tablet Take 1 tablet (25 mg) by mouth daily (Patient taking differently: Take 50 mg by mouth every morning ) 90 tablet 3     metFORMIN (GLUCOPHAGE) 1000 MG tablet Take 1,000 mg by mouth 2 times daily (with meals)        metoprolol succinate (TOPROL-XL) 25 MG 24 hr tablet Take 25 mg by mouth every morning        Multiple Vitamins-Minerals (MULTIVITAL) TABS Take 1 tablet by mouth every morning        sildenafil (VIAGRA) 50 MG tablet Take 50 mg by mouth daily as needed for erectile dysfunction       tacrolimus (GENERIC EQUIVALENT) 1 MG capsule Take 1 capsule (1 mg) by mouth every 12 hours (Patient taking differently:  Take 1 mg by mouth 2 times daily ) 180 capsule 3      Allergies:    Lisinopril    Meropenem    Micafungin    Social History:  Tobacco: Distant history of light smoking  Alcohol: One glass of wine per month  Lives in [place], [place]    Family History:    Noncontributory    Review of Systems   A 10-point review of systems was performed. Pertinent findings are noted in the HPI.    Physical Exam   ECOG Status: 0    Vitals:  B/P: 128/76  T: 36.7  C  P: 88  Weight: 127 kg  Pain: 0/10    Gen: Alert, in NAD  Head: NC/AT  Eyes: PERRL, EOMI, sclera anicteric  Ears: No external auricular lesions. There is an approximately 2.5 cm firm but mobile mass immediately anterior to the left ear overlying the left mandibular ramus. There are biopsy-related cutaneous changes immediately overlying this mass without any evidence of cutaneous involvement by the tumor.   Nose/sinus: No rhinorrhea or epistaxis  Oral cavity/oropharynx: MMM, no visible oral cavity lesions, FOM and BOT are soft to palpation  Neck: Full ROM, supple, no palpable cervical adenopathy aside from the preauricular mass as described above  Pulm: No wheezing, stridor or respiratory distress  CV: Extremities are warm and well-perfused, no cyanosis  Musculoskeletal: Normal bulk and tone  Skin: Normal color and turgor. No cutaneous lesions.  Neuro: A/Ox3, CN II-XII intact    Imaging/Path/Labs   Imaging: Reviewed    Path: Reviewed    Labs: Reviewed    Assessment    Mr. 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 He is scheduled to undergo surgical resection by Dr. Moreland next week.      Plan   I had a long discussion with Mr. Andrew regarding his diagnostic workup to date. His pathology from the most recent outside biopsy of the left parotid mass is currently pending review here at the [facility] however I do not anticipate any marked  changes to the outside diagnosis of a metastatic squamous cell carcinoma. Based upon his constellation of clinical attributes including his immunosuppressed status and osiris involvement, he is likely at high risk of locoregional disease relapse following surgical resection alone. Although I will wait for final pathology prior to making definitive recommendations for adjuvant therapy,  I did explain that I would likely recommend that he receive adjuvant radiotherapy for improved local disease control.    I reviewed a potential adjuvant treatment course consisting of 60-66 Gy delivered in 30-33 fractions to the left parotid bed and undissected neck. I reviewed the potential acute and late-term radiation-induced toxicities associated with this regimen and answered Mr. Andrew and his wife's questions to their stated satisfaction. I will have him return to radiation oncology clinic following surgery to review his pathology and formulate a definitive plan of care moving forward. In the interim, I provided him with my contact information with instructions to call or to return should he have any additional questions following today's visit.    Grabiel Floyd MD/PhD    Dept of Radiation Oncology  [facility]

## 2019-04-05 NOTE — H&P
Pre-Operative H & P     CC:  Preoperative exam to assess for increased cardiopulmonary risk while undergoing surgery and anesthesia.    Date of Encounter: 4/5/2019  Primary Care Physician:  Aston Devine     Reason for visit: pre operative examination, Malignant neoplasm of parotid gland    HPI  Eric Andrew is a 68 year old male who presents for pre-operative H & P in preparation for left total parotidectomy and left modified radical neck dissection with Dr. Moreland on 4/11/19 at [facility].     The patient is a 68 year old man who spends the winter in [place]. 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 He had surgery by a dermatologist and told the margins were negative. He then began to feel a lump along his mandible a month later. He had a follow up visit and the mass increased in size so a punch biopsy was performed. Pathology showed invasive squamous tosha carcinoma. He returned to [place] and saw his dermatologist here. He was referred to Dr. Moreland after that visit, a PET scan was completed and his contacted his transplant team. The patient then met with Dr. Moreland on 4/1/19 and they reviewed his diagnosis and treatment options. The patient is now scheduled for the procedure as above.     History is obtained from the patient and chart review    Past Medical History  Past Medical History:   Diagnosis Date     Alpha-1-antitrypsin deficiency (H)      Ascites     Pre-transplant     Chronic kidney disease, stage 3 (H)      Cirrhosis (H)     Alpha-1-antitrypsin deficiency, s/p liver transplant 3/31/15     Gout      HTN (hypertension)      Lentigo maligna melanoma (H) 2009    lentigo maligna melanoma excised on 01/29/2009 with a repeat wide excision on 03/30/2009.      Liver replaced by transplant (H) 03/31/2015     Nephrolithiasis      Osteoarthritis      Portal hypertension (H)     Pre-transplant       Past  Surgical History  Past Surgical History:   Procedure Laterality Date     COLONOSCOPY N/A 2014    Procedure: COLONOSCOPY;  Surgeon: Katherine Jimenez MD;  Location: [facility]     ESOPHAGOSCOPY, GASTROSCOPY, DUODENOSCOPY (EGD), COMBINED N/A 2014    Procedure: COMBINED ESOPHAGOSCOPY, GASTROSCOPY, DUODENOSCOPY (EGD), BIOPSY SINGLE OR MULTIPLE;  Surgeon: Katherine Jimenez MD;  Location: [facility]     ESOPHAGOSCOPY, GASTROSCOPY, DUODENOSCOPY (EGD), COMBINED N/A 2015    Procedure: COMBINED ESOPHAGOSCOPY, GASTROSCOPY, DUODENOSCOPY (EGD), REMOVE FOREIGN BODY;  Surgeon: Katherine Jimenez MD;  Location: [facility]     HERNIA REPAIR      abdominal     INSERT SHUNT PORTAL TRANSJUGULAR INTRAHEPTIC       ORTHOPEDIC SURGERY      bilateral knee surgery     TRANSPLANT LIVER RECIPIENT  DONOR N/A 3/31/2015    Procedure: TRANSPLANT LIVER RECIPIENT  DONOR;  Surgeon: Elia Mehta MD;  Location: [facility]       Hx of Blood transfusions/reactions: yes, denies     Hx of abnormal bleeding or anti-platelet use: ASA 81 mg    Menstrual history: No LMP for male patient.:     Steroid use in the last year: Prednisone when he has a gout flare. Last flare over 6 weeks ago    Personal or FH with difficulty with Anesthesia:  denies    Prior to Admission Medications  Current Outpatient Medications   Medication Sig Dispense Refill     AMLODIPINE BESYLATE PO Take 10 mg by mouth every morning        aspirin (ASA) 81 MG tablet Take 81 mg by mouth every morning ON HOLD FOR SURGERY SINCE 2019       atorvastatin (LIPITOR) 20 MG tablet Take 20 mg by mouth every evening        BASAGLAR 100 UNIT/ML injection Inject 45 Units Subcutaneous At Bedtime        Calcium Carbonate-Vitamin D (CALCIUM + D PO) Take 1 tablet by mouth every morning        diphenhydrAMINE HCl (BENADRYL PO) Take by mouth nightly as needed       losartan (COZAAR) 25 MG tablet Take 1 tablet (25 mg) by mouth daily (Patient taking  differently: Take 50 mg by mouth every morning ) 90 tablet 3     metFORMIN (GLUCOPHAGE) 1000 MG tablet Take 1,000 mg by mouth 2 times daily (with meals)        metoprolol succinate (TOPROL-XL) 25 MG 24 hr tablet Take 25 mg by mouth every morning        Multiple Vitamins-Minerals (MULTIVITAL) TABS Take 1 tablet by mouth every morning        sildenafil (VIAGRA) 50 MG tablet Take 50 mg by mouth daily as needed for erectile dysfunction       tacrolimus (GENERIC EQUIVALENT) 1 MG capsule Take 1 capsule (1 mg) by mouth every 12 hours (Patient taking differently: Take 1 mg by mouth 2 times daily ) 180 capsule 3       Allergies  Allergies   Allergen Reactions     Lisinopril Cough     Meropenem Hives and Swelling     Developed hives and lip swelling while on meropenem and micafungin.  Resolved with discontinuation.  Unclear which was cause.     Micafungin Hives and Swelling     Developed hives and lip swelling while on meropenem and micafungin.  Resolved with discontinuation.  Unclear which was cause.       Social History  Social History     Socioeconomic History     Marital status:      Spouse name: Not on file     Number of children: Not on file     Years of education: Not on file     Highest education level: Not on file   Occupational History     Not on file   Social Needs     Financial resource strain: Not on file     Food insecurity:     Worry: Not on file     Inability: Not on file     Transportation needs:     Medical: Not on file     Non-medical: Not on file   Tobacco Use     Smoking status: Never Smoker     Smokeless tobacco: Never Used     Tobacco comment: [id number] occational smoker   Substance and Sexual Activity     Alcohol use: Yes     Alcohol/week: 0.0 oz     Comment: 2-3 glass of wine per week. At most 1 per day     Drug use: No     Sexual activity: No   Lifestyle     Physical activity:     Days per week: Not on file     Minutes per session: Not on file     Stress: Not on file   Relationships     Social  connections:     Talks on phone: Not on file     Gets together: Not on file     Attends [employer] service: Not on file     Active member of club or organization: Not on file     Attends meetings of clubs or organizations: Not on file     Relationship status: Not on file     Intimate partner violence:     Fear of current or ex partner: Not on file     Emotionally abused: Not on file     Physically abused: Not on file     Forced sexual activity: Not on file   Other Topics Concern     Parent/sibling w/ CABG, MI or angioplasty before 65F 55M? Not Asked   Social History Narrative     Not on file       Family History  Family History   Problem Relation Age of Onset     Cardiovascular Father         MI     Glaucoma No family hx of      Macular Degeneration No family hx of        Review of Systems      ROS/MED HX    ENT/Pulmonary:     (+)tobacco use, Past use , . .    Neurologic: Comment: x1 migraine    (+)migraines,     Cardiovascular: Comment: Congenital absence of flow of RCA - neg cardiovascular ROS   (+) hypertension----. : . . . :. . Previous cardiac testing date:results:Stress Testdate:7/14/2014 results:Interpretation Summary   Normal dobutamine stress echocardiogram. No significant valvular   abnormality.   Nornal PA pressure.ECG reviewed date:11/27/17 results:SRCath date: 12/13/17 results:          METS/Exercise Tolerance: Comment: Hiking, pickle ball >4 METS   Hematologic:     (+) Anemia, History of Transfusion no previous transfusion reaction Other Hematologic Disorder-Thrombocytopenia, Hypofibrinogenemia     (-) history of blood clots   Musculoskeletal:  - neg musculoskeletal ROS       GI/Hepatic: Comment: Secondary to Alpha- Antitrypsinase Def    S/p liver transplant on 3/31/2015    (+) liver disease (2/2 alpha1 antitripsin deficiency; s/p TIPS),       Renal/Genitourinary: Comment: One episode of nephrolithiasis - ROS Renal section negative   (+) chronic renal disease, type: CRI, Nephrolithiasis , Pt does not  "require dialysis, Pt has no history of transplant, Other Renal/ Genitourinary, Erectile dysfunction      Endo:  - neg endo ROS   (+) type II DM Last HgA1c: 6.2 date: 8/7/18 Using insulin Obesity, Other Endocrine Disorder gout.      Psychiatric:  - neg psychiatric ROS       Infectious Disease:   (+) SBE Prophylaxis,       Malignancy:   (+) Malignancy History of Skin and Other  Skin CA Remission status post Surgery, Other CA parotid gland Active status post         Other:    (+) C-spine cleared: N/A, no H/O Chronic Pain,no other significant disability   - neg other ROS           The complete review of systems is negative other than noted in the HPI or here.   Temp: 98  F (36.7  C) Temp src: Oral BP: 128/76 Pulse: 88   Resp: 18 SpO2: 98 %         280 lbs 0 oz  6' 0\"   Body mass index is 37.97 kg/m .       Physical Exam  Constitutional: Awake, alert, cooperative, no apparent distress, and appears stated age.  Eyes: Pupils equal, round and reactive to light, extra ocular muscles intact, sclera clear, conjunctiva normal.  HENT: Normocephalic, oral pharynx with moist mucus membranes, good dentition. No goiter appreciated.   Respiratory: Clear to auscultation bilaterally, no crackles or wheezing.  Cardiovascular: Regular rate and rhythm, normal S1 and S2, and no murmur noted.  Carotids +2, no bruits. No edema. Palpable pulses to radial  DP and PT arteries.   GI: Normal bowel sounds, soft, non-distended, non-tender, exam limited secondary to body habitus. Surgical scars: well healed  Lymph/Hematologic: No cervical lymphadenopathy and no supraclavicular lymphadenopathy.  Genitourinary:  defer  Skin: Warm and dry.  No rashes at anticipated surgical site.   Musculoskeletal: Full ROM of neck. There is no redness, warmth, or swelling of the joints. Gross motor strength is normal.    Neurologic: Awake, alert, oriented to name, place and time. Cranial nerves II-XII are grossly intact. Gait is normal.   Neuropsychiatric: Calm, " cooperative. Normal affect.     Labs: (personally reviewed)  Results for BROOKLYNN CORTEZ (MRN [id number]) as of 4/5/2019 15:43   Ref. Range 4/1/2019 10:53 4/1/2019 13:30   Sodium Latest Ref Range: 133 - 144 mmol/L 138    Potassium Latest Ref Range: 3.4 - 5.3 mmol/L 4.4    Chloride Latest Ref Range: 94 - 109 mmol/L 107    Carbon Dioxide Latest Ref Range: 20 - 32 mmol/L 23    Urea Nitrogen Latest Ref Range: 7 - 30 mg/dL 35 (H)    Creatinine Latest Ref Range: 0.66 - 1.25 mg/dL 1.57 (H)    GFR Estimate Latest Ref Range: >60 mL/min/1.73_m2 45 (L)    GFR Estimate If Black Latest Ref Range: >60 mL/min/1.73_m2 52 (L)    Calcium Latest Ref Range: 8.5 - 10.1 mg/dL 9.2    Anion Gap Latest Ref Range: 3 - 14 mmol/L 8    Albumin Latest Ref Range: 3.4 - 5.0 g/dL 3.9    Protein Total Latest Ref Range: 6.8 - 8.8 g/dL 7.3    Bilirubin Total Latest Ref Range: 0.2 - 1.3 mg/dL 0.4    Alkaline Phosphatase Latest Ref Range: 40 - 150 U/L 103    ALT Latest Ref Range: 0 - 70 U/L 32    AST Latest Ref Range: 0 - 45 U/L 22    Bilirubin Direct Latest Ref Range: 0.0 - 0.2 mg/dL 0.1    Glucose Latest Ref Range: 70 - 99 mg/dL 198 (H) 134 (H)   WBC Latest Ref Range: 4.0 - 11.0 10e9/L 3.9 (L)    Hemoglobin Latest Ref Range: 13.3 - 17.7 g/dL 13.5    Hematocrit Latest Ref Range: 40.0 - 53.0 % 41.8    Platelet Count Latest Ref Range: 150 - 450 10e9/L 103 (L)    RBC Count Latest Ref Range: 4.4 - 5.9 10e12/L 4.51    MCV Latest Ref Range: 78 - 100 fl 93    MCH Latest Ref Range: 26.5 - 33.0 pg 29.9    MCHC Latest Ref Range: 31.5 - 36.5 g/dL 32.3    RDW Latest Ref Range: 10.0 - 15.0 % 14.8    Tacrolimus Last Dose Unknown 03/31/19 2230    Tacrolimus Level Latest Ref Range: 5.0 - 15.0 ug/L 6.5      EKG11/27/17  Sinus Rhythm    [facility]  Cardiac Catheterization Report    Name:  BROOKLYNN FABI JOSEKAYLA Event Date: 12/13/2017 13:51  Diagnostic Findings  LEFT MAIN CORONARY ARTERY     10% stenosis in the LMCA.    LEFT  ANTERIOR DESCENDING     20% stenosis in the Proximal LAD.    CIRCUMFLEX     10% stenosis in the Proximal Circumflex.    RAMUS     20% stenosis in the Ramus.    RIGHT CORONARY ARTERY     30% stenosis in the Proximal RCA.      CAROTID ULTRASOUND REPORT 12/13/17  1. Absence of detectable flow within the right ICA. Findings concordant   with those of the previous MRI of the brain. Recommend follow-up with CTA   of the neck to further evaluate extent of high grade stenosis or   occlusion.  2.  No hemodynamically significant stenosis with the left ICA.  3.  Normal antegrade flow within the bilateral vertebral arteries.        MYOCARDIAL PERFUSION IMAGING REPORT 11/27/17  REST/STRESS SINGLE ISOTOPE GATED SPECT IMAGING USING GD.  IMPRESSION   1. Exercise duration and workload were adequate. The patient exercised 7:00 minutes on a Darren protocol.   2. The peak heart rate was 148 bpm (97% MPHR); peak blood pressure was 224 mmHg/- mmHg.   3. There were no significant symptoms with stress.   4. The stress ECG was normal.   5. Myocardial perfusion was abnormal. There was a medium-sized area of mild ischemia in the basal, mid, and apical inferior wall.   6. Left ventricular cavity size was normal ( ml).   7. Overall left ventricular systolic function was normal without wall motion abnormalities. The post stress LVEF was calculated to be 64 %.   8. There were no prior studies available for comparison.    Combined Report of:   Chest/abdomen/pelvis PET and CT on  4/1/2019  3:21 PM :                                                                   IMPRESSION:   In this patient with newly diagnosed squamous cell carcinoma of left  inferior preauricular cheek:  1. No evidence for metastatic disease in the chest abdomen pelvis.   2. A few tiny lung nodules, 2 of which are demonstrated to be  unchanged from abdominal CT 9/29/2013.   3. Please refer to head neck PET/CT report for pertinent information.    The patient's records and  results personally reviewed by this provider.     Outside records reviewed from: care everywhere    ASSESSMENT and PLAN  Eric is a 68 year old man who is scheduled for left total parotidectomy and left modified radical neck dissection for on 4/11/19 by Dr. Moreland in treatment of malignant neoplasm of the parotid gland.  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  Cardiac:  Functional status- METS >4. Patient walks and hikes.  Intermediate risk surgery with 0.9% (RCRI #) risk of major adverse cardiac event.   ~ HTN - continue Norvasc and Lipitor. Hold Cozaar DOS. Patient started holding ASA 81 mg on 4/3/19.   ~ Congenital absence of flow of RCA see on duplex US on 12/13/17.        ~ Prior stress test on 11/27/17 without ischemia, cardiac cath on 12/13/17 a mild degree of coronary atherosclerosis. Seen by cardiologist, Dr. Aston Szymanski and treating without revascularization. EKG on        11/27/17 showing sinus rhythm. No further cardiac testing indicated.     2.  Pulm:  Airway feasible.  HERNÁN risk: Intermediate 4/8: HTN, BMI, age, male. History of smoking for 2 years in the 1970's. He reports a cold 1 week ago continues with slight cough. He denies fevers, chills or coughing up phelgm. PET CT completed on 4/1/19 with no signs of pneumonia. Stable pulmonary nodules.     3. Heme: Thrombocytopenia. Plts 103 on 4/1/19. Stable from baseline.     4. Endo:   ~ Diabetes - on metformin and insulin. Will hold metformin DOS and take 80% of insulin. Last A1c 6.2 on 8/2018.  ~ Gout - hasn't had a flare and needed prednisone for 6 weeks.   ~ Obesity, BMI 37. Consideration for careful positioning and safe lifting  techniques due to BMI    5. GI:    ~ Alpha 1 anti-tripsin deficiency/cirrhosis. Liver transplant on 3/31/15. Continuing to do well and followed by transplant team. Per their notes given the new cancer will continue on Prograf with levels as low as possible. Last prograf level 6.5 on 4/1/19.   ~ Risk of PONV score = 2.  If > 2, anti-emetic intervention recommended.    6. :   ~ CKD stage 3. Baseline creatinine 1.6. Creatinine on 1.57 at last check on 4/1/19.   ~ ED - will hold Viagra and revatio 48 hours to surgery.     7. Skin: History of lentigo malignant melanoma.     VTE risk: 3%    Patient was discussed with Dr Chávez.    The patient is optimized for their procedure. AVS with information on surgery time/arrival time, meds and NPO status given by nursing staff.      La [place] PA-C  Preoperative Assessment Center  [facility]  Clinic and Surgery Center  Phone: [phone]  Fax: [phone]

## 2019-04-05 NOTE — ANESTHESIA PREPROCEDURE EVALUATION
Anesthesia Pre-Procedure Evaluation    Patient: Eric Andrew   MRN:     [id number] Gender:   male   Age:    68 year old :      1950        Preoperative Diagnosis: Malignant Neoplasm Of Parotid Gland   Procedure(s):  Left Total Parotidectomy And Left Modified Radical Neck Dissection     Past Medical History:   Diagnosis Date     Alpha-1-antitrypsin deficiency (H)      Ascites     Pre-transplant     Cirrhosis (H)     Alpha-1-antitrypsin deficiency, s/p liver transplant 3/31/15     HTN (hypertension)      Lentigo maligna melanoma (H)     lentigo maligna melanoma excised on 2009 with a repeat wide excision on 2009.      Liver replaced by transplant (H) 2015     Nephrolithiasis      Osteoarthritis      Portal hypertension (H)     Pre-transplant      Past Surgical History:   Procedure Laterality Date     COLONOSCOPY N/A 2014    Procedure: COLONOSCOPY;  Surgeon: Katherine Jimenez MD;  Location:  GI     ESOPHAGOSCOPY, GASTROSCOPY, DUODENOSCOPY (EGD), COMBINED N/A 2014    Procedure: COMBINED ESOPHAGOSCOPY, GASTROSCOPY, DUODENOSCOPY (EGD), BIOPSY SINGLE OR MULTIPLE;  Surgeon: Katherine Jimenez MD;  Location:  GI     ESOPHAGOSCOPY, GASTROSCOPY, DUODENOSCOPY (EGD), COMBINED N/A 2015    Procedure: COMBINED ESOPHAGOSCOPY, GASTROSCOPY, DUODENOSCOPY (EGD), REMOVE FOREIGN BODY;  Surgeon: Katherine Jimenez MD;  Location:  GI     ORTHOPEDIC SURGERY       TRANSPLANT LIVER RECIPIENT  DONOR N/A 3/31/2015    Procedure: TRANSPLANT LIVER RECIPIENT  DONOR;  Surgeon: Elia Mehta MD;  Location: [facility] OR          Anesthesia Evaluation     . Pt has had prior anesthetic. Type: General    No history of anesthetic complications (denies problems with multiple GA)          ROS/MED HX    ENT/Pulmonary:     (+)tobacco use, Past use , . .    Neurologic: Comment: x1 migraine    (+)migraines,     Cardiovascular: Comment: Congenital absence of flow  of RCA - neg cardiovascular ROS   (+) hypertension----. : . . . :. . Previous cardiac testing date:results:Stress Testdate:7/14/2014 results:Interpretation Summary   Normal dobutamine stress echocardiogram. No significant valvular   abnormality.   Nornal PA pressure.ECG reviewed date:11/27/17 results:SRCath date: 12/13/17 results:          METS/Exercise Tolerance: Comment: Hiking, pickle ball >4 METS   Hematologic:     (+) Anemia, History of Transfusion no previous transfusion reaction Other Hematologic Disorder-Thrombocytopenia, Hypofibrinogenemia     (-) history of blood clots   Musculoskeletal:  - neg musculoskeletal ROS       GI/Hepatic: Comment: Secondary to Alpha- Antitrypsinase Def    S/p liver transplant on 3/31/2015    (+) liver disease (2/2 alpha1 antitripsin deficiency; s/p TIPS; liver transplant 2015),       Renal/Genitourinary: Comment: One episode of nephrolithiasis - ROS Renal section negative   (+) chronic renal disease, type: CRI, Nephrolithiasis , Pt does not require dialysis, Pt has no history of transplant, Other Renal/ Genitourinary, Erectile dysfunction      Endo:  - neg endo ROS   (+) type II DM Last HgA1c: 6.2 date: 4/5/19 Using insulin Obesity, Other Endocrine Disorder gout.      Psychiatric:  - neg psychiatric ROS       Infectious Disease:   (+) SBE Prophylaxis,       Malignancy:   (+) Malignancy History of Skin and Other  Skin CA Remission status post Surgery, Other CA parotid gland Active status post         Other:    (+) C-spine cleared: N/A, no H/O Chronic Pain,no other significant disability   - neg other ROS                     PHYSICAL EXAM:   Mental Status/Neuro: A/A/O   Airway: Facies: Feasible  Mallampati: II  Mouth/Opening: Full  TM distance: > 6 cm  Neck ROM: Full   Respiratory: Auscultation: CTAB     Resp. Rate: Normal     Resp. Effort: Normal      CV: Rhythm: Regular  Rate: Age appropriate  Heart: Normal Sounds   Comments:      Dental:  Dental Comments: Natural dentition with  several dental crowns                Lab Results   Component Value Date    WBC 3.9 (L) 04/01/2019    HGB 13.5 04/01/2019    HCT 41.8 04/01/2019     (L) 04/01/2019    SED 9 10/19/2005     04/01/2019    POTASSIUM 4.4 04/01/2019    CHLORIDE 107 04/01/2019    CO2 23 04/01/2019    BUN 35 (H) 04/01/2019    CR 1.57 (H) 04/01/2019     (H) 04/01/2019    SHAYNE 9.2 04/01/2019    PHOS 3.4 08/01/2018    MAG 1.7 10/29/2015    ALBUMIN 3.9 04/01/2019    PROTTOTAL 7.3 04/01/2019    ALT 32 04/01/2019    AST 22 04/01/2019    ALKPHOS 103 04/01/2019    BILITOTAL 0.4 04/01/2019    LIPASE 110 04/01/2015    AMYLASE 76 04/01/2015    MEHUL 218 (HH) 03/26/2015    PTT 31 04/11/2015    INR 1.28 (H) 04/30/2015    FIBR 222 04/01/2015    TSH 3.66 07/15/2014       Preop Vitals  BP Readings from Last 3 Encounters:   04/05/19 128/76   08/01/18 131/85   06/08/18 172/84    Pulse Readings from Last 3 Encounters:   04/05/19 88   08/01/18 73   06/08/18 75      Resp Readings from Last 3 Encounters:   08/01/18 16   02/10/16 18   09/23/15 18    SpO2 Readings from Last 3 Encounters:   06/08/18 98%   11/22/17 97%   06/02/17 97%      Temp Readings from Last 1 Encounters:   06/08/18 97.9  F (36.6  C) (Oral)    Ht Readings from Last 1 Encounters:   04/01/19 1.829 m (6')      Wt Readings from Last 1 Encounters:   04/05/19 127 kg (280 lb)    Estimated body mass index is 37.97 kg/m  as calculated from the following:    Height as of 4/1/19: 1.829 m (6').    Weight as of an earlier encounter on 4/5/19: 127 kg (280 lb).     LDA:  CVC Triple Lumen 04/01/15 Right Internal jugular (Active)   Site Assessment WDL 4/7/2015 12:30 AM   Dressing Intervention Chlorhexidine sponge;Transparent 4/7/2015 12:30 AM   Dressing Change Due 04/08/15 4/7/2015 12:30 AM   Number of days: 1465       Closed/Suction Drain 1 Left LUQ Bulb 19 [place] (Active)   Site Description [facility] 4/7/2015 10:00 AM   Dressing Status Dressing Changed 4/7/2015 10:00 AM   Dressing Change Due  04/05/15 4/5/2015  8:18 AM   Drainage Appearance Serosanguenous 4/7/2015  1:34 PM   Status To bulb suction 4/7/2015  1:34 PM   Output (ml) 300 ml 4/7/2015  1:34 PM   Number of days: 1466            Assessment:   ASA SCORE: 4    NPO Status: > 2 hours since completed Clear Liquids; > 6 hours since completed Solid Foods   Documentation: H&P complete; Preop Testing complete; Consents complete   Proceeding: Proceed without further delay  Tobacco Use:  NO Active use of Tobacco/UNKNOWN Tobacco use status     Plan:   Anes. Type:  General   Pre-Induction: Midazolam IV   Induction:  IV (Standard)   Airway: Oral ETT   Access/Monitoring: PIV   Maintenance: Balanced   Emergence: Procedure Site   Logistics: Same Day Surgery     Postop Pain/Sedation Strategy:  Standard-Options: Opioids PRN     PONV Management:  Adult Risk Factors:, Non-Smoker, Postop Opioids  Prevention: Ondansetron; Dexamethasone     CONSENT: Direct conversation   Plan and risks discussed with: Patient   Blood Products: Consent Deferred (Minimal Blood Loss)                  PAC Discussion and Assessment    ASA Classification: 3  Case is suitable for: Finger  Anesthetic techniques and relevant risks discussed: GA  Invasive monitoring and risk discussed:   Types:   Possibility and Risk of blood transfusion discussed:   NPO instructions given:   Additional anesthetic preparation and risks discussed:   Needs early admission to pre-op area:   Other:     PAC Resident/NP Anesthesia Assessment:  Eric is a 68 year old man who is scheduled for left total parotidectomy and left modified radical neck dissection for on 4/11/19 by Dr. Moreland in treatment of malignant neoplasm of the parotid gland.  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  Cardiac:  Functional status- METS >4. Patient walks and hikes.  Intermediate risk surgery with 0.9% (RCRI #) risk of major adverse cardiac event.   ~ HTN - continue Norvasc and Lipitor. Hold Cozaar DOS. Patient started holding ASA 81 mg on 4/3/19.   ~ Congenital absence of flow of RCA see on duplex US on 12/13/17.   ~ Prior stress test on 11/27/17 without ischemia, cardiac cath on 12/13/17 a mild degree of coronary atherosclerosis. Seen by cardiologist, Dr. Aston Szymanski and treating without revascularization. EKG on        11/27/17 showing sinus rhythm. No further cardiac testing indicated.     2.  Pulm:  Airway feasible.  HERNÁN risk: Intermediate 4/8: HTN, BMI, age, male. History of smoking for 2 years in the 1970's. He reports a cold 1 week ago continues with slight cough. He denies fevers, chills or coughing up phelgm. PET CT completed on 4/1/19 with no signs of pneumonia. Stable pulmonary nodules.     3. Heme: Thrombocytopenia. Plts 103 on 4/1/19. Stable from baseline.     4. Endo:   ~ Diabetes - on metformin and insulin. Will hold metformin DOS and take 80% of insulin. Last A1c 6.2 on 8/2018.  ~ Gout - hasn't had a flare and needed prednisone for 6 weeks.   ~ Obesity, BMI 37. Consideration for careful positioning and safe lifting techniques due to BMI    5. GI:    ~ Alpha 1 anti-tripsin deficiency/cirrhosis. Liver transplant on 3/31/15. Continuing to do well and followed by transplant team. Per their notes given the new cancer will continue on Prograf with levels as low as possible. Last prograf level 6.5 on 4/1/19.   ~ Risk of PONV score = 2.  If > 2, anti-emetic intervention recommended.    6. :   ~ CKD stage 3. Baseline creatinine 1.6. Creatinine on 1.57 at last check on 4/1/19.   ~ ED - will hold Viagra and revatio 48 hours to surgery.     7. Skin: History of lentigo malignant melanoma.     VTE risk: 3%      Patient is  optimized and is acceptable candidate for the proposed procedure.  No further diagnostic evaluation is needed. Check type and screen and A1c today.     Patient discussed with Dr. Chávez.      For further details of assessment, testing, and physical exam please see H and P completed on same date.    La Henry PA-C        Mid-Level Provider/Resident: La Henry PA-C  Date: 4/5/19  Time:     Attending Anesthesiologist Anesthesia Assessment:        Anesthesiologist:   Date:   Time:   Pass/Fail:   Disposition:     PAC Pharmacist Assessment:        Pharmacist:   Date:   Time:        La Henry PA-C

## 2019-04-09 ENCOUNTER — TELEPHONE (OUTPATIENT)
Dept: OTOLARYNGOLOGY | Facility: CLINIC | Age: 69
End: 2019-04-09

## 2019-04-09 NOTE — TELEPHONE ENCOUNTER
Called and spoke with patient. He states that his PCP has given him a prescription for prednisone as he has history of gout. He started to have significant gout pain last night and took 10 mg. He would usually take 30 mg today and the gout symptoms subside. He is calling to check with Dr. Moreland if he is okay to take prednisone today or if she prefer he wait.     Reviewed with Dr. Moreland and she is okay with patient using the prednisone if he is having an acute flair of gout. Since patient states that his symptoms usually resolve with the one time dose of 30 mg of prednisone, advised patient to try to avoid any additional prednisone if able.     This information was relayed to patient and he was encouraged to call with further questions or concerns.     Lolis Montez, RN, BSN

## 2019-04-09 NOTE — TELEPHONE ENCOUNTER
[facility] Call [facility]    Phone Message    May a detailed message be left on voicemail: yes    Reason for Call: Other: PT:  Pt has questions on his medication (Predisoine). Pt is wondering if he needs to stop the medication before his Surgery 4/11. Pt is experiencing gout at this moment and is wondering if he can continue taking the prednisone within 48 hours of his nahomi surgery, please advise.     Action Taken: Message routed to:  [facility] ([facility] ENT

## 2019-04-11 ENCOUNTER — ANESTHESIA (OUTPATIENT)
Dept: SURGERY | Facility: CLINIC | Age: 69
DRG: 827 | End: 2019-04-11
Payer: MEDICARE

## 2019-04-11 ENCOUNTER — HOSPITAL ENCOUNTER (INPATIENT)
Facility: CLINIC | Age: 69
LOS: 2 days | Discharge: HOME | DRG: 827 | End: 2019-04-13
Attending: OTOLARYNGOLOGY | Admitting: OTOLARYNGOLOGY
Payer: MEDICARE

## 2019-04-11 DIAGNOSIS — Z90.49 H/O PAROTIDECTOMY: Primary | ICD-10-CM

## 2019-04-11 PROBLEM — C77.0 SECONDARY MALIGNANCY OF LYMPH NODES OF HEAD, FACE AND NECK (H): Status: ACTIVE | Noted: 2019-04-11

## 2019-04-11 LAB
ABO + RH BLD: NORMAL
ABO + RH BLD: NORMAL
BLD GP AB SCN SERPL QL: NORMAL
BLOOD BANK CMNT PATIENT-IMP: NORMAL
BLOOD BANK CMNT PATIENT-IMP: NORMAL
GLUCOSE BLDC GLUCOMTR-MCNC: 140 MG/DL (ref 70–99)
GLUCOSE BLDC GLUCOMTR-MCNC: 146 MG/DL (ref 70–99)
GLUCOSE BLDC GLUCOMTR-MCNC: 177 MG/DL (ref 70–99)
GLUCOSE BLDC GLUCOMTR-MCNC: 183 MG/DL (ref 70–99)
GLUCOSE BLDC GLUCOMTR-MCNC: 189 MG/DL (ref 70–99)
GLUCOSE BLDC GLUCOMTR-MCNC: 195 MG/DL (ref 70–99)
GLUCOSE BLDC GLUCOMTR-MCNC: 195 MG/DL (ref 70–99)
GLUCOSE BLDC GLUCOMTR-MCNC: 290 MG/DL (ref 70–99)
INTERPRETATION ECG - MUSE: NORMAL
SPECIMEN EXP DATE BLD: NORMAL

## 2019-04-11 PROCEDURE — 0HB1XZX EXCISION OF FACE SKIN, EXTERNAL APPROACH, DIAGNOSTIC: ICD-10-PCS | Performed by: OTOLARYNGOLOGY

## 2019-04-11 PROCEDURE — A9270 NON-COVERED ITEM OR SERVICE: HCPCS | Performed by: STUDENT IN AN ORGANIZED HEALTH CARE EDUCATION/TRAINING PROGRAM

## 2019-04-11 PROCEDURE — 25000125 ZZHC RX 250: Performed by: NURSE ANESTHETIST, CERTIFIED REGISTERED

## 2019-04-11 PROCEDURE — 25000125 ZZHC RX 250: Performed by: STUDENT IN AN ORGANIZED HEALTH CARE EDUCATION/TRAINING PROGRAM

## 2019-04-11 PROCEDURE — 07T20ZZ RESECTION OF LEFT NECK LYMPHATIC, OPEN APPROACH: ICD-10-PCS | Performed by: OTOLARYNGOLOGY

## 2019-04-11 PROCEDURE — 25800030 ZZH RX IP 258 OP 636: Performed by: NURSE ANESTHETIST, CERTIFIED REGISTERED

## 2019-04-11 PROCEDURE — 37000009 ZZH ANESTHESIA TECHNICAL FEE, EACH ADDTL 15 MIN: Performed by: OTOLARYNGOLOGY

## 2019-04-11 PROCEDURE — 88307 TISSUE EXAM BY PATHOLOGIST: CPT | Performed by: OTOLARYNGOLOGY

## 2019-04-11 PROCEDURE — 40000170 ZZH STATISTIC PRE-PROCEDURE ASSESSMENT II: Performed by: OTOLARYNGOLOGY

## 2019-04-11 PROCEDURE — 71000014 ZZH RECOVERY PHASE 1 LEVEL 2 FIRST HR: Performed by: OTOLARYNGOLOGY

## 2019-04-11 PROCEDURE — 27210794 ZZH OR GENERAL SUPPLY STERILE: Performed by: OTOLARYNGOLOGY

## 2019-04-11 PROCEDURE — 88305 TISSUE EXAM BY PATHOLOGIST: CPT | Performed by: OTOLARYNGOLOGY

## 2019-04-11 PROCEDURE — 88331 PATH CONSLTJ SURG 1 BLK 1SPC: CPT | Performed by: OTOLARYNGOLOGY

## 2019-04-11 PROCEDURE — 37000008 ZZH ANESTHESIA TECHNICAL FEE, 1ST 30 MIN: Performed by: OTOLARYNGOLOGY

## 2019-04-11 PROCEDURE — 25800030 ZZH RX IP 258 OP 636: Performed by: ANESTHESIOLOGY

## 2019-04-11 PROCEDURE — 00BM0ZX EXCISION OF FACIAL NERVE, OPEN APPROACH, DIAGNOSTIC: ICD-10-PCS | Performed by: OTOLARYNGOLOGY

## 2019-04-11 PROCEDURE — 00000160 ZZHCL STATISTIC H-SPECIAL HANDLING: Performed by: OTOLARYNGOLOGY

## 2019-04-11 PROCEDURE — 25000128 H RX IP 250 OP 636: Performed by: NURSE ANESTHETIST, CERTIFIED REGISTERED

## 2019-04-11 PROCEDURE — 71000015 ZZH RECOVERY PHASE 1 LEVEL 2 EA ADDTL HR: Performed by: OTOLARYNGOLOGY

## 2019-04-11 PROCEDURE — 25000128 H RX IP 250 OP 636: Performed by: OTOLARYNGOLOGY

## 2019-04-11 PROCEDURE — 12000001 ZZH R&B MED SURG/OB UMMC

## 2019-04-11 PROCEDURE — 0CT90ZZ RESECTION OF LEFT PAROTID GLAND, OPEN APPROACH: ICD-10-PCS | Performed by: OTOLARYNGOLOGY

## 2019-04-11 PROCEDURE — 25800030 ZZH RX IP 258 OP 636: Performed by: OTOLARYNGOLOGY

## 2019-04-11 PROCEDURE — A9270 NON-COVERED ITEM OR SERVICE: HCPCS | Performed by: OTOLARYNGOLOGY

## 2019-04-11 PROCEDURE — 40000065 ZZH STATISTIC EKG NON-CHARGEABLE

## 2019-04-11 PROCEDURE — 36000064 ZZH SURGERY LEVEL 4 EA 15 ADDTL MIN - UMMC: Performed by: OTOLARYNGOLOGY

## 2019-04-11 PROCEDURE — 25000566 ZZH SEVOFLURANE, EA 15 MIN: Performed by: OTOLARYNGOLOGY

## 2019-04-11 PROCEDURE — 25000125 ZZHC RX 250: Performed by: OTOLARYNGOLOGY

## 2019-04-11 PROCEDURE — 00000146 ZZHCL STATISTIC GLUCOSE BY METER IP

## 2019-04-11 PROCEDURE — 93010 ELECTROCARDIOGRAM REPORT: CPT | Performed by: INTERNAL MEDICINE

## 2019-04-11 PROCEDURE — 25000132 ZZH RX MED GY IP 250 OP 250 PS 637: Performed by: STUDENT IN AN ORGANIZED HEALTH CARE EDUCATION/TRAINING PROGRAM

## 2019-04-11 PROCEDURE — 0HX4XZZ TRANSFER NECK SKIN, EXTERNAL APPROACH: ICD-10-PCS | Performed by: OTOLARYNGOLOGY

## 2019-04-11 PROCEDURE — 36000062 ZZH SURGERY LEVEL 4 1ST 30 MIN - UMMC: Performed by: OTOLARYNGOLOGY

## 2019-04-11 PROCEDURE — 25000131 ZZH RX MED GY IP 250 OP 636 PS 637: Performed by: STUDENT IN AN ORGANIZED HEALTH CARE EDUCATION/TRAINING PROGRAM

## 2019-04-11 RX ORDER — LIDOCAINE 40 MG/G
CREAM TOPICAL
Status: DISCONTINUED | OUTPATIENT
Start: 2019-04-11 | End: 2019-04-13 | Stop reason: HOSPADM

## 2019-04-11 RX ORDER — FENTANYL CITRATE 50 UG/ML
25-50 INJECTION, SOLUTION INTRAMUSCULAR; INTRAVENOUS EVERY 5 MIN PRN
Status: DISCONTINUED | OUTPATIENT
Start: 2019-04-11 | End: 2019-04-11 | Stop reason: HOSPADM

## 2019-04-11 RX ORDER — DEXTROSE MONOHYDRATE 25 G/50ML
25-50 INJECTION, SOLUTION INTRAVENOUS
Status: DISCONTINUED | OUTPATIENT
Start: 2019-04-11 | End: 2019-04-13 | Stop reason: HOSPADM

## 2019-04-11 RX ORDER — GINSENG 100 MG
CAPSULE ORAL EVERY 8 HOURS
Status: DISCONTINUED | OUTPATIENT
Start: 2019-04-11 | End: 2019-04-12

## 2019-04-11 RX ORDER — NICOTINE POLACRILEX 4 MG
15-30 LOZENGE BUCCAL
Status: DISCONTINUED | OUTPATIENT
Start: 2019-04-11 | End: 2019-04-13 | Stop reason: HOSPADM

## 2019-04-11 RX ORDER — LIDOCAINE HYDROCHLORIDE 20 MG/ML
INJECTION, SOLUTION INFILTRATION; PERINEURAL PRN
Status: DISCONTINUED | OUTPATIENT
Start: 2019-04-11 | End: 2019-04-11

## 2019-04-11 RX ORDER — NALOXONE HYDROCHLORIDE 0.4 MG/ML
.1-.4 INJECTION, SOLUTION INTRAMUSCULAR; INTRAVENOUS; SUBCUTANEOUS
Status: ACTIVE | OUTPATIENT
Start: 2019-04-11 | End: 2019-04-12

## 2019-04-11 RX ORDER — DEXAMETHASONE SODIUM PHOSPHATE 4 MG/ML
INJECTION, SOLUTION INTRA-ARTICULAR; INTRALESIONAL; INTRAMUSCULAR; INTRAVENOUS; SOFT TISSUE PRN
Status: DISCONTINUED | OUTPATIENT
Start: 2019-04-11 | End: 2019-04-11

## 2019-04-11 RX ORDER — ONDANSETRON 4 MG/1
4 TABLET, ORALLY DISINTEGRATING ORAL EVERY 30 MIN PRN
Status: DISCONTINUED | OUTPATIENT
Start: 2019-04-11 | End: 2019-04-11 | Stop reason: HOSPADM

## 2019-04-11 RX ORDER — FENTANYL CITRATE 50 UG/ML
INJECTION, SOLUTION INTRAMUSCULAR; INTRAVENOUS PRN
Status: DISCONTINUED | OUTPATIENT
Start: 2019-04-11 | End: 2019-04-11

## 2019-04-11 RX ORDER — MINERAL OIL/HYDROPHIL PETROLAT
OINTMENT (GRAM) TOPICAL EVERY 8 HOURS
Status: DISCONTINUED | OUTPATIENT
Start: 2019-04-12 | End: 2019-04-12

## 2019-04-11 RX ORDER — OXYCODONE HYDROCHLORIDE 5 MG/1
5 TABLET ORAL EVERY 4 HOURS PRN
Status: DISCONTINUED | OUTPATIENT
Start: 2019-04-11 | End: 2019-04-13 | Stop reason: HOSPADM

## 2019-04-11 RX ORDER — OXYMETAZOLINE HYDROCHLORIDE 0.05 G/100ML
SPRAY NASAL PRN
Status: DISCONTINUED | OUTPATIENT
Start: 2019-04-11 | End: 2019-04-11 | Stop reason: HOSPADM

## 2019-04-11 RX ORDER — CLINDAMYCIN PHOSPHATE 900 MG/50ML
INJECTION, SOLUTION INTRAVENOUS PRN
Status: DISCONTINUED | OUTPATIENT
Start: 2019-04-11 | End: 2019-04-11

## 2019-04-11 RX ORDER — SODIUM CHLORIDE, SODIUM LACTATE, POTASSIUM CHLORIDE, CALCIUM CHLORIDE 600; 310; 30; 20 MG/100ML; MG/100ML; MG/100ML; MG/100ML
INJECTION, SOLUTION INTRAVENOUS CONTINUOUS
Status: DISCONTINUED | OUTPATIENT
Start: 2019-04-11 | End: 2019-04-11 | Stop reason: HOSPADM

## 2019-04-11 RX ORDER — CEFAZOLIN SODIUM 1 G/3ML
INJECTION, POWDER, FOR SOLUTION INTRAMUSCULAR; INTRAVENOUS PRN
Status: DISCONTINUED | OUTPATIENT
Start: 2019-04-11 | End: 2019-04-11

## 2019-04-11 RX ORDER — HYDROMORPHONE HYDROCHLORIDE 1 MG/ML
.3-.5 INJECTION, SOLUTION INTRAMUSCULAR; INTRAVENOUS; SUBCUTANEOUS EVERY 5 MIN PRN
Status: DISCONTINUED | OUTPATIENT
Start: 2019-04-11 | End: 2019-04-11 | Stop reason: HOSPADM

## 2019-04-11 RX ORDER — LOSARTAN POTASSIUM 50 MG/1
50 TABLET ORAL EVERY MORNING
Status: DISCONTINUED | OUTPATIENT
Start: 2019-04-12 | End: 2019-04-13 | Stop reason: HOSPADM

## 2019-04-11 RX ORDER — TACROLIMUS 1 MG/1
1 CAPSULE ORAL 2 TIMES DAILY
Status: DISCONTINUED | OUTPATIENT
Start: 2019-04-11 | End: 2019-04-13 | Stop reason: HOSPADM

## 2019-04-11 RX ORDER — ATORVASTATIN CALCIUM 20 MG/1
20 TABLET, FILM COATED ORAL EVERY EVENING
Status: DISCONTINUED | OUTPATIENT
Start: 2019-04-11 | End: 2019-04-13 | Stop reason: HOSPADM

## 2019-04-11 RX ORDER — AMLODIPINE BESYLATE 10 MG/1
10 TABLET ORAL EVERY MORNING
Status: DISCONTINUED | OUTPATIENT
Start: 2019-04-12 | End: 2019-04-13 | Stop reason: HOSPADM

## 2019-04-11 RX ORDER — PROPOFOL 10 MG/ML
INJECTION, EMULSION INTRAVENOUS PRN
Status: DISCONTINUED | OUTPATIENT
Start: 2019-04-11 | End: 2019-04-11

## 2019-04-11 RX ORDER — TACROLIMUS 1 MG/1
1 CAPSULE ORAL 2 TIMES DAILY
Status: DISCONTINUED | OUTPATIENT
Start: 2019-04-11 | End: 2019-04-11

## 2019-04-11 RX ORDER — CLINDAMYCIN PHOSPHATE 900 MG/50ML
900 INJECTION, SOLUTION INTRAVENOUS ONCE
Status: DISCONTINUED | OUTPATIENT
Start: 2019-04-11 | End: 2019-04-11 | Stop reason: HOSPADM

## 2019-04-11 RX ORDER — SODIUM CHLORIDE, SODIUM LACTATE, POTASSIUM CHLORIDE, CALCIUM CHLORIDE 600; 310; 30; 20 MG/100ML; MG/100ML; MG/100ML; MG/100ML
INJECTION, SOLUTION INTRAVENOUS CONTINUOUS PRN
Status: DISCONTINUED | OUTPATIENT
Start: 2019-04-11 | End: 2019-04-11

## 2019-04-11 RX ORDER — ONDANSETRON 2 MG/ML
4 INJECTION INTRAMUSCULAR; INTRAVENOUS EVERY 30 MIN PRN
Status: DISCONTINUED | OUTPATIENT
Start: 2019-04-11 | End: 2019-04-11 | Stop reason: HOSPADM

## 2019-04-11 RX ORDER — METOPROLOL SUCCINATE 25 MG/1
25 TABLET, EXTENDED RELEASE ORAL EVERY MORNING
Status: DISCONTINUED | OUTPATIENT
Start: 2019-04-12 | End: 2019-04-13 | Stop reason: HOSPADM

## 2019-04-11 RX ORDER — EPHEDRINE SULFATE 50 MG/ML
INJECTION, SOLUTION INTRAMUSCULAR; INTRAVENOUS; SUBCUTANEOUS PRN
Status: DISCONTINUED | OUTPATIENT
Start: 2019-04-11 | End: 2019-04-11

## 2019-04-11 RX ADMIN — REMIFENTANIL HYDROCHLORIDE: 1 INJECTION, POWDER, LYOPHILIZED, FOR SOLUTION INTRAVENOUS at 14:54

## 2019-04-11 RX ADMIN — SODIUM CHLORIDE, POTASSIUM CHLORIDE, SODIUM LACTATE AND CALCIUM CHLORIDE: 600; 310; 30; 20 INJECTION, SOLUTION INTRAVENOUS at 18:00

## 2019-04-11 RX ADMIN — Medication 100 MG: at 07:40

## 2019-04-11 RX ADMIN — ROCURONIUM BROMIDE 10 MG: 10 INJECTION INTRAVENOUS at 07:39

## 2019-04-11 RX ADMIN — FENTANYL CITRATE 50 MCG: 50 INJECTION, SOLUTION INTRAMUSCULAR; INTRAVENOUS at 09:36

## 2019-04-11 RX ADMIN — LIDOCAINE HYDROCHLORIDE 100 MG: 20 INJECTION, SOLUTION INFILTRATION; PERINEURAL at 07:39

## 2019-04-11 RX ADMIN — SODIUM CHLORIDE, POTASSIUM CHLORIDE, SODIUM LACTATE AND CALCIUM CHLORIDE: 600; 310; 30; 20 INJECTION, SOLUTION INTRAVENOUS at 07:25

## 2019-04-11 RX ADMIN — FENTANYL CITRATE 100 MCG: 50 INJECTION, SOLUTION INTRAMUSCULAR; INTRAVENOUS at 07:28

## 2019-04-11 RX ADMIN — REMIFENTANIL HYDROCHLORIDE: 1 INJECTION, POWDER, LYOPHILIZED, FOR SOLUTION INTRAVENOUS at 12:34

## 2019-04-11 RX ADMIN — ATORVASTATIN CALCIUM 20 MG: 20 TABLET, FILM COATED ORAL at 21:31

## 2019-04-11 RX ADMIN — HYDROMORPHONE HYDROCHLORIDE 0.25 MG: 1 INJECTION, SOLUTION INTRAMUSCULAR; INTRAVENOUS; SUBCUTANEOUS at 17:36

## 2019-04-11 RX ADMIN — BACITRACIN: 500 OINTMENT TOPICAL at 21:32

## 2019-04-11 RX ADMIN — FENTANYL CITRATE 50 MCG: 50 INJECTION, SOLUTION INTRAMUSCULAR; INTRAVENOUS at 17:41

## 2019-04-11 RX ADMIN — HYDROMORPHONE HYDROCHLORIDE 0.25 MG: 1 INJECTION, SOLUTION INTRAMUSCULAR; INTRAVENOUS; SUBCUTANEOUS at 17:35

## 2019-04-11 RX ADMIN — CEFAZOLIN 2 G: 1 INJECTION, POWDER, FOR SOLUTION INTRAMUSCULAR; INTRAVENOUS at 08:00

## 2019-04-11 RX ADMIN — FENTANYL CITRATE 50 MCG: 50 INJECTION, SOLUTION INTRAMUSCULAR; INTRAVENOUS at 17:55

## 2019-04-11 RX ADMIN — DEXAMETHASONE SODIUM PHOSPHATE 10 MG: 4 INJECTION, SOLUTION INTRA-ARTICULAR; INTRALESIONAL; INTRAMUSCULAR; INTRAVENOUS; SOFT TISSUE at 15:31

## 2019-04-11 RX ADMIN — DEXAMETHASONE SODIUM PHOSPHATE 10 MG: 4 INJECTION, SOLUTION INTRA-ARTICULAR; INTRALESIONAL; INTRAMUSCULAR; INTRAVENOUS; SOFT TISSUE at 08:10

## 2019-04-11 RX ADMIN — OXYCODONE HYDROCHLORIDE 5 MG: 5 TABLET ORAL at 21:31

## 2019-04-11 RX ADMIN — HYDROMORPHONE HYDROCHLORIDE 0.25 MG: 1 INJECTION, SOLUTION INTRAMUSCULAR; INTRAVENOUS; SUBCUTANEOUS at 16:25

## 2019-04-11 RX ADMIN — PROPOFOL 200 MG: 10 INJECTION, EMULSION INTRAVENOUS at 07:39

## 2019-04-11 RX ADMIN — CLINDAMYCIN PHOSPHATE 900 MG: 18 INJECTION, SOLUTION INTRAVENOUS at 10:00

## 2019-04-11 RX ADMIN — CLINDAMYCIN PHOSPHATE 900 MG: 18 INJECTION, SOLUTION INTRAVENOUS at 15:56

## 2019-04-11 RX ADMIN — SODIUM CHLORIDE, POTASSIUM CHLORIDE, SODIUM LACTATE AND CALCIUM CHLORIDE: 600; 310; 30; 20 INJECTION, SOLUTION INTRAVENOUS at 07:45

## 2019-04-11 RX ADMIN — HYDROMORPHONE HYDROCHLORIDE 0.25 MG: 1 INJECTION, SOLUTION INTRAMUSCULAR; INTRAVENOUS; SUBCUTANEOUS at 16:51

## 2019-04-11 RX ADMIN — HYDROMORPHONE HYDROCHLORIDE 0.25 MG: 1 INJECTION, SOLUTION INTRAMUSCULAR; INTRAVENOUS; SUBCUTANEOUS at 17:05

## 2019-04-11 RX ADMIN — REMIFENTANIL HYDROCHLORIDE 0.1 MCG/KG/MIN: 1 INJECTION, POWDER, LYOPHILIZED, FOR SOLUTION INTRAVENOUS at 07:52

## 2019-04-11 RX ADMIN — INSULIN ASPART 4 UNITS: 100 INJECTION, SOLUTION INTRAVENOUS; SUBCUTANEOUS at 23:14

## 2019-04-11 RX ADMIN — HYDROMORPHONE HYDROCHLORIDE 0.25 MG: 1 INJECTION, SOLUTION INTRAMUSCULAR; INTRAVENOUS; SUBCUTANEOUS at 15:53

## 2019-04-11 RX ADMIN — SODIUM CHLORIDE, POTASSIUM CHLORIDE, SODIUM LACTATE AND CALCIUM CHLORIDE: 600; 310; 30; 20 INJECTION, SOLUTION INTRAVENOUS at 17:38

## 2019-04-11 RX ADMIN — TACROLIMUS 1 MG: 1 CAPSULE ORAL at 19:17

## 2019-04-11 RX ADMIN — FENTANYL CITRATE 50 MCG: 50 INJECTION, SOLUTION INTRAMUSCULAR; INTRAVENOUS at 09:33

## 2019-04-11 RX ADMIN — REMIFENTANIL HYDROCHLORIDE: 1 INJECTION, POWDER, LYOPHILIZED, FOR SOLUTION INTRAVENOUS at 10:15

## 2019-04-11 RX ADMIN — Medication 10 MG: at 08:17

## 2019-04-11 ASSESSMENT — MIFFLIN-ST. JEOR: SCORE: 2100.63

## 2019-04-11 ASSESSMENT — ACTIVITIES OF DAILY LIVING (ADL): ADLS_ACUITY_SCORE: 14

## 2019-04-11 NOTE — ANESTHESIA CARE TRANSFER NOTE
Patient: Eric Andrew    Procedure(s):  Total Parotidectomy, Excision of Skin, Neck Dissection Levels I - V,  And Local Advancement Flap    Diagnosis: Malignant Neoplasm Of Parotid Gland  Diagnosis Additional Information: No value filed.    Anesthesia Type:   No value filed.     Note:  Airway :Face Mask  Patient transferred to:PACU  Handoff Report: Identifed the Patient, Identified the Reponsible Provider, Reviewed the pertinent medical history, Discussed the surgical course, Reviewed Intra-OP anesthesia mangement and issues during anesthesia, Set expectations for post-procedure period and Allowed opportunity for questions and acknowledgement of understanding      Vitals: (Last set prior to Anesthesia Care Transfer)    CRNA VITALS  4/11/2019 1723 - 4/11/2019 1758      4/11/2019             Resp Rate (observed):  1  (Abnormal)                 Electronically Signed By: Charles Patrick Schlatter, APRN CRNA  April 11, 2019  5:58 PM

## 2019-04-11 NOTE — BRIEF OP NOTE
[facility], [place]    Brief Operative Note    Pre-operative diagnosis: Malignant Neoplasm Of Parotid Gland  Post-operative diagnosis * No post-op diagnosis entered *  Procedure: Procedure(s):  Total Parotidectomy, Excision of Skin, Neck Dissection Levels I - V,  And Local Advancement Flap  Surgeon: Surgeon(s) and Role:     * Johanna Moreland MD - Primary     * Nitish Jasmine MD - Resident - Assisting  Anesthesia: General   Estimated blood loss: Less than 100 ml  Drains: Edward-Corley  Specimens:   ID Type Source Tests Collected by Time Destination   A : Left inferior cheek margin Tissue Cheek SURGICAL PATHOLOGY EXAM Johanna Moreland MD 4/11/2019  8:39 AM    B : Left superior cheek margin Tissue Cheek SURGICAL PATHOLOGY EXAM Johanna Moreland MD 4/11/2019  8:45 AM    C : Left greater auricular nerve biopsy Tissue Neck SURGICAL PATHOLOGY EXAM Johanna Moreland MD 4/11/2019  9:37 AM    D : Left greater auricular nerve Tissue Neck SURGICAL PATHOLOGY EXAM Johanna Moreland MD 4/11/2019 10:05 AM    E : Left greater auricular nerve biopsy #2 Tissue Neck SURGICAL PATHOLOGY EXAM Johanna Moreland MD 4/11/2019 10:06 AM    F : Left Parotidectomy, Short Stitch: Anterior, Long Stitch: Inferior Tissue Parotid Gland SURGICAL PATHOLOGY EXAM Johanna Moreland MD 4/11/2019 12:30 PM    G : Deep lobe of parotid Tissue Parotid Gland SURGICAL PATHOLOGY EXAM Johanna Moreland MD 4/11/2019 12:36 PM    H : Left level 2A neck dissection Tissue Neck SURGICAL PATHOLOGY EXAM Johanna Moreland MD 4/11/2019  2:05 PM    I : Left level 3 neck dissection Tissue Neck SURGICAL PATHOLOGY EXAM Johanna Moreland MD 4/11/2019  2:45 PM    J : Left level 4 neck dissection Tissue Neck SURGICAL PATHOLOGY EXAM Johanna Moreland MD 4/11/2019  2:45 PM    K : Left Level 1B neck dissection Tissue Neck SURGICAL PATHOLOGY EXAM Johanna Moreland MD 4/11/2019  3:09 PM    L : Left Level 2B Neck  Dissection Tissue Neck SURGICAL PATHOLOGY EXAM Johanna Moreland MD 4/11/2019  3:11 PM    M : Left Level 5A Neck Dissection Tissue Neck SURGICAL PATHOLOGY EXAM Johanna Moreland MD 4/11/2019  3:44 PM    N : Left Level 5B Neck Dissection Tissue Neck SURGICAL PATHOLOGY EXAM Johanna Moreland MD 4/11/2019  3:46 PM      Findings:   left parotid skin lesion with palpable tumor deep, 1 cm anterior to tragus. WLE of overlying skin in conjunction with total parotidectomy; enlarged great auricular nerve, frozen positive for squamous cell carcinoma; routine neck dissection Ib-V.  Complications: None.  Implants: None.

## 2019-04-11 NOTE — OP NOTE
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    Postoperative Diagnosis: same    Anesthesia: General    Blood loss: 50 cc    Specimens:     ID Type Source Tests Collected by Time Destination   A : Left inferior cheek margin Tissue Cheek SURGICAL PATHOLOGY EXAM Johanna Moreland MD 4/11/2019  8:39 AM     B : Left superior cheek margin Tissue Cheek SURGICAL PATHOLOGY EXAM Johanna Moreland MD 4/11/2019  8:45 AM     C : Left greater auricular nerve biopsy Tissue Neck SURGICAL PATHOLOGY EXAM Johanna Moreland MD 4/11/2019  9:37 AM     D : Left greater auricular nerve Tissue Neck SURGICAL PATHOLOGY EXAM Johanna Moreland MD 4/11/2019 10:05 AM     E : Left greater auricular nerve biopsy #2 Tissue Neck SURGICAL PATHOLOGY EXAM Johanna Moreland MD 4/11/2019 10:06 AM     F : Left Parotidectomy, Short Stitch: Anterior, Long Stitch: Inferior Tissue Parotid Gland SURGICAL PATHOLOGY EXAM Johanna Moreland MD 4/11/2019 12:30 PM     G : Deep lobe of parotid Tissue Parotid Gland SURGICAL PATHOLOGY EXAM Johanna Moreland MD 4/11/2019 12:36 PM     H : Left level 2A neck dissection Tissue Neck SURGICAL PATHOLOGY EXAM Johanna Moreland MD 4/11/2019  2:05 PM     I : Left level 3 neck dissection Tissue Neck SURGICAL PATHOLOGY EXAM Johanna Moreland MD 4/11/2019  2:45 PM     J : Left level 4 neck dissection Tissue Neck SURGICAL PATHOLOGY EXAM Johanna Moreland MD 4/11/2019  2:45 PM     K : Left Level 1B neck dissection Tissue Neck SURGICAL PATHOLOGY EXAM Johanna Moreland MD 4/11/2019  3:09 PM     L : Left Level 2B Neck Dissection Tissue Neck SURGICAL  PATHOLOGY EXAM Johanna Moreland MD 4/11/2019  3:11 PM     M : Left Level 5A Neck Dissection Tissue Neck SURGICAL PATHOLOGY EXAM Johanna Moreland MD 4/11/2019  3:44 PM     N : Left Level 5B Neck Dissection Tissue Neck SURGICAL PATHOLOGY EXAM Johanna Moreland MD 4/11/2019  3:46 PM        Implants:  ALEKSEY drain x 2    Complications: None    Findings:  Resection of punch biopsy site overlying parotid mass with 1 cm margins, negative margins on frozen section pathology  Considerable oozing from skin likely secondary to the thrombocytopenia  Grossly abnormal appearing greater auricular nerve - biopsy positive for SCC, resected approximately 5 cm, repeat distal biopsy negative  Parotid mass within the superficial lobe - resected with grossly negative margins  Preservation of facial nerve with movement noted in all branches on the monitor at 0.5 mA at end of parotidectomy  Facial nerve branches with very small branches   Left neck dissection levels IB-V performed with preservation of hypoglossal nerve, spinal accessory nerve, phrenic nerve, vagus nerve  No grossly abnormal nodes in neck  Skin defect reconstructed using local advancement of face and neck skin for closure, removal of dog ears below ear and along the preauricular region    Indications:  Eric Andrew is a 69 year old man with a history of alpha-1-antitrypsin deficiency who underwent a liver transplant and is immunosuppressed. 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 He subsequently developed a left cheek mass. His dermatologist in [place] performed a punch biopsy of the mass which was consistent with metastatic SCC to the parotid gland. His preoperative PET scan showed only the disease in the parotid, without osiris disease. He is indicated for a parotidectomy and neck dissection along with resection of the skin which was punch biopsied.     Description of Procedure:  After informed consent was obtained,  the patient was brought back to the main operating room and placed in a supine position.  General anesthesia was induced and the patient was orotracheally intubated.  The bed was turned 180  from anesthesia.  A Grimes was placed.  The NIMS facial nerve monitors were placed in four channel fashion and tested.  A shoulder roll was placed and the arms were tucked.  The planned incision was marked in modified Taz fashion in a pretragal crease and extending down into the neck along the posterior border of the SCM.  A 1 cm margin was marked around the punch biopsy site circumferentially. This was sitting just anterior/inferior to the tragus and was connected to the rest of the modified Taz incision.  The planned incision was injected with 1:100,000 epinephrine.  The patient was prepped and draped in sterile fashion.  A timeout was performed with verification of the correct side and site.    A 15 blade was used to make an incision circumferentially around the area of planned skin excision anterior to the lobule.  This area of incision was extended down to the subcutaneous fat.  Skin margins were sent circumferentially from the lesion and were negative for carcinoma or dysplasia.  Hemostasis was achieved and with the bipolar cautery.  This took considerable time to obtain hemostasis given the thrombocytopenia which resulted in considerable oozing. The 15 blade was used then to make the modified Taz incision, starting along the parotid and extending down into the neck.  The greater auricular nerve and external jugular vein were identified. The greater auricular nerve was about 2-3 times thicker than usual. A biopsy was obtained of the greater auricular nerve just below the parotid mass and sent for frozen section. This was positive for malignancy. The greater auricular nerve was traced out inferiorly until it became more normal in appearance and about 5 cm of the nerve was resected. This was sent for permanent  pathology. The distal end of the nerve overlying the SCM was biopsied and sent for frozen section. This was negative for carcinoma. The external jugular vein was divided at the inferior border of the parotid gland.   The monopolar cautery was then used to raise a skin flap over the parotid working from laterally to medially. The skin flap was raised over the parotid until the masseter was identified.  A pretragal plane of dissection was then performed working from superiorly to inferiorly.  The tail of the parotid gland was released with the monopolar cautery from over the SCM. The anterior border of the SCM was identified and the digastric was traced posteriorly towards the mastoid tip.  The tragal pointer and tympanomastoid suture line were identified. Blunt dissection was performed using the Morales dissector to identify the main trunk of the facial nerve.  This was verified using the nerve stimulator.     The Morales dissector was used to dissect the main trunk of the facial nerve working towards the pes.  The superior and inferior division of the facial nerve were identified.  We then proceeded to dissect out the inferior division of the facial nerve including the cervical and marginal mandibular branches of the nerve. These nerve branches were of very small diameter even proximally along the nerve.  The overlying parotid gland was released from the nerve branches working from proximal to distal along the nerve.  The dissection was carried all the way to the anterior border of the parotid.  We continued to reflect the parotid working from inferior to superior. The parotid mass was sitting over the buccal branch and superior division of the facial nerve. The buccal branch of the facial nerve was identified and traced distally out over the masseter, dividing the overlying parotid gland. The superior division of the facial nerve was identified and traced using a Morales dissector.  We traced out the superior division  until its branch points of the frontal and zygomatic nerves. The frontal and zygomatic branches of the nerve were traced with the overlying parotid gland divided.  Care was taken throughout this dissection to test the parotid with the nerve stimulator prior to dividing it with the bipolar and 12 blade. Once the superficial parotid gland was completely released, orientation sutures were placed on the skin resection over the parotid and the specimen was taken to pathology for permanent pathology. The facial nerve was intact and was tested with the nerve stimulator at 0.5 mA with movement in all branches. At this point, blunt dissection was performed to release the facial nerve from the deep lobe of the parotid gland. The deep lobe of the parotid gland was released using bipolar and 12 blade from under the facial nerve, taking care to stimulate the tissue with the Pras probe prior to dividing it. The deep lobe was handed off to nursing for permanent pathology. The facial nerve was tested again with the nerve stimulator at both 0.5 mA and 0.8 mA with movement noted on the monitor in all branches.     We then turned our attention to the neck dissection.  The incision was extended inferiorly into the neck along the posterior border of the SCM to ensure that we could potentially use a cervicofacial advancement flap for reconstruction of the defect.  Subplatysmal flaps were raised working from the parotid defect inferiorly down towards the clavicle and toward the midline of the neck.   The fascia along the anterior border of the SCM  was released with the monopolar cautery.  This was then unrolled working toward the floor of the neck.  The spinal accessory nerve was identified and traced superiorly towards its junction with the internal jugular vein.  The lateral border of the internal jugular vein was identified.  The superior border of our dissection was defined by the digastric muscle.   We then continued to release the  fascia along the SCM until the cervical rootlets were identified on the floor of the neck.  The fascia on the floor of the neck was released and the fibrofatty contents of levels II, III, and IV were released from the cervical rootlets working from lateral to medial toward the carotid sheath.  The lateral border of the internal jugular vein was identified inferiorly near the clavicle. We then turned our attention to the medial part of our dissection.  The monopolar cautery was used to define the medial border along the omohyoid muscle.  The monopolar cautery was used to release the fibrofatty contents from medial to the internal jugular vein. The hypoglossal nerve was identified and traced out. The superior thyroid vessels were preserved.  The specimen was released inferiorly, taking care to clip and tie and specimen as it was released in level IV to reduce the risk of a chyle leak. The specimen was released off the carotid sheath laterally and finally off the anterior surface of the internal jugular vein, taking care to clip small branches off the vessels. Once the specimen was released from the neck it was divided to its respective levels and handed off to nursing for permanent pathology.  An Allis clamp was used to grasp the contents of level 2B.  The spinal accessory nerve was retracted inferiorly and a monopolar cautery was used to release the contents of level IIB from the floor of the neck.  This was handed off to nursing for permanent pathology.     We then turned our attention back to the level IB neck dissection.  The marginal mandibular nerve had already been dissected out during the parotidectomy.  A fascia flap was raised off of the submandibular gland to try to protect the marginal mandibular nerve.  The submandibular gland was grasped with a Ellis and retracted inferiorly. The digastric was traced out medially to identify the anterior belly of the digastric. The monopolar cautery was used to release  the contents of level IB at the junction between the inferior border of the mandible and the anterior belly of digastric.  This was released working from medial to lateral.  The anterior border of the mylohyoid was identified and retracted superiorly.  Dissection was performed with a tonsil to identify the lingual nerve which was preserved.  The submandibular duct was clipped. The submandibular gland was continued to be released off the digastric.  Care was taken around the hypoglossal nerve, which was preserved.  We then continue to release the specimen working from medial to lateral, dividing the facial artery and facial vein.  Once the contents of level IB had been released, they were handed off to nursing for permanent pathology.      Attention was turned to level V of the neck. Skin flaps were raised posteriorly off the SCM and then into level V back toward the trapezius muscle to allow adequate access.  Care was taken along the spinal accessory nerve. The external jugular vein distally was intact. Blunt dissection was then performed using the tonsil to trace out the spinal accessory nerve proximally toward the SCM and then distally toward the clavicle.  Once this was clearly in view we are able to start releasing the osiris packet of level V.  The spinal accessory nerve was freed circumferentially and retracted inferiorly.  The anterior border of the trapezius muscle was identified and using the bipolar cautery and blunt dissection, we were able to release the fibrofatty contents from the junction of the trapezius muscle and the SCM near the mastoid tip.  This was continued to be released working along the floor of the neck from superior to inferior towards the spinal accessory nerve.  The contents of level VA were released from the floor of the neck leaving the spinal accessory nerve in continuity.  This was then handed off to nursing for permanent pathology.  The spinal accessory nerve was then retracted  superiorly to allow the contents of level VB to be released from inferior to the spinal accessory nerve. Using blunt dissection and the bipolar cautery, the specimen was released from between the trapezius muscle posterior and the SCM anteriorly. This was carried out inferiorly down toward the omohyoid muscle and clavicle. The specimen was dissected out from around the cervical rootlets. Some of the cervical rootlets were sacrificed during this dissection but some were able to be preserved. The transverse cervical vessels were identified and the specimen was dissected from the vessels. The specimen was released from inferiorly. Once the contents of level VB were completely released they were handed off to nursing for permanent pathology.    The parotidectomy and neck dissection wound was irrigated with 2 L of saline.  Hemostasis was achieved with bipolar cautery. Multiple sustained Valsalva maneuvers were performed.  The wound was inspected and there was an approximately 2.5 cm diameter skin defect along the cheek, anterior to the ear lobule and tragus. The patient was deemed most appropriate for a cervicofacial advancement flap. The skin had already been widely undermined when performing the skin flaps for the neck dissection given the plans for reconstruction. The anterior neck and cheek skin was advanced into the skin defect anterior to the ear.  Two ALEKSEY drains were placed within the defect, one in the medial neck and the other in level 5 and extending into the neck dissection.  These were secured to the skin with a 3-0 nylon.  The cervicofacial flap was then inset to the native cheek skin using buried interrupted 3-0 Vicryl.  The neck incision was closed with a buried interrupted 3-0 Vicryl. A standing cone deformity was created along the pretragal plane near the temporal region and was excised. A total area of skin excision of about 5 x 3 cm was performed in order to allow for closure of the defect anterior to  the ear. A standing cone deformity was also created inferior to the lobule. All the skin incisions were then closed with a running 5-0 Prolene.  Bacitracin was applied to the incisions. A pressure dressing was applied.  The patient was handed back to anesthesia for extubation.  The patient was transported to the recovery room in stable condition with no immediate complications.      I was present for and participated in the entire procedure.      Johanna Moreland MD    Department of Otolaryngology

## 2019-04-11 NOTE — ANESTHESIA POSTPROCEDURE EVALUATION
Anesthesia POST Procedure Evaluation    Patient: Eric Andrew   MRN:     [id number] Gender:   male   Age:    69 year old :      1950        Preoperative Diagnosis: Malignant Neoplasm Of Parotid Gland   Procedure(s):  Total Parotidectomy, Excision of Skin, Neck Dissection Levels I - V,  And Local Advancement Flap   Postop Comments: No value filed.       Anesthesia Type:  General    Reportable Event: NO     PAIN: Uncomplicated   Sign Out status: Comfortable, Well controlled pain     PONV: No PONV   Sign Out status:  No Nausea or Vomiting     Neuro/Psych: Uneventful perioperative course   Sign Out Status: Preoperative baseline; Age appropriate mentation     Airway/Resp.: Uneventful perioperative course   Sign Out Status: Non labored breathing, age appropriate RR; Resp. Status within EXPECTED Parameters     CV: Uneventful perioperative course   Sign Out status: Appropriate BP and perfusion indices; Appropriate HR/Rhythm     Disposition:   Sign Out in:  PACU  Disposition:  Floor  Recovery Course: Uneventful  Follow-Up: Not required           Last Anesthesia Record Vitals:  CRNA VITALS  2019 1723 - 2019 1820      2019             Resp Rate (observed):  1  (Abnormal)           Last PACU Vitals:  Vitals Value Taken Time   /89 2019  6:10 PM   Temp     Pulse 83 2019  6:10 PM   Resp     SpO2 93 % 2019  6:19 PM   Temp src     NIBP     Pulse     SpO2     Resp     Temp     Ht Rate     Temp 2     Vitals shown include unvalidated device data.      Electronically Signed By: Liz Owens MD, 2019, 6:20 PM

## 2019-04-12 ENCOUNTER — APPOINTMENT (OUTPATIENT)
Dept: PHYSICAL THERAPY | Facility: CLINIC | Age: 69
DRG: 827 | End: 2019-04-12
Attending: OTOLARYNGOLOGY
Payer: MEDICARE

## 2019-04-12 LAB
ALBUMIN SERPL-MCNC: 3.5 G/DL (ref 3.4–5)
ALP SERPL-CCNC: 104 U/L (ref 40–150)
ALT SERPL W P-5'-P-CCNC: 234 U/L (ref 0–70)
ANION GAP SERPL CALCULATED.3IONS-SCNC: 8 MMOL/L (ref 3–14)
AST SERPL W P-5'-P-CCNC: 119 U/L (ref 0–45)
BASOPHILS # BLD AUTO: 0 10E9/L (ref 0–0.2)
BASOPHILS NFR BLD AUTO: 0 %
BILIRUB DIRECT SERPL-MCNC: 0.2 MG/DL (ref 0–0.2)
BILIRUB SERPL-MCNC: 0.6 MG/DL (ref 0.2–1.3)
BUN SERPL-MCNC: 36 MG/DL (ref 7–30)
CALCIUM SERPL-MCNC: 8.2 MG/DL (ref 8.5–10.1)
CHLORIDE SERPL-SCNC: 108 MMOL/L (ref 94–109)
CO2 SERPL-SCNC: 21 MMOL/L (ref 20–32)
CREAT SERPL-MCNC: 1.57 MG/DL (ref 0.66–1.25)
DIFFERENTIAL METHOD BLD: ABNORMAL
EOSINOPHIL # BLD AUTO: 0 10E9/L (ref 0–0.7)
EOSINOPHIL NFR BLD AUTO: 0 %
ERYTHROCYTE [DISTWIDTH] IN BLOOD BY AUTOMATED COUNT: 14.3 % (ref 10–15)
GFR SERPL CREATININE-BSD FRML MDRD: 45 ML/MIN/{1.73_M2}
GLUCOSE BLDC GLUCOMTR-MCNC: 160 MG/DL (ref 70–99)
GLUCOSE BLDC GLUCOMTR-MCNC: 164 MG/DL (ref 70–99)
GLUCOSE BLDC GLUCOMTR-MCNC: 172 MG/DL (ref 70–99)
GLUCOSE BLDC GLUCOMTR-MCNC: 219 MG/DL (ref 70–99)
GLUCOSE BLDC GLUCOMTR-MCNC: 256 MG/DL (ref 70–99)
GLUCOSE SERPL-MCNC: 233 MG/DL (ref 70–99)
HBA1C MFR BLD: 6.4 % (ref 0–5.6)
HCT VFR BLD AUTO: 38.8 % (ref 40–53)
HGB BLD-MCNC: 12.4 G/DL (ref 13.3–17.7)
LYMPHOCYTES # BLD AUTO: 0.6 10E9/L (ref 0.8–5.3)
LYMPHOCYTES NFR BLD AUTO: 8.8 %
MAGNESIUM SERPL-MCNC: 1.5 MG/DL (ref 1.6–2.3)
MCH RBC QN AUTO: 30.1 PG (ref 26.5–33)
MCHC RBC AUTO-ENTMCNC: 32 G/DL (ref 31.5–36.5)
MCV RBC AUTO: 94 FL (ref 78–100)
MONOCYTES # BLD AUTO: 0.1 10E9/L (ref 0–1.3)
MONOCYTES NFR BLD AUTO: 1.8 %
NEUTROPHILS # BLD AUTO: 6.3 10E9/L (ref 1.6–8.3)
NEUTROPHILS NFR BLD AUTO: 89.4 %
PLATELET # BLD AUTO: 139 10E9/L (ref 150–450)
PLATELET # BLD EST: ABNORMAL 10*3/UL
POIKILOCYTOSIS BLD QL SMEAR: SLIGHT
POTASSIUM SERPL-SCNC: 4.7 MMOL/L (ref 3.4–5.3)
PROT SERPL-MCNC: 6.6 G/DL (ref 6.8–8.8)
RBC # BLD AUTO: 4.12 10E12/L (ref 4.4–5.9)
SODIUM SERPL-SCNC: 137 MMOL/L (ref 133–144)
TACROLIMUS BLD-MCNC: 4 UG/L (ref 5–15)
TME LAST DOSE: ABNORMAL H
WBC # BLD AUTO: 7 10E9/L (ref 4–11)

## 2019-04-12 PROCEDURE — 25000125 ZZHC RX 250: Performed by: STUDENT IN AN ORGANIZED HEALTH CARE EDUCATION/TRAINING PROGRAM

## 2019-04-12 PROCEDURE — 83735 ASSAY OF MAGNESIUM: CPT | Performed by: STUDENT IN AN ORGANIZED HEALTH CARE EDUCATION/TRAINING PROGRAM

## 2019-04-12 PROCEDURE — 00000146 ZZHCL STATISTIC GLUCOSE BY METER IP

## 2019-04-12 PROCEDURE — 80197 ASSAY OF TACROLIMUS: CPT | Performed by: STUDENT IN AN ORGANIZED HEALTH CARE EDUCATION/TRAINING PROGRAM

## 2019-04-12 PROCEDURE — 25000132 ZZH RX MED GY IP 250 OP 250 PS 637: Performed by: STUDENT IN AN ORGANIZED HEALTH CARE EDUCATION/TRAINING PROGRAM

## 2019-04-12 PROCEDURE — 80076 HEPATIC FUNCTION PANEL: CPT | Performed by: STUDENT IN AN ORGANIZED HEALTH CARE EDUCATION/TRAINING PROGRAM

## 2019-04-12 PROCEDURE — 80048 BASIC METABOLIC PNL TOTAL CA: CPT | Performed by: STUDENT IN AN ORGANIZED HEALTH CARE EDUCATION/TRAINING PROGRAM

## 2019-04-12 PROCEDURE — 25000131 ZZH RX MED GY IP 250 OP 636 PS 637: Performed by: STUDENT IN AN ORGANIZED HEALTH CARE EDUCATION/TRAINING PROGRAM

## 2019-04-12 PROCEDURE — A9270 NON-COVERED ITEM OR SERVICE: HCPCS | Performed by: STUDENT IN AN ORGANIZED HEALTH CARE EDUCATION/TRAINING PROGRAM

## 2019-04-12 PROCEDURE — 85025 COMPLETE CBC W/AUTO DIFF WBC: CPT | Performed by: STUDENT IN AN ORGANIZED HEALTH CARE EDUCATION/TRAINING PROGRAM

## 2019-04-12 PROCEDURE — 25000128 H RX IP 250 OP 636: Performed by: STUDENT IN AN ORGANIZED HEALTH CARE EDUCATION/TRAINING PROGRAM

## 2019-04-12 PROCEDURE — 83036 HEMOGLOBIN GLYCOSYLATED A1C: CPT | Performed by: STUDENT IN AN ORGANIZED HEALTH CARE EDUCATION/TRAINING PROGRAM

## 2019-04-12 PROCEDURE — 97161 PT EVAL LOW COMPLEX 20 MIN: CPT | Mod: GP

## 2019-04-12 PROCEDURE — 12000001 ZZH R&B MED SURG/OB UMMC

## 2019-04-12 PROCEDURE — 36415 COLL VENOUS BLD VENIPUNCTURE: CPT | Performed by: STUDENT IN AN ORGANIZED HEALTH CARE EDUCATION/TRAINING PROGRAM

## 2019-04-12 PROCEDURE — 97530 THERAPEUTIC ACTIVITIES: CPT | Mod: GP

## 2019-04-12 RX ORDER — MUPIROCIN 20 MG/G
OINTMENT TOPICAL 3 TIMES DAILY
Status: DISCONTINUED | OUTPATIENT
Start: 2019-04-12 | End: 2019-04-13 | Stop reason: HOSPADM

## 2019-04-12 RX ORDER — DEXTROSE MONOHYDRATE 25 G/50ML
25-50 INJECTION, SOLUTION INTRAVENOUS
Status: DISCONTINUED | OUTPATIENT
Start: 2019-04-12 | End: 2019-04-12

## 2019-04-12 RX ORDER — AMOXICILLIN 250 MG
1 CAPSULE ORAL 2 TIMES DAILY PRN
Status: DISCONTINUED | OUTPATIENT
Start: 2019-04-12 | End: 2019-04-13 | Stop reason: HOSPADM

## 2019-04-12 RX ORDER — OXYCODONE HYDROCHLORIDE 5 MG/1
5 TABLET ORAL EVERY 6 HOURS PRN
Qty: 30 TABLET | Refills: 0 | Status: SHIPPED | OUTPATIENT
Start: 2019-04-12 | End: 2019-06-10

## 2019-04-12 RX ORDER — NICOTINE POLACRILEX 4 MG
15-30 LOZENGE BUCCAL
Status: DISCONTINUED | OUTPATIENT
Start: 2019-04-12 | End: 2019-04-12

## 2019-04-12 RX ADMIN — OXYCODONE HYDROCHLORIDE 5 MG: 5 TABLET ORAL at 14:26

## 2019-04-12 RX ADMIN — TACROLIMUS 1 MG: 1 CAPSULE ORAL at 07:59

## 2019-04-12 RX ADMIN — OXYCODONE HYDROCHLORIDE 5 MG: 5 TABLET ORAL at 18:36

## 2019-04-12 RX ADMIN — ENOXAPARIN SODIUM 40 MG: 40 INJECTION SUBCUTANEOUS at 20:00

## 2019-04-12 RX ADMIN — METOPROLOL SUCCINATE 25 MG: 25 TABLET, EXTENDED RELEASE ORAL at 07:59

## 2019-04-12 RX ADMIN — TACROLIMUS 1 MG: 1 CAPSULE ORAL at 19:56

## 2019-04-12 RX ADMIN — OXYCODONE HYDROCHLORIDE 5 MG: 5 TABLET ORAL at 06:15

## 2019-04-12 RX ADMIN — MUPIROCIN: 20 OINTMENT TOPICAL at 19:57

## 2019-04-12 RX ADMIN — OXYCODONE HYDROCHLORIDE 5 MG: 5 TABLET ORAL at 10:15

## 2019-04-12 RX ADMIN — LOSARTAN POTASSIUM 50 MG: 50 TABLET, FILM COATED ORAL at 07:59

## 2019-04-12 RX ADMIN — BACITRACIN: 500 OINTMENT TOPICAL at 06:22

## 2019-04-12 RX ADMIN — OXYCODONE HYDROCHLORIDE 5 MG: 5 TABLET ORAL at 01:51

## 2019-04-12 RX ADMIN — MUPIROCIN: 20 OINTMENT TOPICAL at 13:46

## 2019-04-12 RX ADMIN — INSULIN ASPART 3 UNITS: 100 INJECTION, SOLUTION INTRAVENOUS; SUBCUTANEOUS at 02:11

## 2019-04-12 RX ADMIN — AMLODIPINE BESYLATE 10 MG: 10 TABLET ORAL at 08:00

## 2019-04-12 RX ADMIN — OXYCODONE HYDROCHLORIDE 5 MG: 5 TABLET ORAL at 22:34

## 2019-04-12 RX ADMIN — ATORVASTATIN CALCIUM 20 MG: 20 TABLET, FILM COATED ORAL at 19:57

## 2019-04-12 ASSESSMENT — ACTIVITIES OF DAILY LIVING (ADL)
ADLS_ACUITY_SCORE: 12
ADLS_ACUITY_SCORE: 16
ADLS_ACUITY_SCORE: 12

## 2019-04-12 ASSESSMENT — MIFFLIN-ST. JEOR: SCORE: 2098.49

## 2019-04-12 ASSESSMENT — PAIN DESCRIPTION - DESCRIPTORS: DESCRIPTORS: ACHING

## 2019-04-12 NOTE — OR NURSING
Dr Owens called on arrival about elevated Blood sugar. She did not want to treat at this time. Also called because BP is in the low 160's. She also did not want to treat. Pt denied pain, admission /85.

## 2019-04-12 NOTE — PLAN OF CARE
Physical Therapy Discharge Summary    Reason for therapy discharge:    Discharged to home with outpatient therapy.    Progress towards therapy goal(s). See goals on Care Plan in [facility] electronic health record for goal details.  Goals met    Therapy recommendation(s):    Continued therapy is recommended.  Rationale/Recommendations:  Pt was recommended to seek OP PT if L shoulder ROM and strength deficits persist after removal of L neck shoulder drains.

## 2019-04-12 NOTE — PLAN OF CARE
Status: POD # 1 s/p left parotidectomy and left neck dissection with lymph node removal.   VS: VSS on room air. BP within parameters. Pt refusing capno this AM. Removed at 0600. Continuous pulse O2 in place.   Neuros: A&Ox4. Neuros intact.   GI: Advanced to regular diet this AM. +BS, unable to pass flatus.   : Voiding spontaneously. ?  IV: PIV x2 SL.   Activity: Up SBA w/ GB. Removed foot of bed per pt comfort.    Pain: Incisional pain, relieved with PRN oxycodone.   Respiratory: LS diminished at bases. Denies SOB.   Skin: L jaw incision BRADLEY. Bacitracin q8h. Jaw bra in place. Left ALEKSEY x2 to bulb suction.    Labs: B, sliding scale insulin given per order.   Plan of care: Continue to monitor and follow POC.

## 2019-04-12 NOTE — PROGRESS NOTES
Post Transplant Patient Social Work Assessment     Patient Name: Eric Andrew  : 1950  Age: 69 year old  MRN: [id number]  Date of transplant: 3/31/2015    Patient known to me from follow up in the liver transplant program.  Admitted on 2019 for parodidectomy.  Seen today to update assessment and provide support.     Presenting Information   Living Situation: With his wife Naomi.   Functional Status: He was active prior to this, and recently returned from [place].   Cultural/Language/Spiritual Considerations: Not assessed.     Support System  Primary Support Person His wife Naomi.   Other support:  Not assessed.   Plan for support in immediate post-hospital period: His wife.    Health Care Directive  Decision Maker: Eric is his own decision maker.   Alternate Decision Maker: Wife Naomi as next of kin.   Health Care Directive: Provided education    Mental Health/Coping:   History of Mental Health: No concerns.   History of Chemical Health: No concerns.   Current status: No substance use  Coping: Coping as well as can be expected under the circumstances.   Services Needed/Recommended: He has been an active member of the liver transplant support group, which his wife attended briefly yesterday. He plans to return soon, if his radiation schedule allows.     Financial   Income: group home income  Impact of transplant on income: Minimal  Insurance and medication coverage: Medicare plus BCBS  Financial concerns: None voiced.   Resources needed: None at this time.     Discharge Plan   Patient and family discharge goal: Home with outpatient care.   Barriers to discharge: Medical status    Assessment and recommendations and plan:  Mr. Andrew has a supportive wife, who was with him when seen again today.He reports needing seven weeks of radiation therapy after discharge, which he plans on receiving here. Per his chart, outpatient OTR is recommended to assist with his neck/jaw functioning, as it is likely impacted by  his surgery yesterday. Social work remains available while inpatient or outpatient for support and resource needs. I will update his usual transplant , Zenon Beverly.     MARCY Bourne, [facility]  Pager [phone]

## 2019-04-12 NOTE — PLAN OF CARE
VS: VSS   Neuros: A&Ox4. Neuros intact.   GI: Regular diet, good po. Passing gas but no BM this shift  : Voiding spontaneously. ?  IV: PIV SLd  Activity: Indep in room and halls, sat in chair multiple times. Worked with Physical Therapy  Pain: Oxycodone given x 2  Skin: L jaw incision BRADLEY. Jaw bra in place. Left ALEKSEY x2 to bulb suction, see flowsheets.      Wife at bedside and helpful with cares, unsure of discharge plans at this time

## 2019-04-12 NOTE — PLAN OF CARE
Discharge Planner PT   Patient plan for discharge: Home  Current status: Pt IND with transfers, ambluates 250', ascend/descend 3 stairsx2 IND.  Barriers to return to prior living situation: Medical condition  Recommendations for discharge: Home with OP PT  Rationale for recommendations: Pt able to resume PLOF in terms of household mobility. May need OP PT to improve L shoulder strength and ROM to resume previous activities.        Entered by: Vik Morales 04/12/2019 9:30 AM

## 2019-04-12 NOTE — PROGRESS NOTES
"Otolaryngology Progress Note  April 12, 2019    S: No acute events overnight, afebrile, voiding.     O: /76   Pulse 86   Temp 97.6  F (36.4  C) (Oral)   Resp 18   Ht 1.905 m (6' 3\")   Wt 124.3 kg (274 lb)   SpO2 96%   BMI 34.25 kg/m     General: Alert and oriented x 3, No acute distress, lying in bed   HEENT: EOMI. Left parotid incision c/d/i. Jaw bra in place. Left facial skin flat without signs of fluid collection or hematoma.    Pulmonary: Breathing non-labored, no stridor, no accessory muscle use.      Intake/Output Summary (Last 24 hours) at 4/12/2019 1108  Last data filed at 4/12/2019 0900  Gross per 24 hour   Intake 2050 ml   Output 1840 ml   Net 210 ml     ALEKSEY drain output(s): (last 24 hours)/(last shift)  ALEKSEY#1: 20/15 mL  ALEKSEY#2: 15/25 mL    LABS:  ROUTINE IP LABS (Last four results)  BMP  Recent Labs   Lab 04/12/19  0653      POTASSIUM 4.7   CHLORIDE 108   SHAYNE 8.2*   CO2 21   BUN 36*   CR 1.57*   *     CBC  Recent Labs   Lab 04/12/19  0653   WBC 7.0   RBC 4.12*   HGB 12.4*   HCT 38.8*   MCV 94   MCH 30.1   MCHC 32.0   RDW 14.3   *     INRNo lab results found in last 7 days.    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    Neuro:  - Pain control: Oxy prn  HEENT:  - Incision cares: Bacitracin to left parotid incision line x 3 days, then use aquaphor  - Monitor and record ALEKSEY drain output Qshift  Respiratory:  - supplemental O2 PRN to keep sats >92%  CV/heme:  - hemodynamically stable  - HTN: PTA Norvasc, Metoprolol, Losartan  - HLD: PTA Lipitor  FEN/GI:  - Regular diet  - Bowel regimen: senna-docusate prn  :  - voiding independently  Endo  - Hx of Type 2 DM: BGL in 200s on medium sliding scale. Changed to high dose today.  ID:  - Afebrile  PPX:  - PTA Tacrolimus for liver transplant  - SCDs  - IS  Dispo: Home pending removal " of ALEKSEY drains and independence with home cares    -- Patient and above plan discussed with MD Cori Vizcaino MD  Otolaryngology- Head and Neck Surgery PGY4

## 2019-04-12 NOTE — PROGRESS NOTES
Status: POD #0, S/P Left parotidectomy and Left neck dissection with lymph node removal.  Hx: liver transplant, Stage 3 kidney disease, DM II.    VS: VSS/A on 2L O2 via NC, capnography on, sats mid 90s  Neuros:Alert and awake.  Slight L lip droop.    GI: Clear (ADAT).  Tolerating sips water, ice chips  :Grimes pulled out in OR, Voided at 2200 on 6A, 350mL.  Using urinal in bathroom.   IV: PIV S/L right and left arms  Activity: Up with SBA 1, GB+, walker+, dangled at bedside, sat in chair, and ambulated in blackwood with writer this evening.    Pain: Left neck incisions managed with oxycodone, good relief  Respiratory/Trach:clear   Skin:Left neck incision to left face (by ear) BRADLEY.  Bacitracin applied to incisions as per order.  BRADLEY.  Jaw bra brace on.  ALEKSEY x2 from left neck area.  Social:Wife, Naomi visited    Plan of care:Continue with current POC.

## 2019-04-13 VITALS
TEMPERATURE: 97.8 F | HEART RATE: 61 BPM | RESPIRATION RATE: 16 BRPM | OXYGEN SATURATION: 95 % | DIASTOLIC BLOOD PRESSURE: 91 MMHG | BODY MASS INDEX: 34.07 KG/M2 | HEIGHT: 75 IN | WEIGHT: 274 LBS | SYSTOLIC BLOOD PRESSURE: 150 MMHG

## 2019-04-13 LAB
ALBUMIN SERPL-MCNC: 3.4 G/DL (ref 3.4–5)
ALP SERPL-CCNC: 101 U/L (ref 40–150)
ALT SERPL W P-5'-P-CCNC: 138 U/L (ref 0–70)
AST SERPL W P-5'-P-CCNC: 38 U/L (ref 0–45)
BILIRUB DIRECT SERPL-MCNC: 0.2 MG/DL (ref 0–0.2)
BILIRUB SERPL-MCNC: 0.7 MG/DL (ref 0.2–1.3)
GLUCOSE BLDC GLUCOMTR-MCNC: 154 MG/DL (ref 70–99)
GLUCOSE BLDC GLUCOMTR-MCNC: 158 MG/DL (ref 70–99)
GLUCOSE BLDC GLUCOMTR-MCNC: 219 MG/DL (ref 70–99)
PROT SERPL-MCNC: 7 G/DL (ref 6.8–8.8)
TACROLIMUS BLD-MCNC: 3.4 UG/L (ref 5–15)
TME LAST DOSE: ABNORMAL H

## 2019-04-13 PROCEDURE — A9270 NON-COVERED ITEM OR SERVICE: HCPCS | Performed by: STUDENT IN AN ORGANIZED HEALTH CARE EDUCATION/TRAINING PROGRAM

## 2019-04-13 PROCEDURE — 25000132 ZZH RX MED GY IP 250 OP 250 PS 637: Performed by: STUDENT IN AN ORGANIZED HEALTH CARE EDUCATION/TRAINING PROGRAM

## 2019-04-13 PROCEDURE — 25000131 ZZH RX MED GY IP 250 OP 636 PS 637: Performed by: STUDENT IN AN ORGANIZED HEALTH CARE EDUCATION/TRAINING PROGRAM

## 2019-04-13 PROCEDURE — 36415 COLL VENOUS BLD VENIPUNCTURE: CPT | Performed by: STUDENT IN AN ORGANIZED HEALTH CARE EDUCATION/TRAINING PROGRAM

## 2019-04-13 PROCEDURE — 00000146 ZZHCL STATISTIC GLUCOSE BY METER IP

## 2019-04-13 PROCEDURE — 80197 ASSAY OF TACROLIMUS: CPT | Performed by: STUDENT IN AN ORGANIZED HEALTH CARE EDUCATION/TRAINING PROGRAM

## 2019-04-13 PROCEDURE — 80076 HEPATIC FUNCTION PANEL: CPT | Performed by: STUDENT IN AN ORGANIZED HEALTH CARE EDUCATION/TRAINING PROGRAM

## 2019-04-13 RX ORDER — POLYETHYLENE GLYCOL 3350 17 G/17G
17 POWDER, FOR SOLUTION ORAL ONCE
Status: COMPLETED | OUTPATIENT
Start: 2019-04-13 | End: 2019-04-13

## 2019-04-13 RX ORDER — MUPIROCIN 20 MG/G
OINTMENT TOPICAL 3 TIMES DAILY
Qty: 30 G | Refills: 0 | Status: SHIPPED | OUTPATIENT
Start: 2019-04-13 | End: 2020-09-14

## 2019-04-13 RX ORDER — POLYETHYLENE GLYCOL 3350 17 G/17G
17 POWDER, FOR SOLUTION ORAL DAILY PRN
Status: DISCONTINUED | OUTPATIENT
Start: 2019-04-13 | End: 2019-04-13

## 2019-04-13 RX ADMIN — OXYCODONE HYDROCHLORIDE 5 MG: 5 TABLET ORAL at 03:24

## 2019-04-13 RX ADMIN — SENNOSIDES AND DOCUSATE SODIUM 1 TABLET: 8.6; 5 TABLET ORAL at 00:56

## 2019-04-13 RX ADMIN — AMLODIPINE BESYLATE 10 MG: 10 TABLET ORAL at 08:08

## 2019-04-13 RX ADMIN — OXYCODONE HYDROCHLORIDE 5 MG: 5 TABLET ORAL at 13:10

## 2019-04-13 RX ADMIN — TACROLIMUS 1 MG: 1 CAPSULE ORAL at 08:08

## 2019-04-13 RX ADMIN — MUPIROCIN: 20 OINTMENT TOPICAL at 08:15

## 2019-04-13 RX ADMIN — POLYETHYLENE GLYCOL 3350 17 G: 17 POWDER, FOR SOLUTION ORAL at 09:53

## 2019-04-13 RX ADMIN — METOPROLOL SUCCINATE 25 MG: 25 TABLET, EXTENDED RELEASE ORAL at 08:08

## 2019-04-13 RX ADMIN — LOSARTAN POTASSIUM 50 MG: 50 TABLET, FILM COATED ORAL at 08:08

## 2019-04-13 ASSESSMENT — ACTIVITIES OF DAILY LIVING (ADL)
ADLS_ACUITY_SCORE: 10
TRANSFERRING: 0-->INDEPENDENT
ADLS_ACUITY_SCORE: 12
RETIRED_COMMUNICATION: 0-->UNDERSTANDS/COMMUNICATES WITHOUT DIFFICULTY
RETIRED_EATING: 0-->INDEPENDENT
COGNITION: 0 - NO COGNITION ISSUES REPORTED
FALL_HISTORY_WITHIN_LAST_SIX_MONTHS: NO
ADLS_ACUITY_SCORE: 12
DRESS: 0-->INDEPENDENT
SWALLOWING: 0-->SWALLOWS FOODS/LIQUIDS WITHOUT DIFFICULTY
BATHING: 0-->INDEPENDENT
TOILETING: 0-->INDEPENDENT
AMBULATION: 0-->INDEPENDENT
ADLS_ACUITY_SCORE: 12

## 2019-04-13 ASSESSMENT — PAIN DESCRIPTION - DESCRIPTORS
DESCRIPTORS: ACHING
DESCRIPTORS: ACHING

## 2019-04-13 NOTE — PLAN OF CARE
POD#2 L parotidectomy L neck dissection . L. parotid incision with sutures, CDI. Jaw bra in place. L. neck incision BRADLEY with sutures with small amount of serosanguinous drainage, site cares completedx1 this shift. L. neck JPx2. VSS ex/ HTN within parameters. A&Ox4. Neuros include: slight L. facial droop otherwise intact. Tolerating regular diet; pt. requesting to have boost supplement d/t minimal appetite r/t throat pain while swallowing. Voids spontaneously without difficulty via bathroom. No BM this shift, has not had a BM since 4/10; +passing flatus; denies any abdominal discomfort; BS audible and normoactive; PRN senna given this shift. Up independently, steady on feet. No IV access, ok'd by MD. Neck pain managed with PRN oxycodone. Anticipated d/c home with outpatient care. Continue to monitor and follow POC.

## 2019-04-13 NOTE — DISCHARGE SUMMARY
Discharge Summary  Eric Andrew  [id number]  1950    Date of Admission: 4/11/2019  Date of Discharge: 4/13/2019    Admission Diagnosis: Secondary Malignant Neoplasm Of Parotid Gland  Discharge Diagnosis: Same    Patient Active Problem List    Diagnosis Date Noted     Secondary malignancy of lymph nodes of head, face and neck (H) 04/11/2019     Priority: Medium     H/O parotidectomy 04/11/2019     Priority: Medium     Squamous cell carcinoma of skin of face 04/05/2019     Priority: Medium     Liver replaced by transplant (H) 03/31/2015     Priority: Medium     Immunosuppressed status (H) 03/31/2015     Priority: Medium     Thrombocytopenia (H) 01/07/2015     Priority: Medium     Alpha-1-antitrypsin deficiency (H) 10/22/2014     Priority: Medium         Procedures:  Date: 4/13/2019  Procedure(s):  Total Parotidectomy, Excision of Skin, Neck Dissection Levels I-V, Local Advancement Flap    Pathology: Final pathology pending. Intra-operative final margins negative for carcinoma.    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 It was recommended that he undergo operative intervention for his SCC, and the patient consented to the above procedure after detailed explanation of the risks and benefits.    Hospital Course: The patient was admitted to the hospital and underwent the above mentioned procedure. He tolerated the procedure without any intra- or lamin-operative complications. Please see the operative report for full details of the procedure. The patient was admitted for post-operative monitoring. His postoperative course was uneventful. Transplant Surgery was notified given the patient's history of a liver transplant. He briefly had elevated of his liver enzymes post-operatively, which resolved towards his baseline by the day of  discharge. At discharge, the patient's pain was well controlled, the patient was voiding on his own, and he was ambulating and tolerating a regular diet.     Discharge Exam:  Vitals:    04/12/19 0758 04/12/19 1530 04/12/19 2306 04/13/19 0800   BP: 161/76 141/76 172/88 (!) 160/91   BP Location:  Right arm Right arm    Pulse:  72 68 61   Resp: 18 16 16 16   Temp: 97.6  F (36.4  C) 97.7  F (36.5  C) 98.4  F (36.9  C) 97.8  F (36.6  C)   TempSrc: Oral Oral Oral Oral   SpO2: 96% 94% 94% 95%   Weight: 124.3 kg (274 lb)      Height:         General: Alert and oriented x3, in no acute distress, resting comfortably in bed.  HEENT: EOMI. Left parotid incision c/d/i. Jaw bra in place. Left facial skin flat without signs of fluid collection or hematoma. Left marginal mandibular nerve weakness, improved from immediately post-op.  Pulmonary: Breathing non-labored, no stridor, no accessory muscle use.     Discharge Medications:   Eric Andrew   Home Medication Instructions ALICIA:[id number]    Printed on:04/13/19 0869   Medication Information                      AMLODIPINE BESYLATE PO  Take 10 mg by mouth every morning              aspirin (ASA) 81 MG tablet  Take 81 mg by mouth every morning ON HOLD FOR SURGERY SINCE 04/03/2019             atorvastatin (LIPITOR) 20 MG tablet  Take 20 mg by mouth every evening              BASAGLAR 100 UNIT/ML injection  Inject 45 Units Subcutaneous At Bedtime              Calcium Carbonate-Vitamin D (CALCIUM + D PO)  Take 1 tablet by mouth every morning              diphenhydrAMINE HCl (BENADRYL PO)  Take by mouth nightly as needed             losartan (COZAAR) 25 MG tablet  Take 1 tablet (25 mg) by mouth daily             metFORMIN (GLUCOPHAGE) 1000 MG tablet  Take 1,000 mg by mouth 2 times daily (with meals)              metoprolol succinate (TOPROL-XL) 25 MG 24 hr tablet  Take 25 mg by mouth every morning              Multiple Vitamins-Minerals (MULTIVITAL) TABS  Take 1 tablet by mouth every  morning              mupirocin (BACTROBAN) 2 % external ointment  Apply topically 3 times daily             oxyCODONE (ROXICODONE) 5 MG tablet  Take 1 tablet (5 mg) by mouth every 6 hours as needed for pain             sildenafil (VIAGRA) 50 MG tablet  Take 50 mg by mouth daily as needed for erectile dysfunction             tacrolimus (GENERIC EQUIVALENT) 1 MG capsule  Take 1 capsule (1 mg) by mouth every 12 hours               Discharge Procedure Orders   Reason for your hospital stay   Order Comments: Post-operative cares after left total parotidectomy, left modified radical neck dissection for SCC     Adult [facility] Follow-up and recommended labs and tests   Order Comments: Follow-up has been scheduled with Dr. Moreland in ENT clinic on 4/19/19 at 3:40 PM. Please call the clinic with questions/concerns: [phone].    Otolaryngology/ENT Clinic:  [facility]  [address] [place] [address]     Activity   Order Comments: Your activity upon discharge: advance to regular activity as tolerated. No strenuous activity or weight lifting >15 pounds for 2 weeks.     Order Specific Question Answer Comments   Is discharge order? Yes      Tubes and drains   Order Comments: You are going home with the following tubes or drains: 2 ALEKSEY drains in the left neck. Strip these drains three times daily and record the output. Keep the bulbs on suction. They will be removed once the output is less than 30 mL per day.     Full Code     Order Specific Question Answer Comments   Code status determined by: Discussion with patient/legal decision maker      Diet   Order Comments: Follow this diet upon discharge: regular diet as tolerated.     Order Specific Question Answer Comments   Is discharge order? Yes      Dispo: To home in good condition. All of the patient's questions/concerns have been addressed at this time.     Nory Barbosa MD PGY-1  Otolaryngology-Head & Neck  Surgery  Please contact ENT by dialing [phone] and entering job code [phone].      Teaching statement:  I saw the patient on the day of discharge. He was comfortable discharging with 2 drains in place. Pain controlled. Plan for likely drain removal on Monday in clinic - message sent to nursing team.    Johanna Moreland MD    Department of Otolarynoglogy

## 2019-04-13 NOTE — CONSULTS
Immunosuppression Note:    Eric Andrew is a 69 year old male who is seen today  for immunosuppression management     I, Shahid Nolen MD, I have examined the patient with the resident/PA/Fellow, discussed and agree with the note and findings.  I have reviewed today's vital signs, medications, labs and imaging. I reviewed the immunosuppression medications and levels. I spoke to the patient/family and explained below clinical details and answered all the questions      Transplant Surgery  Inpatient Daily Progress Note  04/13/2019    Assessment & Plan: 69 year old male with PMH significant for ESLD secondary to alpha-a-antitrypsin deficiency s/p OLT 3/31/2015, HTN, DM and CKD stage 3. Admitted following total parotidectomy by ENT on 4/11/19 for cutaneous SCC of the left cheek with metastases to left parotid.     Graft function: Mildly elevated LFTs on POD 1. Trending to normal on the day of discharge. Plan for transplant coordinator to follow up on outpatient with frequent labs.   Immunosuppression management:  PTA Tacrolimus 1 mg BID with goal ~5. Recently decreased   Disposition: Plan to discharge home today per primary team. Discussed patient with transplant coordinator who will arrange for frequent labs and discuss immunosuppression plan with Dr. Jimenez.     CORI/Fellow/Resident Provider: Farzana Clements PA-C     Faculty: Shahid Nolen M.D.  __________________________________________________________________  Transplant History: Admitted 4/11/2019 for parotidectomy.   The patient has a history of liver failure due to alpha-1-antitrypsin deficiency.    3/31/2015 (Liver), Postoperative day: 1474     Interval History: History is obtained from the patient  Overnight events: No complaints.     ROS:   A 10-point review of systems was negative except as noted above.    Curent Meds:    amLODIPine  10 mg Oral QAM     atorvastatin  20 mg Oral QPM     enoxaparin  40 mg Subcutaneous Q24H     insulin aspart   "1-10 Units Subcutaneous TID AC     insulin aspart  1-7 Units Subcutaneous At Bedtime     losartan  50 mg Oral QAM     metoprolol succinate ER  25 mg Oral QAM     mupirocin   Topical TID     sodium chloride (PF)  3 mL Intracatheter Q8H     tacrolimus  1 mg Oral BID       Physical Exam:     Admit Weight: 124.5 kg (274 lb 7.6 oz)    Current Vitals:   BP (!) 150/91   Pulse 61   Temp 97.8  F (36.6  C) (Oral)   Resp 16   Ht 1.905 m (6' 3\")   Wt 124.3 kg (274 lb)   SpO2 95%   BMI 34.25 kg/m      Vital sign ranges:    Temp:  [97.7  F (36.5  C)-98.4  F (36.9  C)] 97.8  F (36.6  C)  Pulse:  [61-72] 61  Resp:  [16] 16  BP: (141-172)/(76-91) 150/91  SpO2:  [94 %-95 %] 95 %  Patient Vitals for the past 24 hrs:   BP Temp Temp src Pulse Resp SpO2   04/13/19 0950 (!) 150/91 -- -- -- -- --   04/13/19 0800 (!) 160/91 97.8  F (36.6  C) Oral 61 16 95 %   04/12/19 2306 172/88 98.4  F (36.9  C) Oral 68 16 94 %   04/12/19 1530 141/76 97.7  F (36.5  C) Oral 72 16 94 %     General Appearance: in no apparent distress.   Skin: normal, No rashes, induration, or jaundice  Heart: regular rate and rhythm  Lungs: NLB on room air  Abdomen: Incision well healed.   Extremities: edema: absent.   Neurologic: awake, alert and oriented. Tremor absent.    Frailty Scores     There is no flowsheet data to display.        Data:   CMP  Recent Labs   Lab 04/13/19  0737 04/12/19  0653   NA  --  137   POTASSIUM  --  4.7   CHLORIDE  --  108   CO2  --  21   GLC  --  233*   BUN  --  36*   CR  --  1.57*   GFRESTIMATED  --  45*   GFRESTBLACK  --  52*   SHAYNE  --  8.2*   MAG  --  1.5*   ALBUMIN 3.4 3.5   BILITOTAL 0.7 0.6   ALKPHOS 101 104   AST 38 119*   * 234*     CBC  Recent Labs   Lab 04/12/19  0653   HGB 12.4*   WBC 7.0   *   A1C 6.4*     COAGSNo lab results found in last 7 days.    Invalid input(s): XA   Urinalysis  Recent Labs   Lab Test 08/01/18  1427 06/02/17  0910   COLOR Yellow Yellow   APPEARANCE Clear Clear   URINEGLC Negative Negative "   URINEBILI Negative Negative   URINEKETONE Negative Negative   SG 1.018 1.017   UBLD Negative Negative   URINEPH 5.0 5.0   PROTEIN 30* 30*   NITRITE Negative Negative   LEUKEST Negative Negative   RBCU 0 0   WBCU 0 <1   UTPG 0.27* 0.28*     Virology:  CMV DNA Quantitation Specimen   Date Value Ref Range Status   08/17/2015 EDTA PLASMA  Final     Hepatitis C Antibody   Date Value Ref Range Status   03/30/2015  NR Final    Nonreactive   Assay performance characteristics have not been established for newborns,   infants, and children

## 2019-04-13 NOTE — PLAN OF CARE
"Status:POD#1 L parotidectomy L neck dissection    VS:/76 (BP Location: Right arm)   Pulse 72   Temp 97.7  F (36.5  C) (Oral)   Resp 16   Ht 1.905 m (6' 3\")   Wt 124.3 kg (274 lb)   SpO2 94%   BMI 34.25 kg/m     GI: Regular diet, requested ensure/boost supplement late this evening, d/t pain when chewing- nutrition consulted. No BM yet.    :Voiding wnl ?  IV: PIV infiltrated- MD ok'd no IV needed.   Activity: Independent   Pain: Neck pain partially controlled with prn oxy  Respiratory/Trach: LS clear, RA    Skin: Incision on L neck/behind L ear with sutures BRADLEY. 2 JPs. Jaw bra in place.   Social: Wife bedside and supportive    Plan of care: Continue to monitor and assess.      "

## 2019-04-13 NOTE — PROGRESS NOTES
"CLINICAL NUTRITION SERVICES - BRIEF NOTE re: \"pt/family request\"    Patient reports he is leaving, but is having some trouble chewing/swallowing d/t pain with recent surgery.      Diet: Regular + Boost Shakes TID with meals  Intake: He reports he drank some of the shake, but thought it was too high in CHO. Has also tolerated some soup.     Interventions:  Discussed lower CHO supplement options, discussed soft high calorie/high protein foods.  Provided with \"high protein/high calorie recipes\" booklet for reference/ideas.  Will send Boost Glucose Control for patient to try.     Carmen Douglass, MS, RD, LD  Weekend pager [phone]        "

## 2019-04-13 NOTE — PLAN OF CARE
VS: VSS   Neuros: A&Ox4. Neuros intact.   GI: Regular diet, good po. Passing gas but no BM this shift, Mirilax given  : Voiding spontaneously. ?  IV: no PIV  Activity: Indep in room and halls  Pain: Oxycodone given x 1  Skin: L jaw incision BRADLEY. Jaw bra in place. Left ALEKSEY x2 to bulb suction, see flowsheets.     Pt is being discharged home at this time. Discharge medications and instructions gone over with no further questions. Pt also instructed on how to care for ALEKSEY bulbs. Refused need for hospital transportation, will walk out with wife.

## 2019-04-14 ENCOUNTER — PATIENT OUTREACH (OUTPATIENT)
Dept: CARE COORDINATION | Facility: CLINIC | Age: 69
End: 2019-04-14

## 2019-04-15 ENCOUNTER — ALLIED HEALTH/NURSE VISIT (OUTPATIENT)
Dept: OTOLARYNGOLOGY | Facility: CLINIC | Age: 69
End: 2019-04-15
Payer: MEDICARE

## 2019-04-15 ENCOUNTER — PATIENT OUTREACH (OUTPATIENT)
Dept: OTOLARYNGOLOGY | Facility: CLINIC | Age: 69
End: 2019-04-15

## 2019-04-15 DIAGNOSIS — Z48.03 CHANGE OR REMOVAL OF DRAINS: Primary | ICD-10-CM

## 2019-04-15 LAB — COPATH REPORT: NORMAL

## 2019-04-15 NOTE — PROGRESS NOTES
ENT Discharge Follow-Up      Responsible Attending Physician: Dr. Moreland  Date of Discharge:  4/13/19   Discharge to:  Home    Current Status:  Pt is a 70 y/o male s/p total parotidectomy and neck dissection on 4/11/19.  Patient reports that operative  pain is decreasing.  Ambulating without assistance.  Pain well controlled with current meds, ample supply.  Reports incision CDI without signs of infection.  Denies redness, swelling, increased tenderness, or elevated temp.  Denies current bowel or bladder issues.      ALEKSEY drain X2 in place. 1 drain is less than 30 ml in 24 hours and ready to be removed. Patient will come to clinic this afternoon for drain removal.      Discharge instructions and medication use were reviewed.  RN triage/on call number given:  [phone] or after hours and w/e - [phone].  Patient verbalized understanding and agreement with current plan.    Lolis Montez, RN, BSN\

## 2019-04-15 NOTE — PROGRESS NOTES
[facility]: Post-Discharge Note  SITUATION                                                      Admission:    Admission Date: 04/11/19   Reason for Admission: Secondary Malignant Neoplasm Of Parotid Gland  Discharge:   Discharge Date: 04/13/19  Discharge Diagnosis: Secondary Malignant Neoplasm Of Parotid Gland  Discharge Service: ENT    BACKGROUND                                                      Total Parotidectomy, Excision of Skin, Neck Dissection Levels I-V, Local Advancement Flap    Eric Andrew is a 69-year-old man with a past medical history of alpha-1-antitrypsin deficiency s/p liver transplant, CKD stage 3, HTN, DM, thrombocytopenia and 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 It was recommended that he undergo operative intervention for his SCC, and the patient consented to the above procedure after detailed explanation of the risks and benefits.    ASSESSMENT      Discharge Assessment  Patient reports symptoms are: Unchanged  Does the patient have all of their medications?: Yes  Does patient know what their new medications are for?: Yes  Does patient have a follow-up appointment scheduled?: Yes(Patient is going into clinic today to have one of his drains removed. )  Does patient have any other questions or concerns?: No    Post-op  Did the patient have surgery or a procedure: Yes  Fever: No  Chills: No  Eating & Drinking: eating and drinking without complaints/concerns  PO Intake: soft foods  Bowel Function: constipation(Patient is using a stool softner)  Urinary Status: voiding without complaint/concerns    PLAN                                                      Outpatient Plan:     Follow-up has been scheduled with Dr. Moreland in ENT clinic on 4/19/19 at 3:40 PM. Please call the clinic with questions/concerns: [phone].    Future Appointments   Date Time Provider Department [place]   4/15/2019  1:00 PM Nurse,  Ent [facility]   4/19/2019  3:40 PM Johanna Moreland  MD [facility]   4/24/2019  2:00 PM Padma Wagoner MD [facility]           Ngozi Liz, CMA

## 2019-04-15 NOTE — PROGRESS NOTES
Is patient coming from ER or being seen in a peds clinic? No - use LOS [id number]    Eric Andrew comes into clinic today at the request of Dr. Moreland Ordering Provider for ALEKSEY drain removal.     Patient in clinic today for ALEKSEY drain removal. Drain output in drain #2 in last 24 hours was 27 ml, indicating ALEKSEY removal as it was less than 30 ml in 24 hours. Incision site evaluated and it was clean, dry and intact without evidence of redness, swelling or drainage. ALEKSEY site was cleansed, suture around tubing was removed, drain bulb was opened, and drain was removed easily. Patient educated on signs and symptoms of infection, site care and provided number to call with further questions or concerns. Patient verbalized understanding and was encouraged to call with any further questions or concerns     ALEKSEY Drain #1 output is 40ml in last 24 hours. Will plan for removal on Wednesday if output is less than 30ml in 24 hours.     This service provided today was under the supervising provider of the day Dr. Dupree, who was available if needed.    Lolis Montez, RN, BSN

## 2019-04-17 ENCOUNTER — ALLIED HEALTH/NURSE VISIT (OUTPATIENT)
Dept: OTOLARYNGOLOGY | Facility: CLINIC | Age: 69
End: 2019-04-17
Payer: MEDICARE

## 2019-04-17 DIAGNOSIS — Z94.4 LIVER REPLACED BY TRANSPLANT (H): ICD-10-CM

## 2019-04-17 DIAGNOSIS — Z48.03 CHANGE OR REMOVAL OF DRAINS: Primary | ICD-10-CM

## 2019-04-17 LAB
ALBUMIN SERPL-MCNC: 3.5 G/DL (ref 3.4–5)
ALP SERPL-CCNC: 180 U/L (ref 40–150)
ALT SERPL W P-5'-P-CCNC: 94 U/L (ref 0–70)
ANION GAP SERPL CALCULATED.3IONS-SCNC: 7 MMOL/L (ref 3–14)
AST SERPL W P-5'-P-CCNC: 30 U/L (ref 0–45)
BILIRUB DIRECT SERPL-MCNC: 0.3 MG/DL (ref 0–0.2)
BILIRUB SERPL-MCNC: 0.7 MG/DL (ref 0.2–1.3)
BUN SERPL-MCNC: 29 MG/DL (ref 7–30)
CALCIUM SERPL-MCNC: 9.4 MG/DL (ref 8.5–10.1)
CHLORIDE SERPL-SCNC: 103 MMOL/L (ref 94–109)
CO2 SERPL-SCNC: 26 MMOL/L (ref 20–32)
CREAT SERPL-MCNC: 1.45 MG/DL (ref 0.66–1.25)
ERYTHROCYTE [DISTWIDTH] IN BLOOD BY AUTOMATED COUNT: 14.2 % (ref 10–15)
GFR SERPL CREATININE-BSD FRML MDRD: 49 ML/MIN/{1.73_M2}
GLUCOSE SERPL-MCNC: 150 MG/DL (ref 70–99)
HCT VFR BLD AUTO: 40.7 % (ref 40–53)
HGB BLD-MCNC: 13.8 G/DL (ref 13.3–17.7)
MCH RBC QN AUTO: 30.8 PG (ref 26.5–33)
MCHC RBC AUTO-ENTMCNC: 33.9 G/DL (ref 31.5–36.5)
MCV RBC AUTO: 91 FL (ref 78–100)
PLATELET # BLD AUTO: 134 10E9/L (ref 150–450)
POTASSIUM SERPL-SCNC: 3.8 MMOL/L (ref 3.4–5.3)
PROT SERPL-MCNC: 7.4 G/DL (ref 6.8–8.8)
RBC # BLD AUTO: 4.48 10E12/L (ref 4.4–5.9)
SODIUM SERPL-SCNC: 136 MMOL/L (ref 133–144)
WBC # BLD AUTO: 6.6 10E9/L (ref 4–11)

## 2019-04-17 NOTE — PROGRESS NOTES
Is patient coming from ER or being seen in a peds clinic? No - use LOS [id number]    Eric Andrew comes into clinic today at the request of Dr. Moreland Ordering Provider for ALEKSEY drain removal.     Patient in clinic today for ALEKSEY drain removal. Drain output in last 24 hours was 9 ml, indicating ALEKSEY removal as it was less than 30 ml in 24 hours. Incision site evaluated and it was clean, dry and intact without evidence of redness, swelling or drainage. ALEKSEY site was cleansed, suture around tubing was removed, drain bulb was opened, and drain was removed easily. Patient educated on signs and symptoms of infection, site care and provided number to call with further questions or concerns. Patient verbalized understanding and was encouraged to call with any further questions or concerns      This service provided today was under the supervising provider of the day Dr. Rawls, who was available if needed.      Lolis Montez, RN, BSN

## 2019-04-18 ENCOUNTER — TELEPHONE (OUTPATIENT)
Dept: TRANSPLANT | Facility: CLINIC | Age: 69
End: 2019-04-18

## 2019-04-18 DIAGNOSIS — Z13.220 LIPID SCREENING: ICD-10-CM

## 2019-04-18 DIAGNOSIS — Z94.4 LIVER REPLACED BY TRANSPLANT (H): ICD-10-CM

## 2019-04-18 NOTE — TELEPHONE ENCOUNTER
Please call Eric, tell him we want him to repeat all labs next week (liver tests are up a bit) w/ accurately timed prograf level

## 2019-04-19 ENCOUNTER — THERAPY VISIT (OUTPATIENT)
Dept: SPEECH THERAPY | Facility: CLINIC | Age: 69
End: 2019-04-19
Payer: MEDICARE

## 2019-04-19 ENCOUNTER — OFFICE VISIT (OUTPATIENT)
Dept: OTOLARYNGOLOGY | Facility: CLINIC | Age: 69
End: 2019-04-19
Payer: MEDICARE

## 2019-04-19 VITALS
HEIGHT: 75 IN | BODY MASS INDEX: 33.57 KG/M2 | SYSTOLIC BLOOD PRESSURE: 154 MMHG | OXYGEN SATURATION: 98 % | WEIGHT: 270 LBS | DIASTOLIC BLOOD PRESSURE: 83 MMHG | HEART RATE: 72 BPM

## 2019-04-19 DIAGNOSIS — Z13.220 LIPID SCREENING: ICD-10-CM

## 2019-04-19 DIAGNOSIS — C44.320 SQUAMOUS CELL CARCINOMA OF SKIN OF FACE: Primary | ICD-10-CM

## 2019-04-19 DIAGNOSIS — R13.12 OROPHARYNGEAL DYSPHAGIA: ICD-10-CM

## 2019-04-19 DIAGNOSIS — Z94.4 LIVER REPLACED BY TRANSPLANT (H): ICD-10-CM

## 2019-04-19 ASSESSMENT — PAIN SCALES - GENERAL: PAINLEVEL: NO PAIN (0)

## 2019-04-19 ASSESSMENT — MIFFLIN-ST. JEOR: SCORE: 2080.34

## 2019-04-19 NOTE — LETTER
4/19/2019       RE: Eric Andrew  [address] [place] [address]     Dear Colleague,    Thank you for referring your patient, Eric Andrew, to the [facility] EAR [facility]ROAT at [facility]. Please see a copy of my visit note below.    Dear Dr. Jimenez:    I had the pleasure of seeing Eric Andrew in follow-up today at the [facility].     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  He underwent surgery by dermatologist in [place] for this.  Per his report this was not done with Mohs surgery but was told that he had negative margins.  He says that shortly after the surgery he noticed a lump along his mandible/below the ear.  He went in for his 1 month follow-up with a dermatologist and at that time the mass had increased in size.  She thought it was a reactive node but offered a biopsy. Per review of the notes they proceeded with a punch biopsy of the parotid mass.  This was consistent with invasive squamous cell carcinoma without any epidermal involvement.  His preoperative PET scan showed only involvement of the parotid.     Interval history:  He was taken to the OR on 4/11/2019 for a left total parotidectomy with resection of skin, preservation of the facial nerve, sacrifice of the greater auricular nerve, level I-V neck dissection, cervicofacial flap. Final pathology showed a 2.1 cm moderately differentiated SCC within the subcutaneous tissues and parotid, PNI, no LVSI, negative margins, 0/47 cervical nodes. He had his ALEKSEY drains removed on 4/15 and 4/19. His pathology was reviewed at tumor conference and he was recommended for postoperative radiation. He already saw Dr Floyd preoperatively.  He is overall doing well.  He is weaning himself off the pain  medications and trying to primarily use Tylenol.  He tried playing the "[employer] recently and did notice a leak on the left lower lip. He is seeing his dentist on Monday.    MEDICATIONS:     Current Outpatient Medications   Medication Sig Dispense Refill     AMLODIPINE BESYLATE PO Take 10 mg by mouth every morning        atorvastatin (LIPITOR) 20 MG tablet Take 20 mg by mouth every evening        BASAGLAR 100 UNIT/ML injection Inject 45 Units Subcutaneous At Bedtime        Calcium Carbonate-Vitamin D (CALCIUM + D PO) Take 1 tablet by mouth every morning        diphenhydrAMINE HCl (BENADRYL PO) Take by mouth nightly as needed       losartan (COZAAR) 25 MG tablet Take 1 tablet (25 mg) by mouth daily (Patient taking differently: Take 50 mg by mouth every morning ) 90 tablet 3     metFORMIN (GLUCOPHAGE) 1000 MG tablet Take 1,000 mg by mouth 2 times daily (with meals)        metoprolol succinate (TOPROL-XL) 25 MG 24 hr tablet Take 25 mg by mouth every morning        Multiple Vitamins-Minerals (MULTIVITAL) TABS Take 1 tablet by mouth every morning        mupirocin (BACTROBAN) 2 % external ointment Apply topically 3 times daily 30 g 0     sildenafil (VIAGRA) 50 MG tablet Take 50 mg by mouth daily as needed for erectile dysfunction       tacrolimus (GENERIC EQUIVALENT) 1 MG capsule Take 1 capsule (1 mg) by mouth every 12 hours (Patient taking differently: Take 1 mg by mouth 2 times daily ) 180 capsule 3       ALLERGIES:    Allergies   Allergen Reactions     Cefazolin Swelling     Lips swelled while patient was in the OR      Lisinopril Cough     Meropenem Hives and Swelling     Developed hives and lip swelling while on meropenem and micafungin.  Resolved with discontinuation.  Unclear which was cause.     Micafungin Hives and Swelling     Developed hives and lip swelling while on meropenem and micafungin.  Resolved with discontinuation.  Unclear which was cause.       HABITS/SOCIAL HISTORY:   Spends the shin in [place].   .  He is a retired .   He smokes for a few years and quit back in 1973.  He has no chewing tobacco use.  He has 1 alcoholic beverage about 3 times per week.   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    Social History     Socioeconomic History     Marital status:      Spouse name: Not on file     Number of children: Not on file     Years of education: Not on file     Highest education level: Not on file   Occupational History     Not on file   Social Needs     Financial resource strain: Not on file     Food insecurity:     Worry: Not on file     Inability: Not on file     Transportation needs:     Medical: Not on file     Non-medical: Not on file   Tobacco Use     Smoking status: Never Smoker     Smokeless tobacco: Never Used     Tobacco comment: [id number] occational smoker   Substance and Sexual Activity     Alcohol use: Yes     Alcohol/week: 0.0 oz     Comment: 2-3 glass of wine per week. At most 1 per day     Drug use: No     Sexual activity: Never   Lifestyle     Physical activity:     Days per week: Not on file     Minutes per session: Not on file     Stress: Not on file   Relationships     Social connections:     Talks on phone: Not on file     Gets together: Not on file     Attends Hinduism service: Not on file     Active member of club or organization: Not on file     Attends meetings of clubs or organizations: Not on file     Relationship status: Not on file     Intimate partner violence:     Fear of current or ex partner: Not on file     Emotionally abused: Not on file     Physically abused: Not on file     Forced sexual activity: Not on file   Other Topics Concern     Parent/sibling w/ CABG, MI or angioplasty before 65F 55M? Not Asked   Social History Narrative     Not on file       PAST MEDICAL HISTORY:   Past Medical History:   Diagnosis Date     Alpha-1-antitrypsin deficiency (H)      Ascites     Pre-transplant     Chronic kidney disease, stage 3 (H)      Cirrhosis (H)      Alpha-1-antitrypsin deficiency, s/p liver transplant 3/31/15     Gout      HTN (hypertension)      Lentigo maligna melanoma (H)     lentigo maligna melanoma excised on 2009 with a repeat wide excision on 2009.      Liver replaced by transplant (H) 2015     Nephrolithiasis      Osteoarthritis      Portal hypertension (H)     Pre-transplant        PAST SURGICAL HISTORY:   Past Surgical History:   Procedure Laterality Date     COLONOSCOPY N/A 2014    Procedure: COLONOSCOPY;  Surgeon: Katherine Jimenez MD;  Location:  GI     ESOPHAGOSCOPY, GASTROSCOPY, DUODENOSCOPY (EGD), COMBINED N/A 2014    Procedure: COMBINED ESOPHAGOSCOPY, GASTROSCOPY, DUODENOSCOPY (EGD), BIOPSY SINGLE OR MULTIPLE;  Surgeon: Katherine Jimenez MD;  Location:  GI     ESOPHAGOSCOPY, GASTROSCOPY, DUODENOSCOPY (EGD), COMBINED N/A 2015    Procedure: COMBINED ESOPHAGOSCOPY, GASTROSCOPY, DUODENOSCOPY (EGD), REMOVE FOREIGN BODY;  Surgeon: Katherine Jimenez MD;  Location:  GI     HERNIA REPAIR      abdominal     INSERT SHUNT PORTAL TRANSJUGULAR INTRAHEPTIC       ORTHOPEDIC SURGERY      bilateral knee surgery     PAROTIDECTOMY, RADICAL NECK DISSECTION Left 2019    Procedure: Total Parotidectomy, Excision of Skin, Neck Dissection Levels I - V,  And Local Advancement Flap;  Surgeon: Johanna Moreland MD;  Location:  OR     TRANSPLANT LIVER RECIPIENT  DONOR N/A 3/31/2015    Procedure: TRANSPLANT LIVER RECIPIENT  DONOR;  Surgeon: Elia Mehta MD;  Location:  OR       FAMILY HISTORY:    Family History   Problem Relation Age of Onset     Cardiovascular Father         MI     Glaucoma No family hx of      Macular Degeneration No family hx of        REVIEW OF SYSTEMS:  12 point ROS was negative other than the symptoms noted above in the HPI.  Patient Supplied Answers to Review of Systems  [facility] ROS 3/28/2019   Musculoskeletal Back pain         PHYSICAL  "EXAMINATION:   /83   Pulse 72   Ht 1.905 m (6' 3\")   Wt 122.5 kg (270 lb)   SpO2 98%   BMI 33.75 kg/m      Patient in no apparent distress  Breathing comfortably on room air  Complete eye closure with good forehead movement, minimal weakness of the midface, mild weakness of the left lower lip on smiling but good strength with puckering ~HB II/VI  Parotid and neck incision healing appropriately with ointment in place, no evidence of venous congestion of the skin flap, no evidence of dehiscence, no fluid collection      RESULTS REVIEWED:     SPECIMEN(S):   A: Left inferior cheek margin   B: Left superior cheek margin   C: Left greater auricular nerve   D: Left greater auricular nerve   E: Left greater auricular nerve biopsy #2   F: Left parotidectomy   G: Deep lobe of parotid   H: Left level 2A neck dissection   I: Left level 3 neck dissection   J: Left level 4 neck dissection   K: Left level 1B neck dissection   L: Left level 2B neck dissection   M: Left level 5A neck dissection   N: Left level 5B neck dissection     FINAL DIAGNOSIS:   A. LEFT INFERIOR CHEEK MARGIN, BIOPSY:   - Skin with solar elastosis and actinic keratosis, negative for   malignancy.     B. LEFT SUPERIOR CHEEK MARGIN, BIOPSY:   - Skin with solar elastosis, negative for malignancy.     C. LEFT GREATER AURICULAR NERVE, BIOPSY:   - POSITIVE FOR SQUAMOUS CELL CARCINOMA.     D. LEFT GREATER AURICULAR NERVE, BIOPSY:   - Nerve tissue, negative for malignancy.     E. LEFT GREATER AURICULAR NERVE BIOPSY #2, BIOPSY:   - Nerve tissue, negative for malignancy.     F. PAROTID GLAND, LEFT, PAROTIDECTOMY:   - METASTATIC SQUAMOUS CELL CARCINOMA, moderately differentiated, 2.1 cm in    greatest dimension.   - Tumor is centered in the subcutaneous tissue and extends into parotid   gland; depth of invasion from skin is   approx. 15 mm.   - No in-situ component is identified.   - Lymphovascular space invasion is not identified.   - Perineural invasion is " present (both small and large nerves are   involved).   - Margins are negative for tumor; closest margin is superficial at 2 mm,   followed by deep at 5 mm; all other   margins are more than 5 mm from invasive carcinoma.   - Four lymph nodes, negative for tumor (0/4).   - See comment.     G. DEEP LOBE OF PAROTID, EXCISION:   - Salivary gland tissue, negative for malignancy.     H. LYMPH NODES, LEFT LEVEL 2A, NECK DISSECTION:   - Twelve lymph nodes, negative for tumor (0/12).     I. LYMPH NODES, LEFT LEVEL 3, NECK DISSECTION:   - Seven lymph nodes, negative for tumor (0/7).     J. LYMPH NODES, LEFT LEVEL 4, NECK DISSECTION:   - Thirteen lymph nodes, negative for tumor (0/13).     K. LYMPH NODES, LEFT LEVEL 1B, NECK DISSECTION:   - Three lymph nodes, negative for tumor (0/3).   - Benign salivary gland tissue.     L. LYMPH NODES, LEFT LEVEL 2B, NECK DISSECTION:   - Three lymph nodes, negative for tumor (0/3).     M. LYMPH NODES, LEFT LEVEL 5A, NECK DISSECTION:   - Four lymph nodes, negative for tumor (0/4).     N. LYMPH NODES, LEFT LEVEL 5B, NECK DISSECTION:   - Five lymph nodes, negative for tumor (0/5).     COMMENT:   It is unclear if this metastatic site evolved from a lymph node metastasis    with complete effacement of the   lymph node (there is no lymph node tissue remnant adjacent to tumor   nodule) or from a satellite tumor.       IMPRESSION AND PLAN:   Eric Andrew is a 69-year-old man who has a history of a liver transplant and recent squamous cell carcinoma of the left cheek who now has 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 He underwent total parotidectomy, skin resection, resection of greater auricular nerve, level I-V neck dissection, cervicofacial flap on 4/11/2019. Final pathology showed the 1 metastatic deposit in the parotid.     He should return to see Dr Floyd in the coming weeks to start radiation treatment planning.    He received radiation swallowing education by our clinic speech pathologist  today in clinic.    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     He will need to have appropriate dental care with the radiation.    I will see him back in about 2 weeks.    Thank you very much for the opportunity to participate in the care of your patient.      Johanna Moreland M.D.  Otolaryngology- Head & Neck Surgery      This note was dictated with voice recognition software and then edited. Please excuse any unintentional errors.     CC:  Katherine Jimenez MD  [address]  [facility] [address]      Naomi Rodriguez, RN  Liver Coordinator  [facility]      Aston Devine MD  [facility]  [address]  [facility] [address]

## 2019-04-19 NOTE — PROGRESS NOTES
Dear Dr. Jimenez:    I had the pleasure of seeing Eric Andrew in follow-up today at the [facility].     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  He underwent surgery by dermatologist in [place] for this.  Per his report this was not done with Mohs surgery but was told that he had negative margins.  He says that shortly after the surgery he noticed a lump along his mandible/below the ear.  He went in for his 1 month follow-up with a dermatologist and at that time the mass had increased in size.  She thought it was a reactive node but offered a biopsy. Per review of the notes they proceeded with a punch biopsy of the parotid mass.  This was consistent with invasive squamous cell carcinoma without any epidermal involvement.  His preoperative PET scan showed only involvement of the parotid.     Interval history:  He was taken to the OR on 4/11/2019 for a left total parotidectomy with resection of skin, preservation of the facial nerve, sacrifice of the greater auricular nerve, level I-V neck dissection, cervicofacial flap. Final pathology showed a 2.1 cm moderately differentiated SCC within the subcutaneous tissues and parotid, PNI, no LVSI, negative margins, 0/47 cervical nodes. He had his ALEKSEY drains removed on 4/15 and 4/19. His pathology was reviewed at tumor conference and he was recommended for postoperative radiation. He already saw Dr Floyd preoperatively.  He is overall doing well.  He is weaning himself off the pain medications and trying to primarily use Tylenol.  He tried playing the trombone recently and did notice a leak on the left lower lip. He is seeing his dentist on Monday.    MEDICATIONS:     Current Outpatient Medications   Medication Sig Dispense Refill     AMLODIPINE BESYLATE PO Take 10 mg by  mouth every morning        atorvastatin (LIPITOR) 20 MG tablet Take 20 mg by mouth every evening        BASAGLAR 100 UNIT/ML injection Inject 45 Units Subcutaneous At Bedtime        Calcium Carbonate-Vitamin D (CALCIUM + D PO) Take 1 tablet by mouth every morning        diphenhydrAMINE HCl (BENADRYL PO) Take by mouth nightly as needed       losartan (COZAAR) 25 MG tablet Take 1 tablet (25 mg) by mouth daily (Patient taking differently: Take 50 mg by mouth every morning ) 90 tablet 3     metFORMIN (GLUCOPHAGE) 1000 MG tablet Take 1,000 mg by mouth 2 times daily (with meals)        metoprolol succinate (TOPROL-XL) 25 MG 24 hr tablet Take 25 mg by mouth every morning        Multiple Vitamins-Minerals (MULTIVITAL) TABS Take 1 tablet by mouth every morning        mupirocin (BACTROBAN) 2 % external ointment Apply topically 3 times daily 30 g 0     sildenafil (VIAGRA) 50 MG tablet Take 50 mg by mouth daily as needed for erectile dysfunction       tacrolimus (GENERIC EQUIVALENT) 1 MG capsule Take 1 capsule (1 mg) by mouth every 12 hours (Patient taking differently: Take 1 mg by mouth 2 times daily ) 180 capsule 3       ALLERGIES:    Allergies   Allergen Reactions     Cefazolin Swelling     Lips swelled while patient was in the OR      Lisinopril Cough     Meropenem Hives and Swelling     Developed hives and lip swelling while on meropenem and micafungin.  Resolved with discontinuation.  Unclear which was cause.     Micafungin Hives and Swelling     Developed hives and lip swelling while on meropenem and micafungin.  Resolved with discontinuation.  Unclear which was cause.       HABITS/SOCIAL HISTORY:   Spends the shin in [place].  .  He is a retired .   He smokes for a few years and quit back in 1973.  He has no chewing tobacco use.  He has 1 alcoholic beverage about 3 times per week.   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    Social History     Socioeconomic History     Marital status:      Spouse  name: Not on file     Number of children: Not on file     Years of education: Not on file     Highest education level: Not on file   Occupational History     Not on file   Social Needs     Financial resource strain: Not on file     Food insecurity:     Worry: Not on file     Inability: Not on file     Transportation needs:     Medical: Not on file     Non-medical: Not on file   Tobacco Use     Smoking status: Never Smoker     Smokeless tobacco: Never Used     Tobacco comment: [id number] occational smoker   Substance and Sexual Activity     Alcohol use: Yes     Alcohol/week: 0.0 oz     Comment: 2-3 glass of wine per week. At most 1 per day     Drug use: No     Sexual activity: Never   Lifestyle     Physical activity:     Days per week: Not on file     Minutes per session: Not on file     Stress: Not on file   Relationships     Social connections:     Talks on phone: Not on file     Gets together: Not on file     Attends Confucianist service: Not on file     Active member of club or organization: Not on file     Attends meetings of clubs or organizations: Not on file     Relationship status: Not on file     Intimate partner violence:     Fear of current or ex partner: Not on file     Emotionally abused: Not on file     Physically abused: Not on file     Forced sexual activity: Not on file   Other Topics Concern     Parent/sibling w/ CABG, MI or angioplasty before 65F 55M? Not Asked   Social History Narrative     Not on file       PAST MEDICAL HISTORY:   Past Medical History:   Diagnosis Date     Alpha-1-antitrypsin deficiency (H)      Ascites     Pre-transplant     Chronic kidney disease, stage 3 (H)      Cirrhosis (H)     Alpha-1-antitrypsin deficiency, s/p liver transplant 3/31/15     Gout      HTN (hypertension)      Lentigo maligna melanoma (H) 2009    lentigo maligna melanoma excised on 01/29/2009 with a repeat wide excision on 03/30/2009.      Liver replaced by transplant (H) 03/31/2015     Nephrolithiasis       "Osteoarthritis      Portal hypertension (H)     Pre-transplant        PAST SURGICAL HISTORY:   Past Surgical History:   Procedure Laterality Date     COLONOSCOPY N/A 2014    Procedure: COLONOSCOPY;  Surgeon: Katherine Jimenez MD;  Location:  GI     ESOPHAGOSCOPY, GASTROSCOPY, DUODENOSCOPY (EGD), COMBINED N/A 2014    Procedure: COMBINED ESOPHAGOSCOPY, GASTROSCOPY, DUODENOSCOPY (EGD), BIOPSY SINGLE OR MULTIPLE;  Surgeon: Katherine Jimenez MD;  Location:  GI     ESOPHAGOSCOPY, GASTROSCOPY, DUODENOSCOPY (EGD), COMBINED N/A 2015    Procedure: COMBINED ESOPHAGOSCOPY, GASTROSCOPY, DUODENOSCOPY (EGD), REMOVE FOREIGN BODY;  Surgeon: Katherine Jimenez MD;  Location:  GI     HERNIA REPAIR      abdominal     INSERT SHUNT PORTAL TRANSJUGULAR INTRAHEPTIC       ORTHOPEDIC SURGERY      bilateral knee surgery     PAROTIDECTOMY, RADICAL NECK DISSECTION Left 2019    Procedure: Total Parotidectomy, Excision of Skin, Neck Dissection Levels I - V,  And Local Advancement Flap;  Surgeon: Johanna Moreland MD;  Location:  OR     TRANSPLANT LIVER RECIPIENT  DONOR N/A 3/31/2015    Procedure: TRANSPLANT LIVER RECIPIENT  DONOR;  Surgeon: Elia Mehta MD;  Location:  OR       FAMILY HISTORY:    Family History   Problem Relation Age of Onset     Cardiovascular Father         MI     Glaucoma No family hx of      Macular Degeneration No family hx of        REVIEW OF SYSTEMS:  12 point ROS was negative other than the symptoms noted above in the HPI.  Patient Supplied Answers to Review of Systems  [facility] ENT ROS 3/28/2019   Musculoskeletal Back pain         PHYSICAL EXAMINATION:   /83   Pulse 72   Ht 1.905 m (6' 3\")   Wt 122.5 kg (270 lb)   SpO2 98%   BMI 33.75 kg/m     Patient in no apparent distress  Breathing comfortably on room air  Complete eye closure with good forehead movement, minimal weakness of the midface, mild weakness of the left lower lip " on smiling but good strength with puckering ~HB II/VI  Parotid and neck incision healing appropriately with ointment in place, no evidence of venous congestion of the skin flap, no evidence of dehiscence, no fluid collection      RESULTS REVIEWED:     SPECIMEN(S):   A: Left inferior cheek margin   B: Left superior cheek margin   C: Left greater auricular nerve   D: Left greater auricular nerve   E: Left greater auricular nerve biopsy #2   F: Left parotidectomy   G: Deep lobe of parotid   H: Left level 2A neck dissection   I: Left level 3 neck dissection   J: Left level 4 neck dissection   K: Left level 1B neck dissection   L: Left level 2B neck dissection   M: Left level 5A neck dissection   N: Left level 5B neck dissection     FINAL DIAGNOSIS:   A. LEFT INFERIOR CHEEK MARGIN, BIOPSY:   - Skin with solar elastosis and actinic keratosis, negative for   malignancy.     B. LEFT SUPERIOR CHEEK MARGIN, BIOPSY:   - Skin with solar elastosis, negative for malignancy.     C. LEFT GREATER AURICULAR NERVE, BIOPSY:   - POSITIVE FOR SQUAMOUS CELL CARCINOMA.     D. LEFT GREATER AURICULAR NERVE, BIOPSY:   - Nerve tissue, negative for malignancy.     E. LEFT GREATER AURICULAR NERVE BIOPSY #2, BIOPSY:   - Nerve tissue, negative for malignancy.     F. PAROTID GLAND, LEFT, PAROTIDECTOMY:   - METASTATIC SQUAMOUS CELL CARCINOMA, moderately differentiated, 2.1 cm in    greatest dimension.   - Tumor is centered in the subcutaneous tissue and extends into parotid   gland; depth of invasion from skin is   approx. 15 mm.   - No in-situ component is identified.   - Lymphovascular space invasion is not identified.   - Perineural invasion is present (both small and large nerves are   involved).   - Margins are negative for tumor; closest margin is superficial at 2 mm,   followed by deep at 5 mm; all other   margins are more than 5 mm from invasive carcinoma.   - Four lymph nodes, negative for tumor (0/4).   - See comment.     G. DEEP LOBE OF  PAROTID, EXCISION:   - Salivary gland tissue, negative for malignancy.     H. LYMPH NODES, LEFT LEVEL 2A, NECK DISSECTION:   - Twelve lymph nodes, negative for tumor (0/12).     I. LYMPH NODES, LEFT LEVEL 3, NECK DISSECTION:   - Seven lymph nodes, negative for tumor (0/7).     J. LYMPH NODES, LEFT LEVEL 4, NECK DISSECTION:   - Thirteen lymph nodes, negative for tumor (0/13).     K. LYMPH NODES, LEFT LEVEL 1B, NECK DISSECTION:   - Three lymph nodes, negative for tumor (0/3).   - Benign salivary gland tissue.     L. LYMPH NODES, LEFT LEVEL 2B, NECK DISSECTION:   - Three lymph nodes, negative for tumor (0/3).     M. LYMPH NODES, LEFT LEVEL 5A, NECK DISSECTION:   - Four lymph nodes, negative for tumor (0/4).     N. LYMPH NODES, LEFT LEVEL 5B, NECK DISSECTION:   - Five lymph nodes, negative for tumor (0/5).     COMMENT:   It is unclear if this metastatic site evolved from a lymph node metastasis    with complete effacement of the   lymph node (there is no lymph node tissue remnant adjacent to tumor   nodule) or from a satellite tumor.       IMPRESSION AND PLAN:   Eric Andrew is a 69-year-old man who has a history of a liver transplant and recent squamous cell carcinoma of the left cheek who now has 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 He underwent total parotidectomy, skin resection, resection of greater auricular nerve, level I-V neck dissection, cervicofacial flap on 4/11/2019. Final pathology showed the 1 metastatic deposit in the parotid.     He should return to see Dr Floyd in the coming weeks to start radiation treatment planning.    He received radiation swallowing education by our clinic speech pathologist today in clinic.    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     He will need to have appropriate dental care with the radiation.    I will see him back in about 2 weeks.    Thank you very much for the opportunity to participate in the care  of your patient.      Johanna Moreland M.D.  Otolaryngology- Head & Neck Surgery      This note was dictated with voice recognition software and then edited. Please excuse any unintentional errors.         CC:  Katherine Jimenez MD  [address] [facility] [address]  [facility] [address]      Naomi Rodriguez, RN  Liver Coordinator  [facility]      Aston Devine MD  [facility]  [address]  [facility] [address]

## 2019-04-19 NOTE — PATIENT INSTRUCTIONS
1. Please follow-up in clinic on Friday May 3rd at 4:00pm.   2. Please schedule video swallow study within 2 weeks.   3. Please call the ENT clinic with any questions,concerns, new or worsening symptoms.    -Clinic number is [phone]   - [facility]'s direct line (Dr. Moreland's nurse) [phone]

## 2019-04-20 NOTE — PROGRESS NOTES
"   04/19/19 1540   General Information   Type Of Visit Initial   Start Of Care Date 04/19/19   Referring Physician Dr. Johanna Moreland   Orders Evaluate And Treat   Orders Comment Clinical swallow eval   Medical Diagnosis SCC of skin of face; oropharyngeal dysphagia   Onset Of Illness/injury Or Date Of Surgery 04/11/19   Precautions/limitations Swallowing Precautions   Hearing Adequate for conversation   Pertinent History of Current Problem/OT: Additional Occupational Profile Info Eric Andrew is a 69-year-old male with a PMH significant for lentigo maligna melanoma, liver transplant, and recent diagnosis of SCC of left cheek with met to parotid. He was taken to the OR on 4/11/2019 for a left total parotidectomy with resection of skin, preservation of the facial nerve, sacrifice of the greater auricular nerve, level I-V neck dissection, and cervicofacial flap. Plan is for pt to undergo adjuvant radiotherapy. Pt seen in conjunction with ENT clinic per MD request. MD stated pt will also need referral to [facility] for facial paralysis/weakness. Upon clinical interview, pt reported his swallow is working \"okay.\" Stated he generally eats softer foods such as scrambled eggs, puree pouches, ham, chicken and green salad, and soups. Stated he uses strategies such as chewing foods well, taking small bites, and drinking water during meals to assist with swallow function. Endorsed avoiding foods that are harder/crunchier/chewier including veggies and steaks. Reported he has been drinking Boost as well. Pt endorsed coughing when swallowing liquids ~60% of time. Stated this began after surgery. Denied significant feeling of pharyngeal stasis but stated this can occur at times. Endorsed this feeling when swallowing bread prior to surgery but stated this has since resolved since surgery. Reported feeling a scratchy pain when swallowing but denied significant odynophagia. He denied recent pneumonias. Reported he has lost weight " since surgery.    Respiratory Status Room air   Prior Level Of Function Swallowing   Prior Level Of Function Comment Soft solid textures, thin liquids   General Observations Pt pleasant and cooperative throughout evaluation.    Patient/family Goals To eat and drink with minimal difficulty.    Clinical Swallow Evaluation   Oral Musculature anomalies present   Structural Abnormalities present   Dentition present and adequate   Mucosal Quality adequate   Mandibular Strength and Mobility intact   Oral Labial Strength and Mobility impaired retraction;impaired pursing;impaired seal;impaired coordination  (on left)   Lingual Strength and Mobility impaired protrusion  (mild deviation to right)   Velar Elevation intact   Buccal Strength and Mobility impaired   Laryngeal Function Swallow;Cough;Voicing initiated   Oral Musculature Comments Reduced strength, coordination, and ROM of oral musculature greater on left than right.    Additional Documentation Yes   Clinical Swallow Eval: Thin Liquid Texture Trial   Mode of Presentation, Thin Liquids cup   Volume of Liquid or Food Presented ~5 oz water   Oral Phase of Swallow WFL   Pharyngeal Phase of Swallow intact   Diagnostic Statement No s/sx of aspiration.    Clinical Swallow Eval: Solid Food Texture Trial   Mode of Presentation, Solid self-fed   Volume of Solid Food Presented ~1/3 of raleigh cracker   Oral Phase, Solid WFL   Pharyngeal Phase, Solid feeling of something stuck in throat   Diagnostic Statement No overt s/sx of aspiration. Reported feeling of stasis near valleculae region which was reported to clear with liquid wash.    Swallow Compensations   Swallow Compensations Alternate viscosity of consistencies   Results No difficulties noted   Educational Assessment   Barriers to Learning No barriers   Esophageal Phase of Swallow   Patient reports or presents with symptoms of esophageal dysphagia No   General Therapy Interventions   Planned Therapy Interventions Dysphagia  Treatment   Dysphagia treatment Oropharyngeal exercise training;Modified diet education;Instruction of safe swallow strategies;Compensatory strategies for swallowing   Swallow Eval: Clinical Impressions   Skilled Criteria for Therapy Intervention Skilled criteria met.  Treatment indicated.   Functional Assessment Scale (FAS) 4   Treatment Diagnosis Mild-moderate oropharyngeal dysphagia   Diet texture recommendations Thin liquids  (soft/moist solid textures, mechanical soft)   Recommended Feeding/Eating Techniques alternate between small bites and sips of food/liquid;hard swallow w/ each bite or sip;maintain upright posture during/after eating for 30 mins;small sips/bites;other (see comments)  (oral cares, remian upright 30-60 min after PO intake)   Rehab Potential good, to achieve stated therapy goals   Demonstrates Need for Referral to Another Service other (see comments)  (VFSS)   Therapy Frequency other (see comments)  (2x/month x 3 months)   Predicted Duration of Therapy Intervention (days/wks) 6 months   Anticipated Discharge Disposition home w/ outpatient services   Risks and Benefits of Treatment have been explained. Yes   Patient, family and/or staff in agreement with Plan of Care Yes   Clinical Impression Comments Pt presents with mild-moderate oropharyngeal dysphagia s/p parotidectomy and left neck dissection for SCC of left cheeck with met to parotid. Plan is for pt to undergo adjuvant radiation. Pt exhibits reduced strength, coordination, and ROM of oral musculature. He demonstrates no overt s/sx of aspiration upon evaluation despite report of frequent coughing when swallowing liquids. He reports feeling pharyngeal stasis with solids textures but endorses benefit with use of liquid wash in relieving feeling of stasis. Given results of today's evaluation, recommend VFSS to further evaluation pharyngeal swallow. Recommend pt continue soft/moist solid textures with use of swallowing strategies including  small bites/sips, alternate solids/liquids, hard double swallow, and excellent oral cares. Pt will benefit from ongoing SLP services as he is at risk of progressive dysphagia with upcoming radiation and acute/late side effects of radiation. Pt likely to be followed by [facility] SLP during treatment for facial paralysis/weakness but will resume services with this SLP after completion of treatment with [facility]. Recommend initiate pharyngeal strengthening exercises at this time to strengthen swallow prior to start of radiation treatment.    Swallow Goals   SLP Swallow Goals 1;2   Swallow Goal 1   Goal Identifier Diet   Goal Description 1. Pt will tolerate soft/regular textures and thin liquids without overt clinical s/sx of aspiration/penetration in 90% of trials during two consecutive sessions after completion of treatment as judged by clinician assessment and/or pt/caregiver report.    Target Date 07/18/19   Swallow Goal 2   Goal Identifier Exercises   Goal Description 2. Pt will improve ROM and strength of tongue, BOT, pharynx and jaw by completing 10 repetitions of 5/5 exercises 3 times daily with minimal written or verbal cues.    Target Date 07/18/19   Total Session Time   Total Evaluation Time 15   Therapy Certification   Certification date from 04/19/19   Certification date to 07/18/19   Medical Diagnosis SCC of skin of face; oropharyngeal dysphagia   Certification I certify the need for these services furnished under this plan of treatment and while under my care.  (Physician co-signature of this document indicates review and certification of the therapy plan).     Thank you for the referral of Eric Andrew. If you have any questions about this report, please contact me using the information below.     Jenna Prabhakar MA, CCC-SLP  Speech-Language Pathologist   [facility]  Department of Otolaryngology/D&T - 4th floor  Pager: [phone]  Phone:  [phone]  Email: [email or web address]

## 2019-04-20 NOTE — PROGRESS NOTES
OUTPATIENT SWALLOW  EVALUATION  PLAN OF TREATMENT FOR OUTPATIENT REHABILITATION  (COMPLETE FOR INITIAL CLAIMS ONLY)  Patient's Last Name, First Name, M.I.  YOB: 1950  Eric Andrew     Provider's Name   Jenna Prabhakar   Medical Record No.  [id number]     Start of Care Date:  04/19/19   Onset Date:  04/11/19   Type:     ___PT   ____OT  ___X_SLP Medical Diagnosis:  SCC of skin of face; oropharyngeal dysphagia     Treatment Diagnosis:  Mild-moderate oropharyngeal dysphagia Visits from SOC:  1     _________________________________________________________________________________  Plan of Treatment/Functional Goals:  Planned Therapy Interventions: Dysphagia Treatment  Dysphagia treatment: Oropharyngeal exercise training, Modified diet education, Instruction of safe swallow strategies, Compensatory strategies for swallowing                     Goals   1. Goal Identifier: Diet       Goal Description: 1. Pt will tolerate soft/regular textures and thin liquids without overt clinical s/sx of aspiration/penetration in 90% of trials during two consecutive sessions after completion of treatment as judged by clinician assessment and/or pt/caregiver report.        Target Date: 07/18/19           2. Goal Identifier: Exercises       Goal Description: 2. Pt will improve ROM and strength of tongue, BOT, pharynx and jaw by completing 10 repetitions of 5/5 exercises 3 times daily with minimal written or verbal cues.        Target Date: 07/18/19           3.                             4.                             5.                            6.                              7.                              8.                              Therapy Frequency: other (see comments)(2x/month x 3 months)  Predicted Duration of Therapy Intervention (days/wks): 6 months    Jenna Prabhakar, SLP       I CERTIFY THE NEED FOR THESE SERVICES FURNISHED UNDER         THIS PLAN OF TREATMENT AND WHILE UNDER MY CARE     (Physician attestation of this document indicates review and certification of the therapy plan).                  Certification date from: 04/19/19 Certification date to: 07/18/19          Referring Physician: Dr. Johanna Moreland    Initial Assessment        See Epic Evaluation Start Of Care Date: 04/19/19

## 2019-04-22 DIAGNOSIS — N18.30 CKD (CHRONIC KIDNEY DISEASE) STAGE 3, GFR 30-59 ML/MIN (H): Primary | ICD-10-CM

## 2019-04-23 ENCOUNTER — HOSPITAL ENCOUNTER (OUTPATIENT)
Dept: LAB | Facility: CLINIC | Age: 69
Discharge: HOME | End: 2019-04-23
Attending: INTERNAL MEDICINE | Admitting: INTERNAL MEDICINE
Payer: MEDICARE

## 2019-04-23 DIAGNOSIS — N18.30 CKD (CHRONIC KIDNEY DISEASE) STAGE 3, GFR 30-59 ML/MIN (H): ICD-10-CM

## 2019-04-23 DIAGNOSIS — Z13.220 LIPID SCREENING: ICD-10-CM

## 2019-04-23 DIAGNOSIS — Z94.4 LIVER REPLACED BY TRANSPLANT (H): ICD-10-CM

## 2019-04-23 LAB
ALBUMIN SERPL-MCNC: 3.6 G/DL (ref 3.4–5)
ALP SERPL-CCNC: 121 U/L (ref 40–150)
ALT SERPL W P-5'-P-CCNC: 30 U/L (ref 0–70)
ANION GAP SERPL CALCULATED.3IONS-SCNC: 8 MMOL/L (ref 3–14)
AST SERPL W P-5'-P-CCNC: 11 U/L (ref 0–45)
BILIRUB DIRECT SERPL-MCNC: 0.1 MG/DL (ref 0–0.2)
BILIRUB SERPL-MCNC: 0.4 MG/DL (ref 0.2–1.3)
BUN SERPL-MCNC: 31 MG/DL (ref 7–30)
CALCIUM SERPL-MCNC: 9 MG/DL (ref 8.5–10.1)
CHLORIDE SERPL-SCNC: 105 MMOL/L (ref 94–109)
CO2 SERPL-SCNC: 25 MMOL/L (ref 20–32)
CREAT SERPL-MCNC: 1.49 MG/DL (ref 0.66–1.25)
CREAT UR-MCNC: 143 MG/DL
DEPRECATED CALCIDIOL+CALCIFEROL SERPL-MC: 35 UG/L (ref 20–75)
ERYTHROCYTE [DISTWIDTH] IN BLOOD BY AUTOMATED COUNT: 14.3 % (ref 10–15)
GFR SERPL CREATININE-BSD FRML MDRD: 48 ML/MIN/{1.73_M2}
GLUCOSE SERPL-MCNC: 192 MG/DL (ref 70–99)
HCT VFR BLD AUTO: 39 % (ref 40–53)
HGB BLD-MCNC: 13 G/DL (ref 13.3–17.7)
MCH RBC QN AUTO: 30.7 PG (ref 26.5–33)
MCHC RBC AUTO-ENTMCNC: 33.3 G/DL (ref 31.5–36.5)
MCV RBC AUTO: 92 FL (ref 78–100)
MICROALBUMIN UR-MCNC: 84 MG/L
MICROALBUMIN/CREAT UR: 59.02 MG/G CR (ref 0–17)
PHOSPHATE SERPL-MCNC: 3 MG/DL (ref 2.5–4.5)
PLATELET # BLD AUTO: 139 10E9/L (ref 150–450)
POTASSIUM SERPL-SCNC: 4 MMOL/L (ref 3.4–5.3)
PROT SERPL-MCNC: 7.4 G/DL (ref 6.8–8.8)
PROT UR-MCNC: 0.21 G/L
PROT/CREAT 24H UR: 0.15 G/G CR (ref 0–0.2)
PTH-INTACT SERPL-MCNC: 67 PG/ML (ref 18–80)
RBC # BLD AUTO: 4.23 10E12/L (ref 4.4–5.9)
SODIUM SERPL-SCNC: 138 MMOL/L (ref 133–144)
TACROLIMUS BLD-MCNC: 3.5 UG/L (ref 5–15)
TME LAST DOSE: ABNORMAL H
WBC # BLD AUTO: 5.5 10E9/L (ref 4–11)

## 2019-04-23 PROCEDURE — 82248 BILIRUBIN DIRECT: CPT | Performed by: INTERNAL MEDICINE

## 2019-04-23 PROCEDURE — 36415 COLL VENOUS BLD VENIPUNCTURE: CPT | Performed by: INTERNAL MEDICINE

## 2019-04-23 PROCEDURE — 82043 UR ALBUMIN QUANTITATIVE: CPT | Performed by: INTERNAL MEDICINE

## 2019-04-23 PROCEDURE — 83970 ASSAY OF PARATHORMONE: CPT | Performed by: INTERNAL MEDICINE

## 2019-04-23 PROCEDURE — 80197 ASSAY OF TACROLIMUS: CPT | Performed by: INTERNAL MEDICINE

## 2019-04-23 PROCEDURE — 82247 BILIRUBIN TOTAL: CPT | Performed by: INTERNAL MEDICINE

## 2019-04-23 PROCEDURE — 80069 RENAL FUNCTION PANEL: CPT | Performed by: INTERNAL MEDICINE

## 2019-04-23 PROCEDURE — 82306 VITAMIN D 25 HYDROXY: CPT | Performed by: INTERNAL MEDICINE

## 2019-04-23 PROCEDURE — 85027 COMPLETE CBC AUTOMATED: CPT | Performed by: INTERNAL MEDICINE

## 2019-04-23 PROCEDURE — 84460 ALANINE AMINO (ALT) (SGPT): CPT | Performed by: INTERNAL MEDICINE

## 2019-04-23 PROCEDURE — 84155 ASSAY OF PROTEIN SERUM: CPT | Performed by: INTERNAL MEDICINE

## 2019-04-23 PROCEDURE — 84450 TRANSFERASE (AST) (SGOT): CPT | Performed by: INTERNAL MEDICINE

## 2019-04-23 PROCEDURE — 84075 ASSAY ALKALINE PHOSPHATASE: CPT | Performed by: INTERNAL MEDICINE

## 2019-04-23 PROCEDURE — 80076 HEPATIC FUNCTION PANEL: CPT | Performed by: INTERNAL MEDICINE

## 2019-04-23 PROCEDURE — 84156 ASSAY OF PROTEIN URINE: CPT | Performed by: INTERNAL MEDICINE

## 2019-04-24 ENCOUNTER — OFFICE VISIT (OUTPATIENT)
Dept: NEPHROLOGY | Facility: CLINIC | Age: 69
End: 2019-04-24
Attending: INTERNAL MEDICINE
Payer: MEDICARE

## 2019-04-24 VITALS
SYSTOLIC BLOOD PRESSURE: 126 MMHG | BODY MASS INDEX: 32.8 KG/M2 | HEART RATE: 73 BPM | DIASTOLIC BLOOD PRESSURE: 82 MMHG | HEIGHT: 75 IN | OXYGEN SATURATION: 97 % | WEIGHT: 263.8 LBS | TEMPERATURE: 97.7 F

## 2019-04-24 DIAGNOSIS — N18.30 CKD (CHRONIC KIDNEY DISEASE) STAGE 3, GFR 30-59 ML/MIN (H): Primary | ICD-10-CM

## 2019-04-24 PROCEDURE — G0463 HOSPITAL OUTPT CLINIC VISIT: HCPCS | Mod: ZF

## 2019-04-24 ASSESSMENT — MIFFLIN-ST. JEOR: SCORE: 2052.22

## 2019-04-24 ASSESSMENT — PAIN SCALES - GENERAL: PAINLEVEL: NO PAIN (0)

## 2019-04-24 NOTE — NURSING NOTE
"/82   Pulse 73   Temp 97.7  F (36.5  C) (Oral)   Ht 1.905 m (6' 3\")   Wt 119.7 kg (263 lb 12.8 oz)   SpO2 97%   BMI 32.97 kg/m    Maddi Chaudhry CMA    "

## 2019-04-24 NOTE — NURSING NOTE
"Chief Complaint   Patient presents with     RECHECK     CKD stage 3     Pulse 73   Temp 97.7  F (36.5  C) (Oral)   Ht 1.905 m (6' 3\")   Wt 119.7 kg (263 lb 12.8 oz)   SpO2 97%   BMI 32.97 kg/m    Maddi Chaudhry CMA    "

## 2019-04-24 NOTE — PROGRESS NOTES
Assessment and Plan:   69 year old pleasant male with PMH of alpha 1 anti trypsin deficiency, cirrhosis status post liver transplant in 3/2015, HTN  and  CKD since  1/2015, recent diagnosis of Diabetes in 2/2017 with Hba1c of 11.5 in 2/2017 . Exact etiology unclear.  Most likely in the setting of hemodynamic effects from  underlying liver disease in the past  . Baseline serum Cr appears to be 1.6 to 1.7. Long term use of CNI also may be contributing.    CKD stage 3b:  GFR 48, baseline cr 1.5- 1.6, no proteinuria, pt had jose after liver transplant since 2015 and never recovered.  -- cont suppurative care, avoid nephrotoxic medication   -- follow up in 1 year.      BP and volume: controlled, on losartan 25mg and metoprolol 25mg, amlodipine 10 mg, euvolemic in exam.     Electrolyte and acid base: K 4.0 bicrab 25 no issue.      Anemia: hgb stable 13.3 no issue    Thrombocytopenia: stable Plt 139 managed by other provider       BMD: ca 9.4, albumin 3.9, phos 3.4.  Vit D 27,  no issue     S/p Liver transplant: 3/31/2015 on tacrolimus 1mg bid, tacro level 3.5    DM2: controlled now, last A1c 6.4  --  on metformin 1000 mg.      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       Patient seen and discussed  with Dr. Carol Wagoner  Nephrology Fellow  Pager: [phone]      Assessment and plan was discussed with patient and he voiced his understanding and agreement.    Reason for Visit:  Eric Andrew is a 69 year old male with CKD from unresolved JOSE during livertransplant, who presents for routine follow up.     Interval history:  Since last visit, he was admitted for left total parotidectomy due to squamous cell carcinoma 4/11/2019, since then he feels fine and back to his usual way of life , he has no specific complaint, his appetite is good denies any nausea or vomiting, no fever or chills, his energy level is good and no limitation to his  activity, no chest pain or sob no leg swelling no diarrhea or constipation. His home blood pressure is controlled around 130-135.  No other complaint.       Home BP: controlled    Baseline Cr: 1.6    ROS:  A comprehensive review of systems was obtained and negative, except as noted in the HPI or PMH.    Active Medical Problems:  Patient Active Problem List   Diagnosis     Alpha-1-antitrypsin deficiency (H)     Anemia     Thrombocytopenia (H)     Liver replaced by transplant (H)     Immunosuppressed status (H)     EZEQUIEL (acute kidney injury) (H)     Edema     Hives     Hypomagnesemia     Squamous cell carcinoma of skin of face     Secondary malignancy of lymph nodes of head, face and neck (H)     H/O parotidectomy       Personal Hx:   Social History     Socioeconomic History     Marital status:      Spouse name: Not on file     Number of children: Not on file     Years of education: Not on file     Highest education level: Not on file   Occupational History     Not on file   Social Needs     Financial resource strain: Not on file     Food insecurity:     Worry: Not on file     Inability: Not on file     Transportation needs:     Medical: Not on file     Non-medical: Not on file   Tobacco Use     Smoking status: Never Smoker     Smokeless tobacco: Never Used     Tobacco comment: [id number] occational smoker   Substance and Sexual Activity     Alcohol use: Yes     Alcohol/week: 0.0 oz     Comment: 2-3 glass of wine per week. At most 1 per day     Drug use: No     Sexual activity: Never   Lifestyle     Physical activity:     Days per week: Not on file     Minutes per session: Not on file     Stress: Not on file   Relationships     Social connections:     Talks on phone: Not on file     Gets together: Not on file     Attends [employer] service: Not on file     Active member of club or organization: Not on file     Attends meetings of clubs or organizations: Not on file     Relationship status: Not on file     Intimate  "partner violence:     Fear of current or ex partner: Not on file     Emotionally abused: Not on file     Physically abused: Not on file     Forced sexual activity: Not on file   Other Topics Concern     Parent/sibling w/ CABG, MI or angioplasty before 65F 55M? Not Asked   Social History Narrative     Not on file       Allergies:  Allergies   Allergen Reactions     Cefazolin Swelling     Lips swelled while patient was in the OR      Lisinopril Cough     Meropenem Hives and Swelling     Developed hives and lip swelling while on meropenem and micafungin.  Resolved with discontinuation.  Unclear which was cause.     Micafungin Hives and Swelling     Developed hives and lip swelling while on meropenem and micafungin.  Resolved with discontinuation.  Unclear which was cause.       Medications:  Current Outpatient Medications   Medication     AMLODIPINE BESYLATE PO     atorvastatin (LIPITOR) 20 MG tablet     BASAGLAR 100 UNIT/ML injection     Calcium Carbonate-Vitamin D (CALCIUM + D PO)     diphenhydrAMINE HCl (BENADRYL PO)     losartan (COZAAR) 25 MG tablet     metFORMIN (GLUCOPHAGE) 1000 MG tablet     metoprolol succinate (TOPROL-XL) 25 MG 24 hr tablet     Multiple Vitamins-Minerals (MULTIVITAL) TABS     mupirocin (BACTROBAN) 2 % external ointment     sildenafil (VIAGRA) 50 MG tablet     tacrolimus (GENERIC EQUIVALENT) 1 MG capsule     No current facility-administered medications for this visit.        Vitals:  /82   Pulse 73   Temp 97.7  F (36.5  C) (Oral)   Ht 1.905 m (6' 3\")   Wt 119.7 kg (263 lb 12.8 oz)   SpO2 97%   BMI 32.97 kg/m      Exam:  GENERAL APPEARANCE: alert and no distress  HENT: mouth without ulcers or lesions  LYMPHATICS: no cervical or supraclavicular nodes  RESP: lungs clear to auscultation - no rales, rhonchi or wheezes  CV: regular rhythm, normal rate, no rub, no murmur  EDEMA: no LE edema bilaterally  ABDOMEN: soft, nondistended, nontender, bowel sounds normal  MS: extremities normal - " no gross deformities noted, no evidence of inflammation in joints, no muscle tenderness  SKIN: no rash    Results:  Electrolytes/Renal -   Recent Labs   Lab Test 04/23/19  0840 04/17/19  1056 04/12/19  0653  08/01/18  1420  05/01/17  1531  10/29/15  1129 09/22/15  0910    136 137   < > 140   < > 142   < > 141 141   POTASSIUM 4.0 3.8 4.7   < > 4.8   < > 4.4   < > 4.8 5.2   CHLORIDE 105 103 108   < > 106   < > 106   < > 110* 110*   CO2 25 26 21   < > 26   < > 26   < > 25 26   BUN 31* 29 36*   < > 35*   < > 43*   < > 46* 41*   CR 1.49* 1.45* 1.57*   < > 1.68*   < > 1.65*   < > 1.56* 1.52*   * 150* 233*   < > 116*   < > 114*   < > 118* 95   SHAYNE 9.0 9.4 8.2*   < > 9.4   < > 8.8   < > 8.6 8.9   MAG  --   --  1.5*  --   --   --   --   --  1.7 2.3   PHOS 3.0  --   --   --  3.4  --  3.0   < > 3.4 2.9    < > = values in this interval not displayed.       CBC -   Recent Labs   Lab Test 04/23/19  0840 04/17/19  1056 04/12/19  0653   WBC 5.5 6.6 7.0   HGB 13.0* 13.8 12.4*   * 134* 139*       LFTs -   Recent Labs   Lab Test 04/23/19  0840 04/17/19  1056 04/13/19  0737   ALKPHOS 121 180* 101   BILITOTAL 0.4 0.7 0.7   ALT 30 94* 138*   AST 11 30 38   PROTTOTAL 7.4 7.4 7.0   ALBUMIN 3.6 3.5 3.4       Coags -   Recent Labs   Lab Test 04/30/15  0907 04/11/15  2000 04/06/15  0521  03/31/15  1250 03/31/15  1142   INR 1.28* 1.05 1.24*   < > 2.76* 3.04*   PTT  --  31  --   --  111* 62*    < > = values in this interval not displayed.       Iron Panel -   Recent Labs   Lab Test 08/06/16  0945 11/10/14  1051 10/30/14  1443   IRON 62 142 131   IRONSAT 29 98* 83*   RAINE 125 673* 905*       Endocrine -   Recent Labs   Lab Test 04/12/19  0653 04/05/19  1605 06/02/17  0859  07/15/14  0711   A1C 6.4* 6.2* 5.5   < >  --    TSH  --   --   --   --  3.66    < > = values in this interval not displayed.     This patient has been evaluated by me, Arnel Medina MD, on 4/24/19.  I discussed with the fellow, Dr. Wagoner, and agree with  the findings and plan in this note.  I have reviewed medications, vital signs and labs.       Total time I spent was >40 minutes, and more than 50% of face to face time with the patient was spent in counseling and/or coordination of care regarding principles of multidisciplinary care, medication management, and CKD education.    Arnel Medina MD

## 2019-04-24 NOTE — LETTER
4/24/2019     RE: Eric Andrew  [address] [place] [address]     Dear Colleague,    Thank you for referring your patient, Eric Andrew, to the [facility] NEPHROLOGY at [facility]. Please see a copy of my visit note below.    Assessment and Plan:   69 year old pleasant male with PMH of alpha 1 anti trypsin deficiency, cirrhosis status post liver transplant in 3/2015, HTN  and  CKD since  1/2015, recent diagnosis of Diabetes in 2/2017 with Hba1c of 11.5 in 2/2017 . Exact etiology unclear.  Most likely in the setting of hemodynamic effects from  underlying liver disease in the past  . Baseline serum Cr appears to be 1.6 to 1.7. Long term use of CNI also may be contributing.    CKD stage 3b:  GFR 48, baseline cr 1.5- 1.6, no proteinuria, pt had jose after liver transplant since 2015 and never recovered.  -- cont suppurative care, avoid nephrotoxic medication   -- follow up in 1 year.      BP and volume: controlled, on losartan 25mg and metoprolol 25mg, amlodipine 10 mg, euvolemic in exam.     Electrolyte and acid base: K 4.0 bicrab 25 no issue.      Anemia: hgb stable 13.3 no issue    Thrombocytopenia: stable Plt 139 managed by other provider       BMD: ca 9.4, albumin 3.9, phos 3.4.  Vit D 27,  no issue     S/p Liver transplant: 3/31/2015 on tacrolimus 1mg bid, tacro level 3.5    DM2: controlled now, last A1c 6.4  --  on metformin 1000 mg.      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       Patient seen and discussed  with Dr. Carol Wagoner  Nephrology Fellow  Pager: [phone]      Assessment and plan was discussed with patient and he voiced his understanding and agreement.    Reason for Visit:  Eric Andrew is a 69 year old male with CKD from unresolved JOSE during livertransplant, who presents for routine follow up.     Interval history:  Since last visit, he was admitted for  left total parotidectomy due to squamous cell carcinoma 4/11/2019, since then he feels fine and back to his usual way of life , he has no specific complaint, his appetite is good denies any nausea or vomiting, no fever or chills, his energy level is good and no limitation to his activity, no chest pain or sob no leg swelling no diarrhea or constipation. His home blood pressure is controlled around 130-135.  No other complaint.       Home BP: controlled    Baseline Cr: 1.6    ROS:  A comprehensive review of systems was obtained and negative, except as noted in the HPI or PMH.    Active Medical Problems:  Patient Active Problem List   Diagnosis     Alpha-1-antitrypsin deficiency (H)     Anemia     Thrombocytopenia (H)     Liver replaced by transplant (H)     Immunosuppressed status (H)     EZEQUIEL (acute kidney injury) (H)     Edema     Hives     Hypomagnesemia     Squamous cell carcinoma of skin of face     Secondary malignancy of lymph nodes of head, face and neck (H)     H/O parotidectomy       Personal Hx:   Social History     Socioeconomic History     Marital status:      Spouse name: Not on file     Number of children: Not on file     Years of education: Not on file     Highest education level: Not on file   Occupational History     Not on file   Social Needs     Financial resource strain: Not on file     Food insecurity:     Worry: Not on file     Inability: Not on file     Transportation needs:     Medical: Not on file     Non-medical: Not on file   Tobacco Use     Smoking status: Never Smoker     Smokeless tobacco: Never Used     Tobacco comment: [id number] occational smoker   Substance and Sexual Activity     Alcohol use: Yes     Alcohol/week: 0.0 oz     Comment: 2-3 glass of wine per week. At most 1 per day     Drug use: No     Sexual activity: Never   Lifestyle     Physical activity:     Days per week: Not on file     Minutes per session: Not on file     Stress: Not on file   Relationships     Social  "connections:     Talks on phone: Not on file     Gets together: Not on file     Attends L[employer] service: Not on file     Active member of club or organization: Not on file     Attends meetings of clubs or organizations: Not on file     Relationship status: Not on file     Intimate partner violence:     Fear of current or ex partner: Not on file     Emotionally abused: Not on file     Physically abused: Not on file     Forced sexual activity: Not on file   Other Topics Concern     Parent/sibling w/ CABG, MI or angioplasty before 65F 55M? Not Asked   Social History Narrative     Not on file       Allergies:  Allergies   Allergen Reactions     Cefazolin Swelling     Lips swelled while patient was in the OR      Lisinopril Cough     Meropenem Hives and Swelling     Developed hives and lip swelling while on meropenem and micafungin.  Resolved with discontinuation.  Unclear which was cause.     Micafungin Hives and Swelling     Developed hives and lip swelling while on meropenem and micafungin.  Resolved with discontinuation.  Unclear which was cause.       Medications:  Current Outpatient Medications   Medication     AMLODIPINE BESYLATE PO     atorvastatin (LIPITOR) 20 MG tablet     BASAGLAR 100 UNIT/ML injection     Calcium Carbonate-Vitamin D (CALCIUM + D PO)     diphenhydrAMINE HCl (BENADRYL PO)     losartan (COZAAR) 25 MG tablet     metFORMIN (GLUCOPHAGE) 1000 MG tablet     metoprolol succinate (TOPROL-XL) 25 MG 24 hr tablet     Multiple Vitamins-Minerals (MULTIVITAL) TABS     mupirocin (BACTROBAN) 2 % external ointment     sildenafil (VIAGRA) 50 MG tablet     tacrolimus (GENERIC EQUIVALENT) 1 MG capsule     No current facility-administered medications for this visit.        Vitals:  /82   Pulse 73   Temp 97.7  F (36.5  C) (Oral)   Ht 1.905 m (6' 3\")   Wt 119.7 kg (263 lb 12.8 oz)   SpO2 97%   BMI 32.97 kg/m       Exam:  GENERAL APPEARANCE: alert and no distress  HENT: mouth without ulcers or " lesions  LYMPHATICS: no cervical or supraclavicular nodes  RESP: lungs clear to auscultation - no rales, rhonchi or wheezes  CV: regular rhythm, normal rate, no rub, no murmur  EDEMA: no LE edema bilaterally  ABDOMEN: soft, nondistended, nontender, bowel sounds normal  MS: extremities normal - no gross deformities noted, no evidence of inflammation in joints, no muscle tenderness  SKIN: no rash    Results:  Electrolytes/Renal -   Recent Labs   Lab Test 04/23/19  0840 04/17/19  1056 04/12/19  0653  08/01/18  1420  05/01/17  1531  10/29/15  1129 09/22/15  0910    136 137   < > 140   < > 142   < > 141 141   POTASSIUM 4.0 3.8 4.7   < > 4.8   < > 4.4   < > 4.8 5.2   CHLORIDE 105 103 108   < > 106   < > 106   < > 110* 110*   CO2 25 26 21   < > 26   < > 26   < > 25 26   BUN 31* 29 36*   < > 35*   < > 43*   < > 46* 41*   CR 1.49* 1.45* 1.57*   < > 1.68*   < > 1.65*   < > 1.56* 1.52*   * 150* 233*   < > 116*   < > 114*   < > 118* 95   SHAYNE 9.0 9.4 8.2*   < > 9.4   < > 8.8   < > 8.6 8.9   MAG  --   --  1.5*  --   --   --   --   --  1.7 2.3   PHOS 3.0  --   --   --  3.4  --  3.0   < > 3.4 2.9    < > = values in this interval not displayed.       CBC -   Recent Labs   Lab Test 04/23/19  0840 04/17/19  1056 04/12/19  0653   WBC 5.5 6.6 7.0   HGB 13.0* 13.8 12.4*   * 134* 139*       LFTs -   Recent Labs   Lab Test 04/23/19  0840 04/17/19  1056 04/13/19  0737   ALKPHOS 121 180* 101   BILITOTAL 0.4 0.7 0.7   ALT 30 94* 138*   AST 11 30 38   PROTTOTAL 7.4 7.4 7.0   ALBUMIN 3.6 3.5 3.4       Coags -   Recent Labs   Lab Test 04/30/15  0907 04/11/15  2000 04/06/15  0521  03/31/15  1250 03/31/15  1142   INR 1.28* 1.05 1.24*   < > 2.76* 3.04*   PTT  --  31  --   --  111* 62*    < > = values in this interval not displayed.       Iron Panel -   Recent Labs   Lab Test 08/06/16  0945 11/10/14  1051 10/30/14  1443   IRON 62 142 131   IRONSAT 29 98* 83*   RAINE 125 673* 905*       Endocrine -   Recent Labs   Lab Test  04/12/19  0653 04/05/19  1605 06/02/17  0859  07/15/14  0711   A1C 6.4* 6.2* 5.5   < >  --    TSH  --   --   --   --  3.66    < > = values in this interval not displayed.     This patient has been evaluated by me, Arnel Medina MD, on 4/24/19.  I discussed with the fellow, Dr. Wagoner, and agree with the findings and plan in this note.  I have reviewed medications, vital signs and labs.       Total time I spent was >40 minutes, and more than 50% of face to face time with the patient was spent in counseling and/or coordination of care regarding principles of multidisciplinary care, medication management, and CKD education.    Arnel Medina MD    Again, thank you for allowing me to participate in the care of your patient.      Sincerely,    Padma Wagoner MD

## 2019-04-24 NOTE — LETTER
4/24/2019      RE: Eric Andrew  [address] [place] [address]       Assessment and Plan:   69 year old pleasant male with PMH of alpha 1 anti trypsin deficiency, cirrhosis status post liver transplant in 3/2015, HTN  and  CKD since  1/2015, recent diagnosis of Diabetes in 2/2017 with Hba1c of 11.5 in 2/2017 . Exact etiology unclear.  Most likely in the setting of hemodynamic effects from  underlying liver disease in the past  . Baseline serum Cr appears to be 1.6 to 1.7. Long term use of CNI also may be contributing.    CKD stage 3b:  GFR 48, baseline cr 1.5- 1.6, no proteinuria, pt had jose after liver transplant since 2015 and never recovered.  -- cont suppurative care, avoid nephrotoxic medication   -- follow up in 1 year.      BP and volume: controlled, on losartan 25mg and metoprolol 25mg, amlodipine 10 mg, euvolemic in exam.     Electrolyte and acid base: K 4.0 bicrab 25 no issue.      Anemia: hgb stable 13.3 no issue    Thrombocytopenia: stable Plt 139 managed by other provider       BMD: ca 9.4, albumin 3.9, phos 3.4.  Vit D 27,  no issue     S/p Liver transplant: 3/31/2015 on tacrolimus 1mg bid, tacro level 3.5    DM2: controlled now, last A1c 6.4  --  on metformin 1000 mg.      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       Patient seen and discussed  with Dr. Carol Wagoner  Nephrology Fellow  Pager: [phone]      Assessment and plan was discussed with patient and he voiced his understanding and agreement.    Reason for Visit:  Eric Andrew is a 69 year old male with CKD from unresolved JOSE during livertransplant, who presents for routine follow up.     Interval history:  Since last visit, he was admitted for left total parotidectomy due to squamous cell carcinoma 4/11/2019, since then he feels fine and back to his usual way of life , he has no specific complaint, his appetite is good denies any nausea  or vomiting, no fever or chills, his energy level is good and no limitation to his activity, no chest pain or sob no leg swelling no diarrhea or constipation. His home blood pressure is controlled around 130-135.  No other complaint.       Home BP: controlled    Baseline Cr: 1.6    ROS:  A comprehensive review of systems was obtained and negative, except as noted in the HPI or PMH.    Active Medical Problems:  Patient Active Problem List   Diagnosis     Alpha-1-antitrypsin deficiency (H)     Anemia     Thrombocytopenia (H)     Liver replaced by transplant (H)     Immunosuppressed status (H)     EZEQUIEL (acute kidney injury) (H)     Edema     Hives     Hypomagnesemia     Squamous cell carcinoma of skin of face     Secondary malignancy of lymph nodes of head, face and neck (H)     H/O parotidectomy       Personal Hx:   Social History     Socioeconomic History     Marital status:      Spouse name: Not on file     Number of children: Not on file     Years of education: Not on file     Highest education level: Not on file   Occupational History     Not on file   Social Needs     Financial resource strain: Not on file     Food insecurity:     Worry: Not on file     Inability: Not on file     Transportation needs:     Medical: Not on file     Non-medical: Not on file   Tobacco Use     Smoking status: Never Smoker     Smokeless tobacco: Never Used     Tobacco comment: [id number] occational smoker   Substance and Sexual Activity     Alcohol use: Yes     Alcohol/week: 0.0 oz     Comment: 2-3 glass of wine per week. At most 1 per day     Drug use: No     Sexual activity: Never   Lifestyle     Physical activity:     Days per week: Not on file     Minutes per session: Not on file     Stress: Not on file   Relationships     Social connections:     Talks on phone: Not on file     Gets together: Not on file     Attends Nondenominational service: Not on file     Active member of club or organization: Not on file     Attends meetings of  "clubs or organizations: Not on file     Relationship status: Not on file     Intimate partner violence:     Fear of current or ex partner: Not on file     Emotionally abused: Not on file     Physically abused: Not on file     Forced sexual activity: Not on file   Other Topics Concern     Parent/sibling w/ CABG, MI or angioplasty before 65F 55M? Not Asked   Social History Narrative     Not on file       Allergies:  Allergies   Allergen Reactions     Cefazolin Swelling     Lips swelled while patient was in the OR      Lisinopril Cough     Meropenem Hives and Swelling     Developed hives and lip swelling while on meropenem and micafungin.  Resolved with discontinuation.  Unclear which was cause.     Micafungin Hives and Swelling     Developed hives and lip swelling while on meropenem and micafungin.  Resolved with discontinuation.  Unclear which was cause.       Medications:  Current Outpatient Medications   Medication     AMLODIPINE BESYLATE PO     atorvastatin (LIPITOR) 20 MG tablet     BASAGLAR 100 UNIT/ML injection     Calcium Carbonate-Vitamin D (CALCIUM + D PO)     diphenhydrAMINE HCl (BENADRYL PO)     losartan (COZAAR) 25 MG tablet     metFORMIN (GLUCOPHAGE) 1000 MG tablet     metoprolol succinate (TOPROL-XL) 25 MG 24 hr tablet     Multiple Vitamins-Minerals (MULTIVITAL) TABS     mupirocin (BACTROBAN) 2 % external ointment     sildenafil (VIAGRA) 50 MG tablet     tacrolimus (GENERIC EQUIVALENT) 1 MG capsule     No current facility-administered medications for this visit.        Vitals:  /82   Pulse 73   Temp 97.7  F (36.5  C) (Oral)   Ht 1.905 m (6' 3\")   Wt 119.7 kg (263 lb 12.8 oz)   SpO2 97%   BMI 32.97 kg/m       Exam:  GENERAL APPEARANCE: alert and no distress  HENT: mouth without ulcers or lesions  LYMPHATICS: no cervical or supraclavicular nodes  RESP: lungs clear to auscultation - no rales, rhonchi or wheezes  CV: regular rhythm, normal rate, no rub, no murmur  EDEMA: no LE edema " bilaterally  ABDOMEN: soft, nondistended, nontender, bowel sounds normal  MS: extremities normal - no gross deformities noted, no evidence of inflammation in joints, no muscle tenderness  SKIN: no rash    Results:  Electrolytes/Renal -   Recent Labs   Lab Test 04/23/19  0840 04/17/19  1056 04/12/19  0653  08/01/18  1420  05/01/17  1531  10/29/15  1129 09/22/15  0910    136 137   < > 140   < > 142   < > 141 141   POTASSIUM 4.0 3.8 4.7   < > 4.8   < > 4.4   < > 4.8 5.2   CHLORIDE 105 103 108   < > 106   < > 106   < > 110* 110*   CO2 25 26 21   < > 26   < > 26   < > 25 26   BUN 31* 29 36*   < > 35*   < > 43*   < > 46* 41*   CR 1.49* 1.45* 1.57*   < > 1.68*   < > 1.65*   < > 1.56* 1.52*   * 150* 233*   < > 116*   < > 114*   < > 118* 95   SHAYNE 9.0 9.4 8.2*   < > 9.4   < > 8.8   < > 8.6 8.9   MAG  --   --  1.5*  --   --   --   --   --  1.7 2.3   PHOS 3.0  --   --   --  3.4  --  3.0   < > 3.4 2.9    < > = values in this interval not displayed.       CBC -   Recent Labs   Lab Test 04/23/19  0840 04/17/19  1056 04/12/19  0653   WBC 5.5 6.6 7.0   HGB 13.0* 13.8 12.4*   * 134* 139*       LFTs -   Recent Labs   Lab Test 04/23/19  0840 04/17/19  1056 04/13/19  0737   ALKPHOS 121 180* 101   BILITOTAL 0.4 0.7 0.7   ALT 30 94* 138*   AST 11 30 38   PROTTOTAL 7.4 7.4 7.0   ALBUMIN 3.6 3.5 3.4       Coags -   Recent Labs   Lab Test 04/30/15  0907 04/11/15  2000 04/06/15  0521  03/31/15  1250 03/31/15  1142   INR 1.28* 1.05 1.24*   < > 2.76* 3.04*   PTT  --  31  --   --  111* 62*    < > = values in this interval not displayed.       Iron Panel -   Recent Labs   Lab Test 08/06/16  0945 11/10/14  1051 10/30/14  1443   IRON 62 142 131   IRONSAT 29 98* 83*   RAINE 125 673* 905*       Endocrine -   Recent Labs   Lab Test 04/12/19  0653 04/05/19  1605 06/02/17  0859  07/15/14  0711   A1C 6.4* 6.2* 5.5   < >  --    TSH  --   --   --   --  3.66    < > = values in this interval not displayed.     This patient has been  evaluated by me, Arnel Medina MD, on 4/24/19.  I discussed with the fellow, Dr. Wagoner, and agree with the findings and plan in this note.  I have reviewed medications, vital signs and labs.       Total time I spent was >40 minutes, and more than 50% of face to face time with the patient was spent in counseling and/or coordination of care regarding principles of multidisciplinary care, medication management, and CKD education.    MD Padma Gordon MD

## 2019-04-29 ENCOUNTER — ALLIED HEALTH/NURSE VISIT (OUTPATIENT)
Dept: RADIATION ONCOLOGY | Facility: CLINIC | Age: 69
End: 2019-04-29
Attending: RADIOLOGY
Payer: MEDICARE

## 2019-04-29 DIAGNOSIS — R13.10 DYSPHAGIA: Primary | ICD-10-CM

## 2019-04-29 PROCEDURE — 77334 RADIATION TREATMENT AID(S): CPT | Performed by: RADIOLOGY

## 2019-05-01 ENCOUNTER — TELEPHONE (OUTPATIENT)
Dept: SPEECH THERAPY | Facility: CLINIC | Age: 69
End: 2019-05-01

## 2019-05-01 NOTE — PROGRESS NOTES
Dear Dr. Jimenez:    I had the pleasure of seeing Eric Andrew in follow-up today at the [facility].     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  He underwent surgery by dermatologist in [place] for this.  Per his report this was not done with Mohs surgery but was told that he had negative margins.  He says that shortly after the surgery he noticed a lump along his mandible/below the ear.  He went in for his 1 month follow-up with a dermatologist and at that time the mass had increased in size.  She thought it was a reactive node but offered a biopsy. Per review of the notes they proceeded with a punch biopsy of the parotid mass.  This was consistent with invasive squamous cell carcinoma without any epidermal involvement.  His preoperative PET scan showed only involvement of the parotid. He was taken to the OR on 4/11/2019 for a left total parotidectomy with resection of skin, preservation of the facial nerve, sacrifice of the greater auricular nerve, level I-V neck dissection, cervicofacial flap. Final pathology showed a 2.1 cm moderately differentiated SCC within the subcutaneous tissues and parotid, PNI, no LVSI, negative margins, 0/47 cervical nodes.    Interval history:  He comes in today for follow-up. He was last seen for a postoperative visit. He is scheduled for his pre-XRT baseline swallow study next week. He is starting radiation therapy next week on 5/9/19. He comes in today for a wound check prior to starting radiation. He saw the nephrology team on 4/24/2019. He saw dermatology this week and was found to have a new CIS of the right temporal region and they are planning on Mohs. He is feeling a bit down over this diagnosis and his recovery process. He is frustrated by the tightness and stiffness in  his left neck and feels that there is a aretha in his neck. He notices some trouble with swallowing saying that he has to chew his food well, takes longer to swallow. He is able to take pills but says he now has to use water to get them down. He thinks the lip weakness is improving as noticed by decrease in the leak when playing the trombone, and the range at which he experiences the leak is less. He is not noticing any restrictions in his shoulder.       MEDICATIONS:     Current Outpatient Medications   Medication Sig Dispense Refill     AMLODIPINE BESYLATE PO Take 10 mg by mouth every morning        atorvastatin (LIPITOR) 20 MG tablet Take 20 mg by mouth every evening        BASAGLAR 100 UNIT/ML injection Inject 45 Units Subcutaneous At Bedtime        Calcium Carbonate-Vitamin D (CALCIUM + D PO) Take 1 tablet by mouth every morning        diphenhydrAMINE HCl (BENADRYL PO) Take by mouth nightly as needed       losartan (COZAAR) 25 MG tablet Take 1 tablet (25 mg) by mouth daily (Patient taking differently: Take 50 mg by mouth every morning ) 90 tablet 3     metFORMIN (GLUCOPHAGE) 1000 MG tablet Take 1,000 mg by mouth 2 times daily (with meals)        metoprolol succinate (TOPROL-XL) 25 MG 24 hr tablet Take 25 mg by mouth every morning        Multiple Vitamins-Minerals (MULTIVITAL) TABS Take 1 tablet by mouth every morning        mupirocin (BACTROBAN) 2 % external ointment Apply topically 3 times daily 30 g 0     sildenafil (VIAGRA) 50 MG tablet Take 50 mg by mouth daily as needed for erectile dysfunction       tacrolimus (GENERIC EQUIVALENT) 1 MG capsule Take 1 capsule (1 mg) by mouth every 12 hours (Patient taking differently: Take 1 mg by mouth 2 times daily ) 180 capsule 3       ALLERGIES:    Allergies   Allergen Reactions     Cefazolin Swelling     Lips swelled while patient was in the OR      Lisinopril Cough     Meropenem Hives and Swelling     Developed hives and lip swelling while on meropenem and micafungin.   Resolved with discontinuation.  Unclear which was cause.     Micafungin Hives and Swelling     Developed hives and lip swelling while on meropenem and micafungin.  Resolved with discontinuation.  Unclear which was cause.       HABITS/SOCIAL HISTORY:   Spends the shin in [place].  .  He is a retired .   He smokes for a few years and quit back in 1973.  He has no chewing tobacco use.  He has 1 alcoholic beverage about 3 times per week.   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    Social History     Socioeconomic History     Marital status:      Spouse name: Not on file     Number of children: Not on file     Years of education: Not on file     Highest education level: Not on file   Occupational History     Not on file   Social Needs     Financial resource strain: Not on file     Food insecurity:     Worry: Not on file     Inability: Not on file     Transportation needs:     Medical: Not on file     Non-medical: Not on file   Tobacco Use     Smoking status: Never Smoker     Smokeless tobacco: Never Used     Tobacco comment: [id number] occational smoker   Substance and Sexual Activity     Alcohol use: Yes     Alcohol/week: 0.0 oz     Comment: 2-3 glass of wine per week. At most 1 per day     Drug use: No     Sexual activity: Never   Lifestyle     Physical activity:     Days per week: Not on file     Minutes per session: Not on file     Stress: Not on file   Relationships     Social connections:     Talks on phone: Not on file     Gets together: Not on file     Attends Jewish service: Not on file     Active member of club or organization: Not on file     Attends meetings of clubs or organizations: Not on file     Relationship status: Not on file     Intimate partner violence:     Fear of current or ex partner: Not on file     Emotionally abused: Not on file     Physically abused: Not on file     Forced sexual activity: Not on file   Other Topics Concern     Parent/sibling w/ CABG, MI or  angioplasty before 65F 55M? Not Asked   Social History Narrative     Not on file       PAST MEDICAL HISTORY:   Past Medical History:   Diagnosis Date     Alpha-1-antitrypsin deficiency (H)      Ascites     Pre-transplant     Chronic kidney disease, stage 3 (H)      Cirrhosis (H)     Alpha-1-antitrypsin deficiency, s/p liver transplant 3/31/15     Gout      HTN (hypertension)      Lentigo maligna melanoma (H)     lentigo maligna melanoma excised on 2009 with a repeat wide excision on 2009.      Liver replaced by transplant (H) 2015     Nephrolithiasis      Osteoarthritis      Portal hypertension (H)     Pre-transplant        PAST SURGICAL HISTORY:   Past Surgical History:   Procedure Laterality Date     COLONOSCOPY N/A 2014    Procedure: COLONOSCOPY;  Surgeon: Katherine Jimenez MD;  Location:  GI     ESOPHAGOSCOPY, GASTROSCOPY, DUODENOSCOPY (EGD), COMBINED N/A 2014    Procedure: COMBINED ESOPHAGOSCOPY, GASTROSCOPY, DUODENOSCOPY (EGD), BIOPSY SINGLE OR MULTIPLE;  Surgeon: Katherine Jimenez MD;  Location:  GI     ESOPHAGOSCOPY, GASTROSCOPY, DUODENOSCOPY (EGD), COMBINED N/A 2015    Procedure: COMBINED ESOPHAGOSCOPY, GASTROSCOPY, DUODENOSCOPY (EGD), REMOVE FOREIGN BODY;  Surgeon: Katherine Jimenez MD;  Location:  GI     HERNIA REPAIR      abdominal     INSERT SHUNT PORTAL TRANSJUGULAR INTRAHEPTIC       ORTHOPEDIC SURGERY      bilateral knee surgery     PAROTIDECTOMY, RADICAL NECK DISSECTION Left 2019    Procedure: Total Parotidectomy, Excision of Skin, Neck Dissection Levels I - V,  And Local Advancement Flap;  Surgeon: Johanna Moreland MD;  Location:  OR     TRANSPLANT LIVER RECIPIENT  DONOR N/A 3/31/2015    Procedure: TRANSPLANT LIVER RECIPIENT  DONOR;  Surgeon: Elia Mehta MD;  Location:  OR       FAMILY HISTORY:    Family History   Problem Relation Age of Onset     Cardiovascular Father         MI      Glaucoma No family hx of      Macular Degeneration No family hx of        REVIEW OF SYSTEMS:  12 point ROS was negative other than the symptoms noted above in the HPI.  Patient Supplied Answers to Review of Systems  [facility] ENT ROS 5/3/2019   Musculoskeletal Neck pain   Skin Skin lesions         PHYSICAL EXAMINATION:   /90   Pulse 78   Wt 122.5 kg (270 lb)   SpO2 100%   BMI 33.75 kg/m     Patient in no apparent distress  Flat and depressed affect - new from previous  Breathing comfortably on room air  Complete eye closure with good forehead movement, improvement in the midface and lower lip weakness - II/VI  Parotid and neck incision healed, some prominence along the SCM on the left in the area of the cervicofacial flap, some mild lymphedema  Cerviofacial flap is well healed    PROCEDURE:  Flexible fiberoptic laryngoscopy: Scope exam was indicated due to dysphagia. Verbal consent was obtained. The nasal cavity was prepped with an aerosolized solution of topical anesthetic and vasoconstrictive agent. The scope was passed through the anterior nasal cavity and advanced. Inspection of the nasopharynx revealed no gross abnormality. The base of tongue and vallecula are normal. The epiglottis, AE folds, false cords, true cords, arytenoids are normal.  Inspection of the larynx revealed bilaterally mobile vocal cords. Pyriform sinuses are symmetric. The airway is patent. Procedure tolerated well with no immediate complications noted.      RESULTS REVIEWED:       IMPRESSION AND PLAN:   Eric Andrew is a 69-year-old man who has a history of a liver transplant and recent squamous cell carcinoma of the left cheek who now has 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 He underwent total parotidectomy, skin resection, resection of greater auricular nerve, level I-V neck dissection, cervicofacial flap on 4/11/2019. Final pathology showed the 1 metastatic deposit in the parotid. He is scheduled to start postoperative radiation on  5/9/2019 with Dr Floyd.    We discussed that the feelings of frustration and anxiety are common with a cancer diagnosis and going through treatment. I offered to start him on treatment with celexa for depression but he declined at this time. We also discussed possible referral to [facility] for therapy support. At this time he declines and thinks this is mostly responsive to the small frustrations of recovery process and the new CIS diagnosis. He will let us know if he decides he would like to pursue these further.     His facial nerve is improving. I would like him to still try to meet with Holly Miller for further rehab support given his trombone playing. He does seem to be rehabilitating this quite a bit on his own. He will also need to meet with our swallowing specialized SLP team (Eliana and Jenna) during radiation given their expertise with regards to radiation induced dysphagia. He has his baseline video swallow study next week.     We discussed ROM exercises and lymphedema therapy for his neck. We will place a referral for lymphedema therapy and suggested he use massage and his jaw bra in the interim.    I will see him back in clinic about 6 weeks after completion of his treatment. He knows that I am happy to see him during his treatment if issues arise.       Thank you very much for the opportunity to participate in the care of your patient.      Johanna Moreland M.D.  Otolaryngology- Head & Neck Surgery      This note was dictated with voice recognition software and then edited. Please excuse any unintentional errors.         CC:  Katherine Jimenez MD  [address]  [facility] [address]      Naomi Rodriguez, RN  Liver Coordinator  [facility]      Aston Devine MD  [facility]  [address]  [facility] [address]

## 2019-05-02 ENCOUNTER — TELEPHONE (OUTPATIENT)
Dept: SPEECH THERAPY | Facility: CLINIC | Age: 69
End: 2019-05-02

## 2019-05-02 NOTE — TELEPHONE ENCOUNTER
Left VM with patient letting him know Date/Times of Radiation Swallow treatment appointments for while he is having Radiation.  Let him know the purpose of these appointments in the VM.  Let him know that we are recommending that he also have at least one additional treatment after radiation is complete.  Left my direct number in case we need to reschedule any of his treatments during radiation and to schedule the appointment for after treatment.

## 2019-05-03 ENCOUNTER — OFFICE VISIT (OUTPATIENT)
Dept: OTOLARYNGOLOGY | Facility: CLINIC | Age: 69
End: 2019-05-03
Payer: MEDICARE

## 2019-05-03 VITALS
WEIGHT: 270 LBS | SYSTOLIC BLOOD PRESSURE: 166 MMHG | OXYGEN SATURATION: 100 % | HEART RATE: 78 BPM | DIASTOLIC BLOOD PRESSURE: 90 MMHG | BODY MASS INDEX: 33.75 KG/M2

## 2019-05-03 DIAGNOSIS — C44.320 SQUAMOUS CELL CARCINOMA OF SKIN OF FACE: Primary | ICD-10-CM

## 2019-05-03 ASSESSMENT — PAIN SCALES - GENERAL: PAINLEVEL: MILD PAIN (2)

## 2019-05-03 NOTE — LETTER
5/3/2019       RE: Eric Andrew  [address] [place] [address]     Dear Colleague,    Thank you for referring your patient, Eric Andrew, to the [facility] EAR NOSE AND THROAT at [facility]. Please see a copy of my visit note below.    Dear Dr. Jimenez:    I had the pleasure of seeing Eric Andrew in follow-up today at the [facility].     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  He underwent surgery by dermatologist in [place] for this.  Per his report this was not done with Mohs surgery but was told that he had negative margins.  He says that shortly after the surgery he noticed a lump along his mandible/below the ear.  He went in for his 1 month follow-up with a dermatologist and at that time the mass had increased in size.  She thought it was a reactive node but offered a biopsy. Per review of the notes they proceeded with a punch biopsy of the parotid mass.  This was consistent with invasive squamous cell carcinoma without any epidermal involvement.  His preoperative PET scan showed only involvement of the parotid. He was taken to the OR on 4/11/2019 for a left total parotidectomy with resection of skin, preservation of the facial nerve, sacrifice of the greater auricular nerve, level I-V neck dissection, cervicofacial flap. Final pathology showed a 2.1 cm moderately differentiated SCC within the subcutaneous tissues and parotid, PNI, no LVSI, negative margins, 0/47 cervical nodes.    Interval history:  He comes in today for follow-up. He was last seen for a postoperative visit. He is scheduled for his pre-XRT baseline swallow study next week. He is starting radiation therapy next week on 5/9/19. He comes in today for a wound check prior to starting  radiation. He saw the nephrology team on 4/24/2019. He saw dermatology this week and was found to have a new CIS of the right temporal region and they are planning on Mohs. He is feeling a bit down over this diagnosis and his recovery process. He is frustrated by the tightness and stiffness in his left neck and feels that there is a aretha in his neck. He notices some trouble with swallowing saying that he has to chew his food well, takes longer to swallow. He is able to take pills but says he now has to use water to get them down. He thinks the lip weakness is improving as noticed by decrease in the leak when playing the trombone, and the range at which he experiences the leak is less. He is not noticing any restrictions in his shoulder.       MEDICATIONS:     Current Outpatient Medications   Medication Sig Dispense Refill     AMLODIPINE BESYLATE PO Take 10 mg by mouth every morning        atorvastatin (LIPITOR) 20 MG tablet Take 20 mg by mouth every evening        BASAGLAR 100 UNIT/ML injection Inject 45 Units Subcutaneous At Bedtime        Calcium Carbonate-Vitamin D (CALCIUM + D PO) Take 1 tablet by mouth every morning        diphenhydrAMINE HCl (BENADRYL PO) Take by mouth nightly as needed       losartan (COZAAR) 25 MG tablet Take 1 tablet (25 mg) by mouth daily (Patient taking differently: Take 50 mg by mouth every morning ) 90 tablet 3     metFORMIN (GLUCOPHAGE) 1000 MG tablet Take 1,000 mg by mouth 2 times daily (with meals)        metoprolol succinate (TOPROL-XL) 25 MG 24 hr tablet Take 25 mg by mouth every morning        Multiple Vitamins-Minerals (MULTIVITAL) TABS Take 1 tablet by mouth every morning        mupirocin (BACTROBAN) 2 % external ointment Apply topically 3 times daily 30 g 0     sildenafil (VIAGRA) 50 MG tablet Take 50 mg by mouth daily as needed for erectile dysfunction       tacrolimus (GENERIC EQUIVALENT) 1 MG capsule Take 1 capsule (1 mg) by mouth every 12 hours (Patient taking differently:  Take 1 mg by mouth 2 times daily ) 180 capsule 3       ALLERGIES:    Allergies   Allergen Reactions     Cefazolin Swelling     Lips swelled while patient was in the OR      Lisinopril Cough     Meropenem Hives and Swelling     Developed hives and lip swelling while on meropenem and micafungin.  Resolved with discontinuation.  Unclear which was cause.     Micafungin Hives and Swelling     Developed hives and lip swelling while on meropenem and micafungin.  Resolved with discontinuation.  Unclear which was cause.       HABITS/SOCIAL HISTORY:   Spends the shin in [place].  .  He is a retired .   He smokes for a few years and quit back in 1973.  He has no chewing tobacco use.  He has 1 alcoholic beverage about 3 times per week.   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    Social History     Socioeconomic History     Marital status:      Spouse name: Not on file     Number of children: Not on file     Years of education: Not on file     Highest education level: Not on file   Occupational History     Not on file   Social Needs     Financial resource strain: Not on file     Food insecurity:     Worry: Not on file     Inability: Not on file     Transportation needs:     Medical: Not on file     Non-medical: Not on file   Tobacco Use     Smoking status: Never Smoker     Smokeless tobacco: Never Used     Tobacco comment: [id number] occational smoker   Substance and Sexual Activity     Alcohol use: Yes     Alcohol/week: 0.0 oz     Comment: 2-3 glass of wine per week. At most 1 per day     Drug use: No     Sexual activity: Never   Lifestyle     Physical activity:     Days per week: Not on file     Minutes per session: Not on file     Stress: Not on file   Relationships     Social connections:     Talks on phone: Not on file     Gets together: Not on file     Attends Christianity service: Not on file     Active member of club or organization: Not on file     Attends meetings of clubs or organizations: Not on  file     Relationship status: Not on file     Intimate partner violence:     Fear of current or ex partner: Not on file     Emotionally abused: Not on file     Physically abused: Not on file     Forced sexual activity: Not on file   Other Topics Concern     Parent/sibling w/ CABG, MI or angioplasty before 65F 55M? Not Asked   Social History Narrative     Not on file       PAST MEDICAL HISTORY:   Past Medical History:   Diagnosis Date     Alpha-1-antitrypsin deficiency (H)      Ascites     Pre-transplant     Chronic kidney disease, stage 3 (H)      Cirrhosis (H)     Alpha-1-antitrypsin deficiency, s/p liver transplant 3/31/15     Gout      HTN (hypertension)      Lentigo maligna melanoma (H) 2009    lentigo maligna melanoma excised on 01/29/2009 with a repeat wide excision on 03/30/2009.      Liver replaced by transplant (H) 03/31/2015     Nephrolithiasis      Osteoarthritis      Portal hypertension (H)     Pre-transplant        PAST SURGICAL HISTORY:   Past Surgical History:   Procedure Laterality Date     COLONOSCOPY N/A 12/18/2014    Procedure: COLONOSCOPY;  Surgeon: Katherine Jimenez MD;  Location:  GI     ESOPHAGOSCOPY, GASTROSCOPY, DUODENOSCOPY (EGD), COMBINED N/A 12/18/2014    Procedure: COMBINED ESOPHAGOSCOPY, GASTROSCOPY, DUODENOSCOPY (EGD), BIOPSY SINGLE OR MULTIPLE;  Surgeon: Katherine Jimenez MD;  Location:  GI     ESOPHAGOSCOPY, GASTROSCOPY, DUODENOSCOPY (EGD), COMBINED N/A 5/28/2015    Procedure: COMBINED ESOPHAGOSCOPY, GASTROSCOPY, DUODENOSCOPY (EGD), REMOVE FOREIGN BODY;  Surgeon: Katherine Jimenez MD;  Location:  GI     HERNIA REPAIR      abdominal     INSERT SHUNT PORTAL TRANSJUGULAR INTRAHEPTIC       ORTHOPEDIC SURGERY      bilateral knee surgery     PAROTIDECTOMY, RADICAL NECK DISSECTION Left 4/11/2019    Procedure: Total Parotidectomy, Excision of Skin, Neck Dissection Levels I - V,  And Local Advancement Flap;  Surgeon: Johanna Moreland MD;  Location:   OR     TRANSPLANT LIVER RECIPIENT  DONOR N/A 3/31/2015    Procedure: TRANSPLANT LIVER RECIPIENT  DONOR;  Surgeon: Elia Mehta MD;  Location: [facility] OR       FAMILY HISTORY:    Family History   Problem Relation Age of Onset     Cardiovascular Father         MI     Glaucoma No family hx of      Macular Degeneration No family hx of        REVIEW OF SYSTEMS:  12 point ROS was negative other than the symptoms noted above in the HPI.  Patient Supplied Answers to Review of Systems  [facility] ENT ROS 5/3/2019   Musculoskeletal Neck pain   Skin Skin lesions         PHYSICAL EXAMINATION:   /90   Pulse 78   Wt 122.5 kg (270 lb)   SpO2 100%   BMI 33.75 kg/m      Patient in no apparent distress  Flat and depressed affect - new from previous  Breathing comfortably on room air  Complete eye closure with good forehead movement, improvement in the midface and lower lip weakness - II/VI  Parotid and neck incision healed, some prominence along the SCM on the left in the area of the cervicofacial flap, some mild lymphedema  Cerviofacial flap is well healed    PROCEDURE:  Flexible fiberoptic laryngoscopy: Scope exam was indicated due to dysphagia. Verbal consent was obtained. The nasal cavity was prepped with an aerosolized solution of topical anesthetic and vasoconstrictive agent. The scope was passed through the anterior nasal cavity and advanced. Inspection of the nasopharynx revealed no gross abnormality. The base of tongue and vallecula are normal. The epiglottis, AE folds, false cords, true cords, arytenoids are normal.  Inspection of the larynx revealed bilaterally mobile vocal cords. Pyriform sinuses are symmetric. The airway is patent. Procedure tolerated well with no immediate complications noted.      RESULTS REVIEWED:       IMPRESSION AND PLAN:   Eric Andrew is a 69-year-old man who has a history of a liver transplant and recent squamous cell carcinoma of the left cheek who now has metastatic  disease in his left parotid. He underwent total parotidectomy, skin resection, resection of greater auricular nerve, level I-V neck dissection, cervicofacial flap on 4/11/2019. Final pathology showed the 1 metastatic deposit in the parotid. He is scheduled to start postoperative radiation on 5/9/2019 with Dr Floyd.    We discussed that the feelings of frustration and anxiety are common with a cancer diagnosis and going through treatment. I offered to start him on treatment with celexa for depression but he declined at this time. We also discussed possible referral to [facility] for therapy support. At this time he declines and thinks this is mostly responsive to the small frustrations of recovery process and the new CIS diagnosis. He will let us know if he decides he would like to pursue these further.     His facial nerve is improving. I would like him to still try to meet with Holly Miller for further rehab support given his trombone playing. He does seem to be rehabilitating this quite a bit on his own. He will also need to meet with our swallowing specialized SLP team (Eliana and Jenna) during radiation given their expertise with regards to radiation induced dysphagia. He has his baseline video swallow study next week.     We discussed ROM exercises and lymphedema therapy for his neck. We will place a referral for lymphedema therapy and suggested he use massage and his jaw bra in the interim.    I will see him back in clinic about 6 weeks after completion of his treatment. He knows that I am happy to see him during his treatment if issues arise.       Thank you very much for the opportunity to participate in the care of your patient.      Johanna Moreland M.D.  Otolaryngology- Head & Neck Surgery      This note was dictated with voice recognition software and then edited. Please excuse any unintentional errors.     CC:  Katherine Jimenez MD  [address] [facility] [address]  [facility] [address]      Naomi Rodriguez,  RN  Liver Coordinator  [facility]      MD [facility]  [address] [facility] [address]  [facility] [address]

## 2019-05-03 NOTE — PATIENT INSTRUCTIONS
1. Please follow-up in clinic 4-6 weeks after completing radiation.  2. Please initiate lymphedema therapy. Call [phone] to schedule.  3. Please call the ENT clinic with any questions,concerns, new or worsening symptoms.    -Clinic number is [phone]

## 2019-05-09 ENCOUNTER — THERAPY VISIT (OUTPATIENT)
Dept: SPEECH THERAPY | Facility: CLINIC | Age: 69
End: 2019-05-09
Payer: MEDICARE

## 2019-05-09 ENCOUNTER — ANCILLARY PROCEDURE (OUTPATIENT)
Dept: GENERAL RADIOLOGY | Facility: CLINIC | Age: 69
End: 2019-05-09
Attending: OTOLARYNGOLOGY
Payer: MEDICARE

## 2019-05-09 DIAGNOSIS — C44.320 SQUAMOUS CELL CARCINOMA OF SKIN OF FACE: ICD-10-CM

## 2019-05-09 DIAGNOSIS — R13.12 OROPHARYNGEAL DYSPHAGIA: ICD-10-CM

## 2019-05-09 RX ORDER — BARIUM SULFATE 400 MG/ML
25 SUSPENSION ORAL ONCE
Status: COMPLETED | OUTPATIENT
Start: 2019-05-09 | End: 2019-05-09

## 2019-05-09 RX ADMIN — BARIUM SULFATE 25 ML: 400 SUSPENSION ORAL at 08:15

## 2019-05-13 ENCOUNTER — HOSPITAL ENCOUNTER (OUTPATIENT)
Dept: NUTRITION | Facility: CLINIC | Age: 69
Discharge: HOME | End: 2019-05-13
Attending: DIETITIAN, REGISTERED | Admitting: DIETITIAN, REGISTERED
Payer: MEDICARE

## 2019-05-13 ENCOUNTER — APPOINTMENT (OUTPATIENT)
Dept: RADIATION ONCOLOGY | Facility: CLINIC | Age: 69
End: 2019-05-13
Attending: RADIOLOGY
Payer: MEDICARE

## 2019-05-13 VITALS
DIASTOLIC BLOOD PRESSURE: 84 MMHG | BODY MASS INDEX: 34.25 KG/M2 | HEART RATE: 75 BPM | SYSTOLIC BLOOD PRESSURE: 142 MMHG | WEIGHT: 274 LBS

## 2019-05-13 DIAGNOSIS — C44.320 SQUAMOUS CELL CARCINOMA OF SKIN OF FACE: Primary | ICD-10-CM

## 2019-05-13 PROCEDURE — 77386 ZZH IMRT TREATMENT DELIVERY, COMPLEX: CPT | Performed by: RADIOLOGY

## 2019-05-13 PROCEDURE — 77333 RADIATION TREATMENT AID(S): CPT | Performed by: RADIOLOGY

## 2019-05-13 PROCEDURE — 77332 RADIATION TREATMENT AID(S): CPT | Performed by: RADIOLOGY

## 2019-05-13 PROCEDURE — 77331 SPECIAL RADIATION DOSIMETRY: CPT | Performed by: RADIOLOGY

## 2019-05-13 PROCEDURE — 97802 MEDICAL NUTRITION INDIV IN: CPT | Performed by: DIETITIAN, REGISTERED

## 2019-05-13 NOTE — LETTER
2019       RE: Eric Andrew  [address] [place] [address]     Dear Colleague,    Thank you for referring your patient, Eric Andrew, to the RADIATION ONCOLOGY CLINIC. Please see a copy of my visit note below.    RADIATION ONCOLOGY WEEKLY ON TREATMENT VISIT   Encounter Date: May 13, 2019    Patient Name: Eric Andrew  MRN: [id number]  : 1950     Disease and Stage: [id number] SCC of the skin overlying the left parotid  Treatment Site: left parotid and neck 200 / 6000  cGy  Daily Fraction Size: 200 cGy/day, 5 times/week  Concurrent Chemotherapy: No    Treatment Summary:    2019: Initiation of radiotherapy    ED visits/Hospitalizations:  None    Missed Treatments:  None    Subjective: Mr. Andrew presents to clinic today for his first weekly on-treatment visit. We discussed his treatment plan and answered his questions to his stated satisfaction.    ROS:   Negative      Objective:   Weight: 124.3 kg  BP: 142/84  Pulse: 75    General: NAD, well-appearing  Cardiac: warm and well-perfused  Respiratory: breathing comfortably on room air  Skin: No erythema, well healed surgical incision over left mandible    Treatment-related toxicities (CTCAE v5.0):  None    Assessment:    Mr. Andrew is a 69 year old male with a recurrent squamous cell carcinoma of the skin overlying his left parotid. He is ready to begin adjuvant radiotherapy.     Plan:   Recurrent squamous cell carcinoma of the skin:    Continue radiotherapy as planned    Pain management:    No symptoms requiring management    Fluids/Electrolytes/Nutrition:    Encouraged healthy diet    Dermatitis:    Recommended starting moisturizer BID/TID    Mosaiq chart and setup information reviewed  IGRT images reviewed      Guero Shaikh MD PGY-2  Radiation Oncology Resident, [facility]  Pager: [phone]    I saw the patient with the resident.  I agree with the resident note and plan of care.      Mary Arana  MD          Again, thank you for allowing me to participate in the care of your patient.      Sincerely,    Mary Arana MD

## 2019-05-13 NOTE — PROGRESS NOTES
RADIATION ONCOLOGY WEEKLY ON TREATMENT VISIT   Encounter Date: May 13, 2019    Patient Name: Eric Andrew  MRN: [id number]  : 1950     Disease and Stage: [id number] SCC of the skin overlying the left parotid  Treatment Site: left parotid and neck 200 / 6000  cGy  Daily Fraction Size: 200 cGy/day, 5 times/week  Concurrent Chemotherapy: No    Treatment Summary:    2019: Initiation of radiotherapy    ED visits/Hospitalizations:  None    Missed Treatments:  None    Subjective: Mr. Andrew presents to clinic today for his first weekly on-treatment visit. We discussed his treatment plan and answered his questions to his stated satisfaction.    ROS:   Negative      Objective:   Weight: 124.3 kg  BP: 142/84  Pulse: 75    General: NAD, well-appearing  Cardiac: warm and well-perfused  Respiratory: breathing comfortably on room air  Skin: No erythema, well healed surgical incision over left mandible    Treatment-related toxicities (CTCAE v5.0):  None    Assessment:    Mr. Andrew is a 69 year old male with a recurrent squamous cell carcinoma of the skin overlying his left parotid. He is ready to begin adjuvant radiotherapy.     Plan:   Recurrent squamous cell carcinoma of the skin:    Continue radiotherapy as planned    Pain management:    No symptoms requiring management    Fluids/Electrolytes/Nutrition:    Encouraged healthy diet    Dermatitis:    Recommended starting moisturizer BID/TID    Mosaiq chart and setup information reviewed  IGRT images reviewed      Guero Shaikh MD PGY-2  Radiation Oncology Resident, [facility]  Pager: [phone]    I saw the patient with the resident.  I agree with the resident note and plan of care.      Mary Arana MD

## 2019-05-13 NOTE — PROGRESS NOTES
OUTPATIENT SWALLOW  EVALUATION  PLAN OF TREATMENT FOR OUTPATIENT REHABILITATION  (COMPLETE FOR INITIAL CLAIMS ONLY)  Patient's Last Name, First Name, M.I.  YOB: 1950  Eric Andrew     Provider's Name   Eliana Garduno   Medical Record No.  [id number]     Start of Care Date:  05/09/19   Onset Date:  04/11/19   Type:     ___PT   ____OT  ___X_SLP Medical Diagnosis:  SCC of skin of face; oropharyngeal dysphagia     Treatment Diagnosis:  Mild oropharyngeal dysphagia. This will likely become more severe as he participates in radiation therapy and he is at right for long term effects of dysphagia.  Visits from SOC:  1     _________________________________________________________________________________  Plan of Treatment/Functional Goals:  Planned Therapy Interventions: Dysphagia Treatment  Dysphagia treatment: Oropharyngeal exercise training, Modified diet education, Instruction of safe swallow strategies, Compensatory strategies for swallowing         Goals   1. Goal Identifier: Diet       Goal Description: 1. Pt will tolerate soft/regular textures and thin liquids without overt clinical s/sx of aspiration/penetration in 90% of trials during two consecutive sessions after completion of treatment as judged by clinician assessment and/or pt/caregiver report.        Target Date: 08/06/19           2. Goal Identifier: Exercises       Goal Description: 2. Pt will improve ROM and strength of tongue, BOT, pharynx and jaw by completing 10 repetitions of 5/5 exercises 3 times daily with minimal written or verbal cues.        Target Date: 08/06/19                     Therapy Frequency: other (see comments)  Predicted Duration of Therapy Intervention (days/wks): 2x/month x 6 months    Eliana Garduno SLP       I CERTIFY THE NEED FOR THESE SERVICES FURNISHED UNDER        THIS PLAN OF TREATMENT AND WHILE UNDER MY CARE     (Physician  attestation of this document indicates review and certification of the therapy plan).                  Certification date from: 05/09/19 Certification date to: 08/06/19          Referring Physician: Dr. Johanna Moreland    Initial Assessment        See Epic Evaluation Start Of Care Date: 05/09/19

## 2019-05-13 NOTE — PROGRESS NOTES
05/09/19 0800   General Information   Type Of Visit Initial   Start Of Care Date 05/09/19   Referring Physician Dr. 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 He was taken to the OR on 4/11/2019 for a left total parotidectomy with resection of skin, preservation of the facial nerve, sacrifice of the greater auricular nerve, level I-V neck dissection, and cervicofacial flap. Plan is for pt to undergo adjuvant radiotherapy. Noted pt will also need referral to Holly Miller SLP for facial paralysis. He presents today for video swallow study prior to initiation of radiation treatment.    Respiratory Status Room air   Prior Level Of Function Swallowing   Prior Level Of Function Comment Soft solid textures and thin liquids   Home/community Accessibility Comments (flowsheet Row) Independent   General Observations Pt highly pleasant and cooperative throughout evaluation   Patient/family Goals Pt would like to maintain swallow function during and after radiation.    Clinical Swallow Evaluation   Dentition present and adequate   Mucosal Quality adequate   Mandibular Strength and Mobility intact   Velar Elevation intact   VFSS Eval: Radiology   Radiologist Resident   Views Taken left lateral;A/P   Physical Location of Procedure [facility]   VFSS Eval: Thin Liquid Texture Trial   Mode of Presentation, Thin Liquid cup;self-fed   Order of Presentation 1,2,7   Preparatory Phase WFL   Oral Phase, Thin Liquid WFL   Pharyngeal Phase, Thin Liquid Residue  in pyriform sinus;Residue in valleculae  (Small amount of residue greater on the right)   Rosenbek's Penetration Aspiration Scale: Thin Liquid Trial Results 2 - contrast enters airway, remains above the vocal cords, no residue remains (penetration)   Diagnostic Statement Pt demonstrated flash penetration on thin liquid trials when taking large consecutive sips. He has residue in the valleculae and pyriform sinus which is minimal and greater on the right side.    VFSS Eval: Nectar Thick Liquid Texture Trial   Mode of Presentation, Nectar cup;self-fed   Order of Presentation 3   Preparatory Phase WFL   Oral Phase, Nectar WFL   Pharyngeal Phase, Nectar Residue in valleculae;Residue in pyriform sinus   Rosenbek's Penetration Aspiration Scale: Nectar-Thick Liquid Trial Results 1 - no aspiration, contrast does not enter airway   Diagnostic Statement Trace residue noted in the valleculae and pyriform sinus, which is greater on the right side.    VFSS Eval: Puree Solid Texture Trial   Mode of Presentation, Puree spoon;self-fed   Order of Presentation 4   Preparatory Phase WFL   Oral Phase, Puree WFL   Pharyngeal Phase, Puree WFL   Rosenbek's Penetration Aspiration Scale: Puree Food Trial Results 1 - no aspiration, contrast does not enter airway   Diagnostic Statement No aspiration/penetration noted.    VFSS Eval: Solid Food Texture Trial   Mode of Presentation, Solid self-fed   Order of Presentation 5   Preparatory Phase WFL   Oral Phase, Solid WFL   Pharyngeal Phase, Solid WFL   Rosenbek's Penetration Aspiration Scale: Solid Food Trial Results 1 - no aspiration, contrast does not enter airway   Diagnostic Statement No aspiration/penetration noted on solid texture trial.    Educational Assessment   Barriers to Learning No barriers   Preferred Learning Style Listening;Reading;Demonstration;Pictures/video   General Therapy Interventions   Planned Therapy Interventions Dysphagia Treatment   Dysphagia treatment Oropharyngeal  exercise training;Modified diet education;Instruction of safe swallow strategies;Compensatory strategies for swallowing   Swallow Eval: Clinical Impressions   Skilled Criteria for Therapy Intervention Skilled criteria met.  Treatment indicated.   Functional Assessment Scale (FAS) 5   Treatment Diagnosis Mild oropharyngeal dysphagia. This will likely become more severe as he participates in radiation therapy and he is at right for long term effects of dysphagia.    Diet texture recommendations Regular diet;Thin liquids  (Soft moist solids)   Recommended Feeding/Eating Techniques alternate between small bites and sips of food/liquid;hard swallow w/ each bite or sip;maintain upright posture during/after eating for 30 mins;small sips/bites;other (see comments)   Rehab Potential good, to achieve stated therapy goals   Therapy Frequency other (see comments)   Predicted Duration of Therapy Intervention (days/wks) 2x/month x 6 months   Risks and Benefits of Treatment have been explained. Yes   Patient, family and/or staff in agreement with Plan of Care Yes   Clinical Impression Comments Eric Green demonstrates mild oropharyngeal dysphagia as characterized by flash penetration on thin liquid during large consecutive sips, residual in the valleculae and pyriform sinus on thin and nectar thick liquid which is greater on the right side than the left. No aspiration events on trials presented. Adequate oral manipulation of each consistency. Timely swallow demonstrated and adequate epiglottic inversion. Pt demonstrates adequate passage of barium tablet into the esophagus. Recommend he continue on regular moist solids and thin liquids. Sit upright for po intake. Encouraged small bites/sips and slow rate. Pt will continue exercise completion prior to initiation of radiation as well as during his treatment. He will benefit from continued SLP services to maximize swallow function during and after radiation treatment.    Swallow Goal  1   Goal Description 1. Pt will tolerate soft/regular textures and thin liquids without overt clinical s/sx of aspiration/penetration in 90% of trials during two consecutive sessions after completion of treatment as judged by clinician assessment and/or pt/caregiver report.    Target Date 08/06/19   Goal Identifier Diet   Swallow Goal 2   Goal Description 2. Pt will improve ROM and strength of tongue, BOT, pharynx and jaw by completing 10 repetitions of 5/5 exercises 3 times daily with minimal written or verbal cues.    Target Date 08/06/19   Goal Identifier Exercises   Total Session Time   SLP Eval: VideoFluoroscopic Swallow function Minutes (06527) 40   Total Evaluation Time 40   Therapy Certification   Certification date from 05/09/19   Certification date to 08/06/19   Medical Diagnosis SCC of skin of face; oropharyngeal dysphagia   Certification I certify the need for these services furnished under this plan of treatment and while under my care.  (Physician co-signature of this document indicates review and certification of the therapy plan).     Thank you for the referral of Eric Andrew. If you have any questions about this report, please contact me using the information below.      Eliana Garduno MS, CCC-SLP  Speech-Language Pathology  [facility] [place]  Departement of Otolaryngology/D&T - 4th floor  Pager: [phone]  Phone: [phone]  Email: [email or web address]

## 2019-05-14 ENCOUNTER — APPOINTMENT (OUTPATIENT)
Dept: RADIATION ONCOLOGY | Facility: CLINIC | Age: 69
End: 2019-05-14
Attending: RADIOLOGY
Payer: MEDICARE

## 2019-05-14 ENCOUNTER — THERAPY VISIT (OUTPATIENT)
Dept: SPEECH THERAPY | Facility: CLINIC | Age: 69
End: 2019-05-14
Payer: MEDICARE

## 2019-05-14 DIAGNOSIS — R13.12 OROPHARYNGEAL DYSPHAGIA: ICD-10-CM

## 2019-05-14 DIAGNOSIS — C44.320 SQUAMOUS CELL CARCINOMA OF SKIN OF FACE: Primary | ICD-10-CM

## 2019-05-14 PROCEDURE — 77386 ZZH IMRT TREATMENT DELIVERY, COMPLEX: CPT | Performed by: RADIOLOGY

## 2019-05-14 NOTE — PROGRESS NOTES
"CLINICAL NUTRITION SERVICES - ASSESSMENT NOTE    Eric Andrew 69 year old referred for MNT related to squamous cell carcinoma of skin on face    Time Spent: 45 minutes  Visit Type: initial  Referring Physician: [place]  Pt accompanied by: his wife, Ivana.   Pt familiar with RD from 2014 - during transplant evaluation     NUTRITION HISTORY  Factors affecting nutrition intake include: none at this time  Current diet: General  Current appetite/intake: good  PEG Tube: No  Chemotherapy: No Chemo   Radiation: Starting today       Eric reports that his appetite and intake have been good.    He did struggle with difficulty eating for 1-2 weeks after surgery, however he has been able to eat most foods since then.   He likes to do all the cooking and tends to be a very good cook per his wife.    He reports that even when foods haven't tasted good (at the time of transplant), he was still able to eat.   He also likes Boost and Ensure shakes.  He makes home made shakes and smoothies as well.     Diet Recall  Breakfast Scrambled eggs with Maltese toast or pancakes or cereal/oatmeal   Lunch Banks, soups or leftovers from dinner   Dinner Chicken, meat, fish with grains and cooked vegetables   Snacks Shakes, smoothies or sandwich   Beverages >72 oz water/day     ANTHROPOMETRICS  Height: 6'3\"  Weight: 270 lbs/122kg  BMI: 33  Weight Status:  Obesity Grade I BMI 30-34.9  IBW: 196 lbs  % IBW: 137%  Weight History: down ~10 lb at the time of surgery; reported regain of weight  Wt Readings from Last 6 Encounters:   05/13/19 124.3 kg (274 lb)   05/03/19 122.5 kg (270 lb)   04/24/19 119.7 kg (263 lb 12.8 oz)   04/19/19 122.5 kg (270 lb)   04/12/19 124.3 kg (274 lb)   04/05/19 127 kg (280 lb)     Dosing Weight: 214 lbs/97kg    Medications/vitamins/minerals/herbals:   Reviewed    Labs:   Labs reviewed    ASSESSED NUTRITION NEEDS:  Estimated Energy Needs: 2900 kcals (30 Kcal/Kg)  Justification: maintenance/increased needs with " radiation  Estimated Protein Needs: 116-145grams protein (1.2-1.5 g pro/Kg)  Justification: increased needs with radiation  Estimated Fluid Needs: 3000  mL   Justification: increased needs with radiation    MALNUTRITION:  % Weight Loss:  Weight loss does not meet criteria for malnutrition   % Intake:  Decreased intake does not meet criteria for malnutrition   Subcutaneous Fat Loss:  None observed  Muscle Loss:  None observed  Fluid Retention:  None noted     Malnutrition Diagnosis: Patient does not meet two of the above criteria necessary for diagnosing malnutrition    NUTRITION DIAGNOSIS:  Food and nutrition-related knowledge deficit related to nutrition needs during radiation as evidenced by pt with questions regarding food choices for when eating becomes more difficult.     INTERVENTIONS  Nutrition Education  Provided written & verbal education:   - Discussed ways to optimize calories and protein intake.  Advised pt to aim for at least 2900kcal and 116g protein via 5-6 small frequent meals.   - Reviewed common barriers to eating.  As treatment progresses, eating may become more difficult.  Discussed ways to cope with this.  Provided pt with edc sheet for Coping with Mouth Sores and Sore Throat'.     Provided pt with corresponding education materials/handouts on:  High Calorie, High Protein Recipe booklet, Tips to Increase the Calories in Your Diet and Protein Sources    Goals  1.  Aim for 5-6 small frequent meals  2.  Aim for 2900kcal and 115g protein/day  3. Weight maintenance through cancer treatment      Follow-Up Plans: Pt has RD contact information for questions.    Pt to follow up with RD in 1-2 weeks at the time of treatment.     MONITORING AND EVALUATION:  -Food intake  -Fluid/beverage intake  -Weight trends    Bela Pina, GILMA, LD

## 2019-05-15 ENCOUNTER — APPOINTMENT (OUTPATIENT)
Dept: RADIATION ONCOLOGY | Facility: CLINIC | Age: 69
End: 2019-05-15
Attending: RADIOLOGY
Payer: MEDICARE

## 2019-05-15 PROCEDURE — 77386 ZZH IMRT TREATMENT DELIVERY, COMPLEX: CPT | Performed by: RADIOLOGY

## 2019-05-17 ENCOUNTER — APPOINTMENT (OUTPATIENT)
Dept: RADIATION ONCOLOGY | Facility: CLINIC | Age: 69
End: 2019-05-17
Attending: RADIOLOGY
Payer: MEDICARE

## 2019-05-17 PROCEDURE — 77386 ZZH IMRT TREATMENT DELIVERY, COMPLEX: CPT | Performed by: RADIOLOGY

## 2019-05-18 ENCOUNTER — APPOINTMENT (OUTPATIENT)
Dept: RADIATION ONCOLOGY | Facility: CLINIC | Age: 69
End: 2019-05-18
Attending: RADIOLOGY
Payer: MEDICARE

## 2019-05-18 PROCEDURE — 77386 ZZH IMRT TREATMENT DELIVERY, COMPLEX: CPT | Performed by: RADIOLOGY

## 2019-05-18 PROCEDURE — 77336 RADIATION PHYSICS CONSULT: CPT | Performed by: RADIOLOGY

## 2019-05-20 ENCOUNTER — APPOINTMENT (OUTPATIENT)
Dept: RADIATION ONCOLOGY | Facility: CLINIC | Age: 69
End: 2019-05-20
Attending: RADIOLOGY
Payer: MEDICARE

## 2019-05-20 PROCEDURE — 77386 ZZH IMRT TREATMENT DELIVERY, COMPLEX: CPT | Performed by: RADIOLOGY

## 2019-05-21 ENCOUNTER — APPOINTMENT (OUTPATIENT)
Dept: RADIATION ONCOLOGY | Facility: CLINIC | Age: 69
End: 2019-05-21
Attending: RADIOLOGY
Payer: MEDICARE

## 2019-05-21 DIAGNOSIS — C44.320 SQUAMOUS CELL CARCINOMA OF SKIN OF FACE: Primary | ICD-10-CM

## 2019-05-21 PROCEDURE — 77386 ZZH IMRT TREATMENT DELIVERY, COMPLEX: CPT | Performed by: RADIOLOGY

## 2019-05-21 NOTE — PROGRESS NOTES
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 No issues.    ED visits/Hospitalizations:  None    Missed Treatments:  1. 2019: 1-day treatment break between fractions 3-4 secondary to machine downtime. Missed fraction was made up over the following weekend.    Subjective: Mr. Andrew presents to clinic today for his weekly on-treatment visit. He is tolerating adjuvant radiotherapy very well and denies any significant treatment-related toxicities. He specifically denies any odynophagia, dysphagia, dermatitis, headaches, nausea/vomiting, dyspnea, chest pain or abdominal pain and his remaining ROS are negative.    ROS:   Constitutional  Pain (0-10): 1 (mouth), 1 (throat), 1 (skin)  Fatigue: Mild symptoms     CNS  Headaches: None    ENT  Mucositis: None    Cardiopulmonary  Dyspnea: None    GI  Nausea/vomiting: None    Nutrition  PEG: No  Diet: Regular diet    Integumentary  Dermatitis: None    Objective:   Current weight: 123.6 kg  Last week's weight: 124.3 kg  Starting weight: 124.3 kg    BP: 141/81 (sitting), 136/75 (standing)  Pulse: 78 (sitting), 81 (standing)    General: Alert, oriented and in no acute distress  HEENT: Moist mucous membranes. No visible oral cavity lesions or thrush.  Cardiac: Extremities are warm and well-perfused  Respiratory: No wheezing, stridor or respiratory distress  Skin: No erythema    Treatment-related toxicities (CTCAE v5.0):  None    Assessment:    Mr. Andrew is a 69 year old male with a recurrent pT2 N0 M0 cutaneous squamous cell  carcinoma of the left face status post surgical resection. He is receiving adjuvant radiotherapy for improved local disease control and is tolerating treatment very well with no significant acute radiation-induced toxicities.    Plan:   rpT2 N0 M0 cutaneous squamous cell carcinoma of the left face:    Continue radiotherapy    Pain management:    No symptoms requiring medical management    Fluids/Electrolytes/Nutrition:    Continue diet as tolerated    Dermatitis:    Recommended starting BID/TID moisturizer use to the left lateral face and neck    Mosaiq chart and setup information reviewed  IGRT images reviewed    Medication Review  Med list reviewed with patient?: Yes    Grabiel Floyd MD/PhD    Dept of Radiation Oncology  [facility]

## 2019-05-22 ENCOUNTER — APPOINTMENT (OUTPATIENT)
Dept: RADIATION ONCOLOGY | Facility: CLINIC | Age: 69
End: 2019-05-22
Attending: RADIOLOGY
Payer: MEDICARE

## 2019-05-22 ENCOUNTER — DOCUMENTATION ONLY (OUTPATIENT)
Dept: TRANSPLANT | Facility: CLINIC | Age: 69
End: 2019-05-22

## 2019-05-22 PROCEDURE — 77386 ZZH IMRT TREATMENT DELIVERY, COMPLEX: CPT | Performed by: RADIOLOGY

## 2019-05-22 NOTE — PROGRESS NOTES
Annual chart review completed.    Pt doing labs at regular advised interval.    [facility] within the past year

## 2019-05-23 ENCOUNTER — APPOINTMENT (OUTPATIENT)
Dept: RADIATION ONCOLOGY | Facility: CLINIC | Age: 69
End: 2019-05-23
Attending: RADIOLOGY
Payer: MEDICARE

## 2019-05-23 PROCEDURE — 77386 ZZH IMRT TREATMENT DELIVERY, COMPLEX: CPT | Performed by: RADIOLOGY

## 2019-05-24 ENCOUNTER — APPOINTMENT (OUTPATIENT)
Dept: RADIATION ONCOLOGY | Facility: CLINIC | Age: 69
End: 2019-05-24
Attending: RADIOLOGY
Payer: MEDICARE

## 2019-05-24 PROCEDURE — 77386 ZZH IMRT TREATMENT DELIVERY, COMPLEX: CPT | Performed by: RADIOLOGY

## 2019-05-24 PROCEDURE — 77336 RADIATION PHYSICS CONSULT: CPT | Performed by: RADIOLOGY

## 2019-05-28 ENCOUNTER — APPOINTMENT (OUTPATIENT)
Dept: RADIATION ONCOLOGY | Facility: CLINIC | Age: 69
End: 2019-05-28
Attending: RADIOLOGY
Payer: MEDICARE

## 2019-05-28 PROCEDURE — 77386 ZZH IMRT TREATMENT DELIVERY, COMPLEX: CPT | Performed by: RADIOLOGY

## 2019-05-29 ENCOUNTER — APPOINTMENT (OUTPATIENT)
Dept: RADIATION ONCOLOGY | Facility: CLINIC | Age: 69
End: 2019-05-29
Attending: RADIOLOGY
Payer: MEDICARE

## 2019-05-29 PROCEDURE — 77386 ZZH IMRT TREATMENT DELIVERY, COMPLEX: CPT | Performed by: RADIOLOGY

## 2019-05-30 ENCOUNTER — APPOINTMENT (OUTPATIENT)
Dept: RADIATION ONCOLOGY | Facility: CLINIC | Age: 69
End: 2019-05-30
Attending: RADIOLOGY
Payer: MEDICARE

## 2019-05-30 PROCEDURE — 77386 ZZH IMRT TREATMENT DELIVERY, COMPLEX: CPT | Performed by: RADIOLOGY

## 2019-05-30 NOTE — PROGRESS NOTES
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 No issues.    2019: Tolerating treatment well. Mildly increased dermatitis.    ED visits/Hospitalizations:  None    Missed Treatments:  1. 2019: 1-day treatment break between fractions 3-4 secondary to machine downtime. Missed fraction was made up over the following weekend.  2. 2019: 1-day treatment break between fractions 10-11 secondary to the Memorial Day holiday    Subjective: Mr. Andrew presents to clinic today for his weekly on-treatment visit. He continues to tolerate adjuvant head and neck radiotherapy very well and has no pressing concerns/complaints on examination. He denies any significant odynophagia, dysphagia, headaches, fevers/chills, nausea/vomiting, dyspnea, chest pain or abdominal pain and his remaining ROS are negative.    ROS:   Constitutional  Pain (0-10): 0  Fatigue: Mild symptoms    CNS  Headaches: None    ENT  Mucositis: None    Cardiopulmonary  Dyspnea: None    GI  Nausea/vomiting: None    Nutrition  PEG: No  Diet: Regular diet    Integumentary  Dermatitis: Mild symptoms    Objective:   Current weight: 123.4 kg  Last week's weight: 123.6 kg  Starting weight: 124.3 kg    BP: 135/94 (sitting), 148/80 (standing)  Pulse: 76 (sitting), 74 (standing)    General: Alert, oriented and in no acute distress  HEENT: Moist mucous membranes. No visible oral cavity lesions or thrush.  Cardiac: Extremities are warm and well-perfused  Respiratory: No wheezing, stridor or respiratory distress  Skin:  No erythema    Treatment-related toxicities (CTCAE v5.0):  1. Dermatitis: Grade 1: Faint erythema or dry desquamation     Assessment:    Mr. Andrew is a 69 year old male with a recurrent pT2 N0 M0 cutaneous squamous cell carcinoma of the left face status post surgical resection. He is receiving adjuvant radiotherapy for improved local disease control. He is tolerating treatment well with no significant acute radiation-induced toxicities.    Plan:   rpT2 N0 M0 cutaneous squamous cell carcinoma of the left face:    Continue radiotherapy    Pain management:    No symptoms requiring medical management    Fluids/Electrolytes/Nutrition:    Continue diet as tolerated    Dermatitis:    Continue TID moisturizer use to the left face and neck    Mosaiq chart and setup information reviewed  IGRT images reviewed    Medication Review  Med list reviewed with patient?: Yes    Grabiel Floyd MD/PhD    Dept of Radiation Oncology  [facility]

## 2019-05-31 ENCOUNTER — THERAPY VISIT (OUTPATIENT)
Dept: SPEECH THERAPY | Facility: CLINIC | Age: 69
End: 2019-05-31
Payer: MEDICARE

## 2019-05-31 ENCOUNTER — APPOINTMENT (OUTPATIENT)
Dept: RADIATION ONCOLOGY | Facility: CLINIC | Age: 69
End: 2019-05-31
Attending: RADIOLOGY
Payer: MEDICARE

## 2019-05-31 VITALS
WEIGHT: 272 LBS | BODY MASS INDEX: 34 KG/M2 | DIASTOLIC BLOOD PRESSURE: 94 MMHG | SYSTOLIC BLOOD PRESSURE: 135 MMHG | HEART RATE: 76 BPM

## 2019-05-31 DIAGNOSIS — R13.12 OROPHARYNGEAL DYSPHAGIA: ICD-10-CM

## 2019-05-31 DIAGNOSIS — C44.320 SQUAMOUS CELL CARCINOMA OF SKIN OF FACE: Primary | ICD-10-CM

## 2019-05-31 DIAGNOSIS — C77.0 SECONDARY MALIGNANCY OF LYMPH NODES OF HEAD, FACE AND NECK (H): Primary | ICD-10-CM

## 2019-05-31 PROCEDURE — 77386 ZZH IMRT TREATMENT DELIVERY, COMPLEX: CPT | Performed by: RADIOLOGY

## 2019-06-03 ENCOUNTER — OFFICE VISIT (OUTPATIENT)
Dept: RADIATION ONCOLOGY | Facility: CLINIC | Age: 69
End: 2019-06-03
Attending: RADIOLOGY
Payer: MEDICARE

## 2019-06-03 ENCOUNTER — HOSPITAL ENCOUNTER (OUTPATIENT)
Dept: NUTRITION | Facility: CLINIC | Age: 69
End: 2019-06-03
Attending: RADIOLOGY
Payer: MEDICARE

## 2019-06-03 VITALS
HEART RATE: 76 BPM | SYSTOLIC BLOOD PRESSURE: 162 MMHG | DIASTOLIC BLOOD PRESSURE: 83 MMHG | BODY MASS INDEX: 34 KG/M2 | WEIGHT: 272 LBS

## 2019-06-03 DIAGNOSIS — C44.320 SQUAMOUS CELL CARCINOMA OF SKIN OF FACE: Primary | ICD-10-CM

## 2019-06-03 PROCEDURE — 77386 ZZH IMRT TREATMENT DELIVERY, COMPLEX: CPT | Performed by: RADIOLOGY

## 2019-06-03 PROCEDURE — 77336 RADIATION PHYSICS CONSULT: CPT | Performed by: RADIOLOGY

## 2019-06-03 NOTE — PROGRESS NOTES
Nutrition Services:     Called Eric today prior to his OTV and radiation treatment.   He reports that his eating and appetite have been going very well.    He reports that his weight has been stable.   He continues to eat most textures including bread and roast roast beef.    He has been focused on adding protein rich foods to his diet.      He denies nutrition questions and concerns for RD at this time.  He would like to extend his scheduled RD appointment for the next 1-2 weeks.    RD will follow-up with Eric on June 10th prn.      Bela Pian RDN, [facility]  [phone]

## 2019-06-03 NOTE — PROGRESS NOTES
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 No issues.    2019: Tolerating treatment well. Mildly increased dermatitis.    6/3/2019: Tolerating treatment well. No issues.    ED visits/Hospitalizations:  None    Missed Treatments:  1. 2019: 1-day treatment break between fractions 3-4 secondary to machine downtime. Missed fraction was made up over the following weekend.  2. 2019: 1-day treatment break between fractions 10-11 secondary to the Memorial Day holiday    Subjective: Mr. Andrew presents to clinic today for his weekly on-treatment visit. He reports he is doing well and has no pressing concerns or complaints. He specifically denies any significant cutaneous, oral cavity or oropharyngeal pain and rates his current skin discomfort as a 1/10 in severity. He continues to eat a normal diet without difficulty and denies any odynophagia or dysphagia. His remaining ROS are otherwise negative.    ROS:   Constitutional  Pain (0-10): 1  Fatigue: Mild symptoms    CNS  Headaches: None    ENT  Mucositis: None    Cardiopulmonary  Dyspnea: None    GI  Nausea/vomiting: None    Nutrition  PEG: No  Diet: Regular diet    Integumentary  Dermatitis: Mild symptoms    Objective:   Current weight: 123.4 kg  Last week's weight: 123.4 kg  Starting weight: 124.3 kg    BP: 162/83 (sitting), 138/81 (standing)  Pulse: 76 (sitting), 84 (standing)    General: Alert, oriented and in no acute distress  HEENT: Moist mucous membranes. No visible oral cavity lesions or  thrush.  Cardiac: Extremities are warm and well-perfused  Respiratory: No wheezing, stridor or respiratory distress  Skin: Mild erythema involving the left anastasiya-face. Very early desquamation involving the skin folds posterior to the auricle.    Treatment-related toxicities (CTCAE v5.0):  1. Dermatitis: Grade 1: Faint erythema or dry desquamation     Assessment:    Mr. Andrew is a 69 year old male with a recurrent pT2 N0 M0 cutaneous squamous cell carcinoma of the left face status post surgical resection. He is receiving adjuvant radiotherapy for improved local disease control. He is tolerating treatment well with very mild radiation-induced dermatitis.    Plan:   rpT2 N0 M0 cutaneous squamous cell carcinoma of the left face:    Continue radiotherapy    Pain management:    No symptoms requiring medical management    Fluids/Electrolytes/Nutrition:    Continue diet as tolerated    Dermatitis:    Continue TID moisturizer use to the left face and neck    Mosaiq chart and setup information reviewed  IGRT images reviewed    Medication Review  Med list reviewed with patient?: Yes    Grabiel Floyd MD/PhD    Dept of Radiation Oncology  [facility]

## 2019-06-04 ENCOUNTER — APPOINTMENT (OUTPATIENT)
Dept: RADIATION ONCOLOGY | Facility: CLINIC | Age: 69
End: 2019-06-04
Attending: RADIOLOGY
Payer: MEDICARE

## 2019-06-04 PROCEDURE — 77386 ZZH IMRT TREATMENT DELIVERY, COMPLEX: CPT | Performed by: RADIOLOGY

## 2019-06-05 ENCOUNTER — THERAPY VISIT (OUTPATIENT)
Dept: SPEECH THERAPY | Facility: CLINIC | Age: 69
End: 2019-06-05
Payer: MEDICARE

## 2019-06-05 ENCOUNTER — APPOINTMENT (OUTPATIENT)
Dept: RADIATION ONCOLOGY | Facility: CLINIC | Age: 69
End: 2019-06-05
Attending: RADIOLOGY
Payer: MEDICARE

## 2019-06-05 DIAGNOSIS — R47.89 OTHER SPEECH DISTURBANCES: Primary | ICD-10-CM

## 2019-06-05 DIAGNOSIS — R29.810 FACIAL WEAKNESS: ICD-10-CM

## 2019-06-05 PROCEDURE — 77386 ZZH IMRT TREATMENT DELIVERY, COMPLEX: CPT | Performed by: RADIOLOGY

## 2019-06-06 ENCOUNTER — APPOINTMENT (OUTPATIENT)
Dept: RADIATION ONCOLOGY | Facility: CLINIC | Age: 69
End: 2019-06-06
Attending: RADIOLOGY
Payer: MEDICARE

## 2019-06-06 DIAGNOSIS — R29.810 FACIAL WEAKNESS: Primary | ICD-10-CM

## 2019-06-06 PROCEDURE — 77386 ZZH IMRT TREATMENT DELIVERY, COMPLEX: CPT | Performed by: RADIOLOGY

## 2019-06-07 ENCOUNTER — APPOINTMENT (OUTPATIENT)
Dept: RADIATION ONCOLOGY | Facility: CLINIC | Age: 69
End: 2019-06-07
Attending: RADIOLOGY
Payer: MEDICARE

## 2019-06-07 PROCEDURE — 77386 ZZH IMRT TREATMENT DELIVERY, COMPLEX: CPT | Performed by: RADIOLOGY

## 2019-06-10 ENCOUNTER — HOSPITAL ENCOUNTER (OUTPATIENT)
Dept: NUTRITION | Facility: CLINIC | Age: 69
End: 2019-06-10
Attending: DIETITIAN, REGISTERED
Payer: MEDICARE

## 2019-06-10 ENCOUNTER — APPOINTMENT (OUTPATIENT)
Dept: RADIATION ONCOLOGY | Facility: CLINIC | Age: 69
End: 2019-06-10
Attending: RADIOLOGY
Payer: MEDICARE

## 2019-06-10 ENCOUNTER — TELEPHONE (OUTPATIENT)
Dept: TRANSPLANT | Facility: CLINIC | Age: 69
End: 2019-06-10

## 2019-06-10 VITALS
WEIGHT: 272.2 LBS | SYSTOLIC BLOOD PRESSURE: 134 MMHG | DIASTOLIC BLOOD PRESSURE: 84 MMHG | BODY MASS INDEX: 34.02 KG/M2 | HEART RATE: 91 BPM

## 2019-06-10 DIAGNOSIS — C44.320 SQUAMOUS CELL CARCINOMA OF SKIN OF FACE: Primary | ICD-10-CM

## 2019-06-10 PROCEDURE — 77336 RADIATION PHYSICS CONSULT: CPT | Performed by: RADIOLOGY

## 2019-06-10 PROCEDURE — 77386 ZZH IMRT TREATMENT DELIVERY, COMPLEX: CPT | Performed by: RADIOLOGY

## 2019-06-10 ASSESSMENT — PAIN SCALES - GENERAL: PAINLEVEL: NO PAIN (0)

## 2019-06-10 NOTE — TELEPHONE ENCOUNTER
Pt called to schedule an appointment with Dr Jimenez  His appointment with her in Dec she cancelled it but was not rescheduled

## 2019-06-10 NOTE — PROGRESS NOTES
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 No issues.    2019: Tolerating treatment well. Mildly increased dermatitis.    6/3/2019: Tolerating treatment well. No issues.    6/10/2019: Stable dermatitis.    ED visits/Hospitalizations:  None    Missed Treatments:  1. 2019: 1-day treatment break between fractions 3-4 secondary to machine downtime. Missed fraction was made up over the following weekend.  2. 2019: 1-day treatment break between fractions 10-11 secondary to the Memorial Day holiday    Subjective: Mr. Andrew presents to clinic today for his weekly on-treatment visit. He continues to tolerate adjuvant radiotherapy very well and has no pressing concerns or complaints on examination. He continues to have stable dermatitis involving the left anastasiya-face and neck although denies any significant radiation-induced mucositis. He is eating a normal diet without difficulty and denies any odynophagia or dysphagia. His remaining ROS are negative.    ROS:   Constitutional  Pain (0-10): 1  Fatigue: Mild symptoms    CNS  Headaches: None    ENT  Mucositis: None    Cardiopulmonary  Dyspnea: None    GI  Nausea/vomiting: None    Nutrition  PEG: No  Diet: Regular diet    Integumentary  Dermatitis: Moderate symptoms    Objective:   Current weight: 123.5 kg  Last week's weight: 123.4 kg  Starting weight: 124.3 kg    BP: 134/84 (sitting), 131/82 (standing)  Pulse: 91 (sitting), 91 (standing)    General: Alert, oriented and in no acute distress  HEENT: Mildly dry  mucous membranes. No visible oral cavity lesions or thrush.  Cardiac: Extremities are warm and well-perfused  Respiratory: No wheezing, stridor or respiratory distress  Skin: Moderate erythema involving the left anastasiya-face and neck. Mild patchy desquamation confined to the skin folds.    Treatment-related toxicities (CTCAE v5.0):  1. Dermatitis: Grade 2: Moderate to brisk erythema; patchy moist desquamation, mostly confined to skin folds and creases; moderate erythema     Assessment:    Mr. Andrew is a 69 year old male with a recurrent pT2 N0 M0 cutaneous squamous cell carcinoma of the left face status post surgical resection. He is receiving adjuvant radiotherapy for improved local disease control. He is tolerating treatment well with the expected acute radiation-induced dermatitis.    Plan:   rpT2 N0 M0 cutaneous squamous cell carcinoma of the left face:    Continue radiotherapy    Pain management:    No symptoms requiring medical management    Fluids/Electrolytes/Nutrition:    Continue diet as tolerated    Dermatitis:    Continue TID moisturizer use to the left face and neck    Mosaiq chart and setup information reviewed  IGRT images reviewed    Medication Review  Med list reviewed with patient?: Yes    Grabiel Floyd MD/PhD    Dept of Radiation Oncology  [facility]

## 2019-06-11 ENCOUNTER — APPOINTMENT (OUTPATIENT)
Dept: RADIATION ONCOLOGY | Facility: CLINIC | Age: 69
End: 2019-06-11
Attending: RADIOLOGY
Payer: MEDICARE

## 2019-06-11 PROCEDURE — 77386 ZZH IMRT TREATMENT DELIVERY, COMPLEX: CPT | Performed by: RADIOLOGY

## 2019-06-11 NOTE — PROGRESS NOTES
[facility]          OUTPATIENT SPEECH LANGUAGE PATHOLOGY FACIAL PARALYSIS EVALUATION  PLAN OF TREATMENT FOR OUTPATIENT REHABILITATION  (COMPLETE FOR INITIAL CLAIMS ONLY)  Patient's Last Name, First Name, M.I.  YOB: 1950  Eric Andrew                        Provider s Name: [facility] Medical Record No.  [id number]     Onset Date: 04/11/19    Start of Care Date: 06/05/19   Type:     ___PT  ___OT   _X_SLP    Medical Diagnosis:  Other Speech Disturbances; Facial Weakness   Speech Language Pathology Diagnosis:   Other Speech Disturbances; Facial Weakness      Visits from SOC: 1    _________________________________________________________________________________  Plan of Treatment/Functional Goals:   Planned Therapy Interventions: Facial Therapy, Improve Facial Tone and Function, Improve Speech Intelligibilty           Goals   1. Goal Identifier: Facial Function for Nonverbal Communication       Goal Description: Patient will demonstrate a 5 point gain on his Facial Grading Score, per therapist judgement, reflecting gains in orofacial resting tone, voluntary movement and minimization of synkinesis if present.        Target Date: 11/06/19   2. Goal Identifier: Ability to Play Musical Instrument       Goal Description: Patient will report a 50% improvement in ability to play the trombone in comparison with status noted on date of evaluation.        Target Date: 09/04/19          Cassie Miller, SLP       I CERTIFY THE NEED FOR THESE SERVICES FURNISHED UNDER        THIS PLAN OF TREATMENT AND WHILE UNDER MY CARE     (Physician co-signature of this document indicates review and certification of the therapy plan).                  Certification Date From:   06/05/19  Certification Date To:  09/01/19            Referring Physician: Johanna Jimenez MD    Initial Assessment        See  Epic Evaluation Start of Care Date: 06/05/19

## 2019-06-11 NOTE — PROGRESS NOTES
"   Speech Pathology Facial Paralysis Evaluation - e06/05/19 1700   Visit Type   Visit Type Initial   Patient Type   Patient Type Adult   General Patient Information   Start Of Care Date 06/05/19   Referring Physician Johanna Jimenez MD   Orders Eval And Treat   Orders Date 06/05/19   Medical Diagnosis Other Speech Disturbances; Facial Weakness   Onset Of Illness/injury Or Date Of Surgery 04/11/19   Surgical/Medical History Reviewed Yes   Pertinent History Of Current Problem Per medical chart notes of Dr. Moreland includes PAROTIDECTOMY, RADICAL NECK DISSECTIONLeft4/11/2019 TRANSPLANT LIVER. Per patient, \"I had a squmous spot on my cheekthat metastasized into the parotid gland, surgery to removei it and damage to auricular nerve. Subsequent mohs to right forehead and next to eye. have numbness in the face. I play the trombone, my range is limited, I have a hard time forming a seal in the lower left corner of the mouth. I want to improve the feeling and the control on the left side of my face.\" Patient also concerned about reduced symmetry in smile.    Previous Treatment Surgery   General Health Diabetes;HBP;Heart/lung disease;Cancer;Surgery   Occupation Retired    Sensory Changes Numbness   Pain Description None   Hearing Changes None   Eye Problems None  (Issue w/eye closing on right w/head turn (encour disc w/eye )   Patient's Concerns difficulty playing an instrument;other (comment)  (reduced facial expression/smile, numbness)   Patient/Family Goals Improve above issues   Evaluation Results: Resting Tone and Symmetry/Oral status   Palpebral Fissure - Type of Eye Tone  Wide  (.5-1 mm)   Nasolabial Fold  Less Pronounced   Lips  - Type of Lip Tone  Drooped  (slightly)   Chin  - Type of Chin Tone  Drooped  (Very slightly)   Type of Forehead Wrinkles Same   Type of Eye Wrinkles Same   Type of Cheek/Mouth Wrinkles Less   Oral Rest Posture Anterior dentalized   Evaluation Results: Forehead Elevation   Forehead " Strength Rating % 100%   Forehead General Severity of Synkinesis   none   Evaluation Results: Minimal Effort Eye Closure    Complete Yes   General Severity of Synkinesis   none   Evaluation Results: Open Mouth Smile    Open Smile Strength Rating % 85%   Open Smile General Severity of Synkinesis   none   Evaluation Results: Closed Mouth Smile    Closed Mouth Smile Strength Rating % 90%   Closed Mouth Smile General Severity of Synkinesis   none   Evaluation Results: Snarl   Snarl Strength Rating % 80%   Snarl General Severity of Synkinesis   none   Evaluation Results: Smirk   Smirk Strength rating % 80%   Smirk General Severity of Synkinesis   none   Evaluation Results: Pucker   Strength Rating % 85%  (upper lip weaker)   General Severity of Synkinesis   none   Evaluation Results: Compression   Strength Rating % 70%   General Severity of Synkinesis   none   Evaluation Results: Contraction   Strength Rating % 75%   General Severity of Synkinesis   none   Evaluation Results: Protrusion   Strength Rating % 80%   General Severity of Synkinesis   none   Evaluation Results: Pout   Strength Rating % 90%   General Severity of Synkinesis   none   Evaluation Results: Frown   Strength Rating % 97%   General Severity of Synkinesis   none   Evaluation Results: Lower Lip Depression   Strength Rating % 75%   General Severity of Synkinesis   none   Evaluation Results: Chin Tone (Mentalis)   Strength Rating % 95%   General Severity of Synkinesis   none   Evaluation Results: Tongue Movement   Evaluation Results: Tongue Movement Reduced tone on left   Tongue Protrusion Deviates to right  (slightly)   Elevation/Depression Extraorally Deviates to right on elevation;Deviates to right on depression   Presence of Synkinesis with Lingual Movements None   Evaluation Results: Speech Function   Evaluation Results: Speech Function Reduced ability to maintain optimal oral rest posture;Reduced lip movement on the left   General Severity of  Synkinesis with Sound-Lip Rounding None   General Severity of Synkinesis with Sound-Lip Pressure none   General Therapy Interventions   Planned Therapy Interventions Facial Therapy;Improve Facial Tone and Function;Improve Speech Intelligibilty   Clinical Impressions   Criteria for Skilled Therapeutic Interventions Met yes;treatment indicated   Facial Grading Score 87/100   Communication Diagnosis Mild Dysathria; Facial Weakness    Rehab Potential good to achieve stated therapy goal(s)   Predicted Duration of Therapy Intervention (days/weeks) 1 x/month for 3 visits or less as indicated   Risks and Benefits of Treatment have been explained yes   Patient, family and/or staff in agreement yes   Facial Paralysis Goals   Facial Paralysis Goals 1;2   Facial Paralysis Goal 1   Goal Identifier Facial Function for Nonverbal Communication   Goal Description Patient will demonstrate a 5 point gain on his Facial Grading Score, per therapist judgement, reflecting gains in orofacial resting tone, voluntary movement and minimization of synkinesis if present.    Target Date 11/06/19   Facial Paralysis Goal 2   Goal Identifier Ability to Play Musical Instrument   Goal Description Patient will report a 50% improvement in ability to play the trombone in comparison with status noted on date of evaluation.    Target Date 09/04/19   Education Assessment   Preferred Learning Style Listening;Reading;Demonstration;Pictures/video   Total Evaluation Time   Sound production (artic, phonology, apraxia, dysarthria) Minutes (92517) 45   Total Evaluation Time 45

## 2019-06-11 NOTE — PROGRESS NOTES
CLINICAL NUTRITION SERVICES - REASSESSMENT NOTE   EVALUATION OF PREVIOUS PLAN OF CARE:   Referring Physician: Everett  No charge   Current diet: general  Current appetite/intake: great  PEG Tube: No  Chemotherapy: No   Radiation: yes - 2/3 completed      Monitoring from previous assessment:   -Food intake - Deepak intake remains good.  He continues to eat normal foods without difficulty.  He has been eating most food textures.   -Weight trends - stable   Wt Readings from Last 10 Encounters:   06/10/19 123.5 kg (272 lb 3.2 oz)   06/03/19 123.4 kg (272 lb)   05/31/19 123.4 kg (272 lb)   05/21/19 (P) 123.6 kg (272 lb 6.4 oz)   05/13/19 124.3 kg (274 lb)   05/03/19 122.5 kg (270 lb)   04/24/19 119.7 kg (263 lb 12.8 oz)   04/19/19 122.5 kg (270 lb)   04/12/19 124.3 kg (274 lb)   04/05/19 127 kg (280 lb)     Previous Goals:   1.  Aim for 5-6 small frequent meals  2.  Aim for 2900kcal and 115g protein/day  3. Weight maintenance through cancer treatment  Evaluation: Met / ongoing  Previous Nutrition Diagnosis:   Food and nutrition-related knowledge deficit related to nutrition needs during radiation as evidenced by pt with questions regarding food choices for when eating becomes more difficult.   Evaluation: Completed     CURRENT NUTRITION DIAGNOSIS   Predicted suboptimal nutrient intake related to radiation treatment to head/neck region   INTERVENTIONS    Implementation  General/healthful diet - reviewed calorie, protein and hydration needs.    Encouraged pt to continue to strive for adequate nutrition.  Recommended pt aim for 2500kcal and 100g protein/day.  Encouraged small frequent meals.  Reviewed ways to incorporate nutrient dense soft foods when eating becomes more difficult.   Goals  1.  Aim for 5-6 small frequent meals  2.  Aim for 2900kcal and 115g protein/day  3. Weight maintenance through cancer treatment        Follow-Up Plans: Pt has RD contact information for questions.    Pt to follow up with RD in 1-2 weeks at  the time of treatment.      MONITORING AND EVALUATION:  -Food intake  -Weight trends     Bela Pina RDN, LD

## 2019-06-12 ENCOUNTER — TELEPHONE (OUTPATIENT)
Dept: TRANSPLANT | Facility: CLINIC | Age: 69
End: 2019-06-12

## 2019-06-12 ENCOUNTER — APPOINTMENT (OUTPATIENT)
Dept: RADIATION ONCOLOGY | Facility: CLINIC | Age: 69
End: 2019-06-12
Attending: RADIOLOGY
Payer: MEDICARE

## 2019-06-12 PROCEDURE — 77386 ZZH IMRT TREATMENT DELIVERY, COMPLEX: CPT | Performed by: RADIOLOGY

## 2019-06-13 ENCOUNTER — APPOINTMENT (OUTPATIENT)
Dept: RADIATION ONCOLOGY | Facility: CLINIC | Age: 69
End: 2019-06-13
Attending: RADIOLOGY
Payer: MEDICARE

## 2019-06-13 PROCEDURE — 77386 ZZH IMRT TREATMENT DELIVERY, COMPLEX: CPT | Performed by: RADIOLOGY

## 2019-06-14 ENCOUNTER — APPOINTMENT (OUTPATIENT)
Dept: RADIATION ONCOLOGY | Facility: CLINIC | Age: 69
End: 2019-06-14
Attending: RADIOLOGY
Payer: MEDICARE

## 2019-06-14 PROCEDURE — 77386 ZZH IMRT TREATMENT DELIVERY, COMPLEX: CPT | Performed by: RADIOLOGY

## 2019-06-17 ENCOUNTER — APPOINTMENT (OUTPATIENT)
Dept: RADIATION ONCOLOGY | Facility: CLINIC | Age: 69
End: 2019-06-17
Attending: RADIOLOGY
Payer: MEDICARE

## 2019-06-17 VITALS
BODY MASS INDEX: 34.1 KG/M2 | HEART RATE: 65 BPM | DIASTOLIC BLOOD PRESSURE: 84 MMHG | WEIGHT: 272.8 LBS | SYSTOLIC BLOOD PRESSURE: 127 MMHG

## 2019-06-17 DIAGNOSIS — C44.320 SQUAMOUS CELL CARCINOMA OF SKIN OF FACE: Primary | ICD-10-CM

## 2019-06-17 PROCEDURE — 77386 ZZH IMRT TREATMENT DELIVERY, COMPLEX: CPT | Performed by: RADIOLOGY

## 2019-06-17 PROCEDURE — 77336 RADIATION PHYSICS CONSULT: CPT | Performed by: RADIOLOGY

## 2019-06-17 NOTE — PROGRESS NOTES
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 No issues.    2019: Tolerating treatment well. Mildly increased dermatitis.    6/3/2019: Tolerating treatment well. No issues.    6/10/2019: Stable dermatitis.    2019: Slightly increased dermatitis and odynophagia.    ED visits/Hospitalizations:  None    Missed Treatments:  1. 2019: 1-day treatment break between fractions 3-4 secondary to machine downtime. Missed fraction was made up over the following weekend.  2. 2019: 1-day treatment break between fractions 10-11 secondary to the Memorial Day holiday    Subjective: Mr. Andrew presents to clinic today for his weekly on-treatment visit. He notes mildly increased throat pain over the past week, rating his symptoms as a 5/10 in severity which remain well-controlled with as-needed acetaminophen. He continues, though, to eat a normal diet without difficulty. He specifically denies any headaches, nausea/vomiting, fever/chills, dyspnea, chest pain or abdominal pain and his remaining ROS are negative.    ROS:   Constitutional  Pain (0-10): 2 (mouth), 5 (throat), 2 (skin)  Fatigue: Moderate symptoms    CNS  Headaches: None    ENT  Mucositis: Mild symptoms    Cardiopulmonary  Dyspnea: None    GI  Nausea/vomiting: None    Nutrition  PEG: No  Diet: Regular diet    Integumentary  Dermatitis: Moderate symptoms    Objective:   Current weight: 123.7 kg  Last week's weight: 123.5 kg  Starting weight: 124.3 kg    BP: 127/84 (sitting), 120/78 (standing)  Pulse: 65  (sitting), 72 (standing)    General: Alert, oriented and in no acute distress  HEENT: Moderately dry mucous membranes. Mild patchy mucositis involving the left buccal mucosa. No evidence of oral thrush.  Cardiac: Extremities are warm and well-perfused  Respiratory: No wheezing, stridor or respiratory distress  Skin: Moderate erythema involving the left anastasiya-face and neck. Patchy areas of desquamation predominantly confined to the skin folds within the left neck.      Treatment-related toxicities (CTCAE v5.0):  1. Mucositis: Grade 1: Asymptomatic or mild symptoms; intervention not indicated  2. Dermatitis: Grade 2: Moderate to brisk erythema; patchy moist desquamation, mostly confined to skin folds and creases; moderate erythema     Assessment:    Mr. Andrew is a 69 year old male with a recurrent pT2 N0 M0 cutaneous squamous cell carcinoma of the left face status post surgical resection. He is receiving adjuvant radiotherapy for improved local disease control. He continues to tolerate adjuvant radiotherapy well with the anticipated acute radiation-induced grade 2 dermatitis and grade 1 mucositis.    Plan:   rpT2 N0 M0 cutaneous squamous cell carcinoma of the left face:    Continue radiotherapy    Pain management:    Continue as-needed acetaminophen for pain management    Fluids/Electrolytes/Nutrition:    Continue diet as tolerated    Dermatitis:    Continue frequent moisturizer use to the left face and neck    Mosaiq chart and setup information reviewed  IGRT images reviewed    Medication Review  Med list reviewed with patient?: Yes    Grabiel Floyd MD/PhD    Dept of Radiation Oncology  [facility]

## 2019-06-18 ENCOUNTER — APPOINTMENT (OUTPATIENT)
Dept: RADIATION ONCOLOGY | Facility: CLINIC | Age: 69
End: 2019-06-18
Attending: RADIOLOGY
Payer: MEDICARE

## 2019-06-18 PROCEDURE — 77386 ZZH IMRT TREATMENT DELIVERY, COMPLEX: CPT | Performed by: RADIOLOGY

## 2019-06-19 ENCOUNTER — APPOINTMENT (OUTPATIENT)
Dept: RADIATION ONCOLOGY | Facility: CLINIC | Age: 69
End: 2019-06-19
Attending: RADIOLOGY
Payer: MEDICARE

## 2019-06-19 PROCEDURE — 77386 ZZH IMRT TREATMENT DELIVERY, COMPLEX: CPT | Performed by: RADIOLOGY

## 2019-06-20 ENCOUNTER — APPOINTMENT (OUTPATIENT)
Dept: RADIATION ONCOLOGY | Facility: CLINIC | Age: 69
End: 2019-06-20
Attending: RADIOLOGY
Payer: MEDICARE

## 2019-06-20 ENCOUNTER — OFFICE VISIT (OUTPATIENT)
Dept: OPHTHALMOLOGY | Facility: CLINIC | Age: 69
End: 2019-06-20
Payer: MEDICARE

## 2019-06-20 ENCOUNTER — TELEPHONE (OUTPATIENT)
Dept: OTOLARYNGOLOGY | Facility: CLINIC | Age: 69
End: 2019-06-20

## 2019-06-20 DIAGNOSIS — H25.13 NUCLEAR SENILE CATARACT OF BOTH EYES: ICD-10-CM

## 2019-06-20 DIAGNOSIS — H01.024 SQUAMOUS BLEPHARITIS OF UPPER EYELIDS OF BOTH EYES: ICD-10-CM

## 2019-06-20 DIAGNOSIS — H52.203 MYOPIA OF BOTH EYES WITH ASTIGMATISM AND PRESBYOPIA: ICD-10-CM

## 2019-06-20 DIAGNOSIS — H52.13 MYOPIA OF BOTH EYES WITH ASTIGMATISM AND PRESBYOPIA: ICD-10-CM

## 2019-06-20 DIAGNOSIS — H01.021 SQUAMOUS BLEPHARITIS OF UPPER EYELIDS OF BOTH EYES: ICD-10-CM

## 2019-06-20 DIAGNOSIS — D31.42 IRIS NEVUS, LEFT: Primary | ICD-10-CM

## 2019-06-20 DIAGNOSIS — H52.4 MYOPIA OF BOTH EYES WITH ASTIGMATISM AND PRESBYOPIA: ICD-10-CM

## 2019-06-20 PROCEDURE — 77386 ZZH IMRT TREATMENT DELIVERY, COMPLEX: CPT | Performed by: RADIOLOGY

## 2019-06-20 ASSESSMENT — TONOMETRY
IOP_METHOD: ICARE
OS_IOP_MMHG: 16
OD_IOP_MMHG: 14

## 2019-06-20 ASSESSMENT — REFRACTION_MANIFEST
OD_AXIS: 177
OD_ADD: +2.25
OD_SPHERE: -7.25
OS_ADD: +2.25
OS_SPHERE: -8.25
OS_CYLINDER: +0.75
OD_CYLINDER: +1.25
OS_AXIS: 024

## 2019-06-20 ASSESSMENT — VISUAL ACUITY
CORRECTION_TYPE: CONTACTS
METHOD: SNELLEN - LINEAR
OD_CC: 20/20
OS_CC: 20/20

## 2019-06-20 ASSESSMENT — REFRACTION_WEARINGRX
OD_CYLINDER: +1.25
OS_SPHERE: -8.50
OD_AXIS: 175
OD_SPHERE: -7.50
OD_ADD: +2.25
OS_CYLINDER: +0.50
OS_ADD: +2.25
OS_AXIS: 040

## 2019-06-20 ASSESSMENT — EXTERNAL EXAM - LEFT EYE: OS_EXAM: NORMAL

## 2019-06-20 ASSESSMENT — CUP TO DISC RATIO
OD_RATIO: 0.20
OS_RATIO: 0.20

## 2019-06-20 ASSESSMENT — SLIT LAMP EXAM - LIDS
COMMENTS: 2+ BLEPHARITIS
COMMENTS: 2+ BLEPHARITIS

## 2019-06-20 ASSESSMENT — CONF VISUAL FIELD
OD_NORMAL: 1
METHOD: COUNTING FINGERS
OS_NORMAL: 1

## 2019-06-20 ASSESSMENT — EXTERNAL EXAM - RIGHT EYE: OD_EXAM: NORMAL

## 2019-06-20 NOTE — NURSING NOTE
Chief Complaints and History of Present Illnesses   Patient presents with     Annual Eye Exam     Chief Complaint(s) and History of Present Illness(es)     Annual Eye Exam     Laterality: both eyes    Onset: years ago    Frequency: constantly    Timing: throughout the day    Course: stable    Associated symptoms: dryness.  Negative for eye pain    Treatments tried: no treatments    Pain scale: 0/10              Comments     Having radiation on left side of face. Some dryness left eye. Wearing CLs most of the time. No blurred vision.    Sophy Castro COT 3:19 PM June 20, 2019

## 2019-06-20 NOTE — PROGRESS NOTES
A/P  1.) Cataracts OU  -Not visually symptomatic at this time  -Monitor    2.) h/o Melanoma   -No ocular abnormalities today  -Iris nevus OS, nonsuspicious  -No retinal findings at this time  -Continue to monitor. Reviewed with pt to self-monitor ocular surface at home. We will continue to monitor whole eye annually    3.) Myopia/Astigmatism/Presbyopia OU  -Doing well in current Rx, continue  -CL wearer, gets eval-ed elsewere    4.) Blepharitis OU  -Symptomatic for dryness OS. Lid scrubs prn    Monitor 1 year routine, sooner prn    I have confirmed the patient's CC, HPI and reviewed Past Medical History, Past Surgical History, Social History, Family History, Problem List, Medication List and agree with Tech note.     Vicki Mantilla OD FAAO

## 2019-06-21 ENCOUNTER — APPOINTMENT (OUTPATIENT)
Dept: RADIATION ONCOLOGY | Facility: CLINIC | Age: 69
End: 2019-06-21
Attending: RADIOLOGY
Payer: MEDICARE

## 2019-06-21 PROCEDURE — 77386 ZZH IMRT TREATMENT DELIVERY, COMPLEX: CPT | Performed by: RADIOLOGY

## 2019-06-24 ENCOUNTER — OFFICE VISIT (OUTPATIENT)
Dept: RADIATION ONCOLOGY | Facility: CLINIC | Age: 69
End: 2019-06-24
Attending: RADIOLOGY
Payer: MEDICARE

## 2019-06-24 VITALS — BODY MASS INDEX: 34 KG/M2 | SYSTOLIC BLOOD PRESSURE: 144 MMHG | DIASTOLIC BLOOD PRESSURE: 78 MMHG | WEIGHT: 272 LBS

## 2019-06-24 DIAGNOSIS — C44.320 SQUAMOUS CELL CARCINOMA OF SKIN OF FACE: Primary | ICD-10-CM

## 2019-06-24 PROCEDURE — 77336 RADIATION PHYSICS CONSULT: CPT | Performed by: RADIOLOGY

## 2019-06-24 PROCEDURE — 77386 ZZH IMRT TREATMENT DELIVERY, COMPLEX: CPT | Performed by: RADIOLOGY

## 2019-06-24 NOTE — PROGRESS NOTES
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 No issues.    2019: Tolerating treatment well. Mildly increased dermatitis.    6/3/2019: Tolerating treatment well. No issues.    6/10/2019: Stable dermatitis.    2019: Slightly increased dermatitis and odynophagia.    2019: Stable symptoms.    ED visits/Hospitalizations:  None    Missed Treatments:  1. 2019: 1-day treatment break between fractions 3-4 secondary to machine downtime. Missed fraction was made up over the following weekend.  2. 2019: 1-day treatment break between fractions 10-11 secondary to the Memorial Day holiday    Subjective: Mr. Andrew presents to clinic today for his weekly on-treatment visit. He describes stable symptoms which are not grossly changed from last week. He rates his mouth and throat pain as a 1-3/10 in severity and the pain within the left neck as a 4/10. These remain well-controlled with as-needed acetaminophen. He continues to eat a normal diet without difficulty and he specifically denies any headaches, nausea/vomiting, dyspnea, fever/chills, chest pain or abdominal pain with his remaining ROS otherwise negative.    ROS:   Constitutional  Pain (0-10): 1 (mouth), 3 (throat), 4 (skin)  Fatigue: Mild to moderate symptoms    CNS  Headaches: None    ENT  Mucositis: Mild symptoms    Cardiopulmonary  Dyspnea: None    GI  Nausea/vomiting: None    Nutrition  PEG: No  Diet: Regular diet    Integumentary  Dermatitis: Moderate symptoms    Objective:   Current weight: 123.4  kg  Last week's weight: 123.7 kg  Starting weight: 124.3 kg    BP: 144/78 (sitting), 132/72 (standing)    General: Alert, oriented and in no acute distress  HEENT: Moderately dry mucous membranes. Mild patchy mucositis involving the left buccal mucosa. No evidence of oral thrush.  Cardiac: Extremities are warm and well-perfused  Respiratory: No wheezing, stridor or respiratory distress  Skin: Moderate erythema involving the left anastasiya-face and neck. Patchy areas of desquamation predominantly confined to the skin folds within the left neck.      Treatment-related toxicities (CTCAE v5.0):  1. Mucositis: Grade 1: Asymptomatic or mild symptoms; intervention not indicated  2. Dermatitis: Grade 2: Moderate to brisk erythema; patchy moist desquamation, mostly confined to skin folds and creases; moderate erythema     Assessment:    Mr. Andrew is a 69 year old male with a recurrent pT2 N0 M0 cutaneous squamous cell carcinoma of the left face status post surgical resection. He is receiving adjuvant radiotherapy for improved local disease control and has tolerated treatment well with the anticipated acute radiation-induced toxicities, namely dermatitis and mucositis.    Plan:   rpT2 N0 M0 cutaneous squamous cell carcinoma of the left face:    Complete radiotherapy today    Follow-up in radiation oncology clinic in 1-2 weeks with NP and in 6 weeks with MD    Pain management:    Continue as-needed acetaminophen for pain management    Fluids/Electrolytes/Nutrition:    Continue diet as tolerated    Dermatitis:    Continue frequent moisturizer use to the left face and neck    Mosaiq chart and setup information reviewed  IGRT images reviewed    Medication Review  Med list reviewed with patient?: Yes    Grabiel Floyd MD/PhD    Dept of Radiation Oncology  [facility]

## 2019-06-26 ENCOUNTER — ONCOLOGY VISIT (OUTPATIENT)
Dept: RADIATION ONCOLOGY | Facility: CLINIC | Age: 69
End: 2019-06-26

## 2019-06-28 NOTE — PROCEDURES
Radiotherapy Treatment Summary          Date of Report: 2019     PATIENT: BROOKLYNN ANDREW  MEDICAL RECORD NO: [id number]  : 1950     DIAGNOSIS: C44.32 Squamous cell carcinoma of skin of other and unspecified parts of face  INTENT OF RADIOTHERAPY: Curative (adjuvant)  PATHOLOGY: Squamous cell carcinoma  STAGE: rpT2 N0 M0  CONCURRENT SYSTEMIC THERAPY: None     Details of the treatments summarized below are found in records kept in the Department of Radiation Oncology at [facility].     Treatment Summary:  Treatment Site Dose  Modality From  To  Elapsed Days Fx.  Tumor bed   6,000 cGy 06 X   5/13/2019  2019  42  30  Operative bed  5,700 cGy 06 X   5/13/2019  2019  42  30  Elective LNs  5,400 cGy 06 X   5/13/2019  2019  42  30     Dose per Fraction:          Tumor bed:  200 cGy  High-risk LNs:  190 cGy  Elective LNs:   180 cGy     COMMENTS:                      Mr. Andrew is a 69 year old gentleman with a history of immunosuppression secondary to liver transplantation in  who underwent an excision of a cutaneous squamous cell carcinoma arising from the left cheek in 2019. Several months later, he developed a recurrence involving the deep margin with invasion into the superficial parotid gland. He had a left total parotidectomy with dissection of ipsilateral levels IB-V   performed by Dr. Moreland on 2019. Surgical pathology revealed a 2.1 cm tumor centered within the subcutaneous tissues with extension into the parotid gland. The surgical margins were negative with no evidence of lymphovascular space invasion. Perineural invasion involving both small and large caliber nerves was appreciated with involvement of the distal greater auricular nerve which was resected. All of the sampled  lymph nodes were negative for metastatic disease.     The tumor bed and distal tract of the greater auricular nerve were treated to a total dose of 6000 cGy in 30 once-daily fractions with 6 MV photons  delivered using a 3-arc volumetric modulated arc therapy technique. The remainder of the operative bed received a total dose of 5700 cGy while the low level IV lymphatics and the facial nerve tract to the base of skull were treated to 5400 cGy using a simultaneous integrated boost technique.      Mr. Andrew tolerated treated well with the development of mild grade 1 mucositis and grade 2 dermatitis by the completion of therapy which was managed with occasional as-needed acetaminophen and frequent moisturizer use, respectively. He had a 1-day break in therapy between fractions 3-4 due to machine downtime with the missed fraction made up over the following weekend. He additionally had a 1-day break between  fractions 10-11 secondary to the Memorial Day holiday. He otherwise did not have any additional unplanned breaks in therapy.     Acute Toxicity Profile (CTC v5.0)  Mucositis: Grade 1  Dermatitis: Grade 2     PAIN MANAGEMENT:   Continue as-needed acetaminophen for mild to moderate pain.      FOLLOW UP PLAN:   1. Follow-up in radiation oncology clinic with NP visit in 2 weeks and with MD in 6 weeks  2. Follow-up with Dr. Moreland in 6 weeks for ongoing disease surveillance     Staff Physician: Grabiel Floyd MD, PhD  Physicist: Pili Trejo, PhD     CC:   MD Aston Vizcaino MD                                 Radiation Oncology:  [facility] [address] [place] [address]

## 2019-07-09 ENCOUNTER — OFFICE VISIT (OUTPATIENT)
Dept: RADIATION ONCOLOGY | Facility: CLINIC | Age: 69
End: 2019-07-09
Attending: RADIOLOGY
Payer: MEDICARE

## 2019-07-09 VITALS — RESPIRATION RATE: 18 BRPM | HEART RATE: 80 BPM | BODY MASS INDEX: 34.05 KG/M2 | WEIGHT: 272.4 LBS

## 2019-07-09 DIAGNOSIS — M43.6 NECK STIFFNESS: Primary | ICD-10-CM

## 2019-07-09 DIAGNOSIS — C44.320 SQUAMOUS CELL CARCINOMA OF SKIN OF FACE: ICD-10-CM

## 2019-07-09 NOTE — PROGRESS NOTES
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 Andrew is a 69 year old gentleman with a history of immunosuppression secondary to liver transplantation in  who underwent an excision of a cutaneous squamous cell carcinoma arising from the left cheek in 2019. Several months later, he developed a recurrence involving the deep margin with invasion into the superficial parotid gland. He had a left total parotidectomy with dissection of ipsilateral levels IB-V   performed by Dr. Moreland on 2019. Surgical pathology revealed a 2.1 cm tumor centered within the subcutaneous tissues with extension into the parotid gland. The surgical margins were negative with no evidence of lymphovascular space invasion. Perineural invasion involving both small and large caliber nerves was appreciated with involvement of the distal greater auricular nerve which was resected. All of the sampled  lymph nodes were negative for metastatic disease.     The tumor bed and distal tract of the greater auricular nerve were treated to a total dose of 6000 cGy in 30 once-daily fractions with 6 MV photons delivered using a 3-arc volumetric modulated arc therapy technique. The remainder of the operative bed received a total dose of 5700 cGy while the low level IV lymphatics and the facial nerve tract to the base of skull were treated to 5400 cGy using a simultaneous integrated boost technique.         SUBJECTIVE/HPI:  Eric Andrew is a 69 year old male who is here today for routine 2 week follow up after completing radiation therapy.  He is doing well.  He states his skin reaction was significantly worse after treatment ended.  He had  a routine derm appointment and they gave him mupirocin (bactroban) ointment and he felt that really helped.  He continues to use this currently.  He notices increased neck stiffness and would like to consider PT.  He is doing jaw exercises.  He is eating well although he does have slight dysgeusia. Dry mouth isn't too bothersome.  He has stopped salt and soda rinses because he feels his mouth is healed.    ROS:  Complete review of systems is negative except for symptoms discussed in subjective above.    Current Outpatient Medications   Medication     AMLODIPINE BESYLATE PO     atorvastatin (LIPITOR) 20 MG tablet     BASAGLAR 100 UNIT/ML injection     Calcium Carbonate-Vitamin D (CALCIUM + D PO)     diphenhydrAMINE HCl (BENADRYL PO)     losartan (COZAAR) 25 MG tablet     metFORMIN (GLUCOPHAGE) 1000 MG tablet     metoprolol succinate (TOPROL-XL) 25 MG 24 hr tablet     Multiple Vitamins-Minerals (MULTIVITAL) TABS     mupirocin (BACTROBAN) 2 % external ointment     sildenafil (VIAGRA) 50 MG tablet     tacrolimus (GENERIC EQUIVALENT) 1 MG capsule     No current facility-administered medications for this visit.           Allergies   Allergen Reactions     Cefazolin Swelling     Lips swelled while patient was in the OR      Lisinopril Cough     Meropenem Hives and Swelling     Developed hives and lip swelling while on meropenem and micafungin.  Resolved with discontinuation.  Unclear which was cause.     Micafungin Hives and Swelling     Developed hives and lip swelling while on meropenem and micafungin.  Resolved with discontinuation.  Unclear which was cause.       Past Medical History:   Diagnosis Date     Alpha-1-antitrypsin deficiency (H)      Ascites     Pre-transplant     Chronic kidney disease, stage 3 (H)      Cirrhosis (H)     Alpha-1-antitrypsin deficiency, s/p liver transplant 3/31/15     Gout      HTN (hypertension)      Lentigo maligna melanoma (H) 2009    lentigo maligna melanoma excised on 01/29/2009 with a  repeat wide excision on 03/30/2009.      Liver replaced by transplant (H) 03/31/2015     Nephrolithiasis      Osteoarthritis      Portal hypertension (H)     Pre-transplant         PHYSICAL EXAM:  Pulse 80   Resp 18   Wt 123.6 kg (272 lb 6.4 oz)   BMI 34.05 kg/m      Gen: Alert, in NAD  Eyes: PERRL, EOMI, sclera anicteric  HENT     Head: NC/AT     Ears: TM's clear, no external lesions.     Oral Cavity/Oropharynx: Slightly dry mucous membranes without thrush.  Neck: Moderate erythema of the left neck with patchy areas of  desquamation involving the folds of the left neck. No lymphedema. No palpable cervical adenopathy.  Decreased ROM especially extension.  Pulm: No wheezing, stridor or respiratory distress  CV: Well-perfused, no cyanosis  Musculoskeletal: Normal bulk and tone   Skin: Erythema and desquamation of the neck as described above otherwise normal color and turgor.      LABS AND IMAGING:  Reviewed.    IMPRESSION:   Mr. Andrew is a 69 year old male with a SCC of the skin recurrent into parotid gland s/p radiation now 2 weeks out since completion of treatment and doing well with slow improvement of acute side effects.    PLAN:   1.  Follow up with Dr. Perez in 1 month.   Continue close follow up with ENT and dermatology.  2. Continue to moisturize with aquaphor and when skin is completely healed can change to preferred moisturizer.  Discussed importance of sun avoidance or sun protection.  3. Fatigue should continue to improve.  4.  Routine dental follow up at least every 6 months.  Continue to use fluoride trays or fluoride treatments. Continue jaw, neck and swallowing exercises.  PT referral sent as he is bothered by his neck stiffness.  5. He is only taking Tylenol for pain on a rare basis.       Bridgett Borden, NP   Radiation Oncology

## 2019-07-09 NOTE — LETTER
2019       RE: Eric Andrew  [address] [place] [address]     Dear Colleague,    Thank you for referring your patient, Eric Andrew, to the RADIATION ONCOLOGY CLINIC. Please see a copy of my visit note below.    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 Andrew is a 69 year old gentleman with a history of immunosuppression secondary to liver transplantation in  who underwent an excision of a cutaneous squamous cell carcinoma arising from the left cheek in 2019. Several months later, he developed a recurrence involving the deep margin with invasion into the superficial parotid gland. He had a left total parotidectomy with dissection of ipsilateral levels IB-V   performed by Dr. Moreland on 2019. Surgical pathology revealed a 2.1 cm tumor centered within the subcutaneous tissues with extension into the parotid gland. The surgical margins were negative with no evidence of lymphovascular space invasion. Perineural invasion involving both small and large caliber nerves was appreciated with involvement of the distal greater auricular nerve which was resected. All of the sampled  lymph nodes were negative for metastatic disease.     The tumor bed and distal tract of the greater auricular nerve were treated to a total dose of 6000 cGy in 30 once-daily fractions with 6 MV photons delivered using a 3-arc volumetric modulated arc therapy technique. The remainder of the operative bed received a total dose of 5700 cGy while the low level IV lymphatics and the facial nerve tract to the base of skull were treated to 5400 cGy using a simultaneous integrated boost technique.          SUBJECTIVE/HPI:  Eric Andrew is a 69 year old male who is here today for routine 2 week follow up after completing radiation therapy.  He is doing well.  He states his skin reaction was significantly worse after treatment ended.  He had a routine derm appointment and they gave him mupirocin (bactroban) ointment and he felt that really helped.  He continues to use this currently.  He notices increased neck stiffness and would like to consider PT.  He is doing jaw exercises.  He is eating well although he does have slight dysgeusia. Dry mouth isn't too bothersome.  He has stopped salt and soda rinses because he feels his mouth is healed.    ROS:  Complete review of systems is negative except for symptoms discussed in subjective above.    Current Outpatient Medications   Medication     AMLODIPINE BESYLATE PO     atorvastatin (LIPITOR) 20 MG tablet     BASAGLAR 100 UNIT/ML injection     Calcium Carbonate-Vitamin D (CALCIUM + D PO)     diphenhydrAMINE HCl (BENADRYL PO)     losartan (COZAAR) 25 MG tablet     metFORMIN (GLUCOPHAGE) 1000 MG tablet     metoprolol succinate (TOPROL-XL) 25 MG 24 hr tablet     Multiple Vitamins-Minerals (MULTIVITAL) TABS     mupirocin (BACTROBAN) 2 % external ointment     sildenafil (VIAGRA) 50 MG tablet     tacrolimus (GENERIC EQUIVALENT) 1 MG capsule     No current facility-administered medications for this visit.           Allergies   Allergen Reactions     Cefazolin Swelling     Lips swelled while patient was in the OR      Lisinopril Cough     Meropenem Hives and Swelling     Developed hives and lip swelling while on meropenem and micafungin.  Resolved with discontinuation.  Unclear which was cause.     Micafungin Hives and Swelling     Developed hives and lip swelling while on meropenem and micafungin.  Resolved with discontinuation.  Unclear which was cause.       Past Medical History:   Diagnosis Date     Alpha-1-antitrypsin deficiency (H)      Ascites     Pre-transplant      Chronic kidney disease, stage 3 (H)      Cirrhosis (H)     Alpha-1-antitrypsin deficiency, s/p liver transplant 3/31/15     Gout      HTN (hypertension)      Lentigo maligna melanoma (H) 2009    lentigo maligna melanoma excised on 01/29/2009 with a repeat wide excision on 03/30/2009.      Liver replaced by transplant (H) 03/31/2015     Nephrolithiasis      Osteoarthritis      Portal hypertension (H)     Pre-transplant         PHYSICAL EXAM:  Pulse 80   Resp 18   Wt 123.6 kg (272 lb 6.4 oz)   BMI 34.05 kg/m       Gen: Alert, in NAD  Eyes: PERRL, EOMI, sclera anicteric  HENT     Head: NC/AT     Ears: TM's clear, no external lesions.     Oral Cavity/Oropharynx:  Slightly dry mucous membranes without thrush.  Neck: Moderate erythema of the left neck with patchy areas of  desquamation involving the folds of the left neck. No lymphedema. No palpable cervical adenopathy.  Decreased ROM especially extension.  Pulm: No wheezing, stridor or respiratory distress  CV: Well-perfused, no cyanosis  Musculoskeletal: Normal bulk and tone   Skin: Erythema and desquamation of the neck as described above otherwise normal color and turgor.      LABS AND IMAGING:  Reviewed.    IMPRESSION:   Mr. Andrew is a 69 year old male with a SCC of the skin recurrent into parotid gland s/p radiation now 2 weeks out since completion of treatment and doing well with slow improvement of acute side effects.    PLAN:   1.  Follow up with Dr. Perez in 1 month.   Continue close follow up with ENT and dermatology.  2. Continue to moisturize with aquaphor and when skin is completely healed can change to preferred moisturizer.  Discussed importance of sun avoidance or sun protection.  3. Fatigue should continue to improve.  4.  Routine dental follow up at least every 6 months.  Continue to use fluoride trays or fluoride treatments. Continue jaw, neck and swallowing exercises.  PT referral sent as he is bothered by his neck stiffness.  5. He is only  taking Tylenol for pain on a rare basis.       Bridgett Borden NP   Radiation Oncology      Again, thank you for allowing me to participate in the care of your patient.      Sincerely,    BRONWYN Serrano CNP

## 2019-07-16 ENCOUNTER — HOSPITAL ENCOUNTER (OUTPATIENT)
Dept: PHYSICAL THERAPY | Facility: CLINIC | Age: 69
End: 2019-07-16
Attending: NURSE PRACTITIONER
Payer: MEDICARE

## 2019-07-16 DIAGNOSIS — L90.5 SCAR CONDITION AND FIBROSIS OF SKIN: ICD-10-CM

## 2019-07-16 DIAGNOSIS — M43.6 NECK STIFFNESS: Primary | ICD-10-CM

## 2019-07-16 DIAGNOSIS — I89.0 LYMPHEDEMA OF FACE: ICD-10-CM

## 2019-07-16 DIAGNOSIS — C44.320 SQUAMOUS CELL CARCINOMA OF SKIN OF FACE: ICD-10-CM

## 2019-07-16 PROCEDURE — 97110 THERAPEUTIC EXERCISES: CPT | Mod: GP | Performed by: PHYSICAL THERAPIST

## 2019-07-16 PROCEDURE — 97140 MANUAL THERAPY 1/> REGIONS: CPT | Mod: GP | Performed by: PHYSICAL THERAPIST

## 2019-07-16 PROCEDURE — 97162 PT EVAL MOD COMPLEX 30 MIN: CPT | Mod: GP | Performed by: PHYSICAL THERAPIST

## 2019-07-16 PROCEDURE — 97112 NEUROMUSCULAR REEDUCATION: CPT | Mod: GP | Performed by: PHYSICAL THERAPIST

## 2019-07-16 NOTE — PROGRESS NOTES
07/16/19 1100   Quick Adds   Quick Adds Certification   Rehab Discipline   Discipline PT   Type of Visit   Type of visit Initial Edema Evaluation   General Information   Start of care 07/16/19   Referring physician Johanna Yousif MD   Orders Evaluate and treat as indicated   Order date 05/03/19   Medical diagnosis squamous cell cancer of face with mets to parotid; lymphedema, neck stiffness   Onset of illness / date of surgery 01/01/19   Edema onset 01/01/19   Affected body parts Head / Neck   Edema etiology Cancer with lymph node dissection;Radiation;Surgery   Location - Cancer with lymph node dissection SCC with mets to L parotid; L neck dissection 0/47   Location - Radiation completed 6/21/2019   Edema etiology comments s/p L neck dissection 0/47 nodes; radiation, lip weakness   Pertinent history of current problem (PT: include personal factors and/or comorbidities that impact the POC; OT: include additional occupational profile info) obesity, h/o liver transplant 2015, c/o fatigue, neck stiffness, pain, decreased restorative sleep;    Surgical / medical history reviewed Yes   Prior level of functional mobility I ADLS, plays trombone; previously used ; enjoys golf   Community support Family / friend caregiver  (wife)   Patient role / employment history Retired  ()   Psychosocial concerns Impaired sleep   Living environment House / [facility]   Living environment comments [place] in [place];   General observations 7/26- 8/19/2019; [place] vacation   Fall Risk Screen   Fall screen completed by PT   Have you fallen 2 or more times in the past year? No   Have you fallen and had an injury in the past year? No   Is patient a fall risk? No   Abuse Screen (yes response referral indicated)   Feels Unsafe at Home or Work/School no   Feels Threatened by Someone no   Does Anyone Try to Keep You From Having Contact with Others or Doing Things Outside Your Home? no   Physical Signs of Abuse Present no    System Outcome Measures   Outcome Measures Cancer Rehab;Lymphedema   FACIT Fatigue Subscale (score out of 52). The higher the score, the better the QOL. 43  (>40 WNL)   Six Minute Walk (meters). An increase of 70 or more meters indicates statistically significant change.   (NA; concerned about neck stiffness; not fatigue/deconditioni)   Lymphedema Life Impact Scale (score range 0-72). A higher score indicates greater impairment.   (NA for Head and neck lymphedema; NDII =44)   Subjective Report   Patient report of symptoms primary concern for neck stiffness; mild edema of face   Precautions   Precautions comments h/o liver transplant   Patient / Family Goals   Patient / family goals statement decrease neck stiffness, increase knowledge of lymphedema and precautions, resume playing trombone   Pain   Patient currently in pain Yes   Pain location L neck   Pain rating 1-2/10   Pain description Ache;Discomfort   Vitals Signs   BMI 34   Cognitive Status   Orientation Orientation to person, place and time   Level of consciousness Alert   Follows commands and answers questions 100% of the time   Personal safety and judgement Intact   Memory Intact   Edema Exam / Assessment   Skin condition Non-pitting;Dryness;Other   Skin condition comments errythema; L neck   Scar Yes   Location L neck dissection   Mobility moderately adhered   Girth Measurements   Girth Measurements Refer to separate girth measurement flowsheet   Range of Motion   ROM comments cspine limited especially in extension; R sidebending and R rotation   Strength   Strength comments WFL   Posture   Posture comments tends to hold cspine in slight L lateral flexion   Gait / Locomotion   Gait / Locomotion no deficits noted   Sensory   Sensory perception comments decreased L side of face/neck   Planned Edema Interventions   Planned edema interventions Manual lymph drainage;Gradient compression bandaging;Fit for compression garment;Exercises;Precautions to prevent  infection / exacerbation;Education;Manual therapy;ADL training;Skin care / precautions;Scar mobilization;Soft tissue mobilization;Myofascial release;Home management program development   Planned edema intervention comments emphasis on MFR/STM to increase cspine ROM and address mild facial lymphedema   Clinical Impression   Criteria for skilled therapeutic intervention met Yes   Therapy diagnosis neck impairment, lymphedema, fatigue   Influenced by the following impairments / conditions Edema;Stage 2   Functional limitations due to impairments / conditions impaired restorative sleep, difficulty with looking to R;and up, increased risk of infections   Clinical Presentation Evolving/Changing   Clinical Presentation Rationale patient reporting decreased neck stiffness but worsening facial edema   Clinical Decision Making (Complexity) Moderate complexity   Treatment Frequency 1x/week   Treatment duration 4 weeks then decrease to 2x/month x 2 months   Patient / family and/or staff in agreement with plan of care Yes   Risks and benefits of therapy have been explained Yes   Clinical impression comments patient s/p SCC with mets to parotid gland with removal and L neck dissection and s/p radiation now with significant limitations of cspine ROM, edema and fatigue and would benefit from therapy to achieve stated goals   Education Assessment   Preferred learning style Listening;Reading;Demonstration   Barriers to learning No barriers   Goal 1   Goal identifier Home program   Goal description patient independent in home program to manage condition of cspine impairment, lymphedema and cancer related fatigue and deconditioning   Target date 10/14/19   Goal 2   Goal identifier Neck Dissection Impairment Index   Goal description Patient to demonstrate a 5 point increase in raw score demnonstrating decreased impact on function.   Target date 09/14/19   Goal 3   Goal identifier edema   Goal description Decrease facial edema by 1 cm to  decrease risk of infection   Target date 09/14/19   Goal 4   Goal identifier cspine AROM   Goal description Increase cspine extension and R sidebending by 10 decrease to allow for increased ease of looking up and to R   Target date 09/14/19   Total Evaluation Time   PT Eval, Moderate Complexity Minutes (72143) 30   Certification   Certification date from 07/16/19   Certification date to 10/14/19   Medical Diagnosis squamous cell cancer of face with mets to parotid; lymphedema   Certification I certify the need for these services furnished under this plan of treatment and while under my care.  (Physician co-signature of this document indicates review and certification of the therapy plan).

## 2019-07-16 NOTE — PROGRESS NOTES
[facility]        OUTPATIENT PHYSICAL THERAPY EDEMA EVALUATION  PLAN OF TREATMENT FOR OUTPATIENT REHABILITATION  (COMPLETE FOR INITIAL CLAIMS ONLY)  Patient's Last Name, First Name, Eric Crenshaw                           Provider s Name:   [facility] Medical Record No.  [id number]     Start of Care Date:  07/16/19   Onset Date:  01/01/19   Type:  PT   Medical Diagnosis:  squamous cell cancer of face with mets to parotid; lymphedema   Therapy Diagnosis:  neck impairment, lymphedema, fatigue Visits from SOC:  1                                     __________________________________________________________________________________   Plan of Treatment/Functional Goals:    Manual lymph drainage, Gradient compression bandaging, Fit for compression garment, Exercises, Precautions to prevent infection / exacerbation, Education, Manual therapy, ADL training, Skin care / precautions, Scar mobilization, Soft tissue mobilization, Myofascial release, Home management program development  emphasis on MFR/STM to increase cspine ROM and address mild facial lymphedema     GOALS  1. Goal description: patient independent in home program to manage condition of cspine impairment, lymphedema and cancer related fatigue and deconditioning       Target date: 10/14/19  2. Goal description: Patient to demonstrate a 5 point increase in raw score demnonstrating decreased impact on function.       Target date: 09/14/19  3. Goal description: Decrease facial edema by 1 cm to decrease risk of infection       Target date: 09/14/19  4. Goal description: Increase cspine extension and R sidebending by 10 decrease to allow for increased ease of looking up and to R       Target date: 09/14/19  5.            6.               7.             8.              Treatment Frequency: 1x/week   Treatment  duration: 4 weeks then decrease to 2x/month x 2 months    Patty Montes, PT                                    I CERTIFY THE NEED FOR THESE SERVICES FURNISHED UNDER        THIS PLAN OF TREATMENT AND WHILE UNDER MY CARE     (Physician co-signature of this document indicates review and certification of the therapy plan).                   Certification date from: 07/16/19       Certification date to: 10/14/19           Referring physician: Johanna Yousif MD   Initial Assessment  See Epic Evaluation- Start of care: 07/16/19

## 2019-07-17 ENCOUNTER — HOSPITAL ENCOUNTER (OUTPATIENT)
Dept: LAB | Facility: CLINIC | Age: 69
Discharge: HOME | End: 2019-07-17
Attending: INTERNAL MEDICINE | Admitting: INTERNAL MEDICINE
Payer: MEDICARE

## 2019-07-17 DIAGNOSIS — Z13.220 LIPID SCREENING: ICD-10-CM

## 2019-07-17 DIAGNOSIS — Z94.4 LIVER REPLACED BY TRANSPLANT (H): ICD-10-CM

## 2019-07-17 LAB
ALBUMIN SERPL-MCNC: 3.8 G/DL (ref 3.4–5)
ALBUMIN UR-MCNC: 10 MG/DL
ALP SERPL-CCNC: 84 U/L (ref 40–150)
ALT SERPL W P-5'-P-CCNC: 19 U/L (ref 0–70)
ANION GAP SERPL CALCULATED.3IONS-SCNC: 3 MMOL/L (ref 3–14)
APPEARANCE UR: CLEAR
AST SERPL W P-5'-P-CCNC: 13 U/L (ref 0–45)
BILIRUB DIRECT SERPL-MCNC: 0.1 MG/DL (ref 0–0.2)
BILIRUB SERPL-MCNC: 0.4 MG/DL (ref 0.2–1.3)
BILIRUB UR QL STRIP: NEGATIVE
BUN SERPL-MCNC: 24 MG/DL (ref 7–30)
CALCIUM SERPL-MCNC: 9 MG/DL (ref 8.5–10.1)
CHLORIDE SERPL-SCNC: 108 MMOL/L (ref 94–109)
CHOLEST SERPL-MCNC: 122 MG/DL
CO2 SERPL-SCNC: 27 MMOL/L (ref 20–32)
COLOR UR AUTO: ABNORMAL
CREAT SERPL-MCNC: 1.34 MG/DL (ref 0.66–1.25)
CREAT UR-MCNC: 122 MG/DL
ERYTHROCYTE [DISTWIDTH] IN BLOOD BY AUTOMATED COUNT: 15.5 % (ref 10–15)
GFR SERPL CREATININE-BSD FRML MDRD: 54 ML/MIN/{1.73_M2}
GLUCOSE SERPL-MCNC: 157 MG/DL (ref 70–99)
GLUCOSE UR STRIP-MCNC: NEGATIVE MG/DL
HCT VFR BLD AUTO: 38.1 % (ref 40–53)
HDLC SERPL-MCNC: 37 MG/DL
HGB BLD-MCNC: 12.7 G/DL (ref 13.3–17.7)
HGB UR QL STRIP: NEGATIVE
KETONES UR STRIP-MCNC: NEGATIVE MG/DL
LDLC SERPL CALC-MCNC: 57 MG/DL
LEUKOCYTE ESTERASE UR QL STRIP: NEGATIVE
MCH RBC QN AUTO: 32 PG (ref 26.5–33)
MCHC RBC AUTO-ENTMCNC: 33.3 G/DL (ref 31.5–36.5)
MCV RBC AUTO: 96 FL (ref 78–100)
MUCOUS THREADS #/AREA URNS LPF: PRESENT /LPF
NITRATE UR QL: NEGATIVE
NONHDLC SERPL-MCNC: 85 MG/DL
PH UR STRIP: 5 PH (ref 5–7)
PLATELET # BLD AUTO: 102 10E9/L (ref 150–450)
POTASSIUM SERPL-SCNC: 4.1 MMOL/L (ref 3.4–5.3)
PROT SERPL-MCNC: 7.3 G/DL (ref 6.8–8.8)
PROT UR-MCNC: 0.22 G/L
PROT/CREAT 24H UR: 0.18 G/G CR (ref 0–0.2)
RBC # BLD AUTO: 3.97 10E12/L (ref 4.4–5.9)
RBC #/AREA URNS AUTO: 0 /HPF (ref 0–2)
SODIUM SERPL-SCNC: 138 MMOL/L (ref 133–144)
SOURCE: ABNORMAL
SP GR UR STRIP: 1.02 (ref 1–1.03)
TACROLIMUS BLD-MCNC: 3.4 UG/L (ref 5–15)
TME LAST DOSE: ABNORMAL H
TRIGL SERPL-MCNC: 142 MG/DL
UROBILINOGEN UR STRIP-MCNC: NORMAL MG/DL (ref 0–2)
WBC # BLD AUTO: 4 10E9/L (ref 4–11)
WBC #/AREA URNS AUTO: <1 /HPF (ref 0–5)

## 2019-07-17 PROCEDURE — 36415 COLL VENOUS BLD VENIPUNCTURE: CPT | Performed by: INTERNAL MEDICINE

## 2019-07-17 PROCEDURE — 84156 ASSAY OF PROTEIN URINE: CPT | Performed by: INTERNAL MEDICINE

## 2019-07-17 PROCEDURE — 85027 COMPLETE CBC AUTOMATED: CPT | Performed by: INTERNAL MEDICINE

## 2019-07-17 PROCEDURE — 81001 URINALYSIS AUTO W/SCOPE: CPT | Performed by: INTERNAL MEDICINE

## 2019-07-17 PROCEDURE — 80076 HEPATIC FUNCTION PANEL: CPT | Performed by: INTERNAL MEDICINE

## 2019-07-17 PROCEDURE — 80197 ASSAY OF TACROLIMUS: CPT | Performed by: INTERNAL MEDICINE

## 2019-07-17 PROCEDURE — 80061 LIPID PANEL: CPT | Performed by: INTERNAL MEDICINE

## 2019-07-17 PROCEDURE — 80048 BASIC METABOLIC PNL TOTAL CA: CPT | Performed by: INTERNAL MEDICINE

## 2019-07-19 ENCOUNTER — OFFICE VISIT (OUTPATIENT)
Dept: GASTROENTEROLOGY | Facility: CLINIC | Age: 69
End: 2019-07-19
Payer: MEDICARE

## 2019-07-19 VITALS
DIASTOLIC BLOOD PRESSURE: 84 MMHG | TEMPERATURE: 98.1 F | SYSTOLIC BLOOD PRESSURE: 133 MMHG | OXYGEN SATURATION: 96 % | HEART RATE: 82 BPM | WEIGHT: 268.4 LBS | BODY MASS INDEX: 33.55 KG/M2

## 2019-07-19 DIAGNOSIS — Z94.4 LIVER REPLACED BY TRANSPLANT (H): Primary | ICD-10-CM

## 2019-07-19 PROCEDURE — G0463 HOSPITAL OUTPT CLINIC VISIT: HCPCS | Mod: ZF

## 2019-07-19 ASSESSMENT — PAIN SCALES - GENERAL: PAINLEVEL: NO PAIN (0)

## 2019-07-19 NOTE — PROGRESS NOTES
A/P  69 year old male s/p LT 2015 for A1AT. 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    Running tac as low as we can. No strong compelling indication to change. Kidney function better. Continue to run tac close to 3.     CKD, stable.      No changes to medications today. Labs up to date as is cancer screening.   Labs every 3 months.  We discussed long-term complications of liver transplantation/immunosuppression, including kidney disease, cardiovascular disease, DM, HTN, and skin cancer, as well as recommendations for cancer screening. Will see back  1 y.   This was a 25 minute visit, over 50% counseling and coordination of care.   Katherine Jimenez MD  Hepatology/Liver Transplant  Medical Director, Liver Transplantation  [facility]  =========================================================     S: 69 year old male s/p DDLT 3/31/15 for alpha-1-antitrypsin deficiency    Since I last saw him 1 year ago he was diagnosed with cutaneous squamous cell carcinoma  Arising from the L cheek and recurrence after excision, invasive in to the L parotid gland. No s/p L total parotidectomy of 2.1 cm tumor. Also received radiation    EXPLANT: NO HCC.  IS: Prograf. Kidney function has not been normal, but has been stable. Has mild proteinuria.  LABS: Up to date. Liver tests look great, normal.   REJECTION: None.  BILIARY ISSUES: None  STENT: Removed 5/28/15 by endoscopy  KIDNEY FUNCTION:  Sees Dr. Bonner. Elevated creatinine since just prior to LT  Creatinine   Date Value Ref Range Status   07/17/2019 1.34 (H) 0.66 - 1.25 mg/dL Final     BP: Good. Amlodipine. Managed by PCP and nephrology. BP at home in 130s. Today is high because he was running late.  PREV: UTD on screening (colonoscopy 2014)  DISEASE RECURRENCE: N/A  OTHER ISSUES:      SOC: He and his wife continue with traveling. They will go to [place] and then a 2 week cruise next week.    ROS: 10 point ROS neg  other than the symptoms noted above in the HPI.     O  /84 (BP Location: Right arm, Patient Position: Sitting, Cuff Size: Adult Regular)   Pulse 82   Temp 98.1  F (36.7  C)   Wt 121.7 kg (268 lb 6.4 oz)   SpO2 96%   BMI 33.55 kg/m    GENERAL:  Very pleasant, well-appearing, in no acute distress.    HEENT:  No icterus, no oral lesions.    LYMPH:  No supraclavicular or cervical lymphadenopathy.    CARDIOVASCULAR:  Regular rate and rhythm.    CHEST:  Lungs are clear.    ABDOMEN:  Bowel sounds are present.  Abdomen is soft, nontender, nondistended.  Scar is well healed.      EXTREMITIES:  No edema.    SKIN:  No rash.    NEUROLOGIC:  Speech is fluent and clear.  No asterixis or tremor.

## 2019-07-19 NOTE — NURSING NOTE
Chief Complaint   Patient presents with     RECHECK     liver tx follow up         /84 (BP Location: Right arm, Patient Position: Sitting, Cuff Size: Adult Regular)   Pulse 82   Temp 98.1  F (36.7  C)   Wt 121.7 kg (268 lb 6.4 oz)   SpO2 96%   BMI 33.55 kg/m        Armando Pope  EMT

## 2019-07-19 NOTE — LETTER
7/19/2019       RE: Eric Andrew  [address] [place] [address]     Dear Colleague,    Thank you for referring your patient, Eric Andrew, to the [facility] HEPATOLOGY at [facility]. Please see a copy of my visit note below.    A/P  69 year old male s/p LT 2015 for A1AT. 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    Running tac as low as we can. No strong compelling indication to change. Kidney function better. Continue to run tac close to 3.     CKD, stable.      No changes to medications today. Labs up to date as is cancer screening.   Labs every 3 months.  We discussed long-term complications of liver transplantation/immunosuppression, including kidney disease, cardiovascular disease, DM, HTN, and skin cancer, as well as recommendations for cancer screening. Will see back  1 y.   This was a 25 minute visit, over 50% counseling and coordination of care.   Katherine Jimenez MD  Hepatology/Liver Transplant  Medical Director, Liver Transplantation  [facility]  =========================================================     S: 69 year old male s/p DDLT 3/31/15 for alpha-1-antitrypsin deficiency    Since I last saw him 1 year ago he was diagnosed with cutaneous squamous cell carcinoma  Arising from the L cheek and recurrence after excision, invasive in to the L parotid gland. No s/p L total parotidectomy of 2.1 cm tumor. Also received radiation    EXPLANT: NO HCC.  IS: Prograf. Kidney function has not been normal, but has been stable. Has mild proteinuria.  LABS: Up to date. Liver tests look great, normal.   REJECTION: None.  BILIARY ISSUES: None  STENT: Removed 5/28/15 by endoscopy  KIDNEY FUNCTION:  Sees Dr. Bonner. Elevated creatinine since just prior to LT  Creatinine   Date Value Ref Range Status   07/17/2019 1.34 (H) 0.66 - 1.25 mg/dL Final     BP: Good. Amlodipine. Managed by PCP and nephrology. BP at  home in 130s. Today is high because he was running late.  PREV: UTD on screening (colonoscopy 2014)  DISEASE RECURRENCE: N/A  OTHER ISSUES:      SOC: He and his wife continue with traveling. They will go to [place] and then a 2 week cruise next week.    ROS: 10 point ROS neg other than the symptoms noted above in the HPI.     O  /84 (BP Location: Right arm, Patient Position: Sitting, Cuff Size: Adult Regular)   Pulse 82   Temp 98.1  F (36.7  C)   Wt 121.7 kg (268 lb 6.4 oz)   SpO2 96%   BMI 33.55 kg/m     GENERAL:  Very pleasant, well-appearing, in no acute distress.    HEENT:  No icterus, no oral lesions.    LYMPH:  No supraclavicular or cervical lymphadenopathy.    CARDIOVASCULAR:  Regular rate and rhythm.    CHEST:  Lungs are clear.    ABDOMEN:  Bowel sounds are present.  Abdomen is soft, nontender, nondistended.  Scar is well healed.      EXTREMITIES:  No edema.    SKIN:  No rash.    NEUROLOGIC:  Speech is fluent and clear.  No asterixis or tremor.           Again, thank you for allowing me to participate in the care of your patient.      Sincerely,    Katherine Jimenez MD

## 2019-07-19 NOTE — LETTER
7/19/2019      RE: Eric Andrew  [address] [place] [address]       A/P  69 year old male s/p LT 2015 for A1AT. 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    Running tac as low as we can. No strong compelling indication to change. Kidney function better. Continue to run tac close to 3.     CKD, stable.      No changes to medications today. Labs up to date as is cancer screening.   Labs every 3 months.  We discussed long-term complications of liver transplantation/immunosuppression, including kidney disease, cardiovascular disease, DM, HTN, and skin cancer, as well as recommendations for cancer screening. Will see back  1 y.   This was a 25 minute visit, over 50% counseling and coordination of care.   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 No s/p L total parotidectomy of 2.1 cm tumor. Also received radiation    EXPLANT: NO HCC.  IS: Prograf. Kidney function has not been normal, but has been stable. Has mild proteinuria.  LABS: Up to date. Liver tests look great, normal.   REJECTION: None.  BILIARY ISSUES: None  STENT: Removed 5/28/15 by endoscopy  KIDNEY FUNCTION:  Sees Dr. Bonner. Elevated creatinine since just prior to LT  Creatinine   Date Value Ref Range Status   07/17/2019 1.34 (H) 0.66 - 1.25 mg/dL Final     BP: Good. Amlodipine. Managed by PCP and nephrology. BP at home in 130s. Today is high because he was running late.  PREV: UTD on screening (colonoscopy 2014)  DISEASE RECURRENCE: N/A  OTHER ISSUES:      SOC: He and his wife continue with traveling. They  will go to [place] and then a 2 week cruise next week.    ROS: 10 point ROS neg other than the symptoms noted above in the HPI.     O  /84 (BP Location: Right arm, Patient Position: Sitting, Cuff Size: Adult Regular)   Pulse 82   Temp 98.1  F (36.7  C)   Wt 121.7 kg (268 lb 6.4 oz)   SpO2 96%   BMI 33.55 kg/m     GENERAL:  Very pleasant, well-appearing, in no acute distress.    HEENT:  No icterus, no oral lesions.    LYMPH:  No supraclavicular or cervical lymphadenopathy.    CARDIOVASCULAR:  Regular rate and rhythm.    CHEST:  Lungs are clear.    ABDOMEN:  Bowel sounds are present.  Abdomen is soft, nontender, nondistended.  Scar is well healed.      EXTREMITIES:  No edema.    SKIN:  No rash.    NEUROLOGIC:  Speech is fluent and clear.  No asterixis or tremor.           Katherine Jimenez MD

## 2019-07-25 ENCOUNTER — HOSPITAL ENCOUNTER (OUTPATIENT)
Dept: PHYSICAL THERAPY | Facility: CLINIC | Age: 69
End: 2019-07-25
Payer: MEDICARE

## 2019-07-25 DIAGNOSIS — I89.0 LYMPHEDEMA OF FACE: ICD-10-CM

## 2019-07-25 DIAGNOSIS — M43.6 NECK STIFFNESS: Primary | ICD-10-CM

## 2019-07-25 DIAGNOSIS — C44.320 SQUAMOUS CELL CARCINOMA OF SKIN OF FACE: ICD-10-CM

## 2019-07-25 DIAGNOSIS — L90.5 SCAR CONDITION AND FIBROSIS OF SKIN: ICD-10-CM

## 2019-07-25 PROCEDURE — 97110 THERAPEUTIC EXERCISES: CPT | Mod: GP | Performed by: PHYSICAL THERAPIST

## 2019-07-25 PROCEDURE — 97140 MANUAL THERAPY 1/> REGIONS: CPT | Mod: GP | Performed by: PHYSICAL THERAPIST

## 2019-08-17 NOTE — PROGRESS NOTES
Dear Dr. Jimenez:    I had the pleasure of seeing Eric Andrew in follow-up today at the [facility].     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  He underwent surgery by dermatologist in [place] for this.  Per his report this was not done with Mohs surgery but was told that he had negative margins.  He says that shortly after the surgery he noticed a lump along his mandible/below the ear.  He went in for his 1 month follow-up with a dermatologist and at that time the mass had increased in size.  She thought it was a reactive node but offered a biopsy. Per review of the notes they proceeded with a punch biopsy of the parotid mass.  This was consistent with invasive squamous cell carcinoma without any epidermal involvement.  His preoperative PET scan showed only involvement of the parotid. He was taken to the OR on 4/11/2019 for a left total parotidectomy with resection of skin, preservation of the facial nerve, sacrifice of the greater auricular nerve, level I-V neck dissection, cervicofacial flap. Final pathology showed a 2.1 cm moderately differentiated SCC within the subcutaneous tissues and parotid, PNI, no LVSI, negative margins, 0/47 cervical nodes.    Interval history:  He comes in today for follow-up. He was last seen May 2019. He had postoperative radiation with Dr Floyd from 5/13/2019 to 6/24/2019 for a total of 6000 cGy. He missed 2 treatments due to machine down time and then a holiday which were made up over the weekend.  He says he is doing quite well.  He was able to go to [place] for 3 weeks for his trip that he had been planning prior to treatment.  He felt like his energy was okay for the trip and he was able to do lots of walking.  He is still struggling with playing the trombone due to air  leakage on the higher notes.  He is working on lip exercises to try and improve this.  He says that he is swallowing without any limitations, does have some dry mouth.  He recently gained some weight during his trip, but otherwise did not have weight loss during his treatment.  He has not been doing any swallowing exercises.  He just started lymphedema therapy.  He denies any ear pain.  He has no new skin lesions or neck masses.  He is seeing dermatology tomorrow.  He and his wife are planning a trip to [place] from September 5 to October 4.        MEDICATIONS:     Current Outpatient Medications   Medication Sig Dispense Refill     AMLODIPINE BESYLATE PO Take 10 mg by mouth every morning        atorvastatin (LIPITOR) 20 MG tablet Take 20 mg by mouth every evening        BASAGLAR 100 UNIT/ML injection Inject 45 Units Subcutaneous At Bedtime        Calcium Carbonate-Vitamin D (CALCIUM + D PO) Take 1 tablet by mouth every morning        diphenhydrAMINE HCl (BENADRYL PO) Take by mouth nightly as needed       losartan (COZAAR) 25 MG tablet Take 1 tablet (25 mg) by mouth daily (Patient taking differently: Take 50 mg by mouth every morning ) 90 tablet 3     metFORMIN (GLUCOPHAGE) 1000 MG tablet Take 1,000 mg by mouth 2 times daily (with meals)        metoprolol succinate (TOPROL-XL) 25 MG 24 hr tablet Take 25 mg by mouth every morning        Multiple Vitamins-Minerals (MULTIVITAL) TABS Take 1 tablet by mouth every morning        sildenafil (VIAGRA) 50 MG tablet Take 50 mg by mouth daily as needed for erectile dysfunction       tacrolimus (GENERIC EQUIVALENT) 1 MG capsule Take 1 capsule (1 mg) by mouth every 12 hours (Patient taking differently: Take 1 mg by mouth 2 times daily ) 180 capsule 3     mupirocin (BACTROBAN) 2 % external ointment Apply topically 3 times daily (Patient not taking: Reported on 7/19/2019) 30 g 0       ALLERGIES:    Allergies   Allergen Reactions     Cefazolin Swelling     Lips swelled while patient  was in the OR      Lisinopril Cough     Meropenem Hives and Swelling     Developed hives and lip swelling while on meropenem and micafungin.  Resolved with discontinuation.  Unclear which was cause.     Micafungin Hives and Swelling     Developed hives and lip swelling while on meropenem and micafungin.  Resolved with discontinuation.  Unclear which was cause.       HABITS/SOCIAL HISTORY:   Spends the shin in [place].  .  He is a retired .   He smokes for a few years and quit back in 1973.  He has no chewing tobacco use.  He has 1 alcoholic beverage about 3 times per week.   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    Social History     Socioeconomic History     Marital status:      Spouse name: Not on file     Number of children: Not on file     Years of education: Not on file     Highest education level: Not on file   Occupational History     Not on file   Social Needs     Financial resource strain: Not on file     Food insecurity:     Worry: Not on file     Inability: Not on file     Transportation needs:     Medical: Not on file     Non-medical: Not on file   Tobacco Use     Smoking status: Never Smoker     Smokeless tobacco: Never Used     Tobacco comment: [id number] occational smoker   Substance and Sexual Activity     Alcohol use: Yes     Alcohol/week: 0.0 oz     Comment: 2-3 glass of wine per week. At most 1 per day     Drug use: No     Sexual activity: Never   Lifestyle     Physical activity:     Days per week: Not on file     Minutes per session: Not on file     Stress: Not on file   Relationships     Social connections:     Talks on phone: Not on file     Gets together: Not on file     Attends Anabaptist service: Not on file     Active member of club or organization: Not on file     Attends meetings of clubs or organizations: Not on file     Relationship status: Not on file     Intimate partner violence:     Fear of current or ex partner: Not on file     Emotionally abused: Not on file      Physically abused: Not on file     Forced sexual activity: Not on file   Other Topics Concern     Parent/sibling w/ CABG, MI or angioplasty before 65F 55M? Not Asked   Social History Narrative     Not on file       PAST MEDICAL HISTORY:   Past Medical History:   Diagnosis Date     Alpha-1-antitrypsin deficiency (H)      Ascites     Pre-transplant     Chronic kidney disease, stage 3 (H)      Cirrhosis (H)     Alpha-1-antitrypsin deficiency, s/p liver transplant 3/31/15     Gout      HTN (hypertension)      Lentigo maligna melanoma (H)     lentigo maligna melanoma excised on 2009 with a repeat wide excision on 2009.      Liver replaced by transplant (H) 2015     Nephrolithiasis      Osteoarthritis      Portal hypertension (H)     Pre-transplant        PAST SURGICAL HISTORY:   Past Surgical History:   Procedure Laterality Date     COLONOSCOPY N/A 2014    Procedure: COLONOSCOPY;  Surgeon: Katherine Jimenez MD;  Location:  GI     ESOPHAGOSCOPY, GASTROSCOPY, DUODENOSCOPY (EGD), COMBINED N/A 2014    Procedure: COMBINED ESOPHAGOSCOPY, GASTROSCOPY, DUODENOSCOPY (EGD), BIOPSY SINGLE OR MULTIPLE;  Surgeon: Katherine Jimenez MD;  Location:  GI     ESOPHAGOSCOPY, GASTROSCOPY, DUODENOSCOPY (EGD), COMBINED N/A 2015    Procedure: COMBINED ESOPHAGOSCOPY, GASTROSCOPY, DUODENOSCOPY (EGD), REMOVE FOREIGN BODY;  Surgeon: Katherine Jimenez MD;  Location:  GI     HERNIA REPAIR      abdominal     INSERT SHUNT PORTAL TRANSJUGULAR INTRAHEPTIC       ORTHOPEDIC SURGERY      bilateral knee surgery     PAROTIDECTOMY, RADICAL NECK DISSECTION Left 2019    Procedure: Total Parotidectomy, Excision of Skin, Neck Dissection Levels I - V,  And Local Advancement Flap;  Surgeon: Johanna Moreland MD;  Location: [facility] OR     TRANSPLANT LIVER RECIPIENT  DONOR N/A 3/31/2015    Procedure: TRANSPLANT LIVER RECIPIENT  DONOR;  Surgeon: Elia Mehta MD;  " Location: [facility] OR       FAMILY HISTORY:    Family History   Problem Relation Age of Onset     Cardiovascular Father         MI     Glaucoma No family hx of      Macular Degeneration No family hx of        REVIEW OF SYSTEMS:  12 point ROS was negative other than the symptoms noted above in the HPI.  Patient Supplied Answers to Review of Systems  [facility] ROS 5/3/2019   Musculoskeletal Neck pain   Skin Skin lesions         PHYSICAL EXAMINATION:   BP (!) 150/81 (BP Location: Right arm, Patient Position: Sitting, Cuff Size: Adult Regular)   Pulse 75   Resp 15   Ht 1.905 m (6' 3\")   Wt 125.2 kg (276 lb)   BMI 34.50 kg/m     Appearance:   normal; NAD, age-appropriate appearance, well-developed, obese   Communication:   normal; communicates verbally, normal voice quality   Head/Face:   inspection -  Normal; no scars or visible lesions   Salivary glands -  S/p left radical parotidectomy with cervicofacial advancement flap, radiation changes to the tissue, no palpable mass, well healed incision   Facial strength -  Normal and symmetric bilateral; H/B I/VI - significantly improved lower lip function, very subtle asymmetry but good movement   Skin:  normal, no rash   Ocular Motility:  normal occular movements   Ears:  auricle (AD) -  normal  EAC (AD) -  normal  TM (AD) -  Normal, no effusion  auricle (AS) -  normal  EAC (AS) -  normal  TM (AS) -  Normal, no effusion  Normal clinical speech reception   Nose:  Ext. inspection -  Normal   Oral Cavity:  lips -  Normal mucosa, oral competence, and stoma size   Age-appropriate dentition, healthy gingival mucosa   Hard palate, buccal, floor of mouth mucosa normal   Tongue - normal movement, no lesions   Oropharynx:  mucosa -  Normal, no lesions  soft palate -  Normal, no lesions, no asymmetry, normal elevation   Neck: Well healed neck incision, well healed cervicofacial flap, no palpable masses  Normal range of motion   Lymphatic:  no abnormal nodes   Cardiovascular: warm, pink, " well-perfused extremities without swelling, tenderness, or edema   Respiratory: Normal respiratory effort, no stridor   Neuro/Psych.:  mood/affect -  normal  mental status -  normal  cranial nerves -  normal - lower lip function on left significantly improved         PROCEDURE:      RESULTS REVIEWED:   I reviewed the treatment records from radiation oncology which are summarized above    IMPRESSION AND PLAN:   Eric Andrew is a 69-year-old man who has a history of a liver transplant and recent squamous cell carcinoma of the left cheek who developed 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 He underwent total parotidectomy, skin resection, resection of greater auricular nerve, level I-V neck dissection, cervicofacial flap on 4/11/2019. Final pathology showed the 1 metastatic deposit in the parotid. He compelted postoperative radiation on 6/24/2019 with Dr Floyd.    He is overall doing quite well.  He was encouraged to work on lymphedema therapy to help with the submental lymphedema.  His lip function has significantly improved and I am optimistic that this will continue to improve with his exercises.  He was seen by speech pathology today to reinforce the importance of swallowing exercises.    We will plan on obtaining a TSH in the next few months.    I will see him back in about 6 weeks. His 3 month post-treatment PET is due at the end of September 2019 and was ordered but with attempts to try and obtain it in between his trips.    Thank you very much for the opportunity to participate in the care of your patient.      Johanna Moreland M.D.  Otolaryngology- Head & Neck Surgery      This note was dictated with voice recognition software and then edited. Please excuse any unintentional errors.         CC:  Katherine Jimenez MD  [address] [facility] [address]  [facility] [address]      Naomi Rodriguez RN  Liver Coordinator  [facility]      Aston Devine MD  [facility]  [address]  [facility]  [id number]

## 2019-08-19 ENCOUNTER — THERAPY VISIT (OUTPATIENT)
Dept: SPEECH THERAPY | Facility: CLINIC | Age: 69
End: 2019-08-19
Payer: MEDICARE

## 2019-08-19 ENCOUNTER — OFFICE VISIT (OUTPATIENT)
Dept: RADIATION ONCOLOGY | Facility: CLINIC | Age: 69
End: 2019-08-19
Attending: RADIOLOGY
Payer: MEDICARE

## 2019-08-19 ENCOUNTER — OFFICE VISIT (OUTPATIENT)
Dept: OTOLARYNGOLOGY | Facility: CLINIC | Age: 69
End: 2019-08-19
Payer: MEDICARE

## 2019-08-19 VITALS — DIASTOLIC BLOOD PRESSURE: 95 MMHG | WEIGHT: 275.3 LBS | BODY MASS INDEX: 34.41 KG/M2 | SYSTOLIC BLOOD PRESSURE: 156 MMHG

## 2019-08-19 VITALS
HEIGHT: 75 IN | HEART RATE: 75 BPM | WEIGHT: 276 LBS | BODY MASS INDEX: 34.32 KG/M2 | SYSTOLIC BLOOD PRESSURE: 150 MMHG | RESPIRATION RATE: 15 BRPM | DIASTOLIC BLOOD PRESSURE: 81 MMHG

## 2019-08-19 DIAGNOSIS — C44.320 SQUAMOUS CELL CARCINOMA OF SKIN OF FACE: ICD-10-CM

## 2019-08-19 DIAGNOSIS — C44.320 SQUAMOUS CELL CARCINOMA OF SKIN OF FACE: Primary | ICD-10-CM

## 2019-08-19 DIAGNOSIS — C44.40 MALIGNANT NEOPLASM OF SKIN OF SCALP AND NECK: ICD-10-CM

## 2019-08-19 DIAGNOSIS — C44.329 SQUAMOUS CELL CARCINOMA OF SKIN OF OTHER PARTS OF FACE: ICD-10-CM

## 2019-08-19 DIAGNOSIS — R13.12 OROPHARYNGEAL DYSPHAGIA: Primary | ICD-10-CM

## 2019-08-19 DIAGNOSIS — E03.9 HYPOTHYROIDISM, UNSPECIFIED TYPE: ICD-10-CM

## 2019-08-19 DIAGNOSIS — E03.4 HYPOTHYROIDISM DUE TO ACQUIRED ATROPHY OF THYROID: ICD-10-CM

## 2019-08-19 PROCEDURE — G0463 HOSPITAL OUTPT CLINIC VISIT: HCPCS | Mod: 25 | Performed by: RADIOLOGY

## 2019-08-19 PROCEDURE — 31231 NASAL ENDOSCOPY DX: CPT | Performed by: RADIOLOGY

## 2019-08-19 ASSESSMENT — PAIN SCALES - GENERAL: PAINLEVEL: NO PAIN (0)

## 2019-08-19 ASSESSMENT — MIFFLIN-ST. JEOR: SCORE: 2107.56

## 2019-08-19 NOTE — NURSING NOTE
"Chief Complaint   Patient presents with     RECHECK     Squamous cell carcinoma of skin of face     BP (!) 150/81 (BP Location: Right arm, Patient Position: Sitting, Cuff Size: Adult Regular)   Pulse 75   Resp 15   Ht 1.905 m (6' 3\")   Wt 125.2 kg (276 lb)   BMI 34.50 kg/m      Yi Rene CMA    "

## 2019-08-19 NOTE — Clinical Note
Follow-up with me in approximately 6 weeks with post-treatment PET/CT.  Please coordinate with Dr. Moreland's clinic (ENT) for same-day follow-up if possible.

## 2019-08-19 NOTE — PROGRESS NOTES
Rehabilitation Services      OUTPATIENT SPEECH LANGUAGE PATHOLOGY  PLAN OF TREATMENT FOR OUTPATIENT REHABILITATION    Patient's Last Name, First Name, M.I.                YOB: 1950  Eric Andrew                        Provider's Name  Lyndsay Morales Medical Record No.  [id number]                               Onset Date: 4/11/2019   Start of Care Date: 5/9/2019   Type:     ___PT   ___OT   _X_SLP Medical Diagnosis: SCC of skin of face                       SLP Diagnosis: Oropharyngeal dysphagia       _________________________________________________________________________________  Plan of Treatment:    Frequency/Duration: 1x/month      Goals:  Goal Identifier Diet   Goal Description 1. Pt will tolerate soft/regular textures and thin liquids without overt clinical s/sx of aspiration/penetration in 90% of trials during two consecutive sessions after completion of treatment as judged by clinician assessment and/or pt/caregiver report.    Target Date 08/06/19   Date Met   Not met    Progress: Pt is tolerating a variety of foods and textures within the bounds of a regular texture diet and thin liquids.  Pt reports taste change with salt but denies other changes at this time.  Pt is tolerating trials of thin liquids with no overt signs or symptoms of aspiration.       Goal Identifier Exercises   Goal Description 2. Pt will improve ROM and strength of tongue, BOT, pharynx and jaw by completing 10 repetitions of 5/5 exercises 3 times daily with minimal written or verbal cues.    Target Date 08/06/19   Date Met   Not met.     Progress: Pt is not consistently completing oropharyngeal strengthening exercises.  Pt aware of need but reports these exercises as 'un-natural.'          Progress Toward Goals:   Progress this reporting period: Pt has made good progress and is eating a variety of foods with minimal difficulty  reported.  Pt has increased his weight.  Pt is not consistently completing oropharyngeal strengthening exercises.          Certification date from 8/19/2019 to 11/17/2019.    Lyndsay Morales, SLP          I CERTIFY THE NEED FOR THESE SERVICES FURNISHED UNDER        THIS PLAN OF TREATMENT AND WHILE UNDER MY CARE     (Physician attestation of this document indicates review and certification of the therapy plan).                Referring Provider: Johanna Moreland MD

## 2019-08-19 NOTE — LETTER
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 Several months later he developed a recurrence involving the deep margin with invasion into the superficial parotid gland. He had a left total parotidectomy and ipsilateral neck dissection of levels IB-V on 2019 with pathology revealing a 2.1 cm tumor centered within the subcutaneous tissues with extension into the parotid gland. Perineural invasion involving both small and large caliber nerves was appreciated with involvement of the distal greater auricular nerve which was resected. Post-operatively, he received adjuvant radiotherapy as described above for improved local disease control.    Mr. Andrew returns to radiation oncology clinic today for a routine post-treatment disease surveillance visit. On exam, he reports that he is doing very well and has no concerns or complaints. His ROS are negative as he specifically denies any odynophagia, dysphagia, visual changes, otalgia, dyspnea, chest pain, nausea/vomiting or abdominal pain. His energy level is excellent and he and his wife recently completed a trip to [place] which they both thoroughly  enjoyed.    PHYSICAL EXAM:  Weight: 124.9 kg  BP: 156/95    General: Alert, oriented and in no acute distress  HEENT: NC/AT. EOMI. No external auricular lesions. No rhinorrhea or epistaxis. Moist mucous membranes. No visible oral cavity lesions or thrush.  Neck: Mild submental edema. Mild to moderate fibrosis within the left neck. No palpable cervical adenopathy.  Pulmonary: No wheezing, stridor or respiratory distress  Skin: Moderate hyperpigmentation of the left neck otherwise normal color and turgor    Cranial Nerve Exam  I: Not tested  II: Not formally tested  III/IV/VI: PERRL. EOMI.   V: Sensation to light touch is intact and symmetric in the bilateral V1-V3 distributions.  VII: Very mild weakness of the left marginal mandibular branch (HB II/VI)  VIII: Hearing is grossly intact and symmetric.  IX/X: Palate elevates symmetrically. Normal phonation.  XI: Strength is 5/5 in bilateral trapezius and SCM musculature.  XII: Tongue protrudes in the midline. No atrophy or fasciculations.     LABS AND IMAGING:  No recent labs or imaging    IMPRESSION:   Mr. 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 He is currently nearly 2 months out from the completion of adjuvant radiotherapy and remains clinically TAMIKA.     PLAN:   1. Follow-up in radiation oncology clinic in approximately 1 month with post-treatment surveillance PET/CT and baseline post-treatment TSH  2. Continue lymphedema therapy exercises as prescribed    Grabiel Floyd MD/PhD    Department of Radiation Oncology  [facility]      FOLLOW-UP VISIT    Patient Name: Eric Andrew      : 1950     Age: 69 year old        ______________________________________________________________________________     Chief Complaint   Patient presents with     Cancer     Pt is here for a follow up:SCC of face and neck: Radiation 6000 cGy completed 19     BP  (!) 156/95   Wt 124.9 kg (275 lb 4.8 oz)   BMI 34.41 kg/m            Pain  Denies    Labs  Other Labs: No    Imaging  None      Dental:   Most Recent Dental Visit: yes      Speech/Swallowing:   Most Recent evaluation or testing: no  Swallowing Restrictions: No difficulties with swallowing    Trismus/Jaw Exercises: yes    Nutrition:    Weight:   Wt Readings from Last 3 Encounters:   08/19/19 124.9 kg (275 lb 4.8 oz)   07/19/19 121.7 kg (268 lb 6.4 oz)   07/09/19 123.6 kg (272 lb 6.4 oz)         Oral Symptoms:   Xerostomia:1- Symptomatic without significant dietary alteration; unstimulated saliva flow >0.2 ml/min  Dysphagia: 0-None  Mucositis Oral Symptoms: 0-None  Mucositis: 0- None  Esophagitis:0- None    Other Appointments:     MD Name: Dr Moreland Appointment Date: today   MD Name: Appointment Date:   MD Name: Appointment Date:   Other Appointment Notes:     Residual Radiation side effect: Lymphedema     Additional Instructions:     Nurse face-to-face time: Level 3:  10 min face to face time  8/19/2019   9:17 AM  Scope used today  #1 Pentax Nasolaryngoscope [id number] Serial number [id number] (video)          Grabiel Floyd MD

## 2019-08-19 NOTE — PROGRESS NOTES
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 Several months later he developed a recurrence involving the deep margin with invasion into the superficial parotid gland. He had a left total parotidectomy and ipsilateral neck dissection of levels IB-V on 2019 with pathology revealing a 2.1 cm tumor centered within the subcutaneous tissues with extension into the parotid gland. Perineural invasion involving both small and large caliber nerves was appreciated with involvement of the distal greater auricular nerve which was resected. Post-operatively, he received adjuvant radiotherapy as described above for improved local disease control.    Mr. Andrew returns to radiation oncology clinic today for a routine post-treatment disease surveillance visit. On exam, he reports that he is doing very well and has no concerns or complaints. His ROS are negative as he specifically denies any odynophagia, dysphagia, visual changes, otalgia, dyspnea, chest pain, nausea/vomiting or abdominal pain. His energy level is excellent and he and his wife recently completed a trip to [place] which they both thoroughly enjoyed.    PHYSICAL EXAM:  Weight: 124.9 kg  BP: 156/95    General: Alert, oriented and in no acute  distress  HEENT: NC/AT. EOMI. No external auricular lesions. No rhinorrhea or epistaxis. Moist mucous membranes. No visible oral cavity lesions or thrush.  Neck: Mild submental edema. Mild to moderate fibrosis within the left neck. No palpable cervical adenopathy.  Pulmonary: No wheezing, stridor or respiratory distress  Skin: Moderate hyperpigmentation of the left neck otherwise normal color and turgor    Cranial Nerve Exam  I: Not tested  II: Not formally tested  III/IV/VI: PERRL. EOMI.   V: Sensation to light touch is intact and symmetric in the bilateral V1-V3 distributions.  VII: Very mild weakness of the left marginal mandibular branch (HB II/VI)  VIII: Hearing is grossly intact and symmetric.  IX/X: Palate elevates symmetrically. Normal phonation.  XI: Strength is 5/5 in bilateral trapezius and SCM musculature.  XII: Tongue protrudes in the midline. No atrophy or fasciculations.     LABS AND IMAGING:  No recent labs or imaging    IMPRESSION:   Mr. 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 He is currently nearly 2 months out from the completion of adjuvant radiotherapy and remains clinically TAMIKA.     PLAN:   1. Follow-up in radiation oncology clinic in approximately 1 month with post-treatment surveillance PET/CT and baseline post-treatment TSH  2. Continue lymphedema therapy exercises as prescribed    Grabiel Floyd MD/PhD    Department of Radiation Oncology  [facility]

## 2019-08-19 NOTE — PROGRESS NOTES
FOLLOW-UP VISIT    Patient Name: Eric Andrew      : 1950     Age: 69 year old        ______________________________________________________________________________     Chief Complaint   Patient presents with     Cancer     Pt is here for a follow up:SCC of face and neck: Radiation 6000 cGy completed 19     BP (!) 156/95   Wt 124.9 kg (275 lb 4.8 oz)   BMI 34.41 kg/m           Pain  Denies    Labs  Other Labs: No    Imaging  None      Dental:   Most Recent Dental Visit: yes      Speech/Swallowing:   Most Recent evaluation or testing: no  Swallowing Restrictions: No difficulties with swallowing    Trismus/Jaw Exercises: yes    Nutrition:    Weight:   Wt Readings from Last 3 Encounters:   19 124.9 kg (275 lb 4.8 oz)   19 121.7 kg (268 lb 6.4 oz)   19 123.6 kg (272 lb 6.4 oz)         Oral Symptoms:   Xerostomia:1- Symptomatic without significant dietary alteration; unstimulated saliva flow >0.2 ml/min  Dysphagia: 0-None  Mucositis Oral Symptoms: 0-None  Mucositis: 0- None  Esophagitis:0- None    Other Appointments:     MD Name: Dr Moreland Appointment Date: today   MD Name: Appointment Date:   MD Name: Appointment Date:   Other Appointment Notes:     Residual Radiation side effect: Lymphedema     Additional Instructions:     Nurse face-to-face time: Level 3:  10 min face to face time  2019   9:17 AM  Scope used today  #1 Pentax Nasolaryngoscope [id number] Serial number [id number] (video)

## 2019-08-19 NOTE — PATIENT INSTRUCTIONS
F/u with Dr Moreland on October 7th (Mon) with PET scan on same day.  Schedule PET scan for 10/7/2019  Plan on labs at next visit.

## 2019-08-20 ENCOUNTER — HOSPITAL ENCOUNTER (OUTPATIENT)
Dept: PHYSICAL THERAPY | Facility: CLINIC | Age: 69
End: 2019-08-20
Payer: MEDICARE

## 2019-08-20 DIAGNOSIS — I89.0 LYMPHEDEMA OF FACE: Primary | ICD-10-CM

## 2019-08-20 DIAGNOSIS — L90.5 SCAR CONDITION AND FIBROSIS OF SKIN: ICD-10-CM

## 2019-08-20 DIAGNOSIS — C44.320 SQUAMOUS CELL CARCINOMA OF SKIN OF FACE: ICD-10-CM

## 2019-08-20 DIAGNOSIS — M43.6 NECK STIFFNESS: ICD-10-CM

## 2019-08-20 PROCEDURE — 97140 MANUAL THERAPY 1/> REGIONS: CPT | Mod: GP | Performed by: PHYSICAL THERAPIST

## 2019-08-20 PROCEDURE — 97110 THERAPEUTIC EXERCISES: CPT | Mod: GP | Performed by: PHYSICAL THERAPIST

## 2019-08-20 PROCEDURE — 97535 SELF CARE MNGMENT TRAINING: CPT | Mod: GP | Performed by: PHYSICAL THERAPIST

## 2019-08-27 ENCOUNTER — HOSPITAL ENCOUNTER (OUTPATIENT)
Dept: PHYSICAL THERAPY | Facility: CLINIC | Age: 69
End: 2019-08-27
Payer: MEDICARE

## 2019-08-27 DIAGNOSIS — C44.320 SQUAMOUS CELL CARCINOMA OF SKIN OF FACE: ICD-10-CM

## 2019-08-27 DIAGNOSIS — I89.0 LYMPHEDEMA OF FACE: Primary | ICD-10-CM

## 2019-08-27 DIAGNOSIS — M43.6 NECK STIFFNESS: ICD-10-CM

## 2019-08-27 DIAGNOSIS — L90.5 SCAR CONDITION AND FIBROSIS OF SKIN: ICD-10-CM

## 2019-08-27 PROCEDURE — 97140 MANUAL THERAPY 1/> REGIONS: CPT | Mod: GP | Performed by: PHYSICAL THERAPIST

## 2019-08-27 PROCEDURE — 97535 SELF CARE MNGMENT TRAINING: CPT | Mod: GP | Performed by: PHYSICAL THERAPIST

## 2019-08-27 PROCEDURE — 97110 THERAPEUTIC EXERCISES: CPT | Mod: GP | Performed by: PHYSICAL THERAPIST

## 2019-09-03 ENCOUNTER — HOSPITAL ENCOUNTER (OUTPATIENT)
Dept: PHYSICAL THERAPY | Facility: CLINIC | Age: 69
End: 2019-09-03
Payer: MEDICARE

## 2019-09-03 DIAGNOSIS — I89.0 LYMPHEDEMA OF FACE: Primary | ICD-10-CM

## 2019-09-03 DIAGNOSIS — C44.320 SQUAMOUS CELL CARCINOMA OF SKIN OF FACE: ICD-10-CM

## 2019-09-03 DIAGNOSIS — L90.5 SCAR CONDITION AND FIBROSIS OF SKIN: ICD-10-CM

## 2019-09-03 DIAGNOSIS — M43.6 NECK STIFFNESS: ICD-10-CM

## 2019-09-03 PROCEDURE — 97110 THERAPEUTIC EXERCISES: CPT | Mod: GP | Performed by: PHYSICAL THERAPIST

## 2019-09-03 PROCEDURE — 97140 MANUAL THERAPY 1/> REGIONS: CPT | Mod: GP | Performed by: PHYSICAL THERAPIST

## 2019-10-03 ENCOUNTER — HEALTH MAINTENANCE LETTER (OUTPATIENT)
Age: 69
End: 2019-10-03

## 2019-10-05 NOTE — PROGRESS NOTES
Dear Dr. Jimenez:    I had the pleasure of seeing Eric Andrew in follow-up today at the [facility].     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  He underwent surgery by dermatologist in [place] for this.  Per his report this was not done with Mohs surgery but was told that he had negative margins.  He says that shortly after the surgery he noticed a lump along his mandible/below the ear.  He went in for his 1 month follow-up with a dermatologist and at that time the mass had increased in size.  She thought it was a reactive node but offered a biopsy. Per review of the notes they proceeded with a punch biopsy of the parotid mass.  This was consistent with invasive squamous cell carcinoma without any epidermal involvement.  His preoperative PET scan showed only involvement of the parotid. He was taken to the OR on 4/11/2019 for a left total parotidectomy with resection of skin, preservation of the facial nerve, sacrifice of the greater auricular nerve, level I-V neck dissection, cervicofacial flap. Final pathology showed a 2.1 cm moderately differentiated SCC within the subcutaneous tissues and parotid, PNI, no LVSI, negative margins, 0/47 cervical nodes. He had postoperative radiation with Dr Floyd from 5/13/2019 to 6/24/2019 for a total of 6000 cGy.     Interval history:  He comes in today for follow-up. He was last seen August 2019. At that time he was just starting lymphedema therapy. He continued to work on lip strengthening. He had his PET scan this morning. He says he is overall doing well.  He has noticed a few skin lesions that he is getting evaluated by the dermatologist tomorrow, including inferior to the right lobule, the left antihelix, the left cheek.  He says he is eating without difficulty and has  no changes in his weight.  He continues to have mild dry mouth which is mostly in inconvenience.  He is back to being able to play the trombone and his lip movement is improving.  He has 1 more session of lymphedema therapy left.  He continues to wear the compression wrap for 30 minutes at night.  He is seeing a dentist and has fluoride toothpaste. He and his wife will be traveling back and forth from [place] this winter, starting at the end of the month.      MEDICATIONS:     Current Outpatient Medications   Medication Sig Dispense Refill     AMLODIPINE BESYLATE PO Take 10 mg by mouth every morning        atorvastatin (LIPITOR) 20 MG tablet Take 20 mg by mouth every evening        BASAGLAR 100 UNIT/ML injection Inject 45 Units Subcutaneous At Bedtime        Calcium Carbonate-Vitamin D (CALCIUM + D PO) Take 1 tablet by mouth every morning        diphenhydrAMINE HCl (BENADRYL PO) Take by mouth nightly as needed       losartan (COZAAR) 25 MG tablet Take 1 tablet (25 mg) by mouth daily (Patient taking differently: Take 50 mg by mouth every morning ) 90 tablet 3     metFORMIN (GLUCOPHAGE) 1000 MG tablet Take 1,000 mg by mouth 2 times daily (with meals)        metoprolol succinate (TOPROL-XL) 25 MG 24 hr tablet Take 25 mg by mouth every morning        Multiple Vitamins-Minerals (MULTIVITAL) TABS Take 1 tablet by mouth every morning        mupirocin (BACTROBAN) 2 % external ointment Apply topically 3 times daily 30 g 0     sildenafil (VIAGRA) 50 MG tablet Take 50 mg by mouth daily as needed for erectile dysfunction       tacrolimus (GENERIC EQUIVALENT) 1 MG capsule Take 1 capsule (1 mg) by mouth every 12 hours (Patient taking differently: Take 1 mg by mouth 2 times daily ) 180 capsule 3       ALLERGIES:    Allergies   Allergen Reactions     Cefazolin Swelling     Lips swelled while patient was in the OR      Lisinopril Cough     Meropenem Hives and Swelling     Developed hives and lip swelling while on meropenem and  micafungin.  Resolved with discontinuation.  Unclear which was cause.     Micafungin Hives and Swelling     Developed hives and lip swelling while on meropenem and micafungin.  Resolved with discontinuation.  Unclear which was cause.       HABITS/SOCIAL HISTORY:   Spends the shin in [place].  .  He is a retired .   He smokes for a few years and quit back in 1973.  He has no chewing tobacco use.  He has 1 alcoholic beverage about 3 times per week.   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    Social History     Socioeconomic History     Marital status:      Spouse name: Not on file     Number of children: Not on file     Years of education: Not on file     Highest education level: Not on file   Occupational History     Not on file   Social Needs     Financial resource strain: Not on file     Food insecurity:     Worry: Not on file     Inability: Not on file     Transportation needs:     Medical: Not on file     Non-medical: Not on file   Tobacco Use     Smoking status: Never Smoker     Smokeless tobacco: Never Used     Tobacco comment: [id number] occational smoker   Substance and Sexual Activity     Alcohol use: Yes     Alcohol/week: 0.0 standard drinks     Comment: 2-3 glass of wine per week. At most 1 per day     Drug use: No     Sexual activity: Never   Lifestyle     Physical activity:     Days per week: Not on file     Minutes per session: Not on file     Stress: Not on file   Relationships     Social connections:     Talks on phone: Not on file     Gets together: Not on file     Attends Yarsanism service: Not on file     Active member of club or organization: Not on file     Attends meetings of clubs or organizations: Not on file     Relationship status: Not on file     Intimate partner violence:     Fear of current or ex partner: Not on file     Emotionally abused: Not on file     Physically abused: Not on file     Forced sexual activity: Not on file   Other Topics Concern      Parent/sibling w/ CABG, MI or angioplasty before 65F 55M? Not Asked   Social History Narrative     Not on file       PAST MEDICAL HISTORY:   Past Medical History:   Diagnosis Date     Alpha-1-antitrypsin deficiency (H)      Ascites     Pre-transplant     Chronic kidney disease, stage 3 (H)      Cirrhosis (H)     Alpha-1-antitrypsin deficiency, s/p liver transplant 3/31/15     Gout      HTN (hypertension)      Lentigo maligna melanoma (H)     lentigo maligna melanoma excised on 2009 with a repeat wide excision on 2009.      Liver replaced by transplant (H) 2015     Nephrolithiasis      Osteoarthritis      Portal hypertension (H)     Pre-transplant        PAST SURGICAL HISTORY:   Past Surgical History:   Procedure Laterality Date     COLONOSCOPY N/A 2014    Procedure: COLONOSCOPY;  Surgeon: Katherine Jimenez MD;  Location:  GI     ESOPHAGOSCOPY, GASTROSCOPY, DUODENOSCOPY (EGD), COMBINED N/A 2014    Procedure: COMBINED ESOPHAGOSCOPY, GASTROSCOPY, DUODENOSCOPY (EGD), BIOPSY SINGLE OR MULTIPLE;  Surgeon: Katherine Jimenez MD;  Location:  GI     ESOPHAGOSCOPY, GASTROSCOPY, DUODENOSCOPY (EGD), COMBINED N/A 2015    Procedure: COMBINED ESOPHAGOSCOPY, GASTROSCOPY, DUODENOSCOPY (EGD), REMOVE FOREIGN BODY;  Surgeon: Katherine Jimenez MD;  Location:  GI     HERNIA REPAIR      abdominal     INSERT SHUNT PORTAL TRANSJUGULAR INTRAHEPTIC       ORTHOPEDIC SURGERY      bilateral knee surgery     PAROTIDECTOMY, RADICAL NECK DISSECTION Left 2019    Procedure: Total Parotidectomy, Excision of Skin, Neck Dissection Levels I - V,  And Local Advancement Flap;  Surgeon: Johanna Moreland MD;  Location:  OR     TRANSPLANT LIVER RECIPIENT  DONOR N/A 3/31/2015    Procedure: TRANSPLANT LIVER RECIPIENT  DONOR;  Surgeon: Elia Mehta MD;  Location:  OR       FAMILY HISTORY:    Family History   Problem Relation Age of Onset      "Cardiovascular Father         MI     Glaucoma No family hx of      Macular Degeneration No family hx of        REVIEW OF SYSTEMS:  12 point ROS was negative other than the symptoms noted above in the HPI.  Patient Supplied Answers to Review of Systems  [facility] ENT ROS 5/3/2019   Musculoskeletal Neck pain   Skin Skin lesions         PHYSICAL EXAMINATION:   BP (!) 153/78 (BP Location: Right arm, Patient Position: Sitting, Cuff Size: Adult Regular)   Pulse 73   Resp 16   Ht 1.905 m (6' 3\")   Wt 123.2 kg (271 lb 11.2 oz)   SpO2 97%   BMI 33.96 kg/m     Appearance:   normal; NAD, age-appropriate appearance, well-developed, obese   Communication:   normal; communicates verbally, normal voice quality   Head/Face:   inspection -  Normal; no scars or visible lesions   Salivary glands -  S/p left radical parotidectomy with cervicofacial advancement flap, radiation changes to the tissue, no palpable mass, well healed incision   Facial strength -  Normal and symmetric bilateral; H/B I/VI - significantly improved lower lip function, very subtle asymmetry but good movement   Skin:  normal, no rash  Superficial dry skin with crusted area on left cheek   Ocular Motility:  normal occular movements   Ears:  auricle (AD) -  Papule below the lobule about 5 mm in size  EAC (AD) -  normal  TM (AD) -  Normal, no effusion  auricle (AS) -  3 mm crusted lesion on antihelix  EAC (AS) -  normal  TM (AS) -  Normal, no effusion  Normal clinical speech reception   Nose:  Ext. inspection -  Normal   Oral Cavity:  lips -  Normal mucosa, oral competence, and stoma size   Age-appropriate dentition, healthy gingival mucosa   Hard palate, buccal, floor of mouth mucosa normal   Tongue - normal movement, no lesions   Oropharynx:  mucosa -  Normal, no lesions  soft palate -  Normal, no lesions, no asymmetry, normal elevation   Neck: Well healed neck incision, well healed cervicofacial flap, no palpable masses  Normal range of motion  Midline submental " lymphedema   Lymphatic:  no abnormal nodes   Cardiovascular: warm, pink, well-perfused extremities without swelling, tenderness, or edema   Respiratory: Normal respiratory effort, no stridor   Neuro/Psych.:  mood/affect -  normal  mental status -  normal  cranial nerves -  normal - lower lip function on left significantly improved, nearly normal       PROCEDURE:      RESULTS REVIEWED:   I independently reviewed the PET scan images which show no FDG avid skin lesions or osiris disease, no distant disease, formal read pending      IMPRESSION AND PLAN:   Eric Andrew is a 69-year-old man who has a history of a liver transplant and recent squamous cell carcinoma of the left cheek who developed 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 He underwent total parotidectomy, skin resection, resection of greater auricular nerve, level I-V neck dissection, cervicofacial flap on 4/11/2019. Final pathology showed the 1 metastatic deposit in the parotid. He compelted postoperative radiation on 6/24/2019 with Dr Floyd.  His final read of his PET is still pending but on my review is negative for recurrent disease.    We will plan on obtaining a TSH with his next transplant labs (ordered).    I will see him back in about 6 weeks pending his travel schedule.    Thank you very much for the opportunity to participate in the care of your patient.      Johanna Moreland M.D.  Otolaryngology- Head & Neck Surgery      This note was dictated with voice recognition software and then edited. Please excuse any unintentional errors.         CC:  Katherine Jimenez MD  [address]  [facility] [address]      Naomi Rodriguez, RN  Liver Coordinator  [facility]      Aston Devine MD  [facility]  [address]  [facility] [address]

## 2019-10-07 ENCOUNTER — HOSPITAL ENCOUNTER (OUTPATIENT)
Dept: PET IMAGING | Facility: CLINIC | Age: 69
End: 2019-10-07
Attending: OTOLARYNGOLOGY
Payer: MEDICARE

## 2019-10-07 ENCOUNTER — HOSPITAL ENCOUNTER (OUTPATIENT)
Dept: PET IMAGING | Facility: CLINIC | Age: 69
Discharge: HOME | End: 2019-10-07
Attending: OTOLARYNGOLOGY | Admitting: OTOLARYNGOLOGY
Payer: MEDICARE

## 2019-10-07 ENCOUNTER — OFFICE VISIT (OUTPATIENT)
Dept: OTOLARYNGOLOGY | Facility: CLINIC | Age: 69
End: 2019-10-07
Payer: MEDICARE

## 2019-10-07 VITALS
SYSTOLIC BLOOD PRESSURE: 153 MMHG | BODY MASS INDEX: 33.78 KG/M2 | DIASTOLIC BLOOD PRESSURE: 78 MMHG | RESPIRATION RATE: 16 BRPM | WEIGHT: 271.7 LBS | OXYGEN SATURATION: 97 % | HEIGHT: 75 IN | HEART RATE: 73 BPM

## 2019-10-07 DIAGNOSIS — C44.320 SQUAMOUS CELL CARCINOMA OF SKIN OF FACE: ICD-10-CM

## 2019-10-07 DIAGNOSIS — C44.320 SQUAMOUS CELL CARCINOMA OF SKIN OF FACE: Primary | ICD-10-CM

## 2019-10-07 DIAGNOSIS — C44.329 SQUAMOUS CELL CARCINOMA OF SKIN OF OTHER PARTS OF FACE: ICD-10-CM

## 2019-10-07 LAB
CREAT BLD-MCNC: 1.8 MG/DL (ref 0.66–1.25)
GFR SERPL CREATININE-BSD FRML MDRD: 38 ML/MIN/{1.73_M2}

## 2019-10-07 PROCEDURE — 70490 CT SOFT TISSUE NECK W/O DYE: CPT

## 2019-10-07 PROCEDURE — 74176 CT ABD & PELVIS W/O CONTRAST: CPT | Mod: PS,XU

## 2019-10-07 PROCEDURE — 34300033 ZZH RX 343: Performed by: OTOLARYNGOLOGY

## 2019-10-07 PROCEDURE — 82565 ASSAY OF CREATININE: CPT

## 2019-10-07 PROCEDURE — A9552 F18 FDG: HCPCS | Performed by: OTOLARYNGOLOGY

## 2019-10-07 PROCEDURE — 78816 PET IMAGE W/CT FULL BODY: CPT | Mod: PS

## 2019-10-07 RX ADMIN — FLUDEOXYGLUCOSE F-18 16.2 MCI.: 500 INJECTION, SOLUTION INTRAVENOUS at 10:28

## 2019-10-07 ASSESSMENT — MIFFLIN-ST. JEOR: SCORE: 2088.05

## 2019-10-07 ASSESSMENT — PAIN SCALES - GENERAL: PAINLEVEL: NO PAIN (0)

## 2019-10-07 NOTE — LETTER
10/7/2019       RE: Eric Andrew  [address] [place] [address]     Dear Colleague,    Thank you for referring your patient, Eric Andrew, to the [facility] EAR NOSE AND THROAT at [facility]. Please see a copy of my visit note below.    Dear Dr. Jimenez:    I had the pleasure of seeing Eric Andrew in follow-up today at the [facility].     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  He underwent surgery by dermatologist in [place] for this.  Per his report this was not done with Mohs surgery but was told that he had negative margins.  He says that shortly after the surgery he noticed a lump along his mandible/below the ear.  He went in for his 1 month follow-up with a dermatologist and at that time the mass had increased in size.  She thought it was a reactive node but offered a biopsy. Per review of the notes they proceeded with a punch biopsy of the parotid mass.  This was consistent with invasive squamous cell carcinoma without any epidermal involvement.  His preoperative PET scan showed only involvement of the parotid. He was taken to the OR on 4/11/2019 for a left total parotidectomy with resection of skin, preservation of the facial nerve, sacrifice of the greater auricular nerve, level I-V neck dissection, cervicofacial flap. Final pathology showed a 2.1 cm moderately differentiated SCC within the subcutaneous tissues and parotid, PNI, no LVSI, negative margins, 0/47 cervical nodes. He had postoperative radiation with Dr Floyd from 5/13/2019 to 6/24/2019 for a total of 6000 cGy.     Interval history:  He comes in today for follow-up. He was last seen August 2019. At that time he was just starting lymphedema therapy. He continued to work on lip  strengthening. He had his PET scan this morning. He says he is overall doing well.  He has noticed a few skin lesions that he is getting evaluated by the dermatologist tomorrow, including inferior to the right lobule, the left antihelix, the left cheek.  He says he is eating without difficulty and has no changes in his weight.  He continues to have mild dry mouth which is mostly in inconvenience.  He is back to being able to play the trombone and his lip movement is improving.  He has 1 more session of lymphedema therapy left.  He continues to wear the compression wrap for 30 minutes at night.  He is seeing a dentist and has fluoride toothpaste. He and his wife will be traveling back and forth from [place] this winter, starting at the end of the month.      MEDICATIONS:     Current Outpatient Medications   Medication Sig Dispense Refill     AMLODIPINE BESYLATE PO Take 10 mg by mouth every morning        atorvastatin (LIPITOR) 20 MG tablet Take 20 mg by mouth every evening        BASAGLAR 100 UNIT/ML injection Inject 45 Units Subcutaneous At Bedtime        Calcium Carbonate-Vitamin D (CALCIUM + D PO) Take 1 tablet by mouth every morning        diphenhydrAMINE HCl (BENADRYL PO) Take by mouth nightly as needed       losartan (COZAAR) 25 MG tablet Take 1 tablet (25 mg) by mouth daily (Patient taking differently: Take 50 mg by mouth every morning ) 90 tablet 3     metFORMIN (GLUCOPHAGE) 1000 MG tablet Take 1,000 mg by mouth 2 times daily (with meals)        metoprolol succinate (TOPROL-XL) 25 MG 24 hr tablet Take 25 mg by mouth every morning        Multiple Vitamins-Minerals (MULTIVITAL) TABS Take 1 tablet by mouth every morning        mupirocin (BACTROBAN) 2 % external ointment Apply topically 3 times daily 30 g 0     sildenafil (VIAGRA) 50 MG tablet Take 50 mg by mouth daily as needed for erectile dysfunction       tacrolimus (GENERIC EQUIVALENT) 1 MG capsule Take 1 capsule (1 mg) by mouth every 12 hours (Patient  taking differently: Take 1 mg by mouth 2 times daily ) 180 capsule 3       ALLERGIES:    Allergies   Allergen Reactions     Cefazolin Swelling     Lips swelled while patient was in the OR      Lisinopril Cough     Meropenem Hives and Swelling     Developed hives and lip swelling while on meropenem and micafungin.  Resolved with discontinuation.  Unclear which was cause.     Micafungin Hives and Swelling     Developed hives and lip swelling while on meropenem and micafungin.  Resolved with discontinuation.  Unclear which was cause.       HABITS/SOCIAL HISTORY:   Spends the shin in [place].  .  He is a retired .   He smokes for a few years and quit back in 1973.  He has no chewing tobacco use.  He has 1 alcoholic beverage about 3 times per week.   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    Social History     Socioeconomic History     Marital status:      Spouse name: Not on file     Number of children: Not on file     Years of education: Not on file     Highest education level: Not on file   Occupational History     Not on file   Social Needs     Financial resource strain: Not on file     Food insecurity:     Worry: Not on file     Inability: Not on file     Transportation needs:     Medical: Not on file     Non-medical: Not on file   Tobacco Use     Smoking status: Never Smoker     Smokeless tobacco: Never Used     Tobacco comment: [id number] occational smoker   Substance and Sexual Activity     Alcohol use: Yes     Alcohol/week: 0.0 standard drinks     Comment: 2-3 glass of wine per week. At most 1 per day     Drug use: No     Sexual activity: Never   Lifestyle     Physical activity:     Days per week: Not on file     Minutes per session: Not on file     Stress: Not on file   Relationships     Social connections:     Talks on phone: Not on file     Gets together: Not on file     Attends [employer]ism service: Not on file     Active member of club or organization: Not on file     Attends meetings of  clubs or organizations: Not on file     Relationship status: Not on file     Intimate partner violence:     Fear of current or ex partner: Not on file     Emotionally abused: Not on file     Physically abused: Not on file     Forced sexual activity: Not on file   Other Topics Concern     Parent/sibling w/ CABG, MI or angioplasty before 65F 55M? Not Asked   Social History Narrative     Not on file       PAST MEDICAL HISTORY:   Past Medical History:   Diagnosis Date     Alpha-1-antitrypsin deficiency (H)      Ascites     Pre-transplant     Chronic kidney disease, stage 3 (H)      Cirrhosis (H)     Alpha-1-antitrypsin deficiency, s/p liver transplant 3/31/15     Gout      HTN (hypertension)      Lentigo maligna melanoma (H) 2009    lentigo maligna melanoma excised on 01/29/2009 with a repeat wide excision on 03/30/2009.      Liver replaced by transplant (H) 03/31/2015     Nephrolithiasis      Osteoarthritis      Portal hypertension (H)     Pre-transplant        PAST SURGICAL HISTORY:   Past Surgical History:   Procedure Laterality Date     COLONOSCOPY N/A 12/18/2014    Procedure: COLONOSCOPY;  Surgeon: Katherine Jimenez MD;  Location:  GI     ESOPHAGOSCOPY, GASTROSCOPY, DUODENOSCOPY (EGD), COMBINED N/A 12/18/2014    Procedure: COMBINED ESOPHAGOSCOPY, GASTROSCOPY, DUODENOSCOPY (EGD), BIOPSY SINGLE OR MULTIPLE;  Surgeon: Katherine Jimenez MD;  Location:  GI     ESOPHAGOSCOPY, GASTROSCOPY, DUODENOSCOPY (EGD), COMBINED N/A 5/28/2015    Procedure: COMBINED ESOPHAGOSCOPY, GASTROSCOPY, DUODENOSCOPY (EGD), REMOVE FOREIGN BODY;  Surgeon: Katherine Jimenez MD;  Location:  GI     HERNIA REPAIR      abdominal     INSERT SHUNT PORTAL TRANSJUGULAR INTRAHEPTIC       ORTHOPEDIC SURGERY      bilateral knee surgery     PAROTIDECTOMY, RADICAL NECK DISSECTION Left 4/11/2019    Procedure: Total Parotidectomy, Excision of Skin, Neck Dissection Levels I - V,  And Local Advancement Flap;  Surgeon: Merly  "Johanna Murray MD;  Location: [facility] OR     TRANSPLANT LIVER RECIPIENT  DONOR N/A 3/31/2015    Procedure: TRANSPLANT LIVER RECIPIENT  DONOR;  Surgeon: Elia Mehta MD;  Location: [facility] OR       FAMILY HISTORY:    Family History   Problem Relation Age of Onset     Cardiovascular Father         MI     Glaucoma No family hx of      Macular Degeneration No family hx of        REVIEW OF SYSTEMS:  12 point ROS was negative other than the symptoms noted above in the HPI.  Patient Supplied Answers to Review of Systems  [facility] ENT ROS 5/3/2019   Musculoskeletal Neck pain   Skin Skin lesions         PHYSICAL EXAMINATION:   BP (!) 153/78 (BP Location: Right arm, Patient Position: Sitting, Cuff Size: Adult Regular)   Pulse 73   Resp 16   Ht 1.905 m (6' 3\")   Wt 123.2 kg (271 lb 11.2 oz)   SpO2 97%   BMI 33.96 kg/m      Appearance:   normal; NAD, age-appropriate appearance, well-developed, obese   Communication:   normal; communicates verbally, normal voice quality   Head/Face:   inspection -  Normal; no scars or visible lesions   Salivary glands -  S/p left radical parotidectomy with cervicofacial advancement flap, radiation changes to the tissue, no palpable mass, well healed incision   Facial strength -  Normal and symmetric bilateral; H/B I/VI - significantly improved lower lip function, very subtle asymmetry but good movement   Skin:  normal, no rash  Superficial dry skin with crusted area on left cheek   Ocular Motility:  normal occular movements   Ears:  auricle (AD) -  Papule below the lobule about 5 mm in size  EAC (AD) -  normal  TM (AD) -  Normal, no effusion  auricle (AS) -  3 mm crusted lesion on antihelix  EAC (AS) -  normal  TM (AS) -  Normal, no effusion  Normal clinical speech reception   Nose:  Ext. inspection -  Normal   Oral Cavity:  lips -  Normal mucosa, oral competence, and stoma size   Age-appropriate dentition, healthy gingival mucosa   Hard palate, buccal, floor of mouth mucosa " normal   Tongue - normal movement, no lesions   Oropharynx:  mucosa -  Normal, no lesions  soft palate -  Normal, no lesions, no asymmetry, normal elevation   Neck: Well healed neck incision, well healed cervicofacial flap, no palpable masses  Normal range of motion  Midline submental lymphedema   Lymphatic:  no abnormal nodes   Cardiovascular: warm, pink, well-perfused extremities without swelling, tenderness, or edema   Respiratory: Normal respiratory effort, no stridor   Neuro/Psych.:  mood/affect -  normal  mental status -  normal  cranial nerves -  normal - lower lip function on left significantly improved, nearly normal       PROCEDURE:      RESULTS REVIEWED:   I independently reviewed the PET scan images which show no FDG avid skin lesions or osiris disease, no distant disease, formal read pending      IMPRESSION AND PLAN:   Eric Andrew is a 69-year-old man who has a history of a liver transplant and recent squamous cell carcinoma of the left cheek who developed 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 He underwent total parotidectomy, skin resection, resection of greater auricular nerve, level I-V neck dissection, cervicofacial flap on 4/11/2019. Final pathology showed the 1 metastatic deposit in the parotid. He compelted postoperative radiation on 6/24/2019 with Dr Floyd.  His final read of his PET is still pending but on my review is negative for recurrent disease.    We will plan on obtaining a TSH with his next transplant labs (ordered).    I will see him back in about 6 weeks pending his travel schedule.    Thank you very much for the opportunity to participate in the care of your patient.      Johanna Moreland M.D.  Otolaryngology- Head & Neck Surgery      This note was dictated with voice recognition software and then edited. Please excuse any unintentional errors.         CC:  Katherine Jimenez MD  [address] [facility] [address]  [facility] [address]      Naomi Rodriguez, RN  Liver Coordinator  [facility]  [place]      Atson Devine MD  [facility]  [address]  [facility] [address]

## 2019-10-07 NOTE — NURSING NOTE
"Chief Complaint   Patient presents with     RECHECK     Squamous cell carcinoma of skin of other parts of face     BP (!) 153/78 (BP Location: Right arm, Patient Position: Sitting, Cuff Size: Adult Regular)   Pulse 73   Resp 16   Ht 1.905 m (6' 3\")   Wt 123.2 kg (271 lb 11.2 oz)   SpO2 97%   BMI 33.96 kg/m      Yi Rene CMA    "

## 2019-10-07 NOTE — PROGRESS NOTES
Department of Radiation Oncology  [facility]  [address] [place] [address]  [phone]       Radiation Oncology Follow-up Visit  2019      Eric Andrew  MRN: [id number]   : 1950     DISEASE TREATED:   rpT2 N0 M0 cutaneous squamous cell carcinoma of the skin of the left face     RADIATION THERAPY DELIVERED:   6000 cGy delivered in 30 once-daily fractions, from 2019 - 2019     SYSTEMIC THERAPY:  None     INTERVAL SINCE COMPLETION OF RADIATION THERAPY:   3.5 months    SUBJECTIVE:   Eric Andrew is a 69 year old male with a PMH significant for immunosuppression secondary to liver transplantation in  who underwent an excision of a cutaneous squamous cell carcinoma of the left cheek in 2019. Several months later he developed a recurrence involving the deep margin with invasion into the superficial parotid gland. He had a left total parotidectomy and ipsilateral neck dissection of levels IB-V on 2019 with pathology revealing a 2.1 cm tumor centered within the subcutaneous tissues with extension into the parotid gland. Perineural invasion involving both small and large caliber nerves was appreciated with involvement of the distal greater auricular nerve which was resected. Post-operatively, he received adjuvant radiotherapy as described above for improved local disease control. He underwent a restaging PET/CT on 10/7/2019, which showed no evidence of local disease recurrence or systemic disease.    Mr. Andrew returns to radiation oncology clinic today accompanied by his wife for a routine post-treatment disease surveillance visit. He is doing very well. He is happy that his lip movement is back to normal. He reports mild xerostomia. He specifically denies headache, vision/hearing change, dysphagia, odynophagia. He endorses compliance of lymphedema treatment, including messaging and compression wrap daily. Of note, he was seen by a  dermatologist yesterday for evaluation of a few skin lesions in his face and his ears, whom reportedly applied cryotherapy to the other visible lesions, and took a biopsy of one left auricular lesion. The result of the biopsy is pending during our appointment.     PHYSICAL EXAM:  VITALS: /79   Wt 124.3 kg (274 lb)   BMI 34.25 kg/m    General: Alert, oriented and in no acute distress  HEENT: NC/AT. EOMI. No rhinorrhea or epistaxis.  Oral Cavity: Mildly dry mucous membranes. No visible oral cavity lesions.  Neck: Moderate submental lymphedema. No palpable cervical adenopathy.  Cardiac: Extremities are warm and well-perfused  Pulmonary: No wheezing, stridor or respiratory distress  Skin: Very mild hyperpigmentation within the left lateral face and neck. No erythema, desquamation or ulceration.   Neuro: CN II-XII grossly intact.     LABS AND IMAGING:  10/7/2019 PET/CT: No evidence of locally recurrent disease or cervical adenopathy. Stable sub-centimeter non-hypermetabolic pulmonary nodules. No evidence of distant disease.      IMPRESSION:   Mr. 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 He is currently more than 3 months out from the completion of adjuvant radiotherapy. There is no clinical or radiographic evidence of disease recurrence.    PLAN:   1. We will plan to see Mr. Andrew in clinic in 3 months for follow-up in coordination with Dr. Moreland if possible. Since Mr. Andrew is going to be in [place] throughout the coming winter, we may see him back in 6 months if same-day appointment cannot be arranged.  2. We encouraged Mr. Andrew to continue self-messaging in treatment of lymphedema.    The patient was seen and discussed with staff, Dr. Floyd.    Mykel Welch MD  Resident, PGY-2  Department of Radiation Oncology  [facility]        Attending addendum:   I saw and examined the patient with the resident and  agree with the documented plan of care.    Mr. Andrew is doing very well on examination today. He has no clinical or radiographic evidence of recurrent disease. He continues to have a moderate amount of submental lymphedema and I strongly encouraged him to increase the frequency of his lymphedema therapy exercises. He is due for a posttreatment TSH to be obtained with his next lab draw. I will see him back in 3 months in coordination with Dr. Moreland.    Grabiel Floyd MD/PhD    Dept of Radiation Oncology  [facility]

## 2019-10-08 ENCOUNTER — HOSPITAL ENCOUNTER (OUTPATIENT)
Dept: PHYSICAL THERAPY | Facility: CLINIC | Age: 69
End: 2019-10-08
Payer: MEDICARE

## 2019-10-08 DIAGNOSIS — M43.6 NECK STIFFNESS: ICD-10-CM

## 2019-10-08 DIAGNOSIS — I89.0 LYMPHEDEMA OF FACE: Primary | ICD-10-CM

## 2019-10-08 DIAGNOSIS — L90.5 SCAR CONDITION AND FIBROSIS OF SKIN: ICD-10-CM

## 2019-10-08 DIAGNOSIS — C44.320 SQUAMOUS CELL CARCINOMA OF SKIN OF FACE: ICD-10-CM

## 2019-10-08 PROCEDURE — 97110 THERAPEUTIC EXERCISES: CPT | Mod: GP | Performed by: PHYSICAL THERAPIST

## 2019-10-08 PROCEDURE — 97535 SELF CARE MNGMENT TRAINING: CPT | Mod: GP | Performed by: PHYSICAL THERAPIST

## 2019-10-08 PROCEDURE — 97140 MANUAL THERAPY 1/> REGIONS: CPT | Mod: GP | Performed by: PHYSICAL THERAPIST

## 2019-10-09 ENCOUNTER — OFFICE VISIT (OUTPATIENT)
Dept: RADIATION ONCOLOGY | Facility: CLINIC | Age: 69
End: 2019-10-09
Attending: RADIOLOGY
Payer: MEDICARE

## 2019-10-09 VITALS — BODY MASS INDEX: 34.25 KG/M2 | DIASTOLIC BLOOD PRESSURE: 79 MMHG | SYSTOLIC BLOOD PRESSURE: 134 MMHG | WEIGHT: 274 LBS

## 2019-10-09 DIAGNOSIS — C44.320 SQUAMOUS CELL CARCINOMA OF SKIN OF FACE: Primary | ICD-10-CM

## 2019-10-09 PROCEDURE — G0463 HOSPITAL OUTPT CLINIC VISIT: HCPCS | Performed by: RADIOLOGY

## 2019-10-09 NOTE — Clinical Note
Follow-up in radiation oncology clinic in approximately 2-3 months in coordination with ENT (Dr. Moreland)

## 2019-10-09 NOTE — PROGRESS NOTES
FOLLOW-UP VISIT    Patient Name: Eric Anderw      : 1950     Age: 69 year old        ______________________________________________________________________________     Chief Complaint   Patient presents with     Cancer     Pt is here for a follow up:SCC of face and neck: Radiation 6000 cGy completed 19     /79   Wt 124.3 kg (274 lb)   BMI 34.25 kg/m       Pain  Denies    Labs  Other Labs: No    Imaging  PET: 10/07/19      Dental:   Most Recent Dental Visit: tomorrow      Speech/Swallowing:   Most Recent evaluation or testing: No  Swallowing Restrictions: No difficulties with swallowing    Trismus/Jaw Exercises: Yes    Nutrition:    Weight:   Wt Readings from Last 3 Encounters:   10/09/19 124.3 kg (274 lb)   10/07/19 123.2 kg (271 lb 11.2 oz)   19 125.2 kg (276 lb)         Oral Symptoms:   Xerostomia:1- Symptomatic without significant dietary alteration; unstimulated saliva flow >0.2 ml/min  Dysphagia: 0-None  Mucositis Oral Symptoms: 0-None  Mucositis: 0- None  Esophagitis:0- None    Other Appointments:     MD Name: Dr Moreland Appointment Date: 19   MD Name: Appointment Date:   MD Name: Appointment Date:   Other Appointment Notes:     Residual Radiation side effect: Dry mouth     Additional Instructions:     Nurse face-to-face time: Level 3:  10 min face to face time

## 2019-10-09 NOTE — LETTER
10/9/2019      RE: Eric Andrew  [address] [place] [address]          Department of Radiation Oncology  [address] [address]  [phone]       Radiation Oncology Follow-up Visit  2019      Eric Andrew  MRN: [id number]   : 1950     DISEASE TREATED:   rpT2 N0 M0 cutaneous squamous cell carcinoma of the skin of the left face     RADIATION THERAPY DELIVERED:   6000 cGy delivered in 30 once-daily fractions, from 2019 - 2019     SYSTEMIC THERAPY:  None     INTERVAL SINCE COMPLETION OF RADIATION THERAPY:   3.5 months    SUBJECTIVE:   Eric Andrew is a 69 year old male with a PMH significant for immunosuppression secondary to liver transplantation in  who underwent an excision of a cutaneous squamous cell carcinoma of the left cheek in 2019. Several months later he developed a recurrence involving the deep margin with invasion into the superficial parotid gland. He had a left total parotidectomy and ipsilateral neck dissection of levels IB-V on 2019 with pathology revealing a 2.1 cm tumor centered within the subcutaneous tissues with extension into the parotid gland. Perineural invasion involving both small and large caliber nerves was appreciated with involvement of the distal greater auricular nerve which was resected. Post-operatively, he received adjuvant radiotherapy as described above for improved local disease control. He underwent a restaging PET/CT on 10/7/2019, which showed no evidence of local disease recurrence or systemic disease.    Mr. Andrew returns to radiation oncology clinic today accompanied by his wife for a routine post-treatment disease surveillance visit. He is doing very well. He is happy that his lip movement is back to normal. He reports mild xerostomia. He specifically denies headache, vision/hearing change, dysphagia, odynophagia. He endorses compliance of lymphedema  treatment, including messaging and compression wrap daily. Of note, he was seen by a dermatologist yesterday for evaluation of a few skin lesions in his face and his ears, whom reportedly applied cryotherapy to the other visible lesions, and took a biopsy of one left auricular lesion. The result of the biopsy is pending during our appointment.     PHYSICAL EXAM:  VITALS: /79   Wt 124.3 kg (274 lb)   BMI 34.25 kg/m     General: Alert, oriented and in no acute distress  HEENT: NC/AT. EOMI. No rhinorrhea or epistaxis.  Oral Cavity: Mildly dry mucous membranes. No visible oral cavity lesions.  Neck: Moderate submental lymphedema. No palpable cervical adenopathy.  Cardiac: Extremities are warm and well-perfused  Pulmonary: No wheezing, stridor or respiratory distress  Skin: Very mild hyperpigmentation within the left lateral face and neck. No erythema, desquamation or ulceration.   Neuro: CN II-XII grossly intact.     LABS AND IMAGING:  10/7/2019 PET/CT: No evidence of locally recurrent disease or cervical adenopathy. Stable sub-centimeter non-hypermetabolic pulmonary nodules. No evidence of distant disease.      IMPRESSION:   Mr. 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 He is currently more than 3 months out from the completion of adjuvant radiotherapy. There is no clinical or radiographic evidence of disease recurrence.    PLAN:   1. We will plan to see Mr. Andrew in clinic in 3 months for follow-up in coordination with Dr. Moreland if possible. Since Mr. Andrew is going to be in [place] throughout the coming winter, we may see him back in 6 months if same-day appointment cannot be arranged.  2. We encouraged Mr. Andrew to continue self-messaging in treatment of lymphedema.    The patient was seen and discussed with staff, Dr. Floyd.    Mykel Welch MD  Resident, PGY-2  Department of Radiation Oncology  [facility]  [place]        Attending addendum:   I saw and examined the patient with the resident and agree with the documented plan of care.    Mr. Andrew is doing very well on examination today. He has no clinical or radiographic evidence of recurrent disease. He continues to have a moderate amount of submental lymphedema and I strongly encouraged him to increase the frequency of his lymphedema therapy exercises. He is due for a posttreatment TSH to be obtained with his next lab draw. I will see him back in 3 months in coordination with Dr. Moreland.    Grabiel Floyd MD/PhD    Dept of Radiation Oncology  [facility]      FOLLOW-UP VISIT    Patient Name: Eric Andrew      : 1950     Age: 69 year old        ______________________________________________________________________________     Chief Complaint   Patient presents with     Cancer     Pt is here for a follow up:SCC of face and neck: Radiation 6000 cGy completed 19     /79   Wt 124.3 kg (274 lb)   BMI 34.25 kg/m        Pain  Denies    Labs  Other Labs: No    Imaging  PET: 10/07/19      Dental:   Most Recent Dental Visit: tomorrow      Speech/Swallowing:   Most Recent evaluation or testing: No  Swallowing Restrictions: No difficulties with swallowing    Trismus/Jaw Exercises: Yes    Nutrition:    Weight:   Wt Readings from Last 3 Encounters:   10/09/19 124.3 kg (274 lb)   10/07/19 123.2 kg (271 lb 11.2 oz)   19 125.2 kg (276 lb)         Oral Symptoms:   Xerostomia:1- Symptomatic without significant dietary alteration; unstimulated saliva flow >0.2 ml/min  Dysphagia: 0-None  Mucositis Oral Symptoms: 0-None  Mucositis: 0- None  Esophagitis:0- None    Other Appointments:     MD Name: Dr Moreland Appointment Date: 19   MD Name: Appointment Date:   MD Name: Appointment Date:   Other Appointment Notes:     Residual Radiation side effect: Dry mouth     Additional Instructions:     Nurse face-to-face time: Level  3:  10 min face to face time          Grabiel Floyd MD

## 2019-11-20 ENCOUNTER — HOSPITAL ENCOUNTER (OUTPATIENT)
Dept: LAB | Facility: CLINIC | Age: 69
Discharge: HOME | End: 2019-11-20
Attending: INTERNAL MEDICINE | Admitting: OTOLARYNGOLOGY
Payer: MEDICARE

## 2019-11-20 DIAGNOSIS — Z94.4 LIVER REPLACED BY TRANSPLANT (H): ICD-10-CM

## 2019-11-20 DIAGNOSIS — E03.4 HYPOTHYROIDISM DUE TO ACQUIRED ATROPHY OF THYROID: ICD-10-CM

## 2019-11-20 DIAGNOSIS — Z12.5 SCREENING FOR PROSTATE CANCER: ICD-10-CM

## 2019-11-20 DIAGNOSIS — E11.9 TYPE 2 DIABETES MELLITUS (H): Primary | ICD-10-CM

## 2019-11-20 DIAGNOSIS — N52.9 ED (ERECTILE DYSFUNCTION): ICD-10-CM

## 2019-11-20 DIAGNOSIS — Z13.220 LIPID SCREENING: ICD-10-CM

## 2019-11-20 DIAGNOSIS — I25.10 ASCVD (ARTERIOSCLEROTIC CARDIOVASCULAR DISEASE): ICD-10-CM

## 2019-11-20 DIAGNOSIS — M10.9 GOUT: ICD-10-CM

## 2019-11-20 DIAGNOSIS — I10 BENIGN ESSENTIAL HYPERTENSION: ICD-10-CM

## 2019-11-20 LAB
ALBUMIN SERPL-MCNC: 3.9 G/DL (ref 3.4–5)
ALP SERPL-CCNC: 79 U/L (ref 40–150)
ALT SERPL W P-5'-P-CCNC: 19 U/L (ref 0–70)
ANION GAP SERPL CALCULATED.3IONS-SCNC: 7 MMOL/L (ref 3–14)
AST SERPL W P-5'-P-CCNC: 12 U/L (ref 0–45)
BILIRUB DIRECT SERPL-MCNC: 0.2 MG/DL (ref 0–0.2)
BILIRUB SERPL-MCNC: 0.8 MG/DL (ref 0.2–1.3)
BUN SERPL-MCNC: 26 MG/DL (ref 7–30)
CALCIUM SERPL-MCNC: 9.2 MG/DL (ref 8.5–10.1)
CHLORIDE SERPL-SCNC: 110 MMOL/L (ref 94–109)
CO2 SERPL-SCNC: 24 MMOL/L (ref 20–32)
CREAT SERPL-MCNC: 1.61 MG/DL (ref 0.66–1.25)
ERYTHROCYTE [DISTWIDTH] IN BLOOD BY AUTOMATED COUNT: 15 % (ref 10–15)
GFR SERPL CREATININE-BSD FRML MDRD: 43 ML/MIN/{1.73_M2}
GLUCOSE SERPL-MCNC: 129 MG/DL (ref 70–99)
HBA1C MFR BLD: 5.4 % (ref 0–5.6)
HCT VFR BLD AUTO: 39.8 % (ref 40–53)
HGB BLD-MCNC: 13.2 G/DL (ref 13.3–17.7)
MCH RBC QN AUTO: 32 PG (ref 26.5–33)
MCHC RBC AUTO-ENTMCNC: 33.2 G/DL (ref 31.5–36.5)
MCV RBC AUTO: 96 FL (ref 78–100)
PLATELET # BLD AUTO: 105 10E9/L (ref 150–450)
POTASSIUM SERPL-SCNC: 4.8 MMOL/L (ref 3.4–5.3)
PROT SERPL-MCNC: 7.1 G/DL (ref 6.8–8.8)
PSA SERPL-ACNC: 1.41 UG/L (ref 0–4)
RBC # BLD AUTO: 4.13 10E12/L (ref 4.4–5.9)
SODIUM SERPL-SCNC: 141 MMOL/L (ref 133–144)
T4 FREE SERPL-MCNC: 1.14 NG/DL (ref 0.76–1.46)
TACROLIMUS BLD-MCNC: 3.3 UG/L (ref 5–15)
TME LAST DOSE: ABNORMAL H
TSH SERPL DL<=0.005 MIU/L-ACNC: 4.08 MU/L (ref 0.4–4)
WBC # BLD AUTO: 4.2 10E9/L (ref 4–11)

## 2019-11-20 PROCEDURE — 36415 COLL VENOUS BLD VENIPUNCTURE: CPT | Performed by: OTOLARYNGOLOGY

## 2019-11-20 PROCEDURE — 85027 COMPLETE CBC AUTOMATED: CPT | Performed by: OTOLARYNGOLOGY

## 2019-11-20 PROCEDURE — 84443 ASSAY THYROID STIM HORMONE: CPT | Performed by: OTOLARYNGOLOGY

## 2019-11-20 PROCEDURE — 80076 HEPATIC FUNCTION PANEL: CPT | Performed by: OTOLARYNGOLOGY

## 2019-11-20 PROCEDURE — G0103 PSA SCREENING: HCPCS | Performed by: INTERNAL MEDICINE

## 2019-11-20 PROCEDURE — 80197 ASSAY OF TACROLIMUS: CPT | Performed by: INTERNAL MEDICINE

## 2019-11-20 PROCEDURE — 84439 ASSAY OF FREE THYROXINE: CPT | Performed by: OTOLARYNGOLOGY

## 2019-11-20 PROCEDURE — 83036 HEMOGLOBIN GLYCOSYLATED A1C: CPT | Performed by: INTERNAL MEDICINE

## 2019-11-20 PROCEDURE — 80048 BASIC METABOLIC PNL TOTAL CA: CPT | Performed by: OTOLARYNGOLOGY

## 2019-11-21 ENCOUNTER — PATIENT OUTREACH (OUTPATIENT)
Dept: OTOLARYNGOLOGY | Facility: CLINIC | Age: 69
End: 2019-11-21

## 2019-11-21 NOTE — PROGRESS NOTES
Called patient with the following TSH results:  Component Value Flag Ref Range Units Status Collected Lab   TSH 4.08  High   0.40 - 4.00 mU/L Final 11/20/2019  9:37 AM      Dr. Moreland would like for patient to repeat TSH in 3 months. Patient agreeable to plan and was encouraged to call with further questions or concerns. He will follow-up with Dr. Moreland as scheduled on 12/18/19    Lolis Montez, RN, BSN

## 2019-12-16 ENCOUNTER — HEALTH MAINTENANCE LETTER (OUTPATIENT)
Age: 69
End: 2019-12-16

## 2019-12-18 ENCOUNTER — OFFICE VISIT (OUTPATIENT)
Dept: OTOLARYNGOLOGY | Facility: CLINIC | Age: 69
End: 2019-12-18
Payer: MEDICARE

## 2019-12-18 VITALS
DIASTOLIC BLOOD PRESSURE: 79 MMHG | RESPIRATION RATE: 17 BRPM | HEIGHT: 75 IN | BODY MASS INDEX: 34.07 KG/M2 | HEART RATE: 78 BPM | SYSTOLIC BLOOD PRESSURE: 152 MMHG | WEIGHT: 274 LBS

## 2019-12-18 DIAGNOSIS — C44.320 SQUAMOUS CELL CARCINOMA OF SKIN OF FACE: Primary | ICD-10-CM

## 2019-12-18 DIAGNOSIS — E03.4 HYPOTHYROIDISM DUE TO ACQUIRED ATROPHY OF THYROID: ICD-10-CM

## 2019-12-18 RX ORDER — NIACINAMIDE 500 MG
1000 TABLET ORAL 2 TIMES DAILY WITH MEALS
COMMUNITY

## 2019-12-18 ASSESSMENT — PAIN SCALES - GENERAL: PAINLEVEL: NO PAIN (0)

## 2019-12-18 ASSESSMENT — MIFFLIN-ST. JEOR: SCORE: 2098.49

## 2019-12-18 NOTE — PATIENT INSTRUCTIONS
Schedule Creatinine (labs) for prior to imaging  TSH to be done in approximately April - can be done with regular transplant labs  Schedule CT neck and chest for April - can be done same day as follow-up appointment  F/u in April when back from [place]

## 2019-12-18 NOTE — NURSING NOTE
"Chief Complaint   Patient presents with     RECHECK     follow up      Blood pressure (!) 152/79, pulse 78, resp. rate 17, height 1.905 m (6' 3\"), weight 124.3 kg (274 lb).    Christos Gonzalez LPN    "

## 2019-12-18 NOTE — LETTER
12/18/2019       RE: Eric Andrew  [address] [place] [address]     Dear Colleague,    Thank you for referring your patient, Eric Andrew, to the [facility] EAR NOSE AND [facility]ROAT at [facility]. Please see a copy of my visit note below.    Dear Dr. Jimenez:    I had the pleasure of seeing Eric Andrew in follow-up today at the [facility].     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  He underwent surgery by dermatologist in [place] for this.  Per his report this was not done with Mohs surgery but was told that he had negative margins.  He says that shortly after the surgery he noticed a lump along his mandible/below the ear.  He went in for his 1 month follow-up with a dermatologist and at that time the mass had increased in size.  She thought it was a reactive node but offered a biopsy. Per review of the notes they proceeded with a punch biopsy of the parotid mass.  This was consistent with invasive squamous cell carcinoma without any epidermal involvement.  His preoperative PET scan showed only involvement of the parotid. He was taken to the OR on 4/11/2019 for a left total parotidectomy with resection of skin, preservation of the facial nerve, sacrifice of the greater auricular nerve, level I-V neck dissection, cervicofacial flap. Final pathology showed a 2.1 cm moderately differentiated SCC within the subcutaneous tissues and parotid, PNI, no LVSI, negative margins, 0/47 cervical nodes. He had postoperative radiation with Dr Floyd from 5/13/2019 to 6/24/2019 for a total of 6000 cGy. He had his 3 month post treatment PET scan in October 2019 which only showed a stable sub-cm nodule in the lung.    Interval history:  He comes in today for follow-up. He was last  seen in October 2019. At that time he had his 3 month post treatment PET which was negative for recurrence, stable sub centimeter pulmonary nodule. Since his last visit, he was seen by dermatology and underwent cryotherapy to some skin lesions. He saw Dr Floyd in October. He had his TSH checked in November and it was just above normal. He says that he is overall doing quite well. He is back to playing the trombone. He feels like his lip is improved. He is not having issues with eating. He says his weight is stable. He has no difficulty with swallowing other than having to eat things more carefully. He continues to wear the compression wrap for lymphedema. He is having skin checks every 3 months, did recently have a lesion removed from his left ear. He and his wife are moving into a new house in [place]. He is planning on being in [place] from January to April 2020.       MEDICATIONS:     Current Outpatient Medications   Medication Sig Dispense Refill     AMLODIPINE BESYLATE PO Take 10 mg by mouth every morning        atorvastatin (LIPITOR) 20 MG tablet Take 20 mg by mouth every evening        BASAGLAR 100 UNIT/ML injection Inject 45 Units Subcutaneous At Bedtime        Calcium Carbonate-Vitamin D (CALCIUM + D PO) Take 1 tablet by mouth every morning        diphenhydrAMINE HCl (BENADRYL PO) Take by mouth nightly as needed       losartan (COZAAR) 25 MG tablet Take 1 tablet (25 mg) by mouth daily (Patient taking differently: Take 50 mg by mouth every morning ) 90 tablet 3     metFORMIN (GLUCOPHAGE) 1000 MG tablet Take 1,000 mg by mouth 2 times daily (with meals)        metoprolol succinate (TOPROL-XL) 25 MG 24 hr tablet Take 25 mg by mouth every morning        Multiple Vitamins-Minerals (MULTIVITAL) TABS Take 1 tablet by mouth every morning        mupirocin (BACTROBAN) 2 % external ointment Apply topically 3 times daily 30 g 0     niacinamide (NIACIN) 500 MG tablet Take 1,000 mg by mouth 2 times daily (with meals)        sildenafil (VIAGRA) 50 MG tablet Take 50 mg by mouth daily as needed for erectile dysfunction       tacrolimus (GENERIC EQUIVALENT) 1 MG capsule Take 1 capsule (1 mg) by mouth every 12 hours (Patient taking differently: Take 1 mg by mouth 2 times daily ) 180 capsule 3       ALLERGIES:    Allergies   Allergen Reactions     Cefazolin Swelling     Lips swelled while patient was in the OR      Lisinopril Cough     Meropenem Hives and Swelling     Developed hives and lip swelling while on meropenem and micafungin.  Resolved with discontinuation.  Unclear which was cause.     Micafungin Hives and Swelling     Developed hives and lip swelling while on meropenem and micafungin.  Resolved with discontinuation.  Unclear which was cause.       HABITS/SOCIAL HISTORY:   Spends the shin in [place].  .  He is a retired .   He smokes for a few years and quit back in 1973.  He has no chewing tobacco use.  He has 1 alcoholic beverage about 3 times per week.   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    Social History     Socioeconomic History     Marital status:      Spouse name: Not on file     Number of children: Not on file     Years of education: Not on file     Highest education level: Not on file   Occupational History     Not on file   Social Needs     Financial resource strain: Not on file     Food insecurity:     Worry: Not on file     Inability: Not on file     Transportation needs:     Medical: Not on file     Non-medical: Not on file   Tobacco Use     Smoking status: Never Smoker     Smokeless tobacco: Never Used     Tobacco comment: [id number] occational smoker   Substance and Sexual Activity     Alcohol use: Yes     Alcohol/week: 0.0 standard drinks     Comment: 2-3 glass of wine per week. At most 1 per day     Drug use: No     Sexual activity: Never   Lifestyle     Physical activity:     Days per week: Not on file     Minutes per session: Not on file     Stress: Not on file   Relationships      Social connections:     Talks on phone: Not on file     Gets together: Not on file     Attends [employer] service: Not on file     Active member of club or organization: Not on file     Attends meetings of clubs or organizations: Not on file     Relationship status: Not on file     Intimate partner violence:     Fear of current or ex partner: Not on file     Emotionally abused: Not on file     Physically abused: Not on file     Forced sexual activity: Not on file   Other Topics Concern     Parent/sibling w/ CABG, MI or angioplasty before 65F 55M? Not Asked   Social History Narrative     Not on file       PAST MEDICAL HISTORY:   Past Medical History:   Diagnosis Date     Alpha-1-antitrypsin deficiency (H)      Ascites     Pre-transplant     Chronic kidney disease, stage 3 (H)      Cirrhosis (H)     Alpha-1-antitrypsin deficiency, s/p liver transplant 3/31/15     Gout      HTN (hypertension)      Lentigo maligna melanoma (H) 2009    lentigo maligna melanoma excised on 01/29/2009 with a repeat wide excision on 03/30/2009.      Liver replaced by transplant (H) 03/31/2015     Nephrolithiasis      Osteoarthritis      Portal hypertension (H)     Pre-transplant        PAST SURGICAL HISTORY:   Past Surgical History:   Procedure Laterality Date     COLONOSCOPY N/A 12/18/2014    Procedure: COLONOSCOPY;  Surgeon: Katherine Jimenez MD;  Location:  GI     ESOPHAGOSCOPY, GASTROSCOPY, DUODENOSCOPY (EGD), COMBINED N/A 12/18/2014    Procedure: COMBINED ESOPHAGOSCOPY, GASTROSCOPY, DUODENOSCOPY (EGD), BIOPSY SINGLE OR MULTIPLE;  Surgeon: Katherine Jimenez MD;  Location:  GI     ESOPHAGOSCOPY, GASTROSCOPY, DUODENOSCOPY (EGD), COMBINED N/A 5/28/2015    Procedure: COMBINED ESOPHAGOSCOPY, GASTROSCOPY, DUODENOSCOPY (EGD), REMOVE FOREIGN BODY;  Surgeon: Katherine Jimenez MD;  Location:  GI     HERNIA REPAIR      abdominal     INSERT SHUNT PORTAL TRANSJUGULAR INTRAHEPTIC       ORTHOPEDIC SURGERY       "bilateral knee surgery     PAROTIDECTOMY, RADICAL NECK DISSECTION Left 2019    Procedure: Total Parotidectomy, Excision of Skin, Neck Dissection Levels I - V,  And Local Advancement Flap;  Surgeon: Johanna Moreland MD;  Location: [facility] OR     TRANSPLANT LIVER RECIPIENT  DONOR N/A 3/31/2015    Procedure: TRANSPLANT LIVER RECIPIENT  DONOR;  Surgeon: Elia Mehta MD;  Location: [facility] OR       FAMILY HISTORY:    Family History   Problem Relation Age of Onset     Cardiovascular Father         MI     Glaucoma No family hx of      Macular Degeneration No family hx of        REVIEW OF SYSTEMS:  12 point ROS was negative other than the symptoms noted above in the HPI.  Patient Supplied Answers to Review of Systems  [facility]NT ROS 5/3/2019   Musculoskeletal Neck pain   Skin Skin lesions         PHYSICAL EXAMINATION:   BP (!) 152/79   Pulse 78   Resp 17   Ht 1.905 m (6' 3\")   Wt 124.3 kg (274 lb)   BMI 34.25 kg/m      Appearance:   normal; NAD, age-appropriate appearance, well-developed, obese   Communication:   normal; communicates verbally, normal voice quality   Head/Face:   inspection -  Normal; no scars or visible lesions   Salivary glands -  S/p left radical parotidectomy with cervicofacial advancement flap, radiation changes to the tissue, no palpable mass, well healed incision, minimal lymphedema   Facial strength -  Normal and symmetric bilateral; H/B I/VI - very subtle asymmetry of the left lower lip but good movement overall   Skin:  normal, no rash   Ocular Motility:  normal occular movements   Ears:  auricle (AD) -  Papule below the lobule about 5 mm in size - stable, previously frozen  EAC (AD) -  normal  TM (AD) -  Normal, no effusion  auricle (AS) -  mm crusted lesion on antihelix  EAC (AS) -  normal  TM (AS) -  Normal, no effusion  Normal clinical speech reception   Nose:  Ext. inspection -  Normal   Oral Cavity:  lips -  Normal mucosa, oral competence, and stoma size   " Age-appropriate dentition, healthy gingival mucosa   Hard palate, buccal, floor of mouth mucosa normal   Tongue - normal movement, no lesions   Oropharynx:  mucosa -  Normal, no lesions  soft palate -  Normal, no lesions, no asymmetry, normal elevation   Neck: Well healed neck incision, well healed cervicofacial flap, no palpable masses  Normal range of motion  Midline submental lymphedema is improved and minimal   Lymphatic:  no abnormal nodes   Cardiovascular: warm, pink, well-perfused extremities without swelling, tenderness, or edema   Respiratory: Normal respiratory effort, no stridor   Neuro/Psych.:  mood/affect -  normal  mental status -  normal  cranial nerves -  normal - lower lip function on left nearly normal       PROCEDURE:      RESULTS REVIEWED:   I reviewed the radiation oncology notes from 10/2019.    TSH 11/2019 4.08 (mildly elevated)      IMPRESSION AND PLAN:   Eric Andrew is a 69-year-old man who has a history of a liver transplant and recent squamous cell carcinoma of the left cheek who developed 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 He underwent total parotidectomy, skin resection, resection of greater auricular nerve, level I-V neck dissection, cervicofacial flap on 4/11/2019. Final pathology showed the 1 metastatic deposit in the parotid. He compelted postoperative radiation on 6/24/2019 with Dr Floyd.     He has no signs of recurrence. He will need continued regular skin exams by dermatology.    He had his TSH checked 11/2019. We will recheck in about 6 months (ordered today).    I will see him back in about April when he is back from [place]. We will do repeat scans at the same time (with Cr before).    Thank you very much for the opportunity to participate in the care of your patient.      Johanna Moreland M.D.  Otolaryngology- Head & Neck Surgery      This note was dictated with voice recognition software and then edited. Please excuse any unintentional errors.         CC:  Katherine Salgado  MD Tony  [address]  [place] [address]      Naomi Rodriguez, RN  Liver Coordinator  [facility]      Aston Devine MD  [facility]  [address]  [facility] [address]    Again, thank you for allowing me to participate in the care of your patient.      Sincerely,    Johanna Moreland MD

## 2019-12-18 NOTE — PROGRESS NOTES
Dear Dr. Jimenez:    I had the pleasure of seeing Eric Andrew in follow-up today at the [facility].     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  He underwent surgery by dermatologist in [place] for this.  Per his report this was not done with Mohs surgery but was told that he had negative margins.  He says that shortly after the surgery he noticed a lump along his mandible/below the ear.  He went in for his 1 month follow-up with a dermatologist and at that time the mass had increased in size.  She thought it was a reactive node but offered a biopsy. Per review of the notes they proceeded with a punch biopsy of the parotid mass.  This was consistent with invasive squamous cell carcinoma without any epidermal involvement.  His preoperative PET scan showed only involvement of the parotid. He was taken to the OR on 4/11/2019 for a left total parotidectomy with resection of skin, preservation of the facial nerve, sacrifice of the greater auricular nerve, level I-V neck dissection, cervicofacial flap. Final pathology showed a 2.1 cm moderately differentiated SCC within the subcutaneous tissues and parotid, PNI, no LVSI, negative margins, 0/47 cervical nodes. He had postoperative radiation with Dr Floyd from 5/13/2019 to 6/24/2019 for a total of 6000 cGy. He had his 3 month post treatment PET scan in October 2019 which only showed a stable sub-cm nodule in the lung.    Interval history:  He comes in today for follow-up. He was last seen in October 2019. At that time he had his 3 month post treatment PET which was negative for recurrence, stable sub centimeter pulmonary nodule. Since his last visit, he was seen by dermatology and underwent cryotherapy to some skin lesions. He saw Dr Floyd in October. He had his TSH  checked in November and it was just above normal. He says that he is overall doing quite well. He is back to playing the trombone. He feels like his lip is improved. He is not having issues with eating. He says his weight is stable. He has no difficulty with swallowing other than having to eat things more carefully. He continues to wear the compression wrap for lymphedema. He is having skin checks every 3 months, did recently have a lesion removed from his left ear. He and his wife are moving into a new house in [place]. He is planning on being in [place] from January to April 2020.       MEDICATIONS:     Current Outpatient Medications   Medication Sig Dispense Refill     AMLODIPINE BESYLATE PO Take 10 mg by mouth every morning        atorvastatin (LIPITOR) 20 MG tablet Take 20 mg by mouth every evening        BASAGLAR 100 UNIT/ML injection Inject 45 Units Subcutaneous At Bedtime        Calcium Carbonate-Vitamin D (CALCIUM + D PO) Take 1 tablet by mouth every morning        diphenhydrAMINE HCl (BENADRYL PO) Take by mouth nightly as needed       losartan (COZAAR) 25 MG tablet Take 1 tablet (25 mg) by mouth daily (Patient taking differently: Take 50 mg by mouth every morning ) 90 tablet 3     metFORMIN (GLUCOPHAGE) 1000 MG tablet Take 1,000 mg by mouth 2 times daily (with meals)        metoprolol succinate (TOPROL-XL) 25 MG 24 hr tablet Take 25 mg by mouth every morning        Multiple Vitamins-Minerals (MULTIVITAL) TABS Take 1 tablet by mouth every morning        mupirocin (BACTROBAN) 2 % external ointment Apply topically 3 times daily 30 g 0     niacinamide (NIACIN) 500 MG tablet Take 1,000 mg by mouth 2 times daily (with meals)       sildenafil (VIAGRA) 50 MG tablet Take 50 mg by mouth daily as needed for erectile dysfunction       tacrolimus (GENERIC EQUIVALENT) 1 MG capsule Take 1 capsule (1 mg) by mouth every 12 hours (Patient taking differently: Take 1 mg by mouth 2 times daily ) 180 capsule 3       ALLERGIES:     Allergies   Allergen Reactions     Cefazolin Swelling     Lips swelled while patient was in the OR      Lisinopril Cough     Meropenem Hives and Swelling     Developed hives and lip swelling while on meropenem and micafungin.  Resolved with discontinuation.  Unclear which was cause.     Micafungin Hives and Swelling     Developed hives and lip swelling while on meropenem and micafungin.  Resolved with discontinuation.  Unclear which was cause.       HABITS/SOCIAL HISTORY:   Spends the shin in [place].  .  He is a retired .   He smokes for a few years and quit back in 1973.  He has no chewing tobacco use.  He has 1 alcoholic beverage about 3 times per week.   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    Social History     Socioeconomic History     Marital status:      Spouse name: Not on file     Number of children: Not on file     Years of education: Not on file     Highest education level: Not on file   Occupational History     Not on file   Social Needs     Financial resource strain: Not on file     Food insecurity:     Worry: Not on file     Inability: Not on file     Transportation needs:     Medical: Not on file     Non-medical: Not on file   Tobacco Use     Smoking status: Never Smoker     Smokeless tobacco: Never Used     Tobacco comment: [id number] occational smoker   Substance and Sexual Activity     Alcohol use: Yes     Alcohol/week: 0.0 standard drinks     Comment: 2-3 glass of wine per week. At most 1 per day     Drug use: No     Sexual activity: Never   Lifestyle     Physical activity:     Days per week: Not on file     Minutes per session: Not on file     Stress: Not on file   Relationships     Social connections:     Talks on phone: Not on file     Gets together: Not on file     Attends [employer] service: Not on file     Active member of club or organization: Not on file     Attends meetings of clubs or organizations: Not on file     Relationship status: Not on file     Intimate  partner violence:     Fear of current or ex partner: Not on file     Emotionally abused: Not on file     Physically abused: Not on file     Forced sexual activity: Not on file   Other Topics Concern     Parent/sibling w/ CABG, MI or angioplasty before 65F 55M? Not Asked   Social History Narrative     Not on file       PAST MEDICAL HISTORY:   Past Medical History:   Diagnosis Date     Alpha-1-antitrypsin deficiency (H)      Ascites     Pre-transplant     Chronic kidney disease, stage 3 (H)      Cirrhosis (H)     Alpha-1-antitrypsin deficiency, s/p liver transplant 3/31/15     Gout      HTN (hypertension)      Lentigo maligna melanoma (H)     lentigo maligna melanoma excised on 2009 with a repeat wide excision on 2009.      Liver replaced by transplant (H) 2015     Nephrolithiasis      Osteoarthritis      Portal hypertension (H)     Pre-transplant        PAST SURGICAL HISTORY:   Past Surgical History:   Procedure Laterality Date     COLONOSCOPY N/A 2014    Procedure: COLONOSCOPY;  Surgeon: Katherine Jimenez MD;  Location:  GI     ESOPHAGOSCOPY, GASTROSCOPY, DUODENOSCOPY (EGD), COMBINED N/A 2014    Procedure: COMBINED ESOPHAGOSCOPY, GASTROSCOPY, DUODENOSCOPY (EGD), BIOPSY SINGLE OR MULTIPLE;  Surgeon: Katherine Jimenez MD;  Location:  GI     ESOPHAGOSCOPY, GASTROSCOPY, DUODENOSCOPY (EGD), COMBINED N/A 2015    Procedure: COMBINED ESOPHAGOSCOPY, GASTROSCOPY, DUODENOSCOPY (EGD), REMOVE FOREIGN BODY;  Surgeon: Katherine Jimenez MD;  Location:  GI     HERNIA REPAIR      abdominal     INSERT SHUNT PORTAL TRANSJUGULAR INTRAHEPTIC       ORTHOPEDIC SURGERY      bilateral knee surgery     PAROTIDECTOMY, RADICAL NECK DISSECTION Left 2019    Procedure: Total Parotidectomy, Excision of Skin, Neck Dissection Levels I - V,  And Local Advancement Flap;  Surgeon: Johanna Moreland MD;  Location:  OR     TRANSPLANT LIVER RECIPIENT  DONOR N/A  "3/31/2015    Procedure: TRANSPLANT LIVER RECIPIENT  DONOR;  Surgeon: Elia Mehta MD;  Location:  OR       FAMILY HISTORY:    Family History   Problem Relation Age of Onset     Cardiovascular Father         MI     Glaucoma No family hx of      Macular Degeneration No family hx of        REVIEW OF SYSTEMS:  12 point ROS was negative other than the symptoms noted above in the HPI.  Patient Supplied Answers to Review of Systems  [facility] ROS 5/3/2019   Musculoskeletal Neck pain   Skin Skin lesions         PHYSICAL EXAMINATION:   BP (!) 152/79   Pulse 78   Resp 17   Ht 1.905 m (6' 3\")   Wt 124.3 kg (274 lb)   BMI 34.25 kg/m     Appearance:   normal; NAD, age-appropriate appearance, well-developed, obese   Communication:   normal; communicates verbally, normal voice quality   Head/Face:   inspection -  Normal; no scars or visible lesions   Salivary glands -  S/p left radical parotidectomy with cervicofacial advancement flap, radiation changes to the tissue, no palpable mass, well healed incision, minimal lymphedema   Facial strength -  Normal and symmetric bilateral; H/B I/VI - very subtle asymmetry of the left lower lip but good movement overall   Skin:  normal, no rash   Ocular Motility:  normal occular movements   Ears:  auricle (AD) -  Papule below the lobule about 5 mm in size - stable, previously frozen  EAC (AD) -  normal  TM (AD) -  Normal, no effusion  auricle (AS) -  mm crusted lesion on antihelix  EAC (AS) -  normal  TM (AS) -  Normal, no effusion  Normal clinical speech reception   Nose:  Ext. inspection -  Normal   Oral Cavity:  lips -  Normal mucosa, oral competence, and stoma size   Age-appropriate dentition, healthy gingival mucosa   Hard palate, buccal, floor of mouth mucosa normal   Tongue - normal movement, no lesions   Oropharynx:  mucosa -  Normal, no lesions  soft palate -  Normal, no lesions, no asymmetry, normal elevation   Neck: Well healed neck incision, well healed " cervicofacial flap, no palpable masses  Normal range of motion  Midline submental lymphedema is improved and minimal   Lymphatic:  no abnormal nodes   Cardiovascular: warm, pink, well-perfused extremities without swelling, tenderness, or edema   Respiratory: Normal respiratory effort, no stridor   Neuro/Psych.:  mood/affect -  normal  mental status -  normal  cranial nerves -  normal - lower lip function on left nearly normal       PROCEDURE:      RESULTS REVIEWED:   I reviewed the radiation oncology notes from 10/2019.    TSH 11/2019 4.08 (mildly elevated)      IMPRESSION AND PLAN:   Eric Andrew is a 69-year-old man who has a history of a liver transplant and recent squamous cell carcinoma of the left cheek who developed 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 He underwent total parotidectomy, skin resection, resection of greater auricular nerve, level I-V neck dissection, cervicofacial flap on 4/11/2019. Final pathology showed the 1 metastatic deposit in the parotid. He compelted postoperative radiation on 6/24/2019 with Dr Floyd.     He has no signs of recurrence. He will need continued regular skin exams by dermatology.    He had his TSH checked 11/2019. We will recheck in about 6 months (ordered today).    I will see him back in about April when he is back from [place]. We will do repeat scans at the same time (with Cr before).    Thank you very much for the opportunity to participate in the care of your patient.      Johanna Moreland M.D.  Otolaryngology- Head & Neck Surgery      This note was dictated with voice recognition software and then edited. Please excuse any unintentional errors.         CC:  Katherine Jimenez MD  [address]  [facility] [address]      Naomi Rodriguez RN  Liver Coordinator  [facility]      Aston Devine MD  [facility]  [address]  [facility] [address]

## 2020-02-06 ENCOUNTER — TELEPHONE (OUTPATIENT)
Dept: OTOLARYNGOLOGY | Facility: CLINIC | Age: 70
End: 2020-02-06

## 2020-02-06 NOTE — TELEPHONE ENCOUNTER
Called patient and left a VM.    Informed him that he is due for a TSH recheck this month, and that he can come to the [facility] on a walk-in basis to get that done. Provided the ENT call center number for pt to call back if questions arise.

## 2020-02-11 ENCOUNTER — DOCUMENTATION ONLY (OUTPATIENT)
Dept: TRANSPLANT | Facility: CLINIC | Age: 70
End: 2020-02-11

## 2020-03-18 ENCOUNTER — TELEPHONE (OUTPATIENT)
Dept: TRANSPLANT | Facility: CLINIC | Age: 70
End: 2020-03-18

## 2020-03-18 NOTE — TELEPHONE ENCOUNTER
Patient Call: Transplant Lab/Orders  Route to LPN  Post Transplant Days: 1814  When patient is less than 60 days post-transplant, route high priority    Reason for Call: Annual lab reorder [place] wants a updated date  Past 6 months  Send it thru email to pt and to his My Chart and he will see if both come thru   Callback needed? No

## 2020-03-21 ENCOUNTER — TELEPHONE (OUTPATIENT)
Dept: TRANSPLANT | Facility: CLINIC | Age: 70
End: 2020-03-21

## 2020-03-21 NOTE — TELEPHONE ENCOUNTER
Eric, called to report that he is in [place] and he developed shingles. He did see MD and they ordered Acyclovir for 10 days and prednisone. Patient encouraged to call back to MD if shingles do not crust over by the time he finishes his course of acyclovir. Patient will call back with any further questions or concerns.

## 2020-03-22 ENCOUNTER — HEALTH MAINTENANCE LETTER (OUTPATIENT)
Age: 70
End: 2020-03-22

## 2020-03-25 ENCOUNTER — TELEPHONE (OUTPATIENT)
Dept: OTOLARYNGOLOGY | Facility: CLINIC | Age: 70
End: 2020-03-25

## 2020-03-25 NOTE — TELEPHONE ENCOUNTER
Informed patient I can see the labs he had completed at the outside facility, and the only lab he is missing is the TSH. Informed him he can have this [facility]. Patient expressed understanding.    Marina Boateng, EMT

## 2020-03-25 NOTE — TELEPHONE ENCOUNTER
[facility] Call Center    Phone Message    May a detailed message be left on voicemail: yes     Reason for Call: Other: Patient is away for the winter months, patient said he has not done a good job of checking his messages. Patient is calling back due to a message left to have his TSH labs done. Patient said he just recently had his labs done, he is a donor patient and he wanted to know was those results recvd? or if he need to have those did? Patient was not sure if this was apart of his labs but he is thinking it was, please call to discuss.     Action Taken: Other: [facility] ENT    Travel Screening: Not Applicable

## 2020-04-06 DIAGNOSIS — Z94.4 LIVER TRANSPLANTED (H): ICD-10-CM

## 2020-04-06 RX ORDER — TACROLIMUS 1 MG/1
CAPSULE ORAL
Qty: 180 CAPSULE | Refills: 3 | Status: SHIPPED | OUTPATIENT
Start: 2020-04-06 | End: 2021-04-14

## 2020-04-15 ENCOUNTER — TELEPHONE (OUTPATIENT)
Dept: OTOLARYNGOLOGY | Facility: CLINIC | Age: 70
End: 2020-04-15

## 2020-04-15 NOTE — TELEPHONE ENCOUNTER
Health Call Center    Phone Message    May a detailed message be left on voicemail: yes     Reason for Call: Patient calling to follow up on [employer] message sent to provider on 4/13 regarding upcoming appointments. Patient questioning if lab, CT, and swallow study are essential for patient as he is immunocompromised and is questioning what Dr. Moreland recommends for patient. Please contact patient to discuss.  Thank you, Cierra Sher on 4/15/2020 at 3:45 PM     Action Taken: Message routed to:  [facility] E[facility]    Travel Screening: Not Applicable

## 2020-04-16 NOTE — TELEPHONE ENCOUNTER
Called patient to discuss his concerns regarding his appointments with Dr. Moreland tomorrow. Patient states that he is concerned about coming to clinic for longer then needed tomorrow and would like to only complete scans and then have virtual visit with Dr. Moreland at later date.     Rescheduled for virtual visit with Dr. Moreland next week. Patient will continue with plan for scans tomorrow.   He was encouraged to call with further questions or concerns.     Lolis Montez, RN, BSN

## 2020-04-17 ENCOUNTER — TELEPHONE (OUTPATIENT)
Dept: OTOLARYNGOLOGY | Facility: CLINIC | Age: 70
End: 2020-04-17

## 2020-04-17 ENCOUNTER — MYC MEDICAL ADVICE (OUTPATIENT)
Dept: OTOLARYNGOLOGY | Facility: CLINIC | Age: 70
End: 2020-04-17

## 2020-04-17 ENCOUNTER — ANCILLARY PROCEDURE (OUTPATIENT)
Dept: CT IMAGING | Facility: CLINIC | Age: 70
End: 2020-04-17
Attending: OTOLARYNGOLOGY
Payer: MEDICARE

## 2020-04-17 DIAGNOSIS — C44.320 SQUAMOUS CELL CARCINOMA OF SKIN OF FACE: ICD-10-CM

## 2020-04-17 LAB
CREAT BLD-MCNC: 1.7 MG/DL (ref 0.66–1.25)
GFR SERPL CREATININE-BSD FRML MDRD: 40 ML/MIN/{1.73_M2}

## 2020-04-17 RX ORDER — IOPAMIDOL 755 MG/ML
125 INJECTION, SOLUTION INTRAVASCULAR ONCE
Status: COMPLETED | OUTPATIENT
Start: 2020-04-17 | End: 2020-04-17

## 2020-04-17 RX ADMIN — IOPAMIDOL 125 ML: 755 INJECTION, SOLUTION INTRAVASCULAR at 13:23

## 2020-04-17 NOTE — TELEPHONE ENCOUNTER
Spoke with patient regarding virtual visit with Dr. Moreland on 4/22/20. Confirmed patient's email address. Also verified patient's appointment time, and informed him that we would be calling again 10-15 minutes prior to the appointment. Patient expressed understanding. Will send [employer] message with virtual visit information.    Patient email: [email or web address]  Appointment date: 4/22/20  Appointment time: 0900  [employer] active? YES    [email or web address]

## 2020-04-17 NOTE — TELEPHONE ENCOUNTER
LVM regarding patient's appointment on 4/22/20 at 0900. Requested a call back from patient to discuss set up for virtual visit. If patient returns call, please verify when they will be available to go through this process.    Nayely Vargas LPN

## 2020-04-17 NOTE — DISCHARGE INSTRUCTIONS

## 2020-04-22 ENCOUNTER — VIRTUAL VISIT (OUTPATIENT)
Dept: OTOLARYNGOLOGY | Facility: CLINIC | Age: 70
End: 2020-04-22

## 2020-04-22 VITALS — WEIGHT: 269 LBS | HEIGHT: 75 IN | BODY MASS INDEX: 33.45 KG/M2

## 2020-04-22 DIAGNOSIS — C44.320 SQUAMOUS CELL CARCINOMA OF SKIN OF FACE: Primary | ICD-10-CM

## 2020-04-22 ASSESSMENT — MIFFLIN-ST. JEOR: SCORE: 2070.81

## 2020-04-22 ASSESSMENT — PAIN SCALES - GENERAL: PAINLEVEL: NO PAIN (0)

## 2020-04-22 NOTE — PROGRESS NOTES
"Eric Andrew is a 70 year old male who is being evaluated via a billable video visit.      The patient has been notified of following:     \"This video visit will be conducted via a call between you and your physician/provider. We have found that certain health care needs can be provided without the need for an in-person physical exam.  This service lets us provide the care you need with a video conversation.  If a prescription is necessary we can send it directly to your pharmacy.  If lab work is needed we can place an order for that and you can then stop by our lab to have the test done at a later time.    Video visits are billed at different rates depending on your insurance coverage.  Please reach out to your insurance provider with any questions.    If during the course of the call the physician/provider feels a video visit is not appropriate, you will not be charged for this service.\"    Patient has given verbal consent for Video visit? Yes    How would you like to obtain your AVS? [facility]    Patient would like the video invitation sent by: Send to e-mail at: [email or web address]    Will anyone else be joining your video visit? 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  He underwent surgery by dermatologist in [place] for this.  Per his report this was not done with Mohs surgery but was told that he had negative margins.  He says that shortly after the surgery he noticed a lump along his mandible/below the ear.  He went in for his 1 month follow-up with a dermatologist and at that time the mass had increased in size.  She thought it was a reactive node but offered a biopsy. Per review of the notes they proceeded with a punch biopsy of the parotid mass.  This was " "consistent with invasive squamous cell carcinoma without any epidermal involvement.  His preoperative PET scan showed only involvement of the parotid. He was taken to the OR on 4/11/2019 for a left total parotidectomy with resection of skin, preservation of the facial nerve, sacrifice of the greater auricular nerve, level I-V neck dissection, cervicofacial flap. Final pathology showed a 2.1 cm moderately differentiated SCC within the subcutaneous tissues and parotid, PNI, no LVSI, negative margins, 0/47 cervical nodes. He had postoperative radiation with Dr Floyd from 5/13/2019 to 6/24/2019 for a total of 6000 cGy. He had his 3 month post treatment PET scan in October 2019 which only showed a stable sub-cm nodule in the lung.     Interval history:  He was last seen in December 2019.      Video-Visit Details    Type of service:  Video Visit    Video End Time (time video stopped): ***    Originating Location (pt. Location): {video visit patient location:[id number]::\"Home\"}    Distant Location (provider location):  [facility]     Mode of Communication:  Video Conference via [employer]      {signature options:[id number]}      "

## 2020-04-23 ENCOUNTER — VIRTUAL VISIT (OUTPATIENT)
Dept: OTOLARYNGOLOGY | Facility: CLINIC | Age: 70
End: 2020-04-23
Payer: MEDICARE

## 2020-04-23 DIAGNOSIS — C44.320 SQUAMOUS CELL CARCINOMA OF SKIN OF FACE: ICD-10-CM

## 2020-04-23 DIAGNOSIS — R13.12 OROPHARYNGEAL DYSPHAGIA: Primary | ICD-10-CM

## 2020-04-23 DIAGNOSIS — C07 MALIGNANT NEOPLASM OF PAROTID GLAND (H): ICD-10-CM

## 2020-04-23 NOTE — PROGRESS NOTES
"Eric Andrew is a 70 year old male who is being evaluated via a billable telephone  visit.      The patient has been notified and verbally consented to the following:     \"This telephone  visit will be conducted between you and your provider.\"     \"Patient has opted to conduct today's telephone visit vs an in-person appointment, and is not able to attend due to possible exposure to COVID-19\"    If during the course of the call the provider feels a telephone visit is not appropriate, you will not be charged for this service.\"     Call initiated at: 3:07pm       04/23/20 1500   Signing Clinician's Name / Credentials   Signing clinician's name /credentials Kristen Vasquez MA, CFY-SLP   Session Number   Session Number 5   Progress/Recertification   Recertification Due 05/15/20   Subjective Report   Subjective Report Eric Andrew is a 70 year old male with a PMH significant for a SCCa of the left cheek with a met to the parotid gland. He had surgery on 4/11/2019 for a left total parotidectomy with resection of skin, preservation of the facial nerve, sacrifice of the greater auricular nerve, level I-V neck dissection, cervicofacial flap. He finished post op XRT on 6/24/19. He was also seeing LUZ Zendejas for facial paralysis.  He is seen for follow up dysphagia therapy via telephone due to COVID-19 pandemic. Reports overall he is doing very well and is playing trombone again. He is not seeing Holly Miller anymore, but is completing facial paralysis exercises at home. Reports his lip is moving well.    Swallow Goal 1   Goal Identifier Diet   Goal Description 1. Pt will tolerate soft/regular textures and thin liquids without overt clinical s/sx of aspiration/penetration in 90% of trials during two consecutive sessions after completion of treatment as judged by clinician assessment and/or pt/caregiver report.    Target Date 11/17/19   Swallow Goal 2   Goal Identifier Exercises   Goal Description 2. Pt will improve ROM " "and strength of tongue, BOT, pharynx and jaw by completing 10 repetitions of 5/5 exercises 3 times daily with minimal written or verbal cues.    Target Date 11/17/19   Treatment Interventions    Treatment Interventions Treatment Swallow/Oral dysfunction   Treatment Swallow/Oral dysfunction   Treatment of Swallowing Dysfunction &/or Oral Function for Feeding Minutes (34953) 14 Minutes   Skilled Intervention Provided written and verbal information on diet modifications.;Educated patient on swallowing strategies.;Educated patient on risks of aspiration  (trained exercises)   Patient Response Pt verbalized undestanding of information.   Treatment Detail 1.  Patient reports swallowing is going well.  Denies dysgeusia and xerostomia.  States that he alternate solids and liquids and frequently uses water to help things \"slide down.\"  Endorses feeling of stasis with breads and other dry foods.  Reports he occasionally then gets hiccups if things feel like they get stuck.  Endorses occasional coughing with p.o.  Reports yesterday he ate enchilada, a fried egg on toast, soup and pizza.  Trained patient on signs and symptoms of penetration/aspiration and concern for these given risk of aspiration pneumonia.  Trained patient to continue using safe swallowing strategies: Small sips, small bites, alternating solids and liquids, and remaining upright during and for 30 minutes after p.o. intake.  Trained patient to continue eating and drinking a variety of textures and to continue using safe swallowing strategies.  Patient amenable. 2.  Patient stated he is not doing his swallowing exercises anymore.  Reports the left side of his neck is a bit stiff and he is doing some neck range of motion and stretching activities.  Retrained rationale/importance of ongoing exercise training given risk of radiation-induced fibrosis.  Trained patient to resume 10 repetitions of 5/5 exercises 2-3 times a day.  Trained patient that once he is 2 " years out from radiation and doing well we can switch to a maintenance program of 1 time a day.  Patient stated he cannot find his exercise handout.  This clinician will send an extra copy in the mail today.   Progress Good   Education   Learner Patient   Readiness Acceptance   Method Explanation;Demonstration   Response Verbalizes understanding;Demonstrates understanding   Plan   Home program Safe swallowing strategies, home exercise program   Plan for next session f/u in ENT clinic to reassess PO intake/tolerance and home exercise program completion   Total Session Time   Total Treatment Time (sum of timed and untimed services) 14   AMBULATORY CLINICS ONLY-MEDICAL AND TREATMENT DIAGNOSIS   Medical Diagnosis SCC of skin of face; oropharyngeal dysphagia   Treatment Diagnosis Mild oropharyngeal dysphagia. This will likely become more severe as he participates in radiation therapy and he is at risk for long term effects of dysphagia.        TOTAL SERVICE TIME: 14 minutes  Call Initiated at: 3:07 pm  Call Ended at: 3:21pm     CPT Billing Codes:   SWALLOW TREATMENT (20475)  NO CHARGE FACILITY FEE (44442)      MAGALIS Girard M.A. (music), CFY-SLP  Speech Language Pathology Clinical Fellow  [facility] Trained Vocologist   [facility]   [phone]  [email or web address]      *This report was created in part through the use of computerized dictation software, and though reviewed following completion, some typographic errors may persist.  If there is confusion regarding any of this notes contents, please contact me for clarification.

## 2020-04-23 NOTE — Clinical Note
Medicare Certification - Add your Attestation   1. Review the Certification Documentation below   2. Click 'QuickNote' on your toolbar   3. Add P MEDICARE CERTIFICATION-[facility] as a recipient   4. Add your attestation using [email or web address] and choose Rehab Services Attestation - Physician (at the bottom)   5. Click 'Save as QuickAction' and name the Button '[facility] Rehab Cert.' Click 'Accept.' This is a onetime set up. You will now have a [facility] Rehab Cert button on the right side of the inbasket toolbar so the next time you need to add your attestation just, click the button. You will not have to go through any other steps.   6. Click 'Sign and Route'

## 2020-04-25 NOTE — PROGRESS NOTES
"Eric Andrew is a 70 year old male who is being evaluated via a billable telephone visit.      The patient has been notified of following:     \"This telephone visit will be conducted via a call between you and your physician/provider. We have found that certain health care needs can be provided without the need for a physical exam.  This service lets us provide the care you need with a short phone conversation.  If a prescription is necessary we can send it directly to your pharmacy.  If lab work is needed we can place an order for that and you can then stop by our lab to have the test done at a later time.    Telephone visits are billed at different rates depending on your insurance coverage. During this emergency period, for some insurers they may be billed the same as an in-person visit.  Please reach out to your insurance provider with any questions.    If during the course of the call the physician/provider feels a telephone visit is not appropriate, you will not be charged for this service.\"    Patient has given verbal consent for Telephone visit?  Yes    How would you like to obtain your AVS? 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  He underwent surgery by dermatologist in [place] for this.  Per his report this was not done with Mohs surgery but was told that he had negative margins.  He says that shortly after the surgery he noticed a lump along his mandible/below the ear.  He went in for his 1 month follow-up with a dermatologist and at that time the mass had increased in size.  She thought it was a reactive node but offered a biopsy. Per review of the notes they proceeded with a punch biopsy of the parotid mass.  This was consistent with invasive squamous cell carcinoma without any epidermal involvement.  His preoperative PET " scan showed only involvement of the parotid. He was taken to the OR on 4/11/2019 for a left total parotidectomy with resection of skin, preservation of the facial nerve, sacrifice of the greater auricular nerve, level I-V neck dissection, cervicofacial flap. Final pathology showed a 2.1 cm moderately differentiated SCC within the subcutaneous tissues and parotid, PNI, no LVSI, negative margins, 0/47 cervical nodes. He had postoperative radiation with Dr Floyd from 5/13/2019 to 6/24/2019 for a total of 6000 cGy. He had his 3 month post treatment PET scan in October 2019 which only showed a stable sub-cm nodule in the lung.     Interval history:  He was last seen in December 2019. He was in [place] for the winter and returned at the end of March. He has been staying healthy and has not been ill at all. He denies any issues related to his parotid/neck/skin. He does feel like his neck was more stiff in the last week. He denies any new skin lesions. He is seeing dermatology in June. He denies any neck masses. He says he thinks his lip is doing well with regards to the movement. He says he is eating ok, does have some sticking of food and has to be more careful when he chews. He has to keep water around when he drinks. He had shingles after returning from [place] and had some weight loss but is now stable. He has no issues with fatigue. He has no new concerns. He had transplant labs before leaving [place]. He had a CT scan in the last week which showed no evidence of recurrence.       No exam was able to be performed due to the telephone visit which was conducted due to the COVID pandemic.      I independently reviewed the CT scan images which show postoperative changes in the parotid and neck, no evidence of local recurrence, no disease in the chest.        Impression/Plan:  Eric Andrew is a 70-year-old man who has a history of a liver transplant and recent squamous cell carcinoma of the left cheek who developed  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 He underwent total parotidectomy, skin resection, resection of greater auricular nerve, level I-V neck dissection, cervicofacial flap on 4/11/2019. Final pathology showed the 1 metastatic deposit in the parotid. He compelted postoperative radiation on 6/24/2019 with Dr Floyd.      He has no signs of recurrence based on history. We discussed that assessment is limited due to the telephone visit precluding exam. A video visit was attempted but due to technical issues with [employer] could not be completed. He will need continued regular skin exams by dermatology.     He had his TSH checked 11/2019. He will need his TSH rechecked when he next gets labs done or at his next visit.     He will need repeat imaging in March 2021.     I will see him back in 2 months, sooner if any new issues arise.      Phone call duration: 10 minutes    Video visit start attempted at 8:58, [employer] system not functioning and telephone visit started at 9:17 am.     Phone call end: 9:27 am        Johanna Moreland MD    Department of Otolaryngology

## 2020-04-27 NOTE — PROGRESS NOTES
Rehabilitation Services      OUTPATIENT SPEECH LANGUAGE PATHOLOGY  PLAN OF TREATMENT FOR OUTPATIENT REHABILITATION    Patient's Last Name, First Name, M.I.                YOB: 1950  Eric Andrew                        Provider's Name  LUZ Girard Medical Record No.  [id number]                               Onset Date: 4/19/19   Start of Care Date: 11/17/19   Type:     ___PT   ___OT   _X_SLP Medical Diagnosis: SCC skin of face with met to parotid gland                       SLP Diagnosis: Oropharyngeal dysphagia      _________________________________________________________________________________  Plan of Treatment:    Frequency/Duration: 1x/2 months for 12 months     Goals:  Goal Identifier Diet   Goal Description 1. Pt will tolerate soft/regular textures and thin liquids without overt clinical s/sx of aspiration/penetration in 90% of trials during two consecutive sessions after completion of treatment as judged by clinician assessment and/or pt/caregiver report.    Target Date 2/15/20   Date Met      Progress: Goal not met.      Goal Identifier Exercises   Goal Description 2. Pt will improve ROM and strength of tongue, BOT, pharynx and jaw by completing 10 repetitions of 5/5 exercises 3 times daily with minimal written or verbal cues.    Target Date 2/15/20   Date Met      Progress: Goal not met.        Certification date from 11/17/19  to 2/15/20  LUZ Girard          I CERTIFY THE NEED FOR THESE SERVICES FURNISHED UNDER        THIS PLAN OF TREATMENT AND WHILE UNDER MY CARE     (Physician attestation of this document indicates review and certification of the therapy plan).                Referring Provider: Dr. Johanna Moreland

## 2020-04-27 NOTE — PROGRESS NOTES
Rehabilitation Services      OUTPATIENT SPEECH LANGUAGE PATHOLOGY  PLAN OF TREATMENT FOR OUTPATIENT REHABILITATION    Patient's Last Name, First Name, M.I.                YOB: 1950  Eric Andrew                        Provider's Name  LUZ Girard Medical Record No.  [id number]                               Onset Date: 4/19/19   Start of Care Date: 2/15/20   Type:     ___PT   ___OT   _X_SLP Medical Diagnosis: SCC skin of face with met to parotid gland                       SLP Diagnosis: Oropharyngeal dysphagia      _________________________________________________________________________________  Plan of Treatment:    Frequency/Duration: 1x/2 months for 12 months     Goals:  Goal Identifier Diet   Goal Description 1. Pt will tolerate soft/regular textures and thin liquids without overt clinical s/sx of aspiration/penetration in 90% of trials during two consecutive sessions after completion of treatment as judged by clinician assessment and/or pt/caregiver report.    Target Date 5/15/20   Date Met      Progress: Goal not met.      Goal Identifier Exercises   Goal Description 2. Pt will improve ROM and strength of tongue, BOT, pharynx and jaw by completing 10 repetitions of 5/5 exercises 3 times daily with minimal written or verbal cues.    Target Date 5/15/20   Date Met      Progress: Goal not met.        Certification date from 2/15/20 to 5/15/20  LUZ Girard          I CERTIFY THE NEED FOR THESE SERVICES FURNISHED UNDER        THIS PLAN OF TREATMENT AND WHILE UNDER MY CARE     (Physician attestation of this document indicates review and certification of the therapy plan).                Referring Provider: Dr. Johanna Moreland

## 2020-06-03 ENCOUNTER — OFFICE VISIT (OUTPATIENT)
Dept: OPTOMETRY | Facility: CLINIC | Age: 70
End: 2020-06-03
Payer: MEDICARE

## 2020-06-03 DIAGNOSIS — D31.42 NEVUS OF IRIS OF LEFT EYE: ICD-10-CM

## 2020-06-03 DIAGNOSIS — E11.9 TYPE 2 DIABETES MELLITUS WITHOUT COMPLICATION, WITHOUT LONG-TERM CURRENT USE OF INSULIN (H): Primary | ICD-10-CM

## 2020-06-03 DIAGNOSIS — H52.13 MYOPIA OF BOTH EYES WITH ASTIGMATISM AND PRESBYOPIA: ICD-10-CM

## 2020-06-03 DIAGNOSIS — H52.203 MYOPIA OF BOTH EYES WITH ASTIGMATISM AND PRESBYOPIA: ICD-10-CM

## 2020-06-03 DIAGNOSIS — H52.4 MYOPIA OF BOTH EYES WITH ASTIGMATISM AND PRESBYOPIA: ICD-10-CM

## 2020-06-03 ASSESSMENT — TONOMETRY
OD_IOP_MMHG: 13
IOP_METHOD: ICARE
OS_IOP_MMHG: 13

## 2020-06-03 ASSESSMENT — REFRACTION_CURRENTRX
OD_DIAMETER: 13.8
OD_SPHERE: -7.00
OS_BRAND: DAILIES AQUACOMFORT PLUS
OS_BASECURVE: 8.6
OS_SPHERE: -7.00
OD_BASECURVE: 8.6
OS_DIAMETER: 13.8
OD_BRAND: DAILIES AQUACOMFORT PLUS

## 2020-06-03 ASSESSMENT — REFRACTION_MANIFEST
OS_AXIS: 015
OS_CYLINDER: +1.00
OD_AXIS: 165
OS_SPHERE: -9.00
OD_CYLINDER: +1.25
OD_SPHERE: -7.75

## 2020-06-03 ASSESSMENT — SLIT LAMP EXAM - LIDS
COMMENTS: 1+ BLEPHARITIS
COMMENTS: 1+ BLEPHARITIS

## 2020-06-03 ASSESSMENT — VISUAL ACUITY
CORRECTION_TYPE: CONTACTS
METHOD: SNELLEN - LINEAR
OS_CC: 20/25
OD_CC: 20/20

## 2020-06-03 ASSESSMENT — EXTERNAL EXAM - RIGHT EYE: OD_EXAM: NORMAL

## 2020-06-03 ASSESSMENT — EXTERNAL EXAM - LEFT EYE: OS_EXAM: NORMAL

## 2020-06-03 ASSESSMENT — CONF VISUAL FIELD
OS_NORMAL: 1
OD_NORMAL: 1

## 2020-06-03 ASSESSMENT — REFRACTION_WEARINGRX
OD_SPHERE: -7.25
OD_ADD: +2.25
OS_CYLINDER: +0.75
OD_AXIS: 177
OS_ADD: +2.25
OD_CYLINDER: +1.25
OS_AXIS: 024
OS_SPHERE: -8.25

## 2020-06-03 ASSESSMENT — CUP TO DISC RATIO
OS_RATIO: 0.20
OD_RATIO: 0.20

## 2020-06-03 NOTE — PROGRESS NOTES
6.4  1.) Type 2 DM s/p liver transplant  -Last A1c 6.4, no h/o diabetic retinopathy  -Reviewed effects of DM on the eyes and importance of good blood sugar control  -Monitor annually with dilation    2.) Cataracts OU  -Not visually symptomatic at this time  -Monitor    3.) h/o Melanoma   -No ocular abnormalities today  -Iris nevus OS, nonsuspicious  -No retinal findings at this time  -Continue to monitor. Reviewed with pt to self-monitor ocular surface at home. We will continue to monitor whole eye annually    4.) Myopia/Astigmatism/Presbyopia OU  -Doing well in current Rx, continue  -Good vision/comfort/fit in monovision left eye near. Can reduce Rx slightly    Okay to order if working well. Monitor 1 year routine eye exam

## 2020-06-05 ENCOUNTER — OFFICE VISIT (OUTPATIENT)
Dept: OPTOMETRY | Facility: CLINIC | Age: 70
End: 2020-06-05

## 2020-06-05 DIAGNOSIS — H52.203 MYOPIA OF BOTH EYES WITH ASTIGMATISM AND PRESBYOPIA: Primary | ICD-10-CM

## 2020-06-05 DIAGNOSIS — H52.4 MYOPIA OF BOTH EYES WITH ASTIGMATISM AND PRESBYOPIA: Primary | ICD-10-CM

## 2020-06-05 DIAGNOSIS — H52.13 MYOPIA OF BOTH EYES WITH ASTIGMATISM AND PRESBYOPIA: Primary | ICD-10-CM

## 2020-06-05 ASSESSMENT — REFRACTION_CURRENTRX
OS_SPHERE: -6.50
OS_BASECURVE: 8.6
OS_BRAND: DAILIES AQUACOMFORT PLUS
OD_BRAND: DAILIES AQUACOMFORT PLUS
OD_BASECURVE: 8.6
OD_SPHERE: -6.50
OD_DIAMETER: 13.8
OS_DIAMETER: 13.8

## 2020-06-05 NOTE — PROGRESS NOTES
No office visit. CL order only.  New lenses working well    Contact Lens Billing  V-Code:  - Soft spherical  Final Contact Lens Rx       Brand Base Curve Diameter Sphere    Right Dailies Aquacomfort Plus 8.6 13.8 -6.50    Left Dailies Aquacomfort Plus 8.6 13.8 -6.50         ABB order mailed direct: [phone]  # of units: 4 90-packs  Price per Unit: $65 per 90-pack    These are for cosmetic contact lenses.    Encounter Diagnosis   Name Primary?     Myopia of both eyes with astigmatism and presbyopia Yes        Date of last eye exam: 6/3/20

## 2020-06-10 ENCOUNTER — TELEPHONE (OUTPATIENT)
Dept: OTOLARYNGOLOGY | Facility: CLINIC | Age: 70
End: 2020-06-10

## 2020-06-10 NOTE — TELEPHONE ENCOUNTER
Returned patients call regarding changing appointment r/t conflicting times. Writer changed appointment to 6/24/20 at 1620. Patient acknowledged time and date.    Nayely Vargas LPN

## 2020-06-18 DIAGNOSIS — E03.4 HYPOTHYROIDISM DUE TO ACQUIRED ATROPHY OF THYROID: ICD-10-CM

## 2020-06-18 DIAGNOSIS — Z79.4 ENCOUNTER FOR LONG-TERM (CURRENT) USE OF INSULIN (H): ICD-10-CM

## 2020-06-18 DIAGNOSIS — E11.9 DIABETES MELLITUS (H): Primary | ICD-10-CM

## 2020-06-18 DIAGNOSIS — C44.320 SQUAMOUS CELL SKIN CANCER, FACE: ICD-10-CM

## 2020-06-18 NOTE — PROGRESS NOTES
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 Several months later he developed a recurrence involving the deep margin with invasion into the superficial parotid gland. He had a left total parotidectomy and ipsilateral neck dissection of levels IB-V on 2019 with pathology revealing a 2.1 cm tumor centered within the subcutaneous tissues with extension into the parotid gland. Perineural invasion involving both small and large caliber nerves was appreciated with involvement of the distal greater auricular nerve which was resected. Post-operatively, he received adjuvant radiotherapy as described above for improved local disease control.     Mr. Andrew presents to radiation oncology clinic today for a routine post-treatment disease surveillance visit.  Overall, he reports that he is doing well and he has no pressing concerns or complaints.  He continues to have some mild edema of the left cheek although he reports he was discharged from lymphedema clinic and has stopped doing his lymphedema therapy exercises.  He is eating a regular diet with exception of some very dry foods such as bread which is slightly more difficult for him to swallow given his treatment-induced mild xerostomia.  He otherwise  denies any headaches, vision changes, otalgia, odynophagia, dyspnea, chest pain, nausea/vomiting or abdominal pain with his remaining ROS likewise negative.    PHYSICAL EXAM:  Weight: 123.8 kg  BP: 155/84    General: Healthy-appearing 70-year-old gentleman seated comfortably in an examination chair in no acute distress  HEENT: NC/AT. EOMI. Very mildly dry mucous membranes. Healthy-appearing mucosa involving the oral tongue, floor of mouth, gingiva, buccal mucosa and posterior oropharyngeal wall. No visible oral cavity or oropharyngeal lesions.  Neck: Sequelae from prior left-sided neck dissection and adjuvant radiotherapy with moderate hyperpigmentation and mild to moderate fibrosis. No palpable cervical adenopathy. Very slight submental lymphedema.  Cardiac: Extremities are warm and well-perfused  Pulmonary: No wheezing, stridor or respiratory distress  Skin: Hyperpigmentation within the left neck as described above otherwise normal color and turgor  Neuro: A/O x3. Cranial nerves II-XII intact with the exception of very minimal weakness of the left marginal mandibular branch and mildly decreased sensation within the left V3 distribution.    LABS AND IMAGIN2020  TSH: 4.2  T4 Free: 1.2    2020 CT neck:  Stable posttreatment changes with no evidence of recurrent disease    2020 CT chest:  Stable subcentimeter pulmonary nodules without evidence of metastatic disease    IMPRESSION:   Mr. 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 He is currently 12 months out from the completion of adjuvant radiotherapy and is doing well with no clinical or radiologic evidence concerning for recurrent disease.    PLAN:   1. Follow-up in radiation collagen clinic with NP in 3 months and with MD in 6 months  2. Repeat TSH in 3 months  3. Recommend restarting lymphedema exercises to minimize treatment-related edema and fibrosis    The patient  was seen and discussed with staff, Dr. Floyd.    Mykel Welch MD  Resident, PGY-2  Department of Radiation Oncology  [facility]      Attending addendum:   I saw and examined the patient with the resident and agree with the documented plan of care.    Grabiel Floyd MD/PhD    Dept of Radiation Oncology  [facility]

## 2020-06-23 ENCOUNTER — TELEPHONE (OUTPATIENT)
Dept: OTOLARYNGOLOGY | Facility: CLINIC | Age: 70
End: 2020-06-23

## 2020-06-23 ENCOUNTER — HOSPITAL ENCOUNTER (OUTPATIENT)
Dept: LAB | Facility: CLINIC | Age: 70
Discharge: HOME | End: 2020-06-23
Attending: INTERNAL MEDICINE | Admitting: INTERNAL MEDICINE
Payer: MEDICARE

## 2020-06-23 DIAGNOSIS — E11.9 DIABETES MELLITUS (H): ICD-10-CM

## 2020-06-23 DIAGNOSIS — Z79.4 ENCOUNTER FOR LONG-TERM (CURRENT) USE OF INSULIN (H): ICD-10-CM

## 2020-06-23 DIAGNOSIS — Z13.220 LIPID SCREENING: ICD-10-CM

## 2020-06-23 DIAGNOSIS — E03.4 HYPOTHYROIDISM DUE TO ACQUIRED ATROPHY OF THYROID: ICD-10-CM

## 2020-06-23 DIAGNOSIS — C44.320 SQUAMOUS CELL SKIN CANCER, FACE: ICD-10-CM

## 2020-06-23 DIAGNOSIS — Z94.4 LIVER REPLACED BY TRANSPLANT (H): ICD-10-CM

## 2020-06-23 LAB
ALBUMIN SERPL-MCNC: 3.7 G/DL (ref 3.4–5)
ALP SERPL-CCNC: 76 U/L (ref 40–150)
ALT SERPL W P-5'-P-CCNC: 17 U/L (ref 0–70)
ANION GAP SERPL CALCULATED.3IONS-SCNC: 5 MMOL/L (ref 3–14)
AST SERPL W P-5'-P-CCNC: 10 U/L (ref 0–45)
BILIRUB DIRECT SERPL-MCNC: 0.2 MG/DL (ref 0–0.2)
BILIRUB SERPL-MCNC: 0.5 MG/DL (ref 0.2–1.3)
BUN SERPL-MCNC: 26 MG/DL (ref 7–30)
CALCIUM SERPL-MCNC: 8.9 MG/DL (ref 8.5–10.1)
CHLORIDE SERPL-SCNC: 106 MMOL/L (ref 94–109)
CO2 SERPL-SCNC: 26 MMOL/L (ref 20–32)
CREAT SERPL-MCNC: 1.51 MG/DL (ref 0.66–1.25)
ERYTHROCYTE [DISTWIDTH] IN BLOOD BY AUTOMATED COUNT: 14.6 % (ref 10–15)
GFR SERPL CREATININE-BSD FRML MDRD: 46 ML/MIN/{1.73_M2}
GLUCOSE SERPL-MCNC: 156 MG/DL (ref 70–99)
HBA1C MFR BLD: 5.4 % (ref 0–5.6)
HCT VFR BLD AUTO: 41.7 % (ref 40–53)
HGB BLD-MCNC: 13.8 G/DL (ref 13.3–17.7)
MCH RBC QN AUTO: 32.2 PG (ref 26.5–33)
MCHC RBC AUTO-ENTMCNC: 33.1 G/DL (ref 31.5–36.5)
MCV RBC AUTO: 97 FL (ref 78–100)
PLATELET # BLD AUTO: 108 10E9/L (ref 150–450)
POTASSIUM SERPL-SCNC: 4.1 MMOL/L (ref 3.4–5.3)
PROT SERPL-MCNC: 7.4 G/DL (ref 6.8–8.8)
PSA SERPL-ACNC: 2.35 UG/L (ref 0–4)
RBC # BLD AUTO: 4.29 10E12/L (ref 4.4–5.9)
SODIUM SERPL-SCNC: 137 MMOL/L (ref 133–144)
T4 FREE SERPL-MCNC: 1.2 NG/DL (ref 0.76–1.46)
TACROLIMUS BLD-MCNC: 3 UG/L (ref 5–15)
TME LAST DOSE: ABNORMAL H
TSH SERPL DL<=0.005 MIU/L-ACNC: 4.2 MU/L (ref 0.4–4)
WBC # BLD AUTO: 4.7 10E9/L (ref 4–11)

## 2020-06-23 PROCEDURE — 36415 COLL VENOUS BLD VENIPUNCTURE: CPT | Performed by: INTERNAL MEDICINE

## 2020-06-23 PROCEDURE — 83036 HEMOGLOBIN GLYCOSYLATED A1C: CPT | Performed by: INTERNAL MEDICINE

## 2020-06-23 PROCEDURE — G0103 PSA SCREENING: HCPCS | Performed by: INTERNAL MEDICINE

## 2020-06-23 PROCEDURE — 80048 BASIC METABOLIC PNL TOTAL CA: CPT | Performed by: INTERNAL MEDICINE

## 2020-06-23 PROCEDURE — 84443 ASSAY THYROID STIM HORMONE: CPT | Performed by: INTERNAL MEDICINE

## 2020-06-23 PROCEDURE — 84439 ASSAY OF FREE THYROXINE: CPT | Performed by: INTERNAL MEDICINE

## 2020-06-23 PROCEDURE — 85027 COMPLETE CBC AUTOMATED: CPT | Performed by: INTERNAL MEDICINE

## 2020-06-23 PROCEDURE — 80076 HEPATIC FUNCTION PANEL: CPT | Performed by: INTERNAL MEDICINE

## 2020-06-23 PROCEDURE — 80197 ASSAY OF TACROLIMUS: CPT | Performed by: INTERNAL MEDICINE

## 2020-06-24 ENCOUNTER — OFFICE VISIT (OUTPATIENT)
Dept: RADIATION ONCOLOGY | Facility: CLINIC | Age: 70
End: 2020-06-24
Attending: RADIOLOGY
Payer: MEDICARE

## 2020-06-24 ENCOUNTER — TELEPHONE (OUTPATIENT)
Dept: OTOLARYNGOLOGY | Facility: CLINIC | Age: 70
End: 2020-06-24

## 2020-06-24 VITALS — BODY MASS INDEX: 34.12 KG/M2 | WEIGHT: 273 LBS | SYSTOLIC BLOOD PRESSURE: 155 MMHG | DIASTOLIC BLOOD PRESSURE: 84 MMHG

## 2020-06-24 DIAGNOSIS — E03.9 HYPOTHYROIDISM, UNSPECIFIED TYPE: ICD-10-CM

## 2020-06-24 DIAGNOSIS — C44.320 SQUAMOUS CELL CARCINOMA OF SKIN OF FACE: Primary | ICD-10-CM

## 2020-06-24 PROCEDURE — G0463 HOSPITAL OUTPT CLINIC VISIT: HCPCS | Performed by: RADIOLOGY

## 2020-06-24 NOTE — LETTER
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 Several months later he developed a recurrence involving the deep margin with invasion into the superficial parotid gland. He had a left total parotidectomy and ipsilateral neck dissection of levels IB-V on 2019 with pathology revealing a 2.1 cm tumor centered within the subcutaneous tissues with extension into the parotid gland. Perineural invasion involving both small and large caliber nerves was appreciated with involvement of the distal greater auricular nerve which was resected. Post-operatively, he received adjuvant radiotherapy as described above for improved local disease control.     Mr. Andrew presents to radiation oncology clinic today for a routine post-treatment disease surveillance visit.  Overall, he reports that he is doing well and he has no pressing concerns or complaints.  He continues to have some mild edema of the left cheek although he reports he was discharged from lymphedema clinic and has stopped doing his lymphedema therapy exercises.  He is eating a regular diet with exception of some very dry foods such as bread which is slightly  more difficult for him to swallow given his treatment-induced mild xerostomia.  He otherwise denies any headaches, vision changes, otalgia, odynophagia, dyspnea, chest pain, nausea/vomiting or abdominal pain with his remaining ROS likewise negative.    PHYSICAL EXAM:  Weight: 123.8 kg  BP: 155/84    General: Healthy-appearing 70-year-old gentleman seated comfortably in an examination chair in no acute distress  HEENT: NC/AT. EOMI. Very mildly dry mucous membranes. Healthy-appearing mucosa involving the oral tongue, floor of mouth, gingiva, buccal mucosa and posterior oropharyngeal wall. No visible oral cavity or oropharyngeal lesions.  Neck: Sequelae from prior left-sided neck dissection and adjuvant radiotherapy with moderate hyperpigmentation and mild to moderate fibrosis. No palpable cervical adenopathy. Very slight submental lymphedema.  Cardiac: Extremities are warm and well-perfused  Pulmonary: No wheezing, stridor or respiratory distress  Skin: Hyperpigmentation within the left neck as described above otherwise normal color and turgor  Neuro: A/O x3. Cranial nerves II-XII intact with the exception of very minimal weakness of the left marginal mandibular branch and mildly decreased sensation within the left V3 distribution.    LABS AND IMAGIN2020  TSH: 4.2  T4 Free: 1.2    2020 CT neck:  Stable posttreatment changes with no evidence of recurrent disease    2020 CT chest:  Stable subcentimeter pulmonary nodules without evidence of metastatic disease    IMPRESSION:   Mr. 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 He is currently 12 months out from the completion of adjuvant radiotherapy and is doing well with no clinical or radiologic evidence concerning for recurrent disease.    PLAN:   1. Follow-up in radiation collagen clinic with NP in 3 months and with MD in 6 months  2. Repeat TSH in 3 months  3. Recommend  restarting lymphedema exercises to minimize treatment-related edema and fibrosis    The patient was seen and discussed with staff, Dr. Floyd.    Mykel Welch MD  Resident, PGY-2  Department of Radiation Oncology  [facility]      Attending addendum:   I saw and examined the patient with the resident and agree with the documented plan of care.    Grabiel Floyd MD/PhD    Dept of Radiation Oncology  [facility]      FOLLOW-UP VISIT    Patient Name: Eric Andrew      : 1950     Age: 70 year old        ______________________________________________________________________________     Chief Complaint   Patient presents with     Cancer     Pt is here for a follow up:SCC of face and neck: Radiation 6000 cGy completed 19     Wt 123.8 kg (273 lb)   BMI 34.12 kg/m           Pain  Denies    Labs  Other Labs: No    Imaging  CT: 20      Dental:   Most Recent Dental Visit: No      Speech/Swallowing:   Most Recent evaluation or testing: No  Swallowing Restrictions: No difficulties with swallowing    Trismus/Jaw Exercises: Yes    Nutrition:    Weight:   Wt Readings from Last 3 Encounters:   20 123.8 kg (273 lb)   20 122 kg (269 lb)   19 124.3 kg (274 lb)         Oral Symptoms:   Xerostomia:0- None  Dysphagia: 0-None  Mucositis Oral Symptoms: 0-None  Mucositis: 0- None  Esophagitis:0- None    Other Appointments:     MD Name:  Appointment Date:    MD Name: Appointment Date:   MD Name: Appointment Date:   Other Appointment Notes:     Residual Radiation side effect: No concerns     Additional Instructions:     Nurse face-to-face time: Level 3:  10 min face to face time            Grabiel Floyd MD

## 2020-06-24 NOTE — PROGRESS NOTES
FOLLOW-UP VISIT    Patient Name: Eric Andrew      : 1950     Age: 70 year old        ______________________________________________________________________________     Chief Complaint   Patient presents with     Cancer     Pt is here for a follow up:SCC of face and neck: Radiation 6000 cGy completed 19     Wt 123.8 kg (273 lb)   BMI 34.12 kg/m           Pain  Denies    Labs  Other Labs: No    Imaging  CT: 20      Dental:   Most Recent Dental Visit: No      Speech/Swallowing:   Most Recent evaluation or testing: No  Swallowing Restrictions: No difficulties with swallowing    Trismus/Jaw Exercises: Yes    Nutrition:    Weight:   Wt Readings from Last 3 Encounters:   20 123.8 kg (273 lb)   20 122 kg (269 lb)   19 124.3 kg (274 lb)         Oral Symptoms:   Xerostomia:0- None  Dysphagia: 0-None  Mucositis Oral Symptoms: 0-None  Mucositis: 0- None  Esophagitis:0- None    Other Appointments:     MD Name:  Appointment Date:    MD Name: Appointment Date:   MD Name: Appointment Date:   Other Appointment Notes:     Residual Radiation side effect: No concerns     Additional Instructions:     Nurse face-to-face time: Level 3:  10 min face to face time

## 2020-06-24 NOTE — TELEPHONE ENCOUNTER
Called patient to reschedule 6/24 appointment w/ Dr. Moreland (pt cancelled).    LVM with scheduling instructions and ENT call center number. Patient is welcome to reschedule IN CLINIC appointment in next available '[facility] RETURN' slot of Dr. Moreland's

## 2020-06-30 NOTE — TELEPHONE ENCOUNTER
(2nd attempt)    Called patient to reschedule 6/24 appointment with Dr. Moreland, as he cancelled.    LVM w/ scheduling instructions and ENT call center number. Patient is welcome to schedule IN CLINIC appointment in next available '[facility] RETURN' slot of Dr. Moreland's.

## 2020-07-10 ENCOUNTER — TELEPHONE (OUTPATIENT)
Dept: OTOLARYNGOLOGY | Facility: CLINIC | Age: 70
End: 2020-07-10

## 2020-07-10 NOTE — TELEPHONE ENCOUNTER
[facility] Call Center    Phone Message    May a detailed message be left on voicemail: yes     Reason for Call: Other: Pt called with questions regarding a VM from [place] that was left for him wanting to schedule a FU Appt with Dr. Moreland. Pt said he was confused because he was told that he didn't need the follow up Appt with Dr. Moreland and that is why he cancelled it. Pt said he would like some clarification. I tried calling the backline to see if  I could get some clarification for him with no answer. Please advise, Thank you     Action Taken: Message routed to:  [facility] ENT    Travel Screening: Not Applicable

## 2020-07-10 NOTE — TELEPHONE ENCOUNTER
Called patient back regarding appt with Dr. Moreland and scheduled him for the next available appt time on 8/5/2020.    Octavio Lopez RN  7/10/2020 1:18 PM

## 2020-07-10 NOTE — TELEPHONE ENCOUNTER
Called patient to schedule follow up with Dr. Moreland, as patient missed 6/26 appointment.    LVM with scheduling instructions and ENT call center number. Patient is welcome to schedule follow up with Dr. Moreland IN CLINIC in any available [facility] RETURN slot.

## 2020-07-23 NOTE — PROGRESS NOTES
"Eric Andrew is a 70 year old male who is being evaluated via a billable video visit.      The patient has been notified of following:   \"This video visit will be conducted via a call between you and your physician/provider. We have found that certain health care needs can be provided without the need for an in-person physical exam.  This service lets us provide the care you need with a video conversation.  If a prescription is necessary we can send it directly to your pharmacy.  If lab work is needed we can place an order for that and you can then stop by our lab to have the test done at a later time. Video visits are billed at different rates depending on your insurance coverage.  Please reach out to your insurance provider with any questions.  If during the course of the call the physician/provider feels a video visit is not appropriate, you will not be charged for this service.\"   Patient has given verbal consent for Video visit? Yes    Type of service:  Video Visit  Video Start Time: 11:01  Video End Time  Patient location: home  Will anyone else be joining your video visit?   {If patient encounters technical issues they should call [phone] :1  Distant Location (provider location):  [facility] HEPATOLOGY   Mode of Communication:  Video Conference via Local Magnet  I have reviewed and updated the patient's Past Medical History, Social History, Family History and Medication List.    A/P  70 Y M s/p LT 2015 for A1AT. Doing well until dx of cutanoues squamous cell carcinoma with invasion in to L parotid I 2018    Running tac as low as we can. No strong compelling indication to change. Kidney function better. Continue to run tac close to 3.     CKD, stable.      No changes to medications today. Labs up to date as is cancer screening.   Labs every 3 months.  We discussed long-term complications of liver transplantation/immunosuppression, including kidney disease, cardiovascular disease, DM, HTN, and skin cancer, " as well as recommendations for cancer screening. Will see back  1 y.      Katherine Jimenez MD  Hepatology/Liver Transplant  Medical Director, Liver Transplantation  [facility]  =========================================================     S: 70 Y M s/p DDLT 3/31/15 for alpha-1-antitrypsin deficiency    He asks today about Covid and immunosuppression. He also asked about if he could donate plasma or blood.    2018 diagnosed with cutaneous squamous cell carcinoma  Arising from the L cheek and recurrence after excision, invasive in to the L parotid gland. No s/p L total parotidectomy of 2.1 cm tumor. Also received radiation    EXPLANT: NO HCC.  IS: Prograf. Kidney function has not been normal, but has been stable. Has mild proteinuria.  LABS: Up to date. Liver tests look great, normal.     Lab Test 06/23/20  0845   PROTTOTAL 7.4   ALBUMIN 3.7   BILITOTAL 0.5   ALKPHOS 76   AST 10   ALT 17     Lab Test 06/23/20  0845   WBC 4.7   RBC 4.29*   HGB 13.8   HCT 41.7   MCV 97   MCH 32.2   MCHC 33.1   RDW 14.6   *     REJECTION: None.  BILIARY ISSUES: None  STENT: Removed 5/28/15 by endoscopy  KIDNEY FUNCTION:  Sees Dr. Bonner. Elevated creatinine since just prior to LT  Creatinine   Date Value Ref Range Status   06/23/2020 1.51 (H) 0.66 - 1.25 mg/dL Final     BP: Good. Amlodipine. Managed by PCP and nephrology. BP at home in 130s. Today is high because he was running late.  PREV: UTD on screening (colonoscopy 2014)  DISEASE RECURRENCE: N/A    SOC: He hasn't been travelling due [place] but [place]    ROS: 10 point ROS neg other than the symptoms noted above in the HPI.  Exam  Gen Alert pleasant NAD  Resp No difficulty breathing. No cough  Skin No Jaundice  Eyes No icterus  Neuro CARTY  MSK no muscle wasting  Psyche Pleasant, appropriate. Well groomed.

## 2020-07-24 ENCOUNTER — VIRTUAL VISIT (OUTPATIENT)
Dept: GASTROENTEROLOGY | Facility: CLINIC | Age: 70
End: 2020-07-24
Attending: INTERNAL MEDICINE
Payer: MEDICARE

## 2020-07-24 DIAGNOSIS — Z94.4 LIVER REPLACED BY TRANSPLANT (H): Primary | ICD-10-CM

## 2020-07-24 ASSESSMENT — PAIN SCALES - GENERAL: PAINLEVEL: NO PAIN (0)

## 2020-07-24 NOTE — PROGRESS NOTES
Left voicemail for patient to call back to set up telemedicine visit, will call again before appointment time.  Alee Pearson CMA on 7/24/2020 at 10:19 AM

## 2020-07-24 NOTE — PROGRESS NOTES
"Eric Andrew is a 70 year old male who is being evaluated via a billable video visit.      The patient has been notified of following:     \"This video visit will be conducted via a call between you and your physician/provider. We have found that certain health care needs can be provided without the need for an in-person physical exam.  This service lets us provide the care you need with a video conversation.  If a prescription is necessary we can send it directly to your pharmacy.  If lab work is needed we can place an order for that and you can then stop by our lab to have the test done at a later time.    Video visits are billed at different rates depending on your insurance coverage.  Please reach out to your insurance provider with any questions.    If during the course of the call the physician/provider feels a video visit is not appropriate, you will not be charged for this service.\"    Patient has given verbal consent for Video visit? Yes  How would you like to obtain your AVS? MyChart  If you are dropped from the video visit, the video invite should be resent to: Text to cell phone: [phone]  Will anyone else be joining your video visit? No  {If patient encounters technical issues they should call [phone] :[id number]}      Video-Visit Details    Type of service:  Video Visit    Video Start Time: {video visit start/end time:152948}  Video End Time: {video visit start/end time:152948}    Originating Location (pt. Location): {video visit patient location:684873::\"Home\"}    Distant Location (provider location):  [facility] HEPATOLOGY     Platform used for Video Visit: {Virtual Visit Platforms:058182::\"ParentingInformer\"}    {signature options:899716}        "

## 2020-07-24 NOTE — LETTER
"    7/24/2020         RE: Eric Andrew  [address] [facility] [address]        Dear Colleague,    Thank you for referring your patient, Eric Andrew, to the [facility] HEPATOLOGY. Please see a copy of my visit note below.    Eric Andrew is a 70 year old male who is being evaluated via a billable video visit.      The patient has been notified of following:   \"This video visit will be conducted via a call between you and your physician/provider. We have found that certain health care needs can be provided without the need for an in-person physical exam.  This service lets us provide the care you need with a video conversation.  If a prescription is necessary we can send it directly to your pharmacy.  If lab work is needed we can place an order for that and you can then stop by our lab to have the test done at a later time. Video visits are billed at different rates depending on your insurance coverage.  Please reach out to your insurance provider with any questions.  If during the course of the call the physician/provider feels a video visit is not appropriate, you will not be charged for this service.\"   Patient has given verbal consent for Video visit? Yes    Type of service:  Video Visit  Video Start Time: 11:01  Video End Time  Patient location: home  Will anyone else be joining your video visit?   {If patient encounters technical issues they should call [phone] :  Distant Location (provider location):  [facility] HEPATOLOGY   Mode of Communication:  Video Conference via i-Nalysis  I have reviewed and updated the patient's Past Medical History, Social History, Family History and Medication List.    A/P  70 Y M s/p LT 2015 for A1AT. Doing well until dx of cutanoues squamous cell carcinoma with invasion in to L parotid I 2018    Running tac as low as we can. No strong compelling indication to change. Kidney function better. Continue to run tac close to 3.     CKD, stable.      No changes to " medications today. Labs up to date as is cancer screening.   Labs every 3 months.  We discussed long-term complications of liver transplantation/immunosuppression, including kidney disease, cardiovascular disease, DM, HTN, and skin cancer, as well as recommendations for cancer screening. Will see back  1 y.      Katherine Jimenez MD  Hepatology/Liver Transplant  Medical Director, Liver Transplantation  [facility]  =========================================================     S: 70 Y M s/p DDLT 3/31/15 for alpha-1-antitrypsin deficiency    He asks today about Covid and immunosuppression. He also asked about if he could donate plasma or blood.    2018 diagnosed with cutaneous squamous cell carcinoma  Arising from the L cheek and recurrence after excision, invasive in to the L parotid gland. No s/p L total parotidectomy of 2.1 cm tumor. Also received radiation    EXPLANT: NO HCC.  IS: Prograf. Kidney function has not been normal, but has been stable. Has mild proteinuria.  LABS: Up to date. Liver tests look great, normal.     Lab Test 06/23/20  0845   PROTTOTAL 7.4   ALBUMIN 3.7   BILITOTAL 0.5   ALKPHOS 76   AST 10   ALT 17     Lab Test 06/23/20  0845   WBC 4.7   RBC 4.29*   HGB 13.8   HCT 41.7   MCV 97   MCH 32.2   MCHC 33.1   RDW 14.6   *     REJECTION: None.  BILIARY ISSUES: None  STENT: Removed 5/28/15 by endoscopy  KIDNEY FUNCTION:  Sees Dr. Bonner. Elevated creatinine since just prior to LT  Creatinine   Date Value Ref Range Status   06/23/2020 1.51 (H) 0.66 - 1.25 mg/dL Final     BP: Good. Amlodipine. Managed by PCP and nephrology. BP at home in 130s. Today is high because he was running late.  PREV: UTD on screening (colonoscopy 2014)  DISEASE RECURRENCE: N/A    SOC: He hasn't been travelling due [place] but [place]    ROS: 10 point ROS neg other than the symptoms noted above in the HPI.  Exam  Gen Alert pleasant NAD  Resp No difficulty breathing. No  cough  Skin No Jaundice  Eyes No icterus  Neuro CARTY  MSK no muscle wasting  Psyche Pleasant, appropriate. Well groomed.          Again, thank you for allowing me to participate in the care of your patient.        Sincerely,        Katherine Jimenez MD

## 2020-08-05 ENCOUNTER — OFFICE VISIT (OUTPATIENT)
Dept: OTOLARYNGOLOGY | Facility: CLINIC | Age: 70
End: 2020-08-05
Payer: MEDICARE

## 2020-08-05 VITALS
HEART RATE: 89 BPM | HEIGHT: 75 IN | SYSTOLIC BLOOD PRESSURE: 147 MMHG | OXYGEN SATURATION: 98 % | WEIGHT: 274 LBS | DIASTOLIC BLOOD PRESSURE: 84 MMHG | TEMPERATURE: 98.9 F | BODY MASS INDEX: 34.07 KG/M2

## 2020-08-05 DIAGNOSIS — C44.320 SQUAMOUS CELL CARCINOMA OF SKIN OF FACE: Primary | ICD-10-CM

## 2020-08-05 ASSESSMENT — PAIN SCALES - GENERAL: PAINLEVEL: NO PAIN (0)

## 2020-08-05 ASSESSMENT — MIFFLIN-ST. JEOR: SCORE: 2093.49

## 2020-08-05 NOTE — NURSING NOTE
"Chief Complaint   Patient presents with     RECHECK     Follow up     Blood pressure (!) 147/84, pulse 89, temperature 98.9  F (37.2  C), temperature source Temporal, height 1.905 m (6' 3\"), weight 124.3 kg (274 lb), SpO2 98 %.     Raysa Asif, EMT  "

## 2020-08-05 NOTE — LETTER
8/5/2020       RE: Eric Andrew  [id number] [facility] [address]     Dear Colleague,    Thank you for referring your patient, Eric Andrew, to the [facility] E[facility]AT at [facility]. Please see a copy of my visit note below.    Dear Dr. Jimenez:    I had the pleasure of seeing Eric Andrew in follow-up today at the [facility].     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  He underwent surgery by dermatologist in [place] for this.  Per his report this was not done with Mohs surgery but was told that he had negative margins.  He says that shortly after the surgery he noticed a lump along his mandible/below the ear.  He went in for his 1 month follow-up with a dermatologist and at that time the mass had increased in size.  She thought it was a reactive node but offered a biopsy. Per review of the notes they proceeded with a punch biopsy of the parotid mass.  This was consistent with invasive squamous cell carcinoma without any epidermal involvement.  His preoperative PET scan showed only involvement of the parotid. He was taken to the OR on 4/11/2019 for a left total parotidectomy with resection of skin, preservation of the facial nerve, sacrifice of the greater auricular nerve, level I-V neck dissection, cervicofacial flap. Final pathology showed a 2.1 cm moderately differentiated SCC within the subcutaneous tissues and parotid, PNI, no LVSI, negative margins, 0/47 cervical nodes. He had postoperative radiation with Dr Floyd from 5/13/2019 to 6/24/2019 for a total of 6000 cGy. He had his 3 month post treatment PET scan in October 2019 which only showed a stable sub-cm nodule in the lung.    Interval history:  He comes in today for follow-up. He was last seen  for a virtual visit in April 2020. He had a CT scan at that time which showed no recurrent disease. He had lab work in June 2020 with mild elevation in TSH, normal T4. He saw Dr Floyd at that time with recommendations for lymphedema therapy and repeat TSH in 3 months (ordered). He had a virtual visit with the transplant team in July with plans to keep the tacrolimus levels as low as possible. He says that things are overall going well. He personally has not had any issues with COVID, staying socially distanced. He says that he saw dermatology about 6 weeks ago, no concerning lesions at that time. He has a repeat exam in about 6 weeks. He thinks his lip continues to improve. He is able to play the trombone and is able to hit the high notes. He has no new neck masses. He is overall very pleased with his outcomes. He has an upcoming dental appointment. He is doing well with his transplant, continues to participate in the transplant support group.       MEDICATIONS:     Current Outpatient Medications   Medication Sig Dispense Refill     AMLODIPINE BESYLATE PO Take 10 mg by mouth every morning        atorvastatin (LIPITOR) 20 MG tablet Take 20 mg by mouth every evening        BASAGLAR 100 UNIT/ML injection Inject 45 Units Subcutaneous At Bedtime        Calcium Carbonate-Vitamin D (CALCIUM + D PO) Take 1 tablet by mouth every morning        losartan (COZAAR) 25 MG tablet Take 1 tablet (25 mg) by mouth daily (Patient taking differently: Take 50 mg by mouth every morning ) 90 tablet 3     metFORMIN (GLUCOPHAGE) 1000 MG tablet Take 1,000 mg by mouth 2 times daily (with meals)        metoprolol succinate (TOPROL-XL) 25 MG 24 hr tablet Take 25 mg by mouth every morning        Multiple Vitamins-Minerals (MULTIVITAL) TABS Take 1 tablet by mouth every morning        niacinamide (NIACIN) 500 MG tablet Take 1,000 mg by mouth 2 times daily (with meals)       sildenafil (VIAGRA) 50 MG tablet Take 50 mg by mouth daily as needed for  erectile dysfunction       tacrolimus (GENERIC EQUIVALENT) 1 MG capsule TAKE ONE CAPSULE BY MOUTH EVERY 12 HOURS 180 capsule 3     diphenhydrAMINE HCl (BENADRYL PO) Take by mouth nightly as needed       mupirocin (BACTROBAN) 2 % external ointment Apply topically 3 times daily 30 g 0       ALLERGIES:    Allergies   Allergen Reactions     Cefazolin Swelling     Lips swelled while patient was in the OR      Lisinopril Cough     Meropenem Hives and Swelling     Developed hives and lip swelling while on meropenem and micafungin.  Resolved with discontinuation.  Unclear which was cause.     Micafungin Hives and Swelling     Developed hives and lip swelling while on meropenem and micafungin.  Resolved with discontinuation.  Unclear which was cause.       HABITS/SOCIAL HISTORY:   Spends the shin in [place].  .  He is a retired .   He smokes for a few years and quit back in 1973.  He has no chewing tobacco use.  He has 1 alcoholic beverage about 3 times per week.   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    Social History     Socioeconomic History     Marital status:      Spouse name: Not on file     Number of children: Not on file     Years of education: Not on file     Highest education level: Not on file   Occupational History     Not on file   Social Needs     Financial resource strain: Not on file     Food insecurity     Worry: Not on file     Inability: Not on file     Transportation needs     Medical: Not on file     Non-medical: Not on file   Tobacco Use     Smoking status: Never Smoker     Smokeless tobacco: Never Used     Tobacco comment: [id number] occational smoker   Substance and Sexual Activity     Alcohol use: Yes     Alcohol/week: 0.0 standard drinks     Comment: 2-3 glass of wine per week. At most 1 per day     Drug use: No     Sexual activity: Never   Lifestyle     Physical activity     Days per week: Not on file     Minutes per session: Not on file     Stress: Not on file    Relationships     Social connections     Talks on phone: Not on file     Gets together: Not on file     Attends Yazdanism service: Not on file     Active member of club or organization: Not on file     Attends meetings of clubs or organizations: Not on file     Relationship status: Not on file     Intimate partner violence     Fear of current or ex partner: Not on file     Emotionally abused: Not on file     Physically abused: Not on file     Forced sexual activity: Not on file   Other Topics Concern     Parent/sibling w/ CABG, MI or angioplasty before 65F 55M? Not Asked   Social History Narrative     Not on file       PAST MEDICAL HISTORY:   Past Medical History:   Diagnosis Date     Alpha-1-antitrypsin deficiency (H)      Ascites     Pre-transplant     Chronic kidney disease, stage 3 (H)      Cirrhosis (H)     Alpha-1-antitrypsin deficiency, s/p liver transplant 3/31/15     Gout      HTN (hypertension)      Lentigo maligna melanoma (H) 2009    lentigo maligna melanoma excised on 01/29/2009 with a repeat wide excision on 03/30/2009.      Liver replaced by transplant (H) 03/31/2015     Nephrolithiasis      Osteoarthritis      Portal hypertension (H)     Pre-transplant        PAST SURGICAL HISTORY:   Past Surgical History:   Procedure Laterality Date     COLONOSCOPY N/A 12/18/2014    Procedure: COLONOSCOPY;  Surgeon: Katherine Jimenez MD;  Location:  GI     ESOPHAGOSCOPY, GASTROSCOPY, DUODENOSCOPY (EGD), COMBINED N/A 12/18/2014    Procedure: COMBINED ESOPHAGOSCOPY, GASTROSCOPY, DUODENOSCOPY (EGD), BIOPSY SINGLE OR MULTIPLE;  Surgeon: Katherine Jimenez MD;  Location:  GI     ESOPHAGOSCOPY, GASTROSCOPY, DUODENOSCOPY (EGD), COMBINED N/A 5/28/2015    Procedure: COMBINED ESOPHAGOSCOPY, GASTROSCOPY, DUODENOSCOPY (EGD), REMOVE FOREIGN BODY;  Surgeon: Katherine Jimenez MD;  Location:  GI     HERNIA REPAIR      abdominal     INSERT SHUNT PORTAL TRANSJUGULAR INTRAHEPTIC       ORTHOPEDIC  "SURGERY      bilateral knee surgery     PAROTIDECTOMY, RADICAL NECK DISSECTION Left 2019    Procedure: Total Parotidectomy, Excision of Skin, Neck Dissection Levels I - V,  And Local Advancement Flap;  Surgeon: Johanna Moreland MD;  Location: [facility] OR     TRANSPLANT LIVER RECIPIENT  DONOR N/A 3/31/2015    Procedure: TRANSPLANT LIVER RECIPIENT  DONOR;  Surgeon: Elia Mehta MD;  Location: [facility] OR       FAMILY HISTORY:    Family History   Problem Relation Age of Onset     Cardiovascular Father         MI     Glaucoma No family hx of      Macular Degeneration No family hx of        REVIEW OF SYSTEMS:  12 point ROS was negative other than the symptoms noted above in the HPI.  Patient Supplied Answers to Review of Systems  [facility] ROS 5/3/2019   Musculoskeletal Neck pain   Skin Skin lesions         PHYSICAL EXAMINATION:   BP (!) 147/84 (BP Location: Right arm, Patient Position: Chair, Cuff Size: Adult Regular)   Pulse 89   Temp 98.9  F (37.2  C) (Temporal)   Ht 1.905 m (6' 3\")   Wt 124.3 kg (274 lb)   SpO2 98%   BMI 34.25 kg/m     Appearance:   normal; NAD, age-appropriate appearance, well-developed, obese   Communication:   normal; communicates verbally, normal voice quality   Head/Face:   inspection -  Normal; no scars or visible lesions   Salivary glands -  S/p left radical parotidectomy with cervicofacial advancement flap, radiation changes to the tissue, no palpable mass, well healed incision, minimal lymphedema, some fibrosis   Facial strength -  Normal and symmetric bilateral; H/B I/VI - improvement in movement   Skin:  normal, no rash   Ocular Motility:  normal occular movements   Ears:  auricle (AD) -  normal  EAC (AD) -  normal  TM (AD) -  Normal, no effusion  auricle (AS) -  normal  EAC (AS) -  Normal, hard cerumen removed  TM (AS) -  Normal, no effusion  Normal clinical speech reception   Nose:  Ext. inspection -  Normal   Oral Cavity:  lips -  Normal mucosa, oral competence, " and stoma size   Age-appropriate dentition, healthy gingival mucosa   Hard palate, buccal, floor of mouth mucosa normal   Tongue - normal movement, no lesions   Oropharynx:  mucosa -  Normal, no lesions  soft palate -  Normal, no lesions, no asymmetry, normal elevation   Neck: Well healed neck incision, well healed cervicofacial flap, no palpable masses  Normal range of motion  Midline submental lymphedema is minimal   Lymphatic:  no abnormal nodes   Cardiovascular: warm, pink, well-perfused extremities without swelling, tenderness, or edema   Respiratory: Normal respiratory effort, no stridor   Neuro/Psych.:  mood/affect -  normal  mental status -  normal  cranial nerves -  Normal facial nerve function, normal facial sensation       PROCEDURE:  The left ear was examined with an operating microscope and cerumen impaction noted.  The cerumen was cleared using a speculum and alligator forceps. The external auditory canal and tympanic membrane were then examined and noted to be normal.      RESULTS REVIEWED:   TSH mildly elevated with normal T4 in June 2020    I reviewed the notes from Dr Floyd and Dr Jimenez      IMPRESSION AND PLAN:   Eric Andrew is a 70-year-old man who has a history of a liver transplant and recent squamous cell carcinoma of the left cheek who developed 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 He underwent total parotidectomy, skin resection, resection of greater auricular nerve, level I-V neck dissection, cervicofacial flap on 4/11/2019. Final pathology showed the 1 metastatic deposit in the parotid. He compelted postoperative radiation on 6/24/2019 with Dr Floyd.     He has no signs of recurrence. He will need continued regular skin exams by dermatology and routine dental cleaning.    He had his TSH checked 6/2020 with plans to recheck in about 9/2020 (already ordered).    He will need repeat imaging in March 2021.     I will see him back in 4 months. Will change Dr Floyd's appointment to make  this follow-up fall in [facility] clinic in November/December.    Thank you very much for the opportunity to participate in the care of your patient.      Johanna Moreland M.D.  Otolaryngology- Head & Neck Surgery      This note was dictated with voice recognition software and then edited. Please excuse any unintentional errors.         CC:  Katherine Jimenez MD  [address] [facility] [address]  [facility] [facility] [address]      Naomi Rodriguez, RN  Liver Coordinator  [facility]      Aston Devine MD  [facility]  [address]  [facility] [address]

## 2020-08-05 NOTE — PROGRESS NOTES
Dear Dr. Jimenez:    I had the pleasure of seeing Eric Andrew in follow-up today at the [facility].     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  He underwent surgery by dermatologist in [place] for this.  Per his report this was not done with Mohs surgery but was told that he had negative margins.  He says that shortly after the surgery he noticed a lump along his mandible/below the ear.  He went in for his 1 month follow-up with a dermatologist and at that time the mass had increased in size.  She thought it was a reactive node but offered a biopsy. Per review of the notes they proceeded with a punch biopsy of the parotid mass.  This was consistent with invasive squamous cell carcinoma without any epidermal involvement.  His preoperative PET scan showed only involvement of the parotid. He was taken to the OR on 4/11/2019 for a left total parotidectomy with resection of skin, preservation of the facial nerve, sacrifice of the greater auricular nerve, level I-V neck dissection, cervicofacial flap. Final pathology showed a 2.1 cm moderately differentiated SCC within the subcutaneous tissues and parotid, PNI, no LVSI, negative margins, 0/47 cervical nodes. He had postoperative radiation with Dr Floyd from 5/13/2019 to 6/24/2019 for a total of 6000 cGy. He had his 3 month post treatment PET scan in October 2019 which only showed a stable sub-cm nodule in the lung.    Interval history:  He comes in today for follow-up. He was last seen for a virtual visit in April 2020. He had a CT scan at that time which showed no recurrent disease. He had lab work in June 2020 with mild elevation in TSH, normal T4. He saw Dr Floyd at that time with recommendations for lymphedema therapy and repeat TSH in 3 months (ordered). He had  a virtual visit with the transplant team in July with plans to keep the tacrolimus levels as low as possible. He says that things are overall going well. He personally has not had any issues with COVID, staying socially distanced. He says that he saw dermatology about 6 weeks ago, no concerning lesions at that time. He has a repeat exam in about 6 weeks. He thinks his lip continues to improve. He is able to play the trombone and is able to hit the high notes. He has no new neck masses. He is overall very pleased with his outcomes. He has an upcoming dental appointment. He is doing well with his transplant, continues to participate in the transplant support group.       MEDICATIONS:     Current Outpatient Medications   Medication Sig Dispense Refill     AMLODIPINE BESYLATE PO Take 10 mg by mouth every morning        atorvastatin (LIPITOR) 20 MG tablet Take 20 mg by mouth every evening        BASAGLAR 100 UNIT/ML injection Inject 45 Units Subcutaneous At Bedtime        Calcium Carbonate-Vitamin D (CALCIUM + D PO) Take 1 tablet by mouth every morning        losartan (COZAAR) 25 MG tablet Take 1 tablet (25 mg) by mouth daily (Patient taking differently: Take 50 mg by mouth every morning ) 90 tablet 3     metFORMIN (GLUCOPHAGE) 1000 MG tablet Take 1,000 mg by mouth 2 times daily (with meals)        metoprolol succinate (TOPROL-XL) 25 MG 24 hr tablet Take 25 mg by mouth every morning        Multiple Vitamins-Minerals (MULTIVITAL) TABS Take 1 tablet by mouth every morning        niacinamide (NIACIN) 500 MG tablet Take 1,000 mg by mouth 2 times daily (with meals)       sildenafil (VIAGRA) 50 MG tablet Take 50 mg by mouth daily as needed for erectile dysfunction       tacrolimus (GENERIC EQUIVALENT) 1 MG capsule TAKE ONE CAPSULE BY MOUTH EVERY 12 HOURS 180 capsule 3     diphenhydrAMINE HCl (BENADRYL PO) Take by mouth nightly as needed       mupirocin (BACTROBAN) 2 % external ointment Apply topically 3 times daily 30 g 0        ALLERGIES:    Allergies   Allergen Reactions     Cefazolin Swelling     Lips swelled while patient was in the OR      Lisinopril Cough     Meropenem Hives and Swelling     Developed hives and lip swelling while on meropenem and micafungin.  Resolved with discontinuation.  Unclear which was cause.     Micafungin Hives and Swelling     Developed hives and lip swelling while on meropenem and micafungin.  Resolved with discontinuation.  Unclear which was cause.       HABITS/SOCIAL HISTORY:   Spends the shin in [place].  .  He is a retired .   He smokes for a few years and quit back in 1973.  He has no chewing tobacco use.  He has 1 alcoholic beverage about 3 times per week.   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    Social History     Socioeconomic History     Marital status:      Spouse name: Not on file     Number of children: Not on file     Years of education: Not on file     Highest education level: Not on file   Occupational History     Not on file   Social Needs     Financial resource strain: Not on file     Food insecurity     Worry: Not on file     Inability: Not on file     Transportation needs     Medical: Not on file     Non-medical: Not on file   Tobacco Use     Smoking status: Never Smoker     Smokeless tobacco: Never Used     Tobacco comment: [id number] occational smoker   Substance and Sexual Activity     Alcohol use: Yes     Alcohol/week: 0.0 standard drinks     Comment: 2-3 glass of wine per week. At most 1 per day     Drug use: No     Sexual activity: Never   Lifestyle     Physical activity     Days per week: Not on file     Minutes per session: Not on file     Stress: Not on file   Relationships     Social connections     Talks on phone: Not on file     Gets together: Not on file     Attends Tenriism service: Not on file     Active member of club or organization: Not on file     Attends meetings of clubs or organizations: Not on file     Relationship status: Not on file      Intimate partner violence     Fear of current or ex partner: Not on file     Emotionally abused: Not on file     Physically abused: Not on file     Forced sexual activity: Not on file   Other Topics Concern     Parent/sibling w/ CABG, MI or angioplasty before 65F 55M? Not Asked   Social History Narrative     Not on file       PAST MEDICAL HISTORY:   Past Medical History:   Diagnosis Date     Alpha-1-antitrypsin deficiency (H)      Ascites     Pre-transplant     Chronic kidney disease, stage 3 (H)      Cirrhosis (H)     Alpha-1-antitrypsin deficiency, s/p liver transplant 3/31/15     Gout      HTN (hypertension)      Lentigo maligna melanoma (H)     lentigo maligna melanoma excised on 2009 with a repeat wide excision on 2009.      Liver replaced by transplant (H) 2015     Nephrolithiasis      Osteoarthritis      Portal hypertension (H)     Pre-transplant        PAST SURGICAL HISTORY:   Past Surgical History:   Procedure Laterality Date     COLONOSCOPY N/A 2014    Procedure: COLONOSCOPY;  Surgeon: Katherine Jimenez MD;  Location:  GI     ESOPHAGOSCOPY, GASTROSCOPY, DUODENOSCOPY (EGD), COMBINED N/A 2014    Procedure: COMBINED ESOPHAGOSCOPY, GASTROSCOPY, DUODENOSCOPY (EGD), BIOPSY SINGLE OR MULTIPLE;  Surgeon: Katherine Jimenez MD;  Location:  GI     ESOPHAGOSCOPY, GASTROSCOPY, DUODENOSCOPY (EGD), COMBINED N/A 2015    Procedure: COMBINED ESOPHAGOSCOPY, GASTROSCOPY, DUODENOSCOPY (EGD), REMOVE FOREIGN BODY;  Surgeon: Katherine Jimenez MD;  Location:  GI     HERNIA REPAIR      abdominal     INSERT SHUNT PORTAL TRANSJUGULAR INTRAHEPTIC       ORTHOPEDIC SURGERY      bilateral knee surgery     PAROTIDECTOMY, RADICAL NECK DISSECTION Left 2019    Procedure: Total Parotidectomy, Excision of Skin, Neck Dissection Levels I - V,  And Local Advancement Flap;  Surgeon: Johanna Moreland MD;  Location:  OR     TRANSPLANT LIVER RECIPIENT   "DONOR N/A 3/31/2015    Procedure: TRANSPLANT LIVER RECIPIENT  DONOR;  Surgeon: Elia Mehta MD;  Location: [facility] OR       FAMILY HISTORY:    Family History   Problem Relation Age of Onset     Cardiovascular Father         MI     Glaucoma No family hx of      Macular Degeneration No family hx of        REVIEW OF SYSTEMS:  12 point ROS was negative other than the symptoms noted above in the HPI.  Patient Supplied Answers to Review of Systems  [facility] ROS 5/3/2019   Musculoskeletal Neck pain   Skin Skin lesions         PHYSICAL EXAMINATION:   BP (!) 147/84 (BP Location: Right arm, Patient Position: Chair, Cuff Size: Adult Regular)   Pulse 89   Temp 98.9  F (37.2  C) (Temporal)   Ht 1.905 m (6' 3\")   Wt 124.3 kg (274 lb)   SpO2 98%   BMI 34.25 kg/m     Appearance:   normal; NAD, age-appropriate appearance, well-developed, obese   Communication:   normal; communicates verbally, normal voice quality   Head/Face:   inspection -  Normal; no scars or visible lesions   Salivary glands -  S/p left radical parotidectomy with cervicofacial advancement flap, radiation changes to the tissue, no palpable mass, well healed incision, minimal lymphedema, some fibrosis   Facial strength -  Normal and symmetric bilateral; H/B I/VI - improvement in movement   Skin:  normal, no rash   Ocular Motility:  normal occular movements   Ears:  auricle (AD) -  normal  EAC (AD) -  normal  TM (AD) -  Normal, no effusion  auricle (AS) -  normal  EAC (AS) -  Normal, hard cerumen removed  TM (AS) -  Normal, no effusion  Normal clinical speech reception   Nose:  Ext. inspection -  Normal   Oral Cavity:  lips -  Normal mucosa, oral competence, and stoma size   Age-appropriate dentition, healthy gingival mucosa   Hard palate, buccal, floor of mouth mucosa normal   Tongue - normal movement, no lesions   Oropharynx:  mucosa -  Normal, no lesions  soft palate -  Normal, no lesions, no asymmetry, normal elevation   Neck: Well healed neck " incision, well healed cervicofacial flap, no palpable masses  Normal range of motion  Midline submental lymphedema is minimal   Lymphatic:  no abnormal nodes   Cardiovascular: warm, pink, well-perfused extremities without swelling, tenderness, or edema   Respiratory: Normal respiratory effort, no stridor   Neuro/Psych.:  mood/affect -  normal  mental status -  normal  cranial nerves -  Normal facial nerve function, normal facial sensation       PROCEDURE:  The left ear was examined with an operating microscope and cerumen impaction noted.  The cerumen was cleared using a speculum and alligator forceps. The external auditory canal and tympanic membrane were then examined and noted to be normal.      RESULTS REVIEWED:   TSH mildly elevated with normal T4 in June 2020    I reviewed the notes from Dr Floyd and Dr Jimenez      IMPRESSION AND PLAN:   Eric Andrew is a 70-year-old man who has a history of a liver transplant and recent squamous cell carcinoma of the left cheek who developed 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 He underwent total parotidectomy, skin resection, resection of greater auricular nerve, level I-V neck dissection, cervicofacial flap on 4/11/2019. Final pathology showed the 1 metastatic deposit in the parotid. He compelted postoperative radiation on 6/24/2019 with Dr Floyd.     He has no signs of recurrence. He will need continued regular skin exams by dermatology and routine dental cleaning.    He had his TSH checked 6/2020 with plans to recheck in about 9/2020 (already ordered).    He will need repeat imaging in March 2021.     I will see him back in 4 months. Will change Dr Floyd's appointment to make this follow-up fall in [facility] clinic in November/December.    Thank you very much for the opportunity to participate in the care of your patient.      Johanna Moreland M.D.  Otolaryngology- Head & Neck Surgery      This note was dictated with voice recognition software and then edited. Please  excuse any unintentional errors.         CC:  Katherine Jimenez MD  [address] [facility]  [facility] [address]      Naomi Rodriguez, RN  Liver Coordinator  [facility]      Aston Devine MD  [facility]  [address]  [facility] [address]

## 2020-08-05 NOTE — PATIENT INSTRUCTIONS
TSH (lab draw) when seen by Bridgett in rad onc clinic.  F/u in November/December in [facility] in conjunction with Dr Everett Moseley the Dr Floyd appt in December

## 2020-09-09 DIAGNOSIS — Z13.220 LIPID SCREENING: ICD-10-CM

## 2020-09-09 DIAGNOSIS — Z94.4 LIVER REPLACED BY TRANSPLANT (H): ICD-10-CM

## 2020-09-14 ENCOUNTER — OFFICE VISIT (OUTPATIENT)
Dept: RADIATION ONCOLOGY | Facility: CLINIC | Age: 70
End: 2020-09-14
Attending: RADIOLOGY
Payer: MEDICARE

## 2020-09-14 VITALS — SYSTOLIC BLOOD PRESSURE: 129 MMHG | WEIGHT: 274 LBS | DIASTOLIC BLOOD PRESSURE: 84 MMHG | BODY MASS INDEX: 34.25 KG/M2

## 2020-09-14 DIAGNOSIS — E03.9 HYPOTHYROIDISM, UNSPECIFIED TYPE: Primary | ICD-10-CM

## 2020-09-14 DIAGNOSIS — C77.0 SECONDARY MALIGNANCY OF LYMPH NODES OF HEAD, FACE AND NECK (H): ICD-10-CM

## 2020-09-14 LAB — TSH SERPL DL<=0.005 MIU/L-ACNC: 3.85 MU/L (ref 0.4–4)

## 2020-09-14 PROCEDURE — 84443 ASSAY THYROID STIM HORMONE: CPT | Performed by: RADIOLOGY

## 2020-09-14 PROCEDURE — 36415 COLL VENOUS BLD VENIPUNCTURE: CPT | Performed by: RADIOLOGY

## 2020-09-14 PROCEDURE — G0463 HOSPITAL OUTPT CLINIC VISIT: HCPCS | Performed by: NURSE PRACTITIONER

## 2020-09-14 NOTE — PROGRESS NOTES
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 Several months later he developed a recurrence involving the deep margin with invasion into the superficial parotid gland. He had a left total parotidectomy and ipsilateral neck dissection of levels IB-V on 2019 with pathology revealing a 2.1 cm tumor centered within the subcutaneous tissues with extension into the parotid gland. Perineural invasion involving both small and large caliber nerves was appreciated with involvement of the distal greater auricular nerve which was resected. Post-operatively, he received adjuvant radiotherapy as described above for improved local disease control.     Mr. Andrew presents to radiation oncology clinic today for a routine post-treatment disease surveillance visit.  Overall, he reports that he is doing well and he has no pressing concerns or complaints.  He has mild submental lymphedema and is no longer doing any lymphedema exercises.  He has fibrosis of left neck and he continues to do stretches for that.  It is not improving.  He is eating a regular diet with exception of some very dry foods such as bread which is slightly more difficult for him to swallow given his treatment-induced mild xerostomia.  He  otherwise denies any headaches, vision changes, otalgia, odynophagia, dyspnea, chest pain, nausea/vomiting or abdominal pain with his remaining ROS likewise negative.    PHYSICAL EXAM:  /84   Wt 124.3 kg (274 lb)   BMI 34.25 kg/m        General: Healthy-appearing 70-year-old gentleman seated comfortably in an examination chair in no acute distress  HEENT: NC/AT. EOMI. Very mildly dry mucous membranes. Healthy-appearing mucosa involving the oral tongue, floor of mouth, gingiva, buccal mucosa and posterior oropharyngeal wall. No visible oral cavity or oropharyngeal lesions.  Neck: Sequelae from prior left-sided neck dissection and adjuvant radiotherapy with moderate hyperpigmentation and mild to moderate fibrosis. No palpable cervical adenopathy. Very slight submental lymphedema.  Cardiac: Extremities are warm and well-perfused  Pulmonary: No wheezing, stridor or respiratory distress  Skin: Hyperpigmentation within the left neck as described above otherwise normal color and turgor  Neuro: A/O x3. Cranial nerves II-XII intact with the exception of very minimal weakness of the left marginal mandibular branch and mildly decreased sensation within the left V3 distribution.    LABS AND IMAGIN2020  TSH: 4.2  T4 Free: 1.2    2020:  TSH 3.85    2020 CT neck:  Stable posttreatment changes with no evidence of recurrent disease    2020 CT chest:  Stable subcentimeter pulmonary nodules without evidence of metastatic disease    IMPRESSION:   Mr. 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 He is currently 15 months out from the completion of adjuvant radiotherapy and is doing well with no clinical or radiologic evidence concerning for recurrent disease.    PLAN:   1. Follow-up in radiation clinic in 3 months with Dr. Floyd.  Continue close F/U with ENT.  2. TSH today WNL.  Recheck in 6 months  3. Recommend restarting  lymphedema exercises to minimize treatment-related edema and fibrosis  4. He sees the dentist next week.  He has not been doing any fluoride.  I did give him fluoride toothpaste rx today, but also recommend he discuss with dentist.      Bridgett Borden NP  Dept of Radiation Oncology  [facility]

## 2020-09-14 NOTE — PROGRESS NOTES
FOLLOW-UP VISIT    Patient Name: Eric Andrew      : 1950     Age: 70 year old        ______________________________________________________________________________     Chief Complaint   Patient presents with     Cancer     Pt is here for a follow up:SCC of face and neck: Radiation 6000 cGy completed 06/24/       Pain  Denies    Labs  Other Labs: No    Imaging  None      Dental:   Most Recent Dental Visit: Yes      Speech/Swallowing:   Most Recent evaluation or testing: None  Swallowing Restrictions: No difficulties with swallowing    Trismus/Jaw Exercises: Yes    Nutrition:    Weight:   Wt Readings from Last 3 Encounters:   20 124.3 kg (274 lb)   20 124.3 kg (274 lb)   20 123.8 kg (273 lb)         Oral Symptoms:   Xerostomia:0- None  Dysphagia: 0-None  Mucositis Oral Symptoms: 0-None  Mucositis: 0- None  Esophagitis:0- None    Other Appointments:     MD Name: Dr Moreland Appointment Date: 20   MD Name: Appointment Date:   MD Name: Appointment Date:   Other Appointment Notes:     Residual Radiation side effect: Stiffness in neck     Additional Instructions:     Nurse face-to-face time: Level 3:  10 min face to face time

## 2020-09-14 NOTE — LETTER
2020         RE: Eric Andrew  [address] [facility] [address]        Dear Colleague,    Thank you for referring your patient, Eric Andrew, to the RADIATION ONCOLOGY CLINIC. Please see a copy of my visit note below.    FOLLOW-UP VISIT    Patient Name: Eric Andrew      : 1950     Age: 70 year old        ______________________________________________________________________________     Chief Complaint   Patient presents with     Cancer     Pt is here for a follow up:SCC of face and neck: Radiation 6000 cGy completed 06/24/       Pain  Denies    Labs  Other Labs: No    Imaging  None      Dental:   Most Recent Dental Visit: Yes      Speech/Swallowing:   Most Recent evaluation or testing: None  Swallowing Restrictions: No difficulties with swallowing    Trismus/Jaw Exercises: Yes    Nutrition:    Weight:   Wt Readings from Last 3 Encounters:   20 124.3 kg (274 lb)   20 124.3 kg (274 lb)   20 123.8 kg (273 lb)         Oral Symptoms:   Xerostomia:0- None  Dysphagia: 0-None  Mucositis Oral Symptoms: 0-None  Mucositis: 0- None  Esophagitis:0- None    Other Appointments:     MD Name: Dr Moreland Appointment Date: 20   MD Name: Appointment Date:   MD Name: Appointment Date:   Other Appointment Notes:     Residual Radiation side effect: Stiffness in neck     Additional Instructions:     Nurse face-to-face time: Level 3:  10 min face to face time             Department of Radiation Oncology  [facility]  [address] [place] [address]  [phone]       Radiation Oncology Follow-up Visit  2020       Eric Andrew  MRN: [id number]   : 1950     DISEASE TREATED:   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 Several months later he developed a recurrence involving the deep margin with invasion into the superficial parotid gland. He had a left total parotidectomy and ipsilateral neck dissection of levels IB-V on 4/11/2019 with pathology revealing a 2.1 cm tumor centered within the subcutaneous tissues with extension into the parotid gland. Perineural invasion involving both small and large caliber nerves was appreciated with involvement of the distal greater auricular nerve which was resected. Post-operatively, he received adjuvant radiotherapy as described above for improved local disease control.     Mr. Andrew presents to radiation oncology clinic today for a routine post-treatment disease surveillance visit.  Overall, he reports that he is doing well and he has no pressing concerns or complaints.  He has mild submental lymphedema and is no longer doing any lymphedema exercises.  He has fibrosis of left neck and he continues to do stretches for that.  It is not improving.  He is eating a regular diet with exception of some very dry foods such as bread which is slightly more difficult for him to swallow given his treatment-induced mild xerostomia.  He otherwise denies any headaches, vision changes, otalgia, odynophagia, dyspnea, chest pain, nausea/vomiting or abdominal pain with his remaining ROS likewise negative.    PHYSICAL EXAM:  /84   Wt 124.3 kg (274 lb)   BMI 34.25 kg/m        General: Healthy-appearing 70-year-old gentleman seated comfortably in an examination chair in no acute distress  HEENT: NC/AT. EOMI. Very mildly dry mucous membranes. Healthy-appearing mucosa involving the oral tongue, floor of mouth, gingiva, buccal mucosa and posterior oropharyngeal wall.  No visible oral cavity or oropharyngeal lesions.  Neck: Sequelae from prior left-sided neck dissection and adjuvant radiotherapy with moderate hyperpigmentation and mild to moderate fibrosis. No palpable cervical adenopathy. Very slight submental lymphedema.  Cardiac: Extremities are warm and well-perfused  Pulmonary: No wheezing, stridor or respiratory distress  Skin: Hyperpigmentation within the left neck as described above otherwise normal color and turgor  Neuro: A/O x3. Cranial nerves II-XII intact with the exception of very minimal weakness of the left marginal mandibular branch and mildly decreased sensation within the left V3 distribution.    LABS AND IMAGIN2020  TSH: 4.2  T4 Free: 1.2    2020:  TSH 3.85    2020 CT neck:  Stable posttreatment changes with no evidence of recurrent disease    2020 CT chest:  Stable subcentimeter pulmonary nodules without evidence of metastatic disease    IMPRESSION:   Mr. 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 He is currently 15 months out from the completion of adjuvant radiotherapy and is doing well with no clinical or radiologic evidence concerning for recurrent disease.    PLAN:   1. Follow-up in radiation clinic in 3 months with Dr. Floyd.  Continue close F/U with ENT.  2. TSH today WNL.  Recheck in 6 months  3. Recommend restarting lymphedema exercises to minimize treatment-related edema and fibrosis  4. He sees the dentist next week.  He has not been doing any fluoride.  I did give him fluoride toothpaste rx today, but also recommend he discuss with dentist.      Bridgett Borden NP  Dept of Radiation Oncology  [facility]      Again, thank you for allowing me to participate in the care of your patient.        Sincerely,        BRONWYN Serrano CNP

## 2020-09-25 ENCOUNTER — TELEPHONE (OUTPATIENT)
Dept: OPTOMETRY | Facility: CLINIC | Age: 70
End: 2020-09-25

## 2020-09-25 NOTE — TELEPHONE ENCOUNTER
Left eye puffiness around left eye times one week.  Maybe slightly worse  No redness on white part of eye  No pain  No vision changes    Scheduled appt Monday with Dr. Mantilla at [facility] location-- reviewed weekend on call protocols [phone] option 4 to page on call for any worsening symptoms/vision changes    Reviewed may stay out of contact lens until seen and start frequent preservative free artificial tears. Recommended starting warm compresses 2/day for 10 mintues    Pt verbally demonstrated understanding and seemed comfortable with plan    Note to on call for review  Mamadou Brice RN 3:14 PM 09/25/20          [facility]    Phone Message    May a detailed message be left on voicemail: yes     Reason for Call: Symptoms or Concerns     If patient has red-flag symptoms, warm transfer to triage line    Current symptom or concern: Puffiness and irritation in Lt eye    Symptoms have been present for:  3 day(s)    Has patient previously been seen for this? No    Are there any new or worsening symptoms? Yes: Has continued to bother Pt for 3 days. Has not medicated. Wears contacts       Action Taken: Message routed to:  [facility] EYE    Travel Screening: Not Applicable

## 2020-09-28 ENCOUNTER — OFFICE VISIT (OUTPATIENT)
Dept: OPTOMETRY | Facility: CLINIC | Age: 70
End: 2020-09-28
Payer: MEDICARE

## 2020-09-28 DIAGNOSIS — H01.015 ULCERATIVE BLEPHARITIS OF LEFT LOWER EYELID: Primary | ICD-10-CM

## 2020-09-28 RX ORDER — NEOMYCIN SULFATE, POLYMYXIN B SULFATE AND DEXAMETHASONE 3.5; 10000; 1 MG/ML; [USP'U]/ML; MG/ML
1-2 SUSPENSION/ DROPS OPHTHALMIC 3 TIMES DAILY
Qty: 1 BOTTLE | Refills: 1 | Status: SHIPPED | OUTPATIENT
Start: 2020-09-28 | End: 2022-06-28

## 2020-09-28 ASSESSMENT — SLIT LAMP EXAM - LIDS: COMMENTS: 1+ BLEPHARITIS

## 2020-09-28 ASSESSMENT — VISUAL ACUITY
OS_CC: 20/25
OD_CC: 20/25-2
METHOD: SNELLEN - LINEAR
CORRECTION_TYPE: GLASSES

## 2020-09-28 ASSESSMENT — TONOMETRY
OS_IOP_MMHG: 15
OD_IOP_MMHG: 13
IOP_METHOD: ICARE

## 2020-09-28 ASSESSMENT — EXTERNAL EXAM - LEFT EYE: OS_EXAM: NORMAL

## 2020-09-28 ASSESSMENT — EXTERNAL EXAM - RIGHT EYE: OD_EXAM: NORMAL

## 2020-09-28 NOTE — PATIENT INSTRUCTIONS
Eyelid wipes:    -Ocusoft Plus lid wipes  -Ocusoft plus Hypochlor foaming lid cleanser  -Systane lid wipes    Start Maxitrol eyedrops 2-3x/day left eye for about 1 week. Do not use when contact lens is in.    Use artificial tears a few times a day for dryness. You can use these while the contact lenses are in.

## 2020-09-28 NOTE — PROGRESS NOTES
A/P  1.) Blepharitis left eye  -New chemosis x 1 week  -Cornea clear, no infection/infiltrate  -Mild conj chemosis and blepharitis only  -Start eyelid scrubs 1-2x/day  -Start Maxitrol gtt 2-3x/day left eye  -Add AT prn for dryness    F/u prn if symptoms worsen or do not improve

## 2020-10-14 ENCOUNTER — OFFICE VISIT (OUTPATIENT)
Dept: OPTOMETRY | Facility: CLINIC | Age: 70
End: 2020-10-14
Payer: MEDICARE

## 2020-10-14 DIAGNOSIS — H11.422 CHEMOSIS OF LEFT CONJUNCTIVA: ICD-10-CM

## 2020-10-14 DIAGNOSIS — H01.01B ULCERATIVE BLEPHARITIS OF BOTH UPPER AND LOWER EYELID OF LEFT EYE: Primary | ICD-10-CM

## 2020-10-14 RX ORDER — PREDNISOLONE ACETATE 10 MG/ML
SUSPENSION/ DROPS OPHTHALMIC
Qty: 1 BOTTLE | Refills: 2 | Status: SHIPPED | OUTPATIENT
Start: 2020-10-14 | End: 2020-11-04

## 2020-10-14 ASSESSMENT — CONF VISUAL FIELD
OS_NORMAL: 1
OD_NORMAL: 1

## 2020-10-14 ASSESSMENT — REFRACTION_WEARINGRX
OD_AXIS: 165
OD_CYLINDER: +1.25
OS_AXIS: 015
OD_ADD: +2.50
OS_SPHERE: -9.00
OD_SPHERE: -7.75
OS_CYLINDER: +1.00
OS_ADD: +2.50

## 2020-10-14 ASSESSMENT — TONOMETRY
OD_IOP_MMHG: 10
IOP_METHOD: ICARE
OS_IOP_MMHG: 11

## 2020-10-14 ASSESSMENT — SLIT LAMP EXAM - LIDS: COMMENTS: 1+ BLEPHARITIS

## 2020-10-14 ASSESSMENT — EXTERNAL EXAM - LEFT EYE: OS_EXAM: NORMAL

## 2020-10-14 ASSESSMENT — VISUAL ACUITY
OS_CC: 20/30
METHOD: SNELLEN - LINEAR
OD_CC: 20/20-3
CORRECTION_TYPE: GLASSES

## 2020-10-14 ASSESSMENT — EXTERNAL EXAM - RIGHT EYE: OD_EXAM: NORMAL

## 2020-10-14 NOTE — PATIENT INSTRUCTIONS
STOP maxitrol eye drop that you are currently using.     Start prednisolone in the left eye 4x/day for 1 week, then 2x/day for 1 week, then 1x/day for 1 week, then stop    Start artificial tears 2-3x/day both eyes, or as needed. Some good over-the-counter brands are:  -Refresh Plus  -Refresh Relieva  -Systane Ultra  -Systane Complete    Start daily eyelid scrubs to help keep the blepharitis under control. You can use baby shampoo and a clean finger, or premoistened eyelid wipes like Systane Lid Wipes or Ocusoft Plus lid wipes    Start daily warm compresses on the eyelids - use a warm/hot wet washcloth and apply to closed eyelids for 5-10 minutes a day

## 2020-10-15 NOTE — PROGRESS NOTES
A/P  1.) Blepharitis left eye  -Bleph improved but with persistent inferior conjunctival chemosis  -Symptoms improved but not resolved  -Stop maxitrol, start prednisolone qid with taper  -Restart eyelid scrubs  -Start warm compress at bedtime each eye  -Start AT 3-4x/day each eye    He is likely going to [place] for 1-2 months soon. If this does not resolve after several weeks rec local eval

## 2020-11-25 ENCOUNTER — TELEPHONE (OUTPATIENT)
Dept: OTOLARYNGOLOGY | Facility: CLINIC | Age: 70
End: 2020-11-25

## 2020-11-25 ENCOUNTER — TELEPHONE (OUTPATIENT)
Dept: TRANSPLANT | Facility: CLINIC | Age: 70
End: 2020-11-25

## 2020-11-25 NOTE — LETTER
OUTPATIENT OR TRANSITIONAL CARE  LABORATORY TEST ORDER     Patient Name:    Eric Andrew                                      Transplant Date:   3/31/2015   YOB: 1950                                               Issue Date:            11/25/2020   [facility] MR#:    [id number]                                           Expiration Date:   11/25/2021         Diagnoses:            [x]?      Liver Transplant (ICD-10 Z94.4)                                 [x]?      Long term use of medications (ICD-10 Z79.899)         Please fax results to [phone]     Frequency: Every 2 months, and PRN        [x]??         CBC with Platelets   [x]??         Basic Metabolic Panel (Sodium, Potassium, Chloride, CO2, Creatinine, Urea Nitrogen, Glucose, Calcium)  [x]??         Hepatic panel (Albumin, Alk Phos, ALT, AST, Direct and Total Bili)  [x]??         Tacrolimus drug level - 12 hour trough, please document time of last dose         Other Frequency: annually ONCE NOW, 2020  [x]??         Fasting lipid panel  [x]??         Random urine protein  [x]??         Urinalysis with reflex to micro    If you have questions, please call [phone] or [phone].    .

## 2020-11-25 NOTE — TELEPHONE ENCOUNTER
Returned call to patient. He states that he is concerned with flying during covid and they would prefer to not come back from [place] if they can avoid it. Reviewed with patient that we can change to virtual appointment instead of in clinic due to covid. Patient in agreement with this plan.    He was encouraged to call with further questions or concerns.     Lolis Montez, RN, BSN

## 2020-11-25 NOTE — TELEPHONE ENCOUNTER
[facility]    Phone Message    May a detailed message be left on voicemail: yes     Reason for Call: Other:   Pt as upcoming appts on 12/18. Pt previously sent over [employer] messages and hasn't received a response. Pt has his tickets to fly back on 12/03 but they are thinking now that it isn't such a good idea. Pt would like to know asap how urgent are these appts? Could they do a virtual? Pt would like to cancel those flights if pt doesn't need to come in.  Please call pt back ASAP.     Action Taken: Other:  ent    Travel Screening: Not Applicable

## 2020-12-17 NOTE — PROGRESS NOTES
"Eric Andrew is a 70 year old male who is being evaluated via a billable video visit.      The patient has been notified of following:     \"This video visit will be conducted via a call between you and your physician/provider. We have found that certain health care needs can be provided without the need for an in-person physical exam.  This service lets us provide the care you need with a video conversation.  If a prescription is necessary we can send it directly to your pharmacy.  If lab work is needed we can place an order for that and you can then stop by our lab to have the test done at a later time.    Video visits are billed at different rates depending on your insurance coverage.  Please reach out to your insurance provider with any questions.    If during the course of the call the physician/provider feels a video visit is not appropriate, you will not be charged for this service.\"    Patient has given verbal consent for Video visit? Yes    Video-Visit Details    Type of service:  Video Visit    Video Start Time: 1:00 PM  Video End Time: 1:11 PM    Originating Location (pt. Location): Home    Distant Location (provider location):  [facility] [place]     Platform used for Video Visit: [facility]           Department of Radiation Oncology  [address] [address] [phone]       Radiation Oncology Follow-up Visit  2020      Eric Adnrew  MRN: [id number]   : 1950     DISEASE TREATED:   rpT2 N0 M0 cutaneous squamous cell carcinoma of the left face    RADIATION THERAPY DELIVERED:   6000 cGy / 30 fractions, from 2019 - 2019    SYSTEMIC THERAPY:  None    INTERVAL SINCE COMPLETION OF RADIATION THERAPY:   18 months    SUBJECTIVE:   Eric Andrew is a 70 year old male with a PMH significant for immunosuppression secondary to liver transplantation in  who underwent an excision of a " cutaneous squamous cell carcinoma of the left cheek in 2019. Several months later he developed a recurrence involving the deep margin with invasion into the superficial parotid gland. He had a left total parotidectomy and ipsilateral neck dissection of levels IB-V on 2019 with pathology revealing a 2.1 cm tumor centered within the subcutaneous tissues with extension into the parotid gland. Perineural invasion involving both small and large caliber nerves was appreciated with involvement of the distal greater auricular nerve which was resected. Post-operatively, he received adjuvant radiotherapy as described above for improved local disease control.     Mr. Andrew is contacted via video today for a routine post-treatment disease surveillance visit. He is currently residing in [place] and has not returned to [place] due to the ongoing COVID-19 pandemic. He reports that he is doing very well and has been maintaining an active lifestyle over the past several months. Regarding his prior head and neck radiotherapy, he continues to have some mild treatment-induced xerostomia which is most noticeable when eating very dry foods such as breads. He otherwise has no odynophagia or dysphagia and is eating a regular diet without difficulty. His remaining ROS are negative and he specifically denies any neck or parotid masses nor new cutaneous lesions.    PHYSICAL EXAM:  General: 70-year-old healthy-appearing gentleman in no acute distress  HEENT: Exam limited due to the video nature of the call however there do not appear to be any cutaneous lesions nor head and neck masses.  Pulmonary: Breathing comfortably on room air    LABS AND IMAGIN2020 TSH: 3.85    IMPRESSION:   Mr. 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 He is currently 18 months out from the completion of adjuvant radiotherapy and remains  clinically TAMIKA.    PLAN:   1. Follow-up in [facility] clinic in coordination with Dr. Moreland in 4 months  2. Repeat CT neck and chest in 4/2021  3. Repeat TSH due in 3/2021  4. Continuing follow-up with local dermatologist in [place] for ongoing skin checks    Grabiel Floyd MD/PhD    Department of Radiation Oncology  [facility]

## 2020-12-18 ENCOUNTER — VIRTUAL VISIT (OUTPATIENT)
Dept: OTOLARYNGOLOGY | Facility: CLINIC | Age: 70
End: 2020-12-18
Payer: MEDICARE

## 2020-12-18 VITALS — BODY MASS INDEX: 33.07 KG/M2 | HEIGHT: 75 IN | WEIGHT: 266 LBS

## 2020-12-18 DIAGNOSIS — C77.0 SECONDARY MALIGNANCY OF LYMPH NODES OF HEAD, FACE AND NECK (H): Primary | ICD-10-CM

## 2020-12-18 DIAGNOSIS — C44.320 SQUAMOUS CELL CARCINOMA OF SKIN OF FACE: Primary | ICD-10-CM

## 2020-12-18 PROCEDURE — 99213 OFFICE O/P EST LOW 20 MIN: CPT | Mod: 95 | Performed by: RADIOLOGY

## 2020-12-18 PROCEDURE — 99212 OFFICE O/P EST SF 10 MIN: CPT | Mod: 95 | Performed by: OTOLARYNGOLOGY

## 2020-12-18 ASSESSMENT — MIFFLIN-ST. JEOR: SCORE: 2057.2

## 2020-12-18 ASSESSMENT — PAIN SCALES - GENERAL: PAINLEVEL: NO PAIN (0)

## 2020-12-18 NOTE — LETTER
"2020      RE: Eric Andrew  [address] [address] [facility] [address]       Eric Andrew is a 70 year old male who is being evaluated via a billable video visit.      The patient has been notified of following:     \"This video visit will be conducted via a call between you and your physician/provider. We have found that certain health care needs can be provided without the need for an in-person physical exam.  This service lets us provide the care you need with a video conversation.  If a prescription is necessary we can send it directly to your pharmacy.  If lab work is needed we can place an order for that and you can then stop by our lab to have the test done at a later time.    Video visits are billed at different rates depending on your insurance coverage.  Please reach out to your insurance provider with any questions.    If during the course of the call the physician/provider feels a video visit is not appropriate, you will not be charged for this service.\"    Patient has given verbal consent for Video visit? Yes    Video-Visit Details    Type of service:  Video Visit    Video Start Time: 1:00 PM  Video End Time: 1:11 PM    Originating Location (pt. 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 Several months later he developed a recurrence involving the deep margin with invasion into the superficial parotid gland. He had a left total parotidectomy and ipsilateral neck dissection of levels IB-V on 2019 with pathology revealing a 2.1 cm tumor centered within the subcutaneous tissues with extension into the parotid gland. Perineural invasion involving both small and large caliber nerves was appreciated with involvement of the distal greater auricular nerve which was resected. Post-operatively, he received adjuvant radiotherapy as described above for improved local disease control.     Mr. Andrew is contacted via video today for a routine post-treatment disease surveillance visit. He is currently residing in [place] and has not returned to [place] due to the ongoing COVID-19 pandemic. He reports that he is doing very well and has been maintaining an active lifestyle over the past several months. Regarding his prior head and neck radiotherapy, he continues to have some mild treatment-induced xerostomia which is most noticeable when eating very dry foods such as breads. He otherwise has no odynophagia or dysphagia and is eating a regular diet without difficulty. His remaining ROS are negative and he specifically denies any neck or parotid masses nor new cutaneous lesions.    PHYSICAL EXAM:  General: 70-year-old healthy-appearing gentleman in no acute distress  HEENT: Exam limited due to the video nature of the call however there do not appear to be any cutaneous lesions nor head and neck masses.  Pulmonary: Breathing comfortably on room air    LABS AND IMAGIN2020 TSH: 3.85    IMPRESSION:   Mr. 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  He is currently 18 months out from the completion of adjuvant radiotherapy and remains clinically TAMIKA.    PLAN:   1. Follow-up in [facility] clinic in coordination with Dr. Moreland in 4 months  2. Repeat CT neck and chest in 4/2021  3. Repeat TSH due in 3/2021  4. Continuing follow-up with local dermatologist in [place] for ongoing skin checks    Grabiel Floyd MD/PhD    Department of Radiation Oncology  [facility]

## 2020-12-18 NOTE — PROGRESS NOTES
"Eric Andrew is a 70 year old male who is being evaluated via a billable video visit.      The patient has been notified of following:     \"This video visit will be conducted via a call between you and your physician/provider. We have found that certain health care needs can be provided without the need for an in-person physical exam.  This service lets us provide the care you need with a video conversation.  If a prescription is necessary we can send it directly to your pharmacy.  If lab work is needed we can place an order for that and you can then stop by our lab to have the test done at a later time.    Video visits are billed at different rates depending on your insurance coverage.  Please reach out to your insurance provider with any questions.    If during the course of the call the physician/provider feels a video visit is not appropriate, you will not be charged for this service.\"    Patient has given verbal consent for Video visit? Yes  How would you like to obtain your AVS? MyChart  If you are dropped from the video visit, the video invite should be resent to: Text to cell phone: [phone]  Will anyone else be joining your video visit? No        Video-Visit Details    Type of service:  Video Visit    Video Start Time: 1:00 PM  Video End Time: 1:11 PM    Originating Location (pt. Location): Home    Distant Location (provider location):  [facility] [place]     Platform used for Video Visit: [facility]    Dear Dr. Jimenez:    I had the pleasure of seeing Eric Andrew in follow-up today at the [facility].     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  He underwent surgery by dermatologist in [place] for this.  Per his report this was not done with Mohs surgery but was " told that he had negative margins.  He says that shortly after the surgery he noticed a lump along his mandible/below the ear.  He went in for his 1 month follow-up with a dermatologist and at that time the mass had increased in size.  She thought it was a reactive node but offered a biopsy. Per review of the notes they proceeded with a punch biopsy of the parotid mass.  This was consistent with invasive squamous cell carcinoma without any epidermal involvement.  His preoperative PET scan showed only involvement of the parotid. He was taken to the OR on 4/11/2019 for a left total parotidectomy with resection of skin, preservation of the facial nerve, sacrifice of the greater auricular nerve, level I-V neck dissection, cervicofacial flap. Final pathology showed a 2.1 cm moderately differentiated SCC within the subcutaneous tissues and parotid, PNI, no LVSI, negative margins, 0/47 cervical nodes. He had postoperative radiation with Dr Floyd from 5/13/2019 to 6/24/2019 for a total of 6000 cGy. He had his 3 month post treatment PET scan in October 2019 which only showed a stable sub-cm nodule in the lung.    Interval history:  He comes in today for follow-up. He was last seen in clinic in August 2020. Since that time he was seen by radiation oncology. He is currently in [place] since the fall and his return to [place] was delayed due to the COVID-19 pandemic. He says that he is doing well. He is being quite active in [place], while maintaining social distancing, spending lots of time outdoors. He says that he has no concerns with regards to his skin other than the usual crusted lesions. He has no neck or parotid masses similar to what he had at the time of diagnosis. He notices some problems with swallowing with things getting stuck, such as breads. He is not doing his swallowing exercises. He has been playing the Superbly. He is going to get set up with a dermatologist in [place]. He has been golfing and denies  issues with his shoulder.        MEDICATIONS:     Current Outpatient Medications   Medication Sig Dispense Refill     AMLODIPINE BESYLATE PO Take 10 mg by mouth every morning        atorvastatin (LIPITOR) 20 MG tablet Take 20 mg by mouth every evening        BASAGLAR 100 UNIT/ML injection Inject 45 Units Subcutaneous At Bedtime        Calcium Carbonate-Vitamin D (CALCIUM + D PO) Take 1 tablet by mouth every morning        losartan (COZAAR) 25 MG tablet Take 1 tablet (25 mg) by mouth daily (Patient taking differently: Take 50 mg by mouth every morning ) 90 tablet 3     metFORMIN (GLUCOPHAGE) 1000 MG tablet Take 1,000 mg by mouth 2 times daily (with meals)        metoprolol succinate (TOPROL-XL) 25 MG 24 hr tablet Take 25 mg by mouth every morning        Multiple Vitamins-Minerals (MULTIVITAL) TABS Take 1 tablet by mouth every morning        neomycin-polymyxin-dexamethasone (MAXITROL) 3.5-05336-5.1 SUSP ophthalmic susp Place 1-2 drops Into the left eye 3 times daily 1 Bottle 1     niacinamide (NIACIN) 500 MG tablet Take 1,000 mg by mouth 2 times daily (with meals)       sildenafil (VIAGRA) 50 MG tablet Take 50 mg by mouth daily as needed for erectile dysfunction       Sodium Fluoride 1.1 % PSTE Apply 1 Application to affected area daily 112 g 11     tacrolimus (GENERIC EQUIVALENT) 1 MG capsule TAKE ONE CAPSULE BY MOUTH EVERY 12 HOURS 180 capsule 3       ALLERGIES:    Allergies   Allergen Reactions     Cefazolin Swelling     Lips swelled while patient was in the OR      Lisinopril Cough     Meropenem Hives and Swelling     Developed hives and lip swelling while on meropenem and micafungin.  Resolved with discontinuation.  Unclear which was cause.     Micafungin Hives and Swelling     Developed hives and lip swelling while on meropenem and micafungin.  Resolved with discontinuation.  Unclear which was cause.       HABITS/SOCIAL HISTORY:   Spends the shin in [place].  .  He is a retired .   He smokes for  a few years and quit back in 1973.  He has no chewing tobacco use.  He has 1 alcoholic beverage about 3 times per week.   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    Social History     Socioeconomic History     Marital status:      Spouse name: Not on file     Number of children: Not on file     Years of education: Not on file     Highest education level: Not on file   Occupational History     Not on file   Social Needs     Financial resource strain: Not on file     Food insecurity     Worry: Not on file     Inability: Not on file     Transportation needs     Medical: Not on file     Non-medical: Not on file   Tobacco Use     Smoking status: Never Smoker     Smokeless tobacco: Never Used     Tobacco comment: [id number] occational smoker   Substance and Sexual Activity     Alcohol use: Yes     Alcohol/week: 0.0 standard drinks     Comment: 2-3 glass of wine per week. At most 1 per day     Drug use: No     Sexual activity: Never   Lifestyle     Physical activity     Days per week: Not on file     Minutes per session: Not on file     Stress: Not on file   Relationships     Social connections     Talks on phone: Not on file     Gets together: Not on file     Attends Latter-day service: Not on file     Active member of club or organization: Not on file     Attends meetings of clubs or organizations: Not on file     Relationship status: Not on file     Intimate partner violence     Fear of current or ex partner: Not on file     Emotionally abused: Not on file     Physically abused: Not on file     Forced sexual activity: Not on file   Other Topics Concern     Parent/sibling w/ CABG, MI or angioplasty before 65F 55M? Not Asked   Social History Narrative     Not on file       PAST MEDICAL HISTORY:   Past Medical History:   Diagnosis Date     Alpha-1-antitrypsin deficiency (H)      Ascites     Pre-transplant     Chronic kidney disease, stage 3 (H)      Cirrhosis (H)     Alpha-1-antitrypsin deficiency, s/p liver transplant  "3/31/15     Gout      HTN (hypertension)      Lentigo maligna melanoma (H)     lentigo maligna melanoma excised on 2009 with a repeat wide excision on 2009.      Liver replaced by transplant (H) 2015     Nephrolithiasis      Osteoarthritis      Portal hypertension (H)     Pre-transplant        PAST SURGICAL HISTORY:   Past Surgical History:   Procedure Laterality Date     COLONOSCOPY N/A 2014    Procedure: COLONOSCOPY;  Surgeon: Katherine Jimenez MD;  Location:  GI     ESOPHAGOSCOPY, GASTROSCOPY, DUODENOSCOPY (EGD), COMBINED N/A 2014    Procedure: COMBINED ESOPHAGOSCOPY, GASTROSCOPY, DUODENOSCOPY (EGD), BIOPSY SINGLE OR MULTIPLE;  Surgeon: Katherine Jimenez MD;  Location:  GI     ESOPHAGOSCOPY, GASTROSCOPY, DUODENOSCOPY (EGD), COMBINED N/A 2015    Procedure: COMBINED ESOPHAGOSCOPY, GASTROSCOPY, DUODENOSCOPY (EGD), REMOVE FOREIGN BODY;  Surgeon: Katherine Jimenez MD;  Location:  GI     HERNIA REPAIR      abdominal     INSERT SHUNT PORTAL TRANSJUGULAR INTRAHEPTIC       ORTHOPEDIC SURGERY      bilateral knee surgery     PAROTIDECTOMY, RADICAL NECK DISSECTION Left 2019    Procedure: Total Parotidectomy, Excision of Skin, Neck Dissection Levels I - V,  And Local Advancement Flap;  Surgeon: Johanna Moreland MD;  Location:  OR     TRANSPLANT LIVER RECIPIENT  DONOR N/A 3/31/2015    Procedure: TRANSPLANT LIVER RECIPIENT  DONOR;  Surgeon: Elia Mehta MD;  Location:  OR       FAMILY HISTORY:    Family History   Problem Relation Age of Onset     Cardiovascular Father         MI     Glaucoma No family hx of      Macular Degeneration No family hx of        REVIEW OF SYSTEMS:  12 point ROS was negative other than the symptoms noted above in the HPI.  Patient Supplied Answers to Review of Systems  [facility] ENT ROS 5/3/2019   Musculoskeletal Neck pain   Skin Skin lesions         PHYSICAL EXAMINATION:   Ht 1.905 m (6' 3\")   " Wt 120.7 kg (266 lb)   BMI 33.25 kg/m     Appearance:   normal; NAD, age-appropriate appearance, well-developed   Communication:   normal; communicates verbally, normal voice quality   Head/Face:   inspection -  Normal; no scars or visible lesions   Salivary glands -  S/p left radical parotidectomy with cervicofacial advancement flap, radiation changes to the tissue   Facial strength -  Normal and symmetric bilateral; H/B I/VI - nearly completely normal movement   Skin:  normal, no rash   Ears:  auricle (AD) -  normal  auricle (AS) -  normal  Normal clinical speech reception   Nose:  Ext. inspection -  Normal   Oral Cavity:  lips -  Normal mucosa, oral competence, and stoma size   Age-appropriate dentition   Neck: Well healed neck incision, well healed cervicofacial flap  Normal range of motion   Respiratory: Normal respiratory effort, no stridor   Neuro/Psych.:  mood/affect -  normal  mental status -  normal       PROCEDURE:        RESULTS REVIEWED:         IMPRESSION AND PLAN:   Eric Andrew is a 70-year-old man who has a history of a liver transplant and recent squamous cell carcinoma of the left cheek who developed 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 He underwent total parotidectomy, skin resection, resection of greater auricular nerve, level I-V neck dissection, cervicofacial flap on 4/11/2019. Final pathology showed the 1 metastatic deposit in the parotid. He completed postoperative radiation on 6/24/2019 with Dr Floyd.     He has no signs of recurrence. He will need continued regular skin exams by dermatology and is going to see a dermatologist locally since he will not be in [place] for his next visit.    We did discuss the COVID-19 vaccine today.    He is due for a TSH check in about March/April 2021.    He will need repeat imaging in April 2021.     I will see him back in 4 months in [facility] clinic with Dr Floyd with labs and imaging on same day. If he is not back from [place] at that time he will  notify us and we will adjust the appointment.    Thank you very much for the opportunity to participate in the care of your patient.      Johanna Moreland M.D.  Otolaryngology- Head & Neck Surgery      This note was dictated with voice recognition software and then edited. Please excuse any unintentional errors.         CC:  Katherine Jimenez MD  [address]  [facility] [address]      Naomi Rodriguez, RN  Liver Coordinator  [facility]      Aston Devine MD  [facility]  [address]  [facility] [address]

## 2020-12-28 ENCOUNTER — TELEPHONE (OUTPATIENT)
Dept: TRANSPLANT | Facility: CLINIC | Age: 70
End: 2020-12-28

## 2020-12-28 NOTE — TELEPHONE ENCOUNTER
Patient Call: General  Route to LPN    Reason for call: connect with pt who is having some lower abdominal pain, wondering if he still has his appendix.. please connect     Call back needed? Yes    Return Call Needed  Same as documented in contacts section  When to return call?: Greater than one day: Route standard priority

## 2020-12-28 NOTE — TELEPHONE ENCOUNTER
Call back to Eric.  He is in [place].  He states that he has noted increasing pain in the area of his lower right hip / abdomen.  He is lying down, feels like it gets a bit worse w/ activity.  No fever.  I suggested he see someone if pain continues or worsens.  Reminded him that he can take up to 2000 mg tylenol per day prn.  He does not have a regular doctor in [place], I suggested he establish w/ an internist as he spends a good deal of time in [place].

## 2021-01-15 ENCOUNTER — HEALTH MAINTENANCE LETTER (OUTPATIENT)
Age: 71
End: 2021-01-15

## 2021-02-26 ENCOUNTER — OFFICE VISIT (OUTPATIENT)
Dept: OPTOMETRY | Facility: CLINIC | Age: 71
End: 2021-02-26
Payer: MEDICARE

## 2021-02-26 ENCOUNTER — TELEPHONE (OUTPATIENT)
Dept: OPHTHALMOLOGY | Facility: CLINIC | Age: 71
End: 2021-02-26

## 2021-02-26 ENCOUNTER — TELEPHONE (OUTPATIENT)
Dept: OPTOMETRY | Facility: CLINIC | Age: 71
End: 2021-02-26

## 2021-02-26 DIAGNOSIS — H52.203 MYOPIA OF BOTH EYES WITH ASTIGMATISM AND PRESBYOPIA: Primary | ICD-10-CM

## 2021-02-26 DIAGNOSIS — H52.4 MYOPIA OF BOTH EYES WITH ASTIGMATISM AND PRESBYOPIA: Primary | ICD-10-CM

## 2021-02-26 DIAGNOSIS — H52.13 MYOPIA OF BOTH EYES WITH ASTIGMATISM AND PRESBYOPIA: Primary | ICD-10-CM

## 2021-02-26 ASSESSMENT — REFRACTION_CURRENTRX
OS_SPHERE: -6.50
OS_BASECURVE: 8.7
OD_BRAND: DAILIES AQUACOMFORT PLUS
OD_SPHERE: -6.50
OS_DIAMETER: 14.0
OD_BASECURVE: 8.7
OS_BRAND: DAILIES AQUACOMFORT PLUS
OD_DIAMETER: 14.0

## 2021-02-26 NOTE — TELEPHONE ENCOUNTER
Pt needs ASAP. I SWP and ordered for him, sent to [place] address per pt request.     Upgraded shipping level due to his difficulty getting through. (Ordering process has recently changed)

## 2021-02-26 NOTE — PROGRESS NOTES
No office visit. CL order only.  Pt needs new lenses, would like shipped to [place] address:    [facility] [address]    Contact Lens Billing  V-Code:  - Soft spherical  Final Contact Lens Rx       Brand Base Curve Diameter Sphere    Right Dailies Aquacomfort Plus 8.7 14.0 -6.50    Left Dailies Aquacomfort Plus 8.7 14.0 -6.50    Expiration Date: 6/20/22    Replacement: Daily         ABB order mailed direct: [id number]  # of units: 4 90-packs  Price per Unit: $65 per 90-pack + $11 rush shipping    These are for cosmetic contact lenses.    Encounter Diagnosis   Name Primary?     Myopia of both eyes with astigmatism and presbyopia Yes        Date of last eye exam: 10/14/20

## 2021-02-26 NOTE — TELEPHONE ENCOUNTER
Reviewed by Dr. Mantilla    Spoke to pt at 1413    Pt states reached contact lens ordering personnel and everything taken care of per pt    No further action    Mamadou Brice, RN 2:13 PM 02/26/21          [facility] Center    Phone Message    May a detailed message be left on voicemail: yes     Reason for Call: Other: Pt called wanting to obtain contact lens RX- stated he had called multiple times. Called Vidhi- Clinic Coordinator who is planning to call him back within an hour.      Action Taken: Message routed to:  [facility] EYE    Travel Screening: Not Applicable

## 2021-03-23 ENCOUNTER — DOCUMENTATION ONLY (OUTPATIENT)
Dept: TRANSPLANT | Facility: CLINIC | Age: 71
End: 2021-03-23

## 2021-03-28 NOTE — TELEPHONE ENCOUNTER
Pt called regarding cancellation of lab appointment tomorrow d/t labs being added onto today's draw. No answer. Voicemail left with above information and call-center number for call-back if questions arise.     Natice Schwab, RN BSN       Tamir Alcazar)

## 2021-04-07 DIAGNOSIS — Z94.4 LIVER REPLACED BY TRANSPLANT (H): ICD-10-CM

## 2021-04-07 DIAGNOSIS — Z13.220 LIPID SCREENING: ICD-10-CM

## 2021-04-14 DIAGNOSIS — Z94.4 LIVER TRANSPLANTED (H): ICD-10-CM

## 2021-04-14 RX ORDER — TACROLIMUS 1 MG/1
CAPSULE ORAL
Qty: 180 CAPSULE | Refills: 3 | Status: SHIPPED | OUTPATIENT
Start: 2021-04-14 | End: 2021-04-19

## 2021-04-15 ENCOUNTER — HOSPITAL ENCOUNTER (OUTPATIENT)
Dept: LAB | Facility: CLINIC | Age: 71
Discharge: HOME | End: 2021-04-15
Attending: INTERNAL MEDICINE | Admitting: INTERNAL MEDICINE
Payer: MEDICARE

## 2021-04-15 DIAGNOSIS — Z13.220 LIPID SCREENING: ICD-10-CM

## 2021-04-15 DIAGNOSIS — Z94.4 LIVER REPLACED BY TRANSPLANT (H): ICD-10-CM

## 2021-04-15 LAB
ALBUMIN SERPL-MCNC: 3.7 G/DL (ref 3.4–5)
ALBUMIN UR-MCNC: 30 MG/DL
ALP SERPL-CCNC: 79 U/L (ref 40–150)
ALT SERPL W P-5'-P-CCNC: 16 U/L (ref 0–70)
ANION GAP SERPL CALCULATED.3IONS-SCNC: 3 MMOL/L (ref 3–14)
APPEARANCE UR: CLEAR
AST SERPL W P-5'-P-CCNC: 8 U/L (ref 0–45)
BILIRUB DIRECT SERPL-MCNC: 0.2 MG/DL (ref 0–0.2)
BILIRUB SERPL-MCNC: 0.6 MG/DL (ref 0.2–1.3)
BILIRUB UR QL STRIP: NEGATIVE
BUN SERPL-MCNC: 30 MG/DL (ref 7–30)
CALCIUM SERPL-MCNC: 9.1 MG/DL (ref 8.5–10.1)
CHLORIDE SERPL-SCNC: 111 MMOL/L (ref 94–109)
CHOLEST SERPL-MCNC: 121 MG/DL
CO2 SERPL-SCNC: 26 MMOL/L (ref 20–32)
COLOR UR AUTO: YELLOW
CREAT SERPL-MCNC: 1.51 MG/DL (ref 0.66–1.25)
CREAT UR-MCNC: 164 MG/DL
ERYTHROCYTE [DISTWIDTH] IN BLOOD BY AUTOMATED COUNT: 14.4 % (ref 10–15)
GFR SERPL CREATININE-BSD FRML MDRD: 46 ML/MIN/{1.73_M2}
GLUCOSE SERPL-MCNC: 134 MG/DL (ref 70–99)
GLUCOSE UR STRIP-MCNC: NEGATIVE MG/DL
HCT VFR BLD AUTO: 40.6 % (ref 40–53)
HDLC SERPL-MCNC: 44 MG/DL
HGB BLD-MCNC: 13.4 G/DL (ref 13.3–17.7)
HGB UR QL STRIP: NEGATIVE
KETONES UR STRIP-MCNC: NEGATIVE MG/DL
LDLC SERPL CALC-MCNC: 59 MG/DL
LEUKOCYTE ESTERASE UR QL STRIP: NEGATIVE
MCH RBC QN AUTO: 32.6 PG (ref 26.5–33)
MCHC RBC AUTO-ENTMCNC: 33 G/DL (ref 31.5–36.5)
MCV RBC AUTO: 99 FL (ref 78–100)
MUCOUS THREADS #/AREA URNS LPF: PRESENT /LPF
NITRATE UR QL: NEGATIVE
NONHDLC SERPL-MCNC: 77 MG/DL
PH UR STRIP: 5.5 PH (ref 5–7)
PLATELET # BLD AUTO: 105 10E9/L (ref 150–450)
POTASSIUM SERPL-SCNC: 4.4 MMOL/L (ref 3.4–5.3)
PROT SERPL-MCNC: 6.8 G/DL (ref 6.8–8.8)
PROT UR-MCNC: 0.4 G/L
PROT/CREAT 24H UR: 0.24 G/G CR (ref 0–0.2)
RBC # BLD AUTO: 4.11 10E12/L (ref 4.4–5.9)
RBC #/AREA URNS AUTO: <1 /HPF (ref 0–2)
SODIUM SERPL-SCNC: 140 MMOL/L (ref 133–144)
SOURCE: ABNORMAL
SP GR UR STRIP: 1.02 (ref 1–1.03)
TACROLIMUS BLD-MCNC: <3 UG/L (ref 5–15)
TME LAST DOSE: ABNORMAL H
TRIGL SERPL-MCNC: 91 MG/DL
UROBILINOGEN UR STRIP-MCNC: NORMAL MG/DL (ref 0–2)
WBC # BLD AUTO: 4.5 10E9/L (ref 4–11)
WBC #/AREA URNS AUTO: <1 /HPF (ref 0–5)

## 2021-04-15 PROCEDURE — 80048 BASIC METABOLIC PNL TOTAL CA: CPT | Performed by: INTERNAL MEDICINE

## 2021-04-15 PROCEDURE — 81001 URINALYSIS AUTO W/SCOPE: CPT | Performed by: INTERNAL MEDICINE

## 2021-04-15 PROCEDURE — 80076 HEPATIC FUNCTION PANEL: CPT | Performed by: INTERNAL MEDICINE

## 2021-04-15 PROCEDURE — 80197 ASSAY OF TACROLIMUS: CPT | Performed by: INTERNAL MEDICINE

## 2021-04-15 PROCEDURE — 85027 COMPLETE CBC AUTOMATED: CPT | Performed by: INTERNAL MEDICINE

## 2021-04-15 PROCEDURE — 36415 COLL VENOUS BLD VENIPUNCTURE: CPT | Performed by: INTERNAL MEDICINE

## 2021-04-15 PROCEDURE — 80061 LIPID PANEL: CPT | Performed by: INTERNAL MEDICINE

## 2021-04-15 PROCEDURE — 84156 ASSAY OF PROTEIN URINE: CPT | Performed by: INTERNAL MEDICINE

## 2021-04-16 ENCOUNTER — TELEPHONE (OUTPATIENT)
Dept: TRANSPLANT | Facility: CLINIC | Age: 71
End: 2021-04-16

## 2021-04-16 NOTE — TELEPHONE ENCOUNTER
TAC level is below 3. Current goal is listed at 5. Message sent to Dr. Jimenez to clarify goal. Previous TAC levels are around 3 and liver tests have been stable.

## 2021-04-19 DIAGNOSIS — Z94.4 LIVER TRANSPLANTED (H): ICD-10-CM

## 2021-04-19 RX ORDER — TACROLIMUS 1 MG/1
CAPSULE ORAL
Qty: 270 CAPSULE | Refills: 3 | Status: SHIPPED | OUTPATIENT
Start: 2021-04-19 | End: 2021-04-20 | Stop reason: DRUGHIGH

## 2021-04-19 NOTE — TELEPHONE ENCOUNTER
ISSUE:   Tacrolimus IR level <3 on 4/15, goal 3-5, dose 1 mg BID.    PLAN:   Please call patient and confirm this was an accurate 12-hour trough. Verify Tacrolimus IR dose. Confirm no new medications or illness. Confirm no missed doses. If accurate trough and accurate dose, increase Tacrolimus IR dose to 1 mg in the morning and 2 mg in the evening.  Naomi Rodriguez RN    OUTCOME:   Spoke with patient, they confirm accurate trough level and current dose 1 mg BID. Patient confirmed dose change to 1 mg am, 2 mg pm and to repeat labs in 2 months. Orders sent to preferred pharmacy for dose change and lab for repeat labs. Patient voiced understanding of plan.     Sent a request to schedulers for a future appt with Dr. Jimenez.     Lashanda Liang LPN

## 2021-04-19 NOTE — TELEPHONE ENCOUNTER
Spoke to pt to instruct on dose change. Pt then states that he recalls a conversation with Dr. Jimenez that eventually he may stop taking tacro. Pt feels fine, his labs look good and was wondering about that but would like to discuss with his coordinator. Please contact pt.

## 2021-04-20 ENCOUNTER — TELEPHONE (OUTPATIENT)
Dept: TRANSPLANT | Facility: CLINIC | Age: 71
End: 2021-04-20

## 2021-04-20 DIAGNOSIS — Z94.4 LIVER TRANSPLANTED (H): Primary | ICD-10-CM

## 2021-04-20 RX ORDER — TACROLIMUS 1 MG/1
1 CAPSULE ORAL 2 TIMES DAILY
Qty: 180 CAPSULE | Refills: 3 | Status: SHIPPED | OUTPATIENT
Start: 2021-04-20 | End: 2022-05-19

## 2021-04-20 NOTE — TELEPHONE ENCOUNTER
Dr. Jimenez suggests NOT changing thedose, stay on 1 mg po bid and recheck level in a month.  Mychart message to Eric.

## 2021-04-20 NOTE — TELEPHONE ENCOUNTER
Call from Eric.  His tac level on 4/15 was <3.  I had suggested he increase his dose from 1 mg bid to 1 mg in the morning and 2 mg in the evening.  He would like to keep his dose the same due to cancer history.  Liver tests are normal.    Told him I would review w/ Dr. Jimenez and get back to him.

## 2021-04-23 NOTE — PROCEDURE: DEFER
Pt mom called he had no exposure but has a cough/sore throat/runny nose  Pt mom asking if he can be tested   pls advise
Scheduling Instructions (Optional): 30 minute appointment with Dr Ulrich
Procedure To Be Performed At Next Visit: Excision
Detail Level: Detailed

## 2021-04-29 NOTE — PROGRESS NOTES
Dear Dr. Jimenez:    I had the pleasure of seeing Eric Andrew in follow-up today at the [facility].     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  He underwent surgery by dermatologist in [place] for this.  Per his report this was not done with Mohs surgery but was told that he had negative margins.  He says that shortly after the surgery he noticed a lump along his mandible/below the ear.  He went in for his 1 month follow-up with a dermatologist and at that time the mass had increased in size.  She thought it was a reactive node but offered a biopsy. Per review of the notes they proceeded with a punch biopsy of the parotid mass.  This was consistent with invasive squamous cell carcinoma without any epidermal involvement.  His preoperative PET scan showed only involvement of the parotid. He was taken to the OR on 4/11/2019 for a left total parotidectomy with resection of skin, preservation of the facial nerve, sacrifice of the greater auricular nerve, level I-V neck dissection, cervicofacial flap. Final pathology showed a 2.1 cm moderately differentiated SCC within the subcutaneous tissues and parotid, PNI, no LVSI, negative margins, 0/47 cervical nodes. He had postoperative radiation with Dr Floyd from 5/13/2019 to 6/24/2019 for a total of 6000 cGy. He had his 3 month post treatment PET scan in October 2019 which only showed a stable sub-cm nodule in the lung.    Interval history:  He comes in today for follow-up. He was last seen in December 2020 for a virtual visit. He was doing well at that time. He comes in today with repeat imaging and labs.  He says that he has had multiple doctors appointments this week since returning from [place] a couple weeks ago.  He did see the dentist in dermatology this week.  He  had multiple spots frozen on his face.  He is having some stiffness in his neck, experiences cramping in the neck a couple of times about every 3 weeks or so.  He is not having any issues with lymphedema.  He does notice a firm area on his left cheek that he would like evaluated.  He is able to play trombone without any limitations.  He is eating and drinking without difficulties with the exception of having to be careful about eating, not able to eat as quickly.  He did have a few episodes of choking in the last 6 weeks.  He does some of his swallowing exercises.  He has no ear pain.  He says that his weight has been stable for the last 6 months.  He is seeing physical therapy next week for some hip issues. He is going back to [place] in October.    He did complete his COVID vaccine.      MEDICATIONS:     Current Outpatient Medications   Medication Sig Dispense Refill     AMLODIPINE BESYLATE PO Take 10 mg by mouth every morning        atorvastatin (LIPITOR) 20 MG tablet Take 20 mg by mouth every evening        BASAGLAR 100 UNIT/ML injection Inject 45 Units Subcutaneous At Bedtime        Calcium Carbonate-Vitamin D (CALCIUM + D PO) Take 1 tablet by mouth every morning        losartan (COZAAR) 25 MG tablet Take 1 tablet (25 mg) by mouth daily (Patient taking differently: Take 50 mg by mouth every morning ) 90 tablet 3     metFORMIN (GLUCOPHAGE) 1000 MG tablet Take 1,000 mg by mouth 2 times daily (with meals)        metoprolol succinate (TOPROL-XL) 25 MG 24 hr tablet Take 25 mg by mouth every morning        Multiple Vitamins-Minerals (MULTIVITAL) TABS Take 1 tablet by mouth every morning        niacinamide (NIACIN) 500 MG tablet Take 1,000 mg by mouth 2 times daily (with meals)       sildenafil (VIAGRA) 50 MG tablet Take 50 mg by mouth daily as needed for erectile dysfunction       tacrolimus (GENERIC EQUIVALENT) 1 MG capsule Take 1 capsule (1 mg) by mouth 2 times daily 180 capsule 3      neomycin-polymyxin-dexamethasone (MAXITROL) 3.5-99803-0.1 SUSP ophthalmic susp Place 1-2 drops Into the left eye 3 times daily 1 Bottle 1     Sodium Fluoride 1.1 % PSTE Apply 1 Application to affected area daily 112 g 11       ALLERGIES:    Allergies   Allergen Reactions     Cefazolin Swelling     Lips swelled while patient was in the OR      Lisinopril Cough     Meropenem Hives and Swelling     Developed hives and lip swelling while on meropenem and micafungin.  Resolved with discontinuation.  Unclear which was cause.     Micafungin Hives and Swelling     Developed hives and lip swelling while on meropenem and micafungin.  Resolved with discontinuation.  Unclear which was cause.       HABITS/SOCIAL HISTORY:   Spends the shin in [place].  .  He is a retired .   He smokes for a few years and quit back in 1973.  He has no chewing tobacco use.  He has 1 alcoholic beverage about 3 times per week.   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    Social History     Socioeconomic History     Marital status:      Spouse name: Not on file     Number of children: Not on file     Years of education: Not on file     Highest education level: Not on file   Occupational History     Not on file   Social Needs     Financial resource strain: Not on file     Food insecurity     Worry: Not on file     Inability: Not on file     Transportation needs     Medical: Not on file     Non-medical: Not on file   Tobacco Use     Smoking status: Never Smoker     Smokeless tobacco: Never Used     Tobacco comment: [id number] occational smoker   Substance and Sexual Activity     Alcohol use: Yes     Alcohol/week: 0.0 standard drinks     Comment: 2-3 glass of wine per week. At most 1 per day     Drug use: No     Sexual activity: Never   Lifestyle     Physical activity     Days per week: Not on file     Minutes per session: Not on file     Stress: Not on file   Relationships     Social connections     Talks on phone: Not on file      Gets together: Not on file     Attends [employer] service: Not on file     Active member of club or organization: Not on file     Attends meetings of clubs or organizations: Not on file     Relationship status: Not on file     Intimate partner violence     Fear of current or ex partner: Not on file     Emotionally abused: Not on file     Physically abused: Not on file     Forced sexual activity: Not on file   Other Topics Concern     Parent/sibling w/ CABG, MI or angioplasty before 65F 55M? Not Asked   Social History Narrative     Not on file       PAST MEDICAL HISTORY:   Past Medical History:   Diagnosis Date     Alpha-1-antitrypsin deficiency (H)      Ascites     Pre-transplant     Chronic kidney disease, stage 3 (H)      Cirrhosis (H)     Alpha-1-antitrypsin deficiency, s/p liver transplant 3/31/15     Gout      HTN (hypertension)      Lentigo maligna melanoma (H) 2009    lentigo maligna melanoma excised on 01/29/2009 with a repeat wide excision on 03/30/2009.      Liver replaced by transplant (H) 03/31/2015     Nephrolithiasis      Osteoarthritis      Portal hypertension (H)     Pre-transplant        PAST SURGICAL HISTORY:   Past Surgical History:   Procedure Laterality Date     COLONOSCOPY N/A 12/18/2014    Procedure: COLONOSCOPY;  Surgeon: Katherine Jimenez MD;  Location:  GI     ESOPHAGOSCOPY, GASTROSCOPY, DUODENOSCOPY (EGD), COMBINED N/A 12/18/2014    Procedure: COMBINED ESOPHAGOSCOPY, GASTROSCOPY, DUODENOSCOPY (EGD), BIOPSY SINGLE OR MULTIPLE;  Surgeon: Katherine Jimenez MD;  Location:  GI     ESOPHAGOSCOPY, GASTROSCOPY, DUODENOSCOPY (EGD), COMBINED N/A 5/28/2015    Procedure: COMBINED ESOPHAGOSCOPY, GASTROSCOPY, DUODENOSCOPY (EGD), REMOVE FOREIGN BODY;  Surgeon: Katherine Jimenez MD;  [facility]: [facility]     HERNIA REPAIR      abdominal     INSERT SHUNT PORTAL TRANSJUGULAR INTRAHEPTIC       ORTHOPEDIC SURGERY      bilateral knee surgery     PAROTIDECTOMY, RADICAL NECK  "DISSECTION Left 2019    Procedure: Total Parotidectomy, Excision of Skin, Neck Dissection Levels I - V,  And Local Advancement Flap;  Surgeon: Johanna Moreland MD;  Location: [facility] OR     TRANSPLANT LIVER RECIPIENT  DONOR N/A 3/31/2015    Procedure: TRANSPLANT LIVER RECIPIENT  DONOR;  Surgeon: Elia Mehta MD;  Location: [facility] OR       FAMILY HISTORY:    Family History   Problem Relation Age of Onset     Cardiovascular Father         MI     Glaucoma No family hx of      Macular Degeneration No family hx of        REVIEW OF SYSTEMS:  12 point ROS was negative other than the symptoms noted above in the HPI.  Patient Supplied Answers to Review of Systems  [facility] ROS 5/3/2019   Musculoskeletal Neck pain   Skin Skin lesions         PHYSICAL EXAMINATION:   Ht 1.88 m (6' 2\")   Wt 120.2 kg (265 lb)   BMI 34.02 kg/m     Appearance:   normal; NAD, age-appropriate appearance, well-developed, normal habitus   Communication:   normal; communicates verbally, normal voice quality   Head/Face:   inspection -  Normal; no scars or visible lesions   Salivary glands -  S/p left radical parotidectomy with cervicofacial advancement flap, radiation changes to the tissue, no palpable mass, well healed incision, minimal lymphedema, some fibrosis   Facial strength -  Normal and symmetric bilateral; H/B I/VI   Skin:  multiple area on the face with evidence of recent cryotherapy   Ears:  auricle (AD) -  normal  EAC (AD) -  normal  TM (AD) -  Normal, no effusion  auricle (AS) -  normal  EAC (AS) -  normal  TM (AS) -  Normal, no effusion  Normal clinical speech reception   Nose:  Ext. inspection -  Normal   Oral Cavity:  lips -  Normal mucosa, oral competence, and stoma size   Age-appropriate dentition, healthy gingival mucosa   Hard palate, buccal, floor of mouth mucosa normal   Tongue - normal movement, no lesions   Oropharynx:  mucosa -  Normal, no lesions  soft palate -  Normal, no lesions, no asymmetry, normal " elevation   Neck: Well healed neck incision, well healed cervicofacial flap, no palpable masses  Normal range of motion  Midline submental lymphedema is minimal   Lymphatic:  no abnormal nodes   Cardiovascular:  warm, pink, well-perfused extremities without swelling, tenderness, or edema   Respiratory:  Normal respiratory effort, no stridor   Neuro/Psych.:  mood/affect -  normal  mental status -  normal       PROCEDURE:        RESULTS REVIEWED:   I independently reviewed the CT scan images - no evidence of recurrence    Care discussed with Dr Floyd    I reviewed the CT neck and chest results    TSH normal      IMPRESSION AND PLAN:   Eric Andrew is a 71-year-old man who has a history of a liver transplant and recent squamous cell carcinoma of the left cheek who developed 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 He underwent total parotidectomy, skin resection, resection of greater auricular nerve, level I-V neck dissection, cervicofacial flap on 4/11/2019. Final pathology showed the 1 metastatic deposit in the parotid. He completed postoperative radiation on 6/24/2019 with Dr Floyd.     He has no signs of recurrence. He will need continued regular skin exams by dermatology. His most recent skin exam was last week.    He is having some cramping of the neck.  We discussed a possible referral to Dr. Camejo in PM&R.  He declined at this time.  He is going to discuss with his physical therapist next week when he sees them for his hip.  If he decides he wants a referral to Dr. Camejo he will let us know.    He had his TSH checked today which was normal. We will recheck this in November 2021.    He had repeat imaging today.  Results were not finalized at the time of the visit but appeared normal on review.  Final results were without evidence of recurrent disease and were forwarded on to the patient via [employer].  We will repeat his imaging in May 2021.    I will see him back in 4-6 months prior to going to [place] in October.   He will return to [facility] clinic in a year.    Thank you very much for the opportunity to participate in the care of your patient.      Johanna Moreland M.D.  Otolaryngology- Head & Neck Surgery      This note was dictated with voice recognition software and then edited. Please excuse any unintentional errors.         CC:  Katherine Jimenez MD  [address] [facility] [address]  [facility] [address]      Naomi Rodriguez, RN  Liver Coordinator  [facility]      Aston Devine MD  [facility]  [address] [facility] [address]  [facility] [address]      Grabiel Floyd MD  Department of Radiation Oncology  [facility]

## 2021-04-29 NOTE — PROGRESS NOTES
Department of Radiation Oncology  [facility]  [address] [place] [address]  [phone]       Radiation Oncology Follow-up Visit  2021      Eric Andrew  MRN: [id number]   : 1950     DISEASE TREATED:   rpT2 N0 M0 cutaneous squamous cell carcinoma of the left face    RADIATION THERAPY DELIVERED:   6000 cGy in 30 fractions, from 2019 - 2019    SYSTEMIC THERAPY:  None    INTERVAL SINCE COMPLETION OF RADIATION THERAPY:   22 months    SUBJECTIVE:   Eric Andrew is a 71 year old male with a PMH significant for immunosuppression secondary to liver transplantation in  who underwent an excision of a cutaneous squamous cell carcinoma of the left cheek in 2019. Several months later, he developed a recurrence involving the deep margin with invasion into the superficial parotid gland. He had a left total parotidectomy and ipsilateral neck dissection of levels IB-V on 2019 with pathology revealing a 2.1 cm tumor centered within the subcutaneous tissues with extension into the parotid gland. There was perineural invasion involving both small and large caliber nerves as well as involvement of the distal greater auricular nerve which was resected. Postoperatively, he received adjuvant radiotherapy as described above for improved local disease control.    Mr. Andrew returns to [facility] clinic today for a routine post open treatment disease surveillance visit. On examination, he reports that he is doing well and has no pressing concerns or complaints. He does notice some mild stiffness within the left lateral neck which occur once every few weeks but is not particular bothersome to him. He is otherwise eating a regular diet with mild treatment-induced xerostomia. He had a CT neck and chest performed prior to clinic today. While the official radiology read is currently pending at the time of this follow-up visit, review in clinic demonstrates no  obvious evidence of recurrent or metastatic disease.    PHYSICAL EXAM:  Weight: 120.2 kg    General: Healthy-appearing 71-year-old gentleman seated comfortably in an examination chair no acute distress  HEENT: NC/AT.  EOMI. EACs clear bilaterally with normal-appearing TMs.  No rhinorrhea or epistaxis.  Moist mucous membranes.  No visible oral cavity lesions or thrush.  Neck: Moderate fibrosis of the left lateral neck.  No palpable cervical adenopathy bilaterally.  Pulmonary: No wheezing, stridor or respiratory distress  Skin: Normal color and turgor    LABS AND IMAGIN2021 CT neck:  Official radiology read currently pending at the time of this follow-up visit.  Review in clinic demonstrates post-treatment changes with no evidence of disease.    2021 CT chest:  Official radiology read currently pending at the time of this follow-up visit.  Review in clinic demonstrates no evidence of pulmonary metastases.    2021 labs:  TSH: 2.96    IMPRESSION:   Mr. Andrew is a 71 year old male with a rpT2 N0 M0 cutaneous squamous cell carcinoma of the left face status post surgical resection followed by adjuvant radiotherapy. He is nearly 2 years out from completion of radiotherapy and remains clinically and radiologically TAMIKA.    PLAN:   1. Follow-up with Dr. Moreland in 6 months and in ENT multi D clinic in 1 year  2. Repeat TSH due in 2021  3. Repeat CT neck and chest in 2022    Grabiel Floyd MD/PhD    Department of Radiation Oncology  [facility]

## 2021-04-30 ENCOUNTER — OFFICE VISIT (OUTPATIENT)
Dept: OTOLARYNGOLOGY | Facility: CLINIC | Age: 71
End: 2021-04-30
Payer: MEDICARE

## 2021-04-30 ENCOUNTER — ANCILLARY PROCEDURE (OUTPATIENT)
Dept: CT IMAGING | Facility: CLINIC | Age: 71
End: 2021-04-30
Attending: OTOLARYNGOLOGY
Payer: MEDICARE

## 2021-04-30 VITALS — BODY MASS INDEX: 34.01 KG/M2 | HEIGHT: 74 IN | WEIGHT: 265 LBS

## 2021-04-30 DIAGNOSIS — C44.320 SQUAMOUS CELL CARCINOMA OF SKIN OF FACE: Primary | ICD-10-CM

## 2021-04-30 DIAGNOSIS — E03.9 HYPOTHYROIDISM, UNSPECIFIED TYPE: ICD-10-CM

## 2021-04-30 DIAGNOSIS — C44.320 SQUAMOUS CELL CARCINOMA OF SKIN OF FACE: ICD-10-CM

## 2021-04-30 DIAGNOSIS — C77.0 SECONDARY MALIGNANCY OF LYMPH NODES OF HEAD, FACE AND NECK (H): ICD-10-CM

## 2021-04-30 DIAGNOSIS — Z94.4 LIVER REPLACED BY TRANSPLANT (H): ICD-10-CM

## 2021-04-30 DIAGNOSIS — Z13.220 LIPID SCREENING: ICD-10-CM

## 2021-04-30 LAB
ALBUMIN SERPL-MCNC: 3.6 G/DL (ref 3.4–5)
ALBUMIN UR-MCNC: NEGATIVE MG/DL
ALP SERPL-CCNC: 73 U/L (ref 40–150)
ALT SERPL W P-5'-P-CCNC: 20 U/L (ref 0–70)
ANION GAP SERPL CALCULATED.3IONS-SCNC: 9 MMOL/L (ref 3–14)
APPEARANCE UR: CLEAR
AST SERPL W P-5'-P-CCNC: 7 U/L (ref 0–45)
BILIRUB DIRECT SERPL-MCNC: 0.1 MG/DL (ref 0–0.2)
BILIRUB SERPL-MCNC: 0.4 MG/DL (ref 0.2–1.3)
BILIRUB UR QL STRIP: NEGATIVE
BUN SERPL-MCNC: 34 MG/DL (ref 7–30)
CALCIUM SERPL-MCNC: 8.7 MG/DL (ref 8.5–10.1)
CHLORIDE SERPL-SCNC: 108 MMOL/L (ref 94–109)
CHOLEST SERPL-MCNC: 132 MG/DL
CO2 SERPL-SCNC: 22 MMOL/L (ref 20–32)
COLOR UR AUTO: YELLOW
CREAT BLD-MCNC: 1.8 MG/DL (ref 0.66–1.25)
CREAT SERPL-MCNC: 1.52 MG/DL (ref 0.66–1.25)
CREAT UR-MCNC: 86 MG/DL
ERYTHROCYTE [DISTWIDTH] IN BLOOD BY AUTOMATED COUNT: 14.1 % (ref 10–15)
GFR SERPL CREATININE-BSD FRML MDRD: 37 ML/MIN/{1.73_M2}
GFR SERPL CREATININE-BSD FRML MDRD: 46 ML/MIN/{1.73_M2}
GLUCOSE SERPL-MCNC: 106 MG/DL (ref 70–99)
GLUCOSE UR STRIP-MCNC: NEGATIVE MG/DL
HCT VFR BLD AUTO: 36.6 % (ref 40–53)
HDLC SERPL-MCNC: 40 MG/DL
HGB BLD-MCNC: 12.6 G/DL (ref 13.3–17.7)
HGB UR QL STRIP: NEGATIVE
KETONES UR STRIP-MCNC: NEGATIVE MG/DL
LDLC SERPL CALC-MCNC: 55 MG/DL
LEUKOCYTE ESTERASE UR QL STRIP: NEGATIVE
MCH RBC QN AUTO: 32.3 PG (ref 26.5–33)
MCHC RBC AUTO-ENTMCNC: 34.4 G/DL (ref 31.5–36.5)
MCV RBC AUTO: 94 FL (ref 78–100)
NITRATE UR QL: NEGATIVE
NONHDLC SERPL-MCNC: 92 MG/DL
PH UR STRIP: 5 PH (ref 5–7)
PLATELET # BLD AUTO: 103 10E9/L (ref 150–450)
POTASSIUM SERPL-SCNC: 3.9 MMOL/L (ref 3.4–5.3)
PROT SERPL-MCNC: 6.5 G/DL (ref 6.8–8.8)
PROT UR-MCNC: 0.16 G/L
PROT/CREAT 24H UR: 0.19 G/G CR (ref 0–0.2)
RBC # BLD AUTO: 3.9 10E12/L (ref 4.4–5.9)
RBC #/AREA URNS AUTO: <1 /HPF (ref 0–2)
SODIUM SERPL-SCNC: 140 MMOL/L (ref 133–144)
SOURCE: NORMAL
SP GR UR STRIP: 1.01 (ref 1–1.03)
TACROLIMUS BLD-MCNC: 3.2 UG/L (ref 5–15)
TME LAST DOSE: ABNORMAL H
TRIGL SERPL-MCNC: 186 MG/DL
TSH SERPL DL<=0.005 MIU/L-ACNC: 2.96 MU/L (ref 0.4–4)
UROBILINOGEN UR STRIP-MCNC: 0 MG/DL (ref 0–2)
WBC # BLD AUTO: 5.2 10E9/L (ref 4–11)
WBC #/AREA URNS AUTO: <1 /HPF (ref 0–5)

## 2021-04-30 PROCEDURE — 71260 CT THORAX DX C+: CPT | Mod: MG | Performed by: RADIOLOGY

## 2021-04-30 PROCEDURE — 84443 ASSAY THYROID STIM HORMONE: CPT | Performed by: PATHOLOGY

## 2021-04-30 PROCEDURE — 81001 URINALYSIS AUTO W/SCOPE: CPT | Performed by: PATHOLOGY

## 2021-04-30 PROCEDURE — G1004 CDSM NDSC: HCPCS | Performed by: RADIOLOGY

## 2021-04-30 PROCEDURE — 99213 OFFICE O/P EST LOW 20 MIN: CPT | Performed by: RADIOLOGY

## 2021-04-30 PROCEDURE — 80197 ASSAY OF TACROLIMUS: CPT | Performed by: INTERNAL MEDICINE

## 2021-04-30 PROCEDURE — 70491 CT SOFT TISSUE NECK W/DYE: CPT | Mod: MG | Performed by: RADIOLOGY

## 2021-04-30 PROCEDURE — 36415 COLL VENOUS BLD VENIPUNCTURE: CPT | Performed by: PATHOLOGY

## 2021-04-30 PROCEDURE — 80048 BASIC METABOLIC PNL TOTAL CA: CPT | Performed by: PATHOLOGY

## 2021-04-30 PROCEDURE — 85027 COMPLETE CBC AUTOMATED: CPT | Performed by: PATHOLOGY

## 2021-04-30 PROCEDURE — 82565 ASSAY OF CREATININE: CPT | Performed by: PATHOLOGY

## 2021-04-30 PROCEDURE — 84156 ASSAY OF PROTEIN URINE: CPT | Mod: 90 | Performed by: PATHOLOGY

## 2021-04-30 PROCEDURE — 80076 HEPATIC FUNCTION PANEL: CPT | Performed by: PATHOLOGY

## 2021-04-30 PROCEDURE — 80061 LIPID PANEL: CPT | Performed by: PATHOLOGY

## 2021-04-30 PROCEDURE — G1004 CDSM NDSC: HCPCS | Mod: GC | Performed by: RADIOLOGY

## 2021-04-30 PROCEDURE — 99213 OFFICE O/P EST LOW 20 MIN: CPT | Performed by: OTOLARYNGOLOGY

## 2021-04-30 RX ORDER — IOPAMIDOL 755 MG/ML
125 INJECTION, SOLUTION INTRAVASCULAR ONCE
Status: COMPLETED | OUTPATIENT
Start: 2021-04-30 | End: 2021-04-30

## 2021-04-30 RX ADMIN — IOPAMIDOL 125 ML: 755 INJECTION, SOLUTION INTRAVASCULAR at 12:17

## 2021-04-30 ASSESSMENT — MIFFLIN-ST. JEOR: SCORE: 2031.78

## 2021-04-30 ASSESSMENT — PAIN SCALES - GENERAL: PAINLEVEL: NO PAIN (0)

## 2021-04-30 NOTE — LETTER
4/30/2021       RE: Eric Andrew  [address] [facility] [address]     Dear Colleague,    Thank you for referring your patient, Eric Andrew, to the [facility] [place] [facility]. Please see a copy of my visit note below.    Dear Dr. Jimenez:    I had the pleasure of seeing Eric Andrew in follow-up today at the [facility].     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  He underwent surgery by dermatologist in [place] for this.  Per his report this was not done with Mohs surgery but was told that he had negative margins.  He says that shortly after the surgery he noticed a lump along his mandible/below the ear.  He went in for his 1 month follow-up with a dermatologist and at that time the mass had increased in size.  She thought it was a reactive node but offered a biopsy. Per review of the notes they proceeded with a punch biopsy of the parotid mass.  This was consistent with invasive squamous cell carcinoma without any epidermal involvement.  His preoperative PET scan showed only involvement of the parotid. He was taken to the OR on 4/11/2019 for a left total parotidectomy with resection of skin, preservation of the facial nerve, sacrifice of the greater auricular nerve, level I-V neck dissection, cervicofacial flap. Final pathology showed a 2.1 cm moderately differentiated SCC within the subcutaneous tissues and parotid, PNI, no LVSI, negative margins, 0/47 cervical nodes. He had postoperative radiation with Dr Floyd from 5/13/2019 to 6/24/2019 for a total of 6000 cGy. He had his 3 month post treatment PET scan in October 2019 which only showed a stable sub-cm nodule in the lung.    Interval history:  He comes in  today for follow-up. He was last seen in December 2020 for a virtual visit. He was doing well at that time. He comes in today with repeat imaging and labs.  He says that he has had multiple doctors appointments this week since returning from [place] a couple weeks ago.  He did see the dentist in dermatology this week.  He had multiple spots frozen on his face.  He is having some stiffness in his neck, experiences cramping in the neck a couple of times about every 3 weeks or so.  He is not having any issues with lymphedema.  He does notice a firm area on his left cheek that he would like evaluated.  He is able to play trombone without any limitations.  He is eating and drinking without difficulties with the exception of having to be careful about eating, not able to eat as quickly.  He did have a few episodes of choking in the last 6 weeks.  He does some of his swallowing exercises.  He has no ear pain.  He says that his weight has been stable for the last 6 months.  He is seeing physical therapy next week for some hip issues. He is going back to [place] in October.    He did complete his COVID vaccine.      MEDICATIONS:     Current Outpatient Medications   Medication Sig Dispense Refill     AMLODIPINE BESYLATE PO Take 10 mg by mouth every morning        atorvastatin (LIPITOR) 20 MG tablet Take 20 mg by mouth every evening        BASAGLAR 100 UNIT/ML injection Inject 45 Units Subcutaneous At Bedtime        Calcium Carbonate-Vitamin D (CALCIUM + D PO) Take 1 tablet by mouth every morning        losartan (COZAAR) 25 MG tablet Take 1 tablet (25 mg) by mouth daily (Patient taking differently: Take 50 mg by mouth every morning ) 90 tablet 3     metFORMIN (GLUCOPHAGE) 1000 MG tablet Take 1,000 mg by mouth 2 times daily (with meals)        metoprolol succinate (TOPROL-XL) 25 MG 24 hr tablet Take 25 mg by mouth every morning        Multiple Vitamins-Minerals (MULTIVITAL) TABS Take 1 tablet by mouth every morning         niacinamide (NIACIN) 500 MG tablet Take 1,000 mg by mouth 2 times daily (with meals)       sildenafil (VIAGRA) 50 MG tablet Take 50 mg by mouth daily as needed for erectile dysfunction       tacrolimus (GENERIC EQUIVALENT) 1 MG capsule Take 1 capsule (1 mg) by mouth 2 times daily 180 capsule 3     neomycin-polymyxin-dexamethasone (MAXITROL) 3.5-36528-0.1 SUSP ophthalmic susp Place 1-2 drops Into the left eye 3 times daily 1 Bottle 1     Sodium Fluoride 1.1 % PSTE Apply 1 Application to affected area daily 112 g 11       ALLERGIES:    Allergies   Allergen Reactions     Cefazolin Swelling     Lips swelled while patient was in the OR      Lisinopril Cough     Meropenem Hives and Swelling     Developed hives and lip swelling while on meropenem and micafungin.  Resolved with discontinuation.  Unclear which was cause.     Micafungin Hives and Swelling     Developed hives and lip swelling while on meropenem and micafungin.  Resolved with discontinuation.  Unclear which was cause.       HABITS/SOCIAL HISTORY:   Spends the shin in [place].  .  He is a retired .   He smokes for a few years and quit back in 1973.  He has no chewing tobacco use.  He has 1 alcoholic beverage about 3 times per week.   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    Social History     Socioeconomic History     Marital status:      Spouse name: Not on file     Number of children: Not on file     Years of education: Not on file     Highest education level: Not on file   Occupational History     Not on file   Social Needs     Financial resource strain: Not on file     Food insecurity     Worry: Not on file     Inability: Not on file     Transportation needs     Medical: Not on file     Non-medical: Not on file   Tobacco Use     Smoking status: Never Smoker     Smokeless tobacco: Never Used     Tobacco comment: [id number] occational smoker   Substance and Sexual Activity     Alcohol use: Yes     Alcohol/week: 0.0 standard drinks      Comment: 2-3 glass of wine per week. At most 1 per day     Drug use: No     Sexual activity: Never   Lifestyle     Physical activity     Days per week: Not on file     Minutes per session: Not on file     Stress: Not on file   Relationships     Social connections     Talks on phone: Not on file     Gets together: Not on file     Attends Zoroastrianism service: Not on file     Active member of club or organization: Not on file     Attends meetings of clubs or organizations: Not on file     Relationship status: Not on file     Intimate partner violence     Fear of current or ex partner: Not on file     Emotionally abused: Not on file     Physically abused: Not on file     Forced sexual activity: Not on file   Other Topics Concern     Parent/sibling w/ CABG, MI or angioplasty before 65F 55M? Not Asked   Social History Narrative     Not on file       PAST MEDICAL HISTORY:   Past Medical History:   Diagnosis Date     Alpha-1-antitrypsin deficiency (H)      Ascites     Pre-transplant     Chronic kidney disease, stage 3 (H)      Cirrhosis (H)     Alpha-1-antitrypsin deficiency, s/p liver transplant 3/31/15     Gout      HTN (hypertension)      Lentigo maligna melanoma (H) 2009    lentigo maligna melanoma excised on 01/29/2009 with a repeat wide excision on 03/30/2009.      Liver replaced by transplant (H) 03/31/2015     Nephrolithiasis      Osteoarthritis      Portal hypertension (H)     Pre-transplant        PAST SURGICAL HISTORY:   Past Surgical History:   Procedure Laterality Date     COLONOSCOPY N/A 12/18/2014    Procedure: COLONOSCOPY;  Surgeon: Katherine Jimenez MD;  Location:  GI     ESOPHAGOSCOPY, GASTROSCOPY, DUODENOSCOPY (EGD), COMBINED N/A 12/18/2014    Procedure: COMBINED ESOPHAGOSCOPY, GASTROSCOPY, DUODENOSCOPY (EGD), BIOPSY SINGLE OR MULTIPLE;  Surgeon: Katherine Jimenez MD;  Location:  GI     ESOPHAGOSCOPY, GASTROSCOPY, DUODENOSCOPY (EGD), COMBINED N/A 5/28/2015    Procedure: COMBINED  "ESOPHAGOSCOPY, GASTROSCOPY, DUODENOSCOPY (EGD), REMOVE FOREIGN BODY;  Surgeon: Katherine Jimenez MD;  Location:  GI     HERNIA REPAIR      abdominal     INSERT SHUNT PORTAL TRANSJUGULAR INTRAHEPTIC       ORTHOPEDIC SURGERY      bilateral knee surgery     PAROTIDECTOMY, RADICAL NECK DISSECTION Left 2019    Procedure: Total Parotidectomy, Excision of Skin, Neck Dissection Levels I - V,  And Local Advancement Flap;  Surgeon: Johanna Moreland MD;  Location:  OR     TRANSPLANT LIVER RECIPIENT  DONOR N/A 3/31/2015    Procedure: TRANSPLANT LIVER RECIPIENT  DONOR;  Surgeon: Elia Mehta MD;  Location:  OR       FAMILY HISTORY:    Family History   Problem Relation Age of Onset     Cardiovascular Father         MI     Glaucoma No family hx of      Macular Degeneration No family hx of        REVIEW OF SYSTEMS:  12 point ROS was negative other than the symptoms noted above in the HPI.  Patient Supplied Answers to Review of Systems  [facility] ENT ROS 5/3/2019   Musculoskeletal Neck pain   Skin Skin lesions         PHYSICAL EXAMINATION:   Ht 1.88 m (6' 2\")   Wt 120.2 kg (265 lb)   BMI 34.02 kg/m     Appearance:   normal; NAD, age-appropriate appearance, well-developed, normal habitus   Communication:   normal; communicates verbally, normal voice quality   Head/Face:   inspection -  Normal; no scars or visible lesions   Salivary glands -  S/p left radical parotidectomy with cervicofacial advancement flap, radiation changes to the tissue, no palpable mass, well healed incision, minimal lymphedema, some fibrosis   Facial strength -  Normal and symmetric bilateral; H/B I/VI   Skin:  multiple area on the face with evidence of recent cryotherapy   Ears:  auricle (AD) -  normal  EAC (AD) -  normal  TM (AD) -  Normal, no effusion  auricle (AS) -  normal  EAC (AS) -  normal  TM (AS) -  Normal, no effusion  Normal clinical speech reception   Nose:  Ext. inspection -  Normal   Oral Cavity:  lips " -  Normal mucosa, oral competence, and stoma size   Age-appropriate dentition, healthy gingival mucosa   Hard palate, buccal, floor of mouth mucosa normal   Tongue - normal movement, no lesions   Oropharynx:  mucosa -  Normal, no lesions  soft palate -  Normal, no lesions, no asymmetry, normal elevation   Neck: Well healed neck incision, well healed cervicofacial flap, no palpable masses  Normal range of motion  Midline submental lymphedema is minimal   Lymphatic:  no abnormal nodes   Cardiovascular:  warm, pink, well-perfused extremities without swelling, tenderness, or edema   Respiratory:  Normal respiratory effort, no stridor   Neuro/Psych.:  mood/affect -  normal  mental status -  normal       PROCEDURE:        RESULTS REVIEWED:   I independently reviewed the CT scan images - no evidence of recurrence    Care discussed with Dr Floyd    I reviewed the CT neck and chest results    TSH normal      IMPRESSION AND PLAN:   Eric Andrew is a 71-year-old man who has a history of a liver transplant and recent squamous cell carcinoma of the left cheek who developed 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 He underwent total parotidectomy, skin resection, resection of greater auricular nerve, level I-V neck dissection, cervicofacial flap on 4/11/2019. Final pathology showed the 1 metastatic deposit in the parotid. He completed postoperative radiation on 6/24/2019 with Dr Floyd.     He has no signs of recurrence. He will need continued regular skin exams by dermatology. His most recent skin exam was last week.    He is having some cramping of the neck.  We discussed a possible referral to Dr. Camejo in PM&R.  He declined at this time.  He is going to discuss with his physical therapist next week when he sees them for his hip.  If he decides he wants a referral to Dr. Camejo he will let us know.    He had his TSH checked today which was normal. We will recheck this in November 2021.    He had repeat imaging today.  Results were  not finalized at the time of the visit but appeared normal on review.  Final results were without evidence of recurrent disease and were forwarded on to the patient via [employer].  We will repeat his imaging in May 2021.    I will see him back in 4-6 months prior to going to [place] in October.  He will return to [facility] in a year.    Thank you very much for the opportunity to participate in the care of your patient.      Johanna Moreland M.D.  Otolaryngology- Head & Neck Surgery      This note was dictated with voice recognition software and then edited. Please excuse any unintentional errors.         CC:  Katherine Jimenez MD  [address]  [facility] [address]      Naomi Rodriguez, RN  Liver Coordinator  [facility]      Aston Devine MD  [facility]  [address]  [facility] [address]      Grabiel Floyd MD  Department of Radiation Oncology  [facility]

## 2021-04-30 NOTE — NURSING NOTE
"Chief Complaint   Patient presents with     RECHECK     4 month follow up       Height 1.88 m (6' 2\"), weight 120.2 kg (265 lb).    Kelly Steinberg, EMT    "

## 2021-05-03 ENCOUNTER — VIRTUAL VISIT (OUTPATIENT)
Dept: GASTROENTEROLOGY | Facility: CLINIC | Age: 71
End: 2021-05-03
Attending: INTERNAL MEDICINE
Payer: MEDICARE

## 2021-05-03 DIAGNOSIS — Z94.4 LIVER TRANSPLANTED (H): Primary | ICD-10-CM

## 2021-05-03 DIAGNOSIS — N18.31 STAGE 3A CHRONIC KIDNEY DISEASE (H): ICD-10-CM

## 2021-05-03 PROBLEM — N18.30 CHRONIC KIDNEY DISEASE, STAGE 3 (H): Status: ACTIVE | Noted: 2021-05-03

## 2021-05-03 PROCEDURE — 99214 OFFICE O/P EST MOD 30 MIN: CPT | Mod: 95 | Performed by: INTERNAL MEDICINE

## 2021-05-03 ASSESSMENT — PAIN SCALES - GENERAL: PAINLEVEL: NO PAIN (0)

## 2021-05-03 NOTE — PROGRESS NOTES
"Eric is a 71 year old who is being evaluated via a billable video visit.      How would you like to obtain your AVS? MyChart  If the video visit is dropped, the invitation should be resent by: Text to cell phone: [phone]  Will anyone else be joining your video visit? No  {If patient encounters technical issues they should call [phone] :[id number]}    Video Start Time: {video visit start/end time for provider to select:152948}  Video-Visit Details    Type of service:  Video Visit    Video End Time:{video visit start/end time for provider to select:152948}    Originating Location (pt. Location): {video visit patient location:547502::\"Home\"}    Distant Location (provider location):  [facility] [place]     Platform used for Video Visit: {Virtual Visit Platforms:973824::\"Volance\"}    "

## 2021-05-03 NOTE — LETTER
5/3/2021         RE: Eric Andrew  [address] [facility] [address]        Dear Colleague,    Thank you for referring your patient, Erci Andrew, to the [facility] [place]. Please see a copy of my visit note below.    [facility]  LIVER CLINIC FOLLOW UP      A/P  71 Y M s/p LT 2015 for A1AT. Doing well until dx of cutanoues squamous cell carcinoma with invasion in to L parotid 2018    IS Running tac as low as we can. No strong compelling indication to change. Kidney function better. Continue to run tac close to 3. Continue monitoring labs and IS level every 3 months to assess for appropriate dosing and side effects from IS including EZEQUIEL, pancytopenia, hyperkalemia, hypomagnesemia.  Graft function excllent     CKD, stable.   Covid vaccination completed,      No changes to medications today. Labs up to date as is cancer screening.   Labs every 3 months.     Will see back 1 y     Katherine Jimenez MD  Hepatology/Liver Transplant  Medical Director, Liver Transplantation  [facility]  =========================================================     S: 71 Y M s/p DDLT 3/31/15 for alpha-1-antitrypsin deficiency    2018 diagnosed with cutaneous squamous cell carcinoma  Arising from the L cheek and recurrence after excision, invasive in to the L parotid gland. No s/p L total parotidectomy of 2.1 cm tumor. Also received radiation. Recent MRI clear.    He is doing well. They spent the winter in [place]. Recently had his check ins with multiple physicians and all is going well.    EXPLANT: NO HCC.  IS: Prograf. Kidney function has not been normal, but has been stable. Has mild proteinuria. Tac level was 3.2 4/30/21 but this was not a trough.  LABS: Up to date. Liver tests look great, normal.   Lab Test 04/30/21  1241   PROTTOTAL 6.5*   ALBUMIN 3.6   BILITOTAL 0.4   ALKPHOS 73   AST 7   ALT 20     Lab Test 04/30/21  1241   WBC 5.2   RBC 3.90*   HGB 12.6*   HCT 36.6*  "  MCV 94   MCH 32.3   MCHC 34.4   RDW 14.1   *     REJECTION: None.  BILIARY ISSUES: None  STENT: Removed 5/28/15 by endoscopy  KIDNEY FUNCTION:  Sees Dr. Bonner. Elevated creatinine since just prior to LT  Creatinine   Date Value Ref Range Status   04/30/2021 1.52 (H) 0.66 - 1.25 mg/dL Final     BP: Good. Amlodipine. Managed by PCP and nephrology. BP at home in 130s.   PREV: UTD on screening (colonoscopy 2014)  DISEASE RECURRENCE: N/A  SOC: He hasn't been travelling due to [place].  ROS: 10 point ROS neg other than the symptoms noted above in the HPI.  Exam  Gen Alert pleasant NAD  Resp No difficulty breathing. No cough  Skin No Jaundice  Eyes No icterus  Neuro CARTY  MSK no muscle wasting    Eric Andrew is a 71 year old male who is being evaluated via a billable video visit.      The patient has been notified of following:   \"This video visit will be conducted via a call between you and your physician/provider. We have found that certain health care needs can be provided without the need for an in-person physical exam.  This service lets us provide the care you need with a video conversation.  If a prescription is necessary we can send it directly to your pharmacy.  If lab work is needed we can place an order for that and you can then stop by our lab to have the test done at a later time. Video visits are billed at different rates depending on your insurance coverage.  Please reach out to your insurance provider with any questions.  If during the course of the call the physician/provider feels a video visit is not appropriate, you will not be charged for this service.\"   Patient has given verbal consent for Video visit? Yes    Type of service:  Video Visit  Video Start Time 1134  Video End Time 1155  Patient location: home  Will anyone else be joining your video visit? Wife Naomi  {If patient encounters technical issues they should call [phone] :1  Distant Location (provider location):  [facility] [place]   Mode of Communication:  Video Conference via [employer]  I have reviewed and updated the patient's Past Medical History, Social History, Family History and Medication List.      Psyche Pleasant, appropriate. Well groomed.        Again, thank you for allowing me to participate in the care of your patient.        Sincerely,        Katherine Jimenez MD

## 2021-05-03 NOTE — PROGRESS NOTES
[facility]  LIVER CLINIC FOLLOW UP      A/P  71 Y M s/p LT 2015 for A1AT. Doing well until dx of cutanoues squamous cell carcinoma with invasion in to L parotid 2018    IS Running tac as low as we can. No strong compelling indication to change. Kidney function better. Continue to run tac close to 3. Continue monitoring labs and IS level every 3 months to assess for appropriate dosing and side effects from IS including EZEQUIEL, pancytopenia, hyperkalemia, hypomagnesemia.  Graft function excllent     CKD, stable.   Covid vaccination completed,      No changes to medications today. Labs up to date as is cancer screening.   Labs every 3 months.     Will see back 1 y     Katherine Jimenez MD  Hepatology/Liver Transplant  Medical Director, Liver Transplantation  [facility]  =========================================================     S: 71 Y M s/p DDLT 3/31/15 for alpha-1-antitrypsin deficiency    2018 diagnosed with cutaneous squamous cell carcinoma  Arising from the L cheek and recurrence after excision, invasive in to the L parotid gland. No s/p L total parotidectomy of 2.1 cm tumor. Also received radiation. Recent MRI clear.    He is doing well. They spent the winter in [place]. Recently had his check ins with multiple physicians and all is going well.    EXPLANT: NO HCC.  IS: Prograf. Kidney function has not been normal, but has been stable. Has mild proteinuria. Tac level was 3.2 4/30/21 but this was not a trough.  LABS: Up to date. Liver tests look great, normal.   Lab Test 04/30/21  1241   PROTTOTAL 6.5*   ALBUMIN 3.6   BILITOTAL 0.4   ALKPHOS 73   AST 7   ALT 20     Lab Test 04/30/21  1241   WBC 5.2   RBC 3.90*   HGB 12.6*   HCT 36.6*   MCV 94   MCH 32.3   MCHC 34.4   RDW 14.1   *     REJECTION: None.  BILIARY ISSUES: None  STENT: Removed 5/28/15 by endoscopy  KIDNEY FUNCTION:  Sees Dr. Bonner. Elevated creatinine since just prior to LT  Creatinine   Date Value Ref Range Status  "  04/30/2021 1.52 (H) 0.66 - 1.25 mg/dL Final     BP: Good. Amlodipine. Managed by PCP and nephrology. BP at home in 130s.   PREV: UTD on screening (colonoscopy 2014)  DISEASE RECURRENCE: N/A  SOC: He hasn't been travelling due to [place].  ROS: 10 point ROS neg other than the symptoms noted above in the HPI.  Exam  Gen Alert pleasant NAD  Resp No difficulty breathing. No cough  Skin No Jaundice  Eyes No icterus  Neuro CARTY  MSK no muscle wasting    Eric Andrew is a 71 year old male who is being evaluated via a billable video visit.      The patient has been notified of following:   \"This video visit will be conducted via a call between you and your physician/provider. We have found that certain health care needs can be provided without the need for an in-person physical exam.  This service lets us provide the care you need with a video conversation.  If a prescription is necessary we can send it directly to your pharmacy.  If lab work is needed we can place an order for that and you can then stop by our lab to have the test done at a later time. Video visits are billed at different rates depending on your insurance coverage.  Please reach out to your insurance provider with any questions.  If during the course of the call the physician/provider feels a video visit is not appropriate, you will not be charged for this service.\"   Patient has given verbal consent for Video visit? Yes    Type of service:  Video Visit  Video Start Time 1134  Video End Time 1158  Patient location: home  Will anyone else be joining your video visit? Wife Naomi  {If patient encounters technical issues they should call [phone] :1  Distant Location (provider location):  [facility] [place]   Mode of Communication:  Video Conference via [employer]  I have reviewed and updated the patient's Past Medical History, Social History, Family History and Medication List.          Psyche Pleasant, appropriate. Well " groomed.

## 2021-05-03 NOTE — PROGRESS NOTES
Left voicemail for patient to call back to set up telemedicine visit, will call again before appointment time.  Alee Pearson CMA on 5/3/2021 at 10:25 AM

## 2021-05-16 ENCOUNTER — HEALTH MAINTENANCE LETTER (OUTPATIENT)
Age: 71
End: 2021-05-16

## 2021-05-26 ENCOUNTER — HOSPITAL ENCOUNTER (OUTPATIENT)
Dept: LAB | Facility: CLINIC | Age: 71
Discharge: HOME | End: 2021-05-26
Attending: INTERNAL MEDICINE | Admitting: INTERNAL MEDICINE
Payer: MEDICARE

## 2021-05-26 DIAGNOSIS — Z94.4 LIVER REPLACED BY TRANSPLANT (H): ICD-10-CM

## 2021-05-26 DIAGNOSIS — Z13.220 LIPID SCREENING: ICD-10-CM

## 2021-05-26 LAB
ALBUMIN SERPL-MCNC: 3.8 G/DL (ref 3.4–5)
ALP SERPL-CCNC: 84 U/L (ref 40–150)
ALT SERPL W P-5'-P-CCNC: 18 U/L (ref 0–70)
ANION GAP SERPL CALCULATED.3IONS-SCNC: 6 MMOL/L (ref 3–14)
AST SERPL W P-5'-P-CCNC: 6 U/L (ref 0–45)
BILIRUB DIRECT SERPL-MCNC: 0.2 MG/DL (ref 0–0.2)
BILIRUB SERPL-MCNC: 0.5 MG/DL (ref 0.2–1.3)
BUN SERPL-MCNC: 26 MG/DL (ref 7–30)
CALCIUM SERPL-MCNC: 9 MG/DL (ref 8.5–10.1)
CHLORIDE SERPL-SCNC: 108 MMOL/L (ref 94–109)
CO2 SERPL-SCNC: 25 MMOL/L (ref 20–32)
CREAT SERPL-MCNC: 1.44 MG/DL (ref 0.66–1.25)
ERYTHROCYTE [DISTWIDTH] IN BLOOD BY AUTOMATED COUNT: 14.5 % (ref 10–15)
GFR SERPL CREATININE-BSD FRML MDRD: 49 ML/MIN/{1.73_M2}
GLUCOSE SERPL-MCNC: 161 MG/DL (ref 70–99)
HCT VFR BLD AUTO: 40.7 % (ref 40–53)
HGB BLD-MCNC: 13.6 G/DL (ref 13.3–17.7)
MCH RBC QN AUTO: 32.2 PG (ref 26.5–33)
MCHC RBC AUTO-ENTMCNC: 33.4 G/DL (ref 31.5–36.5)
MCV RBC AUTO: 96 FL (ref 78–100)
PLATELET # BLD AUTO: 112 10E9/L (ref 150–450)
POTASSIUM SERPL-SCNC: 4 MMOL/L (ref 3.4–5.3)
PROT SERPL-MCNC: 7.5 G/DL (ref 6.8–8.8)
RBC # BLD AUTO: 4.23 10E12/L (ref 4.4–5.9)
SODIUM SERPL-SCNC: 139 MMOL/L (ref 133–144)
TACROLIMUS BLD-MCNC: 3 UG/L (ref 5–15)
TME LAST DOSE: ABNORMAL H
WBC # BLD AUTO: 5.1 10E9/L (ref 4–11)

## 2021-05-26 PROCEDURE — 80076 HEPATIC FUNCTION PANEL: CPT | Performed by: INTERNAL MEDICINE

## 2021-05-26 PROCEDURE — 36415 COLL VENOUS BLD VENIPUNCTURE: CPT | Performed by: INTERNAL MEDICINE

## 2021-05-26 PROCEDURE — 85027 COMPLETE CBC AUTOMATED: CPT | Performed by: INTERNAL MEDICINE

## 2021-05-26 PROCEDURE — 80048 BASIC METABOLIC PNL TOTAL CA: CPT | Performed by: INTERNAL MEDICINE

## 2021-05-26 PROCEDURE — 80197 ASSAY OF TACROLIMUS: CPT | Performed by: INTERNAL MEDICINE

## 2021-09-05 ENCOUNTER — HEALTH MAINTENANCE LETTER (OUTPATIENT)
Age: 71
End: 2021-09-05

## 2021-09-07 ENCOUNTER — LAB (OUTPATIENT)
Dept: LAB | Facility: CLINIC | Age: 71
End: 2021-09-07
Payer: MEDICARE

## 2021-09-07 DIAGNOSIS — Z94.4 LIVER REPLACED BY TRANSPLANT (H): ICD-10-CM

## 2021-09-07 DIAGNOSIS — E03.9 HYPOTHYROIDISM, UNSPECIFIED TYPE: ICD-10-CM

## 2021-09-07 DIAGNOSIS — E03.9 HYPOTHYROIDISM, UNSPECIFIED TYPE: Primary | ICD-10-CM

## 2021-09-07 DIAGNOSIS — C77.0 SECONDARY MALIGNANCY OF LYMPH NODES OF HEAD, FACE AND NECK (H): ICD-10-CM

## 2021-09-07 DIAGNOSIS — Z13.220 LIPID SCREENING: ICD-10-CM

## 2021-09-07 LAB
ALBUMIN SERPL-MCNC: 3.9 G/DL (ref 3.4–5)
ALP SERPL-CCNC: 79 U/L (ref 40–150)
ALT SERPL W P-5'-P-CCNC: 16 U/L (ref 0–70)
ANION GAP SERPL CALCULATED.3IONS-SCNC: 6 MMOL/L (ref 3–14)
AST SERPL W P-5'-P-CCNC: 10 U/L (ref 0–45)
BILIRUB DIRECT SERPL-MCNC: 0.2 MG/DL (ref 0–0.2)
BILIRUB SERPL-MCNC: 0.5 MG/DL (ref 0.2–1.3)
BUN SERPL-MCNC: 25 MG/DL (ref 7–30)
CALCIUM SERPL-MCNC: 9.2 MG/DL (ref 8.5–10.1)
CHLORIDE BLD-SCNC: 107 MMOL/L (ref 94–109)
CO2 SERPL-SCNC: 26 MMOL/L (ref 20–32)
CREAT SERPL-MCNC: 1.54 MG/DL (ref 0.66–1.25)
ERYTHROCYTE [DISTWIDTH] IN BLOOD BY AUTOMATED COUNT: 14.5 % (ref 10–15)
GFR SERPL CREATININE-BSD FRML MDRD: 45 ML/MIN/1.73M2
GLUCOSE BLD-MCNC: 156 MG/DL (ref 70–99)
HCT VFR BLD AUTO: 41.2 % (ref 40–53)
HGB BLD-MCNC: 13.7 G/DL (ref 13.3–17.7)
MCH RBC QN AUTO: 32.2 PG (ref 26.5–33)
MCHC RBC AUTO-ENTMCNC: 33.3 G/DL (ref 31.5–36.5)
MCV RBC AUTO: 97 FL (ref 78–100)
PLATELET # BLD AUTO: 112 10E3/UL (ref 150–450)
POTASSIUM BLD-SCNC: 4.1 MMOL/L (ref 3.4–5.3)
PROT SERPL-MCNC: 7.5 G/DL (ref 6.8–8.8)
RBC # BLD AUTO: 4.25 10E6/UL (ref 4.4–5.9)
SODIUM SERPL-SCNC: 139 MMOL/L (ref 133–144)
T4 FREE SERPL-MCNC: 1 NG/DL (ref 0.76–1.46)
TACROLIMUS BLD-MCNC: 3.5 UG/L (ref 5–15)
TME LAST DOSE: ABNORMAL H
TME LAST DOSE: ABNORMAL H
TSH SERPL DL<=0.005 MIU/L-ACNC: 5.55 MU/L (ref 0.4–4)
WBC # BLD AUTO: 5.3 10E3/UL (ref 4–11)

## 2021-09-07 PROCEDURE — 80197 ASSAY OF TACROLIMUS: CPT

## 2021-09-07 PROCEDURE — 80048 BASIC METABOLIC PNL TOTAL CA: CPT

## 2021-09-07 PROCEDURE — 82040 ASSAY OF SERUM ALBUMIN: CPT

## 2021-09-07 PROCEDURE — 84443 ASSAY THYROID STIM HORMONE: CPT

## 2021-09-07 PROCEDURE — 36415 COLL VENOUS BLD VENIPUNCTURE: CPT

## 2021-09-07 PROCEDURE — 84439 ASSAY OF FREE THYROXINE: CPT

## 2021-09-07 PROCEDURE — 85027 COMPLETE CBC AUTOMATED: CPT

## 2021-09-07 PROCEDURE — 82248 BILIRUBIN DIRECT: CPT

## 2021-09-07 RX ORDER — LEVOTHYROXINE SODIUM 50 UG/1
50 TABLET ORAL DAILY
Qty: 90 TABLET | Refills: 3 | Status: SHIPPED | OUTPATIENT
Start: 2021-09-07 | End: 2022-06-01

## 2021-09-29 ENCOUNTER — OFFICE VISIT (OUTPATIENT)
Dept: OTOLARYNGOLOGY | Facility: CLINIC | Age: 71
End: 2021-09-29
Payer: MEDICARE

## 2021-09-29 VITALS — BODY MASS INDEX: 32.95 KG/M2 | HEIGHT: 75 IN | WEIGHT: 265 LBS

## 2021-09-29 DIAGNOSIS — C77.0 SECONDARY MALIGNANCY OF LYMPH NODES OF HEAD, FACE AND NECK (H): ICD-10-CM

## 2021-09-29 DIAGNOSIS — E03.4 HYPOTHYROIDISM DUE TO ACQUIRED ATROPHY OF THYROID: ICD-10-CM

## 2021-09-29 DIAGNOSIS — C44.320 SQUAMOUS CELL CARCINOMA OF SKIN OF FACE: Primary | ICD-10-CM

## 2021-09-29 PROCEDURE — 99213 OFFICE O/P EST LOW 20 MIN: CPT | Performed by: OTOLARYNGOLOGY

## 2021-09-29 ASSESSMENT — PAIN SCALES - GENERAL: PAINLEVEL: NO PAIN (0)

## 2021-09-29 ASSESSMENT — MIFFLIN-ST. JEOR: SCORE: 2047.66

## 2021-09-29 NOTE — PROGRESS NOTES
Dear Dr. Jimenez:    I had the pleasure of seeing Eric Andrew in follow-up today at the [facility].     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  He underwent surgery by dermatologist in [place] for this.  Per his report this was not done with Mohs surgery but was told that he had negative margins.  He says that shortly after the surgery he noticed a lump along his mandible/below the ear.  He went in for his 1 month follow-up with a dermatologist and at that time the mass had increased in size.  She thought it was a reactive node but offered a biopsy. Per review of the notes they proceeded with a punch biopsy of the parotid mass.  This was consistent with invasive squamous cell carcinoma without any epidermal involvement.  His preoperative PET scan showed only involvement of the parotid. He was taken to the OR on 4/11/2019 for a left total parotidectomy with resection of skin, preservation of the facial nerve, sacrifice of the greater auricular nerve, level I-V neck dissection, cervicofacial flap. Final pathology showed a 2.1 cm moderately differentiated SCC within the subcutaneous tissues and parotid, PNI, no LVSI, negative margins, 0/47 cervical nodes. He had postoperative radiation with Dr Floyd from 5/13/2019 to 6/24/2019 for a total of 6000 cGy. He had his 3 month post treatment PET scan in October 2019 which only showed a stable sub-cm nodule in the lung.    Interval history:  He comes in today for follow-up. He was last seen in April 2021. He had repeat imaging at that time. He saw the liver clinic in May 2021. He has been traveling the last month. He is leaving for [place] in October. He was started on synthroid by radiation oncology but had issues with low blood sugar, having issues with taking it with  his other medications. He has not been taking the medication because of the travel and concerns for side effects. He saw the dermatologist every 3 months. He has no new skin spots, no biopsies, did have some cryotherapy. He is wearing sunscreen and hat. His lip is doing well. He has no problems with it. He has no new masses. He is having tightness constantly in the neck, no cramping. He occasionally has problems with things getting stuck swallow, just happens when he is not being careful about eating. Did have an episode at the [facility] with kofi getting stuck for about 2 hrs. He says that it happens about every 3-6 months. He is seeing the dentist.     He is playing in a band.       MEDICATIONS:     Current Outpatient Medications   Medication Sig Dispense Refill     AMLODIPINE BESYLATE PO Take 10 mg by mouth every morning        atorvastatin (LIPITOR) 20 MG tablet Take 20 mg by mouth every evening        BASAGLAR 100 UNIT/ML injection Inject 45 Units Subcutaneous At Bedtime        Calcium Carbonate-Vitamin D (CALCIUM + D PO) Take 1 tablet by mouth every morning        levothyroxine (SYNTHROID/LEVOTHROID) 50 MCG tablet Take 1 tablet (50 mcg) by mouth daily 90 tablet 3     metFORMIN (GLUCOPHAGE) 1000 MG tablet Take 1,000 mg by mouth 2 times daily (with meals)        metoprolol succinate (TOPROL-XL) 25 MG 24 hr tablet Take 25 mg by mouth every morning        Multiple Vitamins-Minerals (MULTIVITAL) TABS Take 1 tablet by mouth every morning        niacinamide (NIACIN) 500 MG tablet Take 1,000 mg by mouth 2 times daily (with meals)       sildenafil (VIAGRA) 50 MG tablet Take 50 mg by mouth daily as needed for erectile dysfunction       tacrolimus (GENERIC EQUIVALENT) 1 MG capsule Take 1 capsule (1 mg) by mouth 2 times daily 180 capsule 3     losartan (COZAAR) 25 MG tablet Take 1 tablet (25 mg) by mouth daily (Patient not taking: Reported on 9/29/2021) 90 tablet 3     neomycin-polymyxin-dexamethasone (MAXITROL)  3.5-82576-9.1 SUSP ophthalmic susp Place 1-2 drops Into the left eye 3 times daily 1 Bottle 1     Sodium Fluoride 1.1 % PSTE Apply 1 Application to affected area daily 112 g 11       ALLERGIES:    Allergies   Allergen Reactions     Cefazolin Swelling     Lips swelled while patient was in the OR      Lisinopril Cough     Meropenem Hives and Swelling     Developed hives and lip swelling while on meropenem and micafungin.  Resolved with discontinuation.  Unclear which was cause.     Micafungin Hives and Swelling     Developed hives and lip swelling while on meropenem and micafungin.  Resolved with discontinuation.  Unclear which was cause.       HABITS/SOCIAL HISTORY:   Spends the shin in [place].  .  He is a retired .   He smokes for a few years and quit back in 1973.  He has no chewing tobacco use.  He has 1 alcoholic beverage about 3 times per week.   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    Social History     Socioeconomic History     Marital status:      Spouse name: Not on file     Number of children: Not on file     Years of education: Not on file     Highest education level: Not on file   Occupational History     Not on file   Tobacco Use     Smoking status: Never Smoker     Smokeless tobacco: Never Used     Tobacco comment: [id number] occational smoker   Substance and Sexual Activity     Alcohol use: Yes     Alcohol/week: 0.0 standard drinks     Comment: 2-3 glass of wine per week. At most 1 per day     Drug use: No     Sexual activity: Never   Other Topics Concern     Parent/sibling w/ CABG, MI or angioplasty before 65F 55M? Not Asked   Social History Narrative     Not on file     Social Determinants of Health     Financial Resource Strain:      Difficulty of Paying Living Expenses:    Food Insecurity:      Worried About Running Out of Food in the Last Year:      Ran Out of Food in the Last Year:    Transportation Needs:      Lack of Transportation (Medical):      Lack of  Transportation (Non-Medical):    Physical Activity:      Days of Exercise per Week:      Minutes of Exercise per Session:    Stress:      Feeling of Stress :    Social Connections:      Frequency of Communication with Friends and Family:      Frequency of Social Gatherings with Friends and Family:      Attends Caoda[employer] Services:      Active Member of Clubs or Organizations:      Attends Club or Organization Meetings:      Marital Status:    Intimate Partner Violence:      Fear of Current or Ex-Partner:      Emotionally Abused:      Physically Abused:      Sexually Abused:        PAST MEDICAL HISTORY:   Past Medical History:   Diagnosis Date     Alpha-1-antitrypsin deficiency (H)      Ascites     Pre-transplant     Chronic kidney disease, stage 3      Cirrhosis (H)     Alpha-1-antitrypsin deficiency, s/p liver transplant 3/31/15     Gout      HTN (hypertension)      Lentigo maligna melanoma (H) 2009    lentigo maligna melanoma excised on 01/29/2009 with a repeat wide excision on 03/30/2009.      Liver replaced by transplant (H) 03/31/2015     Nephrolithiasis      Osteoarthritis      Portal hypertension (H)     Pre-transplant        PAST SURGICAL HISTORY:   Past Surgical History:   Procedure Laterality Date     COLONOSCOPY N/A 12/18/2014    Procedure: COLONOSCOPY;  Surgeon: Katherine Jimenez MD;  Location:  GI     ESOPHAGOSCOPY, GASTROSCOPY, DUODENOSCOPY (EGD), COMBINED N/A 12/18/2014    Procedure: COMBINED ESOPHAGOSCOPY, GASTROSCOPY, DUODENOSCOPY (EGD), BIOPSY SINGLE OR MULTIPLE;  Surgeon: Katherine Jimenez MD;  Location:  GI     ESOPHAGOSCOPY, GASTROSCOPY, DUODENOSCOPY (EGD), COMBINED N/A 5/28/2015    Procedure: COMBINED ESOPHAGOSCOPY, GASTROSCOPY, DUODENOSCOPY (EGD), REMOVE FOREIGN BODY;  Surgeon: Katherine Jimenez MD;  Location:  GI     HERNIA REPAIR      abdominal     INSERT SHUNT PORTAL TRANSJUGULAR INTRAHEPTIC       ORTHOPEDIC SURGERY      bilateral knee surgery      "PAROTIDECTOMY, RADICAL NECK DISSECTION Left 2019    Procedure: Total Parotidectomy, Excision of Skin, Neck Dissection Levels I - V,  And Local Advancement Flap;  Surgeon: Johanna Moreland MD;  Location: [facility] OR     TRANSPLANT LIVER RECIPIENT  DONOR N/A 3/31/2015    Procedure: TRANSPLANT LIVER RECIPIENT  DONOR;  Surgeon: Elia Mehta MD;  Location: [facility] OR       FAMILY HISTORY:    Family History   Problem Relation Age of Onset     Cardiovascular Father         MI     Glaucoma No family hx of      Macular Degeneration No family hx of        REVIEW OF SYSTEMS:  12 point ROS was negative other than the symptoms noted above in the HPI.  Patient Supplied Answers to Review of Systems  [facility] ROS 2021   Gastrointestinal/Genitourinary Heartburn/indigestion   Musculoskeletal -   Skin -         PHYSICAL EXAMINATION:   Ht 1.905 m (6' 3\")   Wt 120.2 kg (265 lb)   BMI 33.12 kg/m     Appearance:   normal; NAD, age-appropriate appearance, well-developed, normal habitus   Communication:   normal; communicates verbally, normal voice quality   Head/Face:   inspection -  Normal; no scars or visible lesions   Salivary glands -  S/p left radical parotidectomy with cervicofacial advancement flap, radiation changes to the tissue, no palpable mass, well healed incision, minimal lymphedema, some fibrosis   Facial strength -  Normal and symmetric bilateral; H/B I/VI   Skin:  small crusted lesion along the right antihelix, another about 4 mm in size along left lobule   Ears:  auricle (AD) -  normal  EAC (AD) -  normal  TM (AD) -  Normal, no effusion  auricle (AS) -  normal  EAC (AS) -  normal  TM (AS) -  Normal, no effusion  Normal clinical speech reception   Nose:  Ext. inspection -  Normal   Oral Cavity:  lips -  Normal mucosa, oral competence, and stoma size   Age-appropriate dentition, healthy gingival mucosa   Hard palate, buccal, floor of mouth mucosa normal   Tongue - normal movement, no lesions "   Oropharynx:  mucosa -  Normal, no lesions  soft palate -  Normal, no lesions, no asymmetry, normal elevation   Neck: Well healed neck incision, well healed cervicofacial flap, no palpable masses  Normal range of motion  No significant lymphedema, mild fibrosis   Lymphatic:  no abnormal nodes   Cardiovascular:  warm, pink, well-perfused extremities without swelling, tenderness, or edema   Respiratory:  Normal respiratory effort, no stridor   Neuro/Psych.:  mood/affect -  normal  mental status -  normal       PROCEDURE:      RESULTS REVIEWED:   TSH mildly elevated, T4 normal    IMPRESSION AND PLAN:   Eric Andrew is a 71-year-old man who has a history of a liver transplant and recent squamous cell carcinoma of the left cheek who developed 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 He underwent total parotidectomy, skin resection, resection of greater auricular nerve, level I-V neck dissection, cervicofacial flap on 4/11/2019. Final pathology showed the 1 metastatic deposit in the parotid. He completed postoperative radiation on 6/24/2019 with Dr Floyd.     He has no signs of recurrence. He will need continued regular skin exams by dermatology which he is doing every 3 months both here and when in [place].    He had his TSH checked in September 2021, radiation oncology team started him on synthroid. He says he had issues with hypoglycemia with taking it. I asked him to talk to his PCP about this and any need for adjustment in his insulin.    We will repeat his imaging in May 2022.    He will return to [facility] clinic when he returns from [place] in the spring with imaging at the same time.    Thank you very much for the opportunity to participate in the care of your patient.      Johanna Moreland M.D.  Otolaryngology- Head & Neck Surgery      This note was dictated with voice recognition software and then edited. Please excuse any unintentional errors.         CC:  Katherine Jimenez MD  [address] [place] [address]  [address]  [facility] [address]      Naomi Rodriguez, RN  Liver Coordinator  [facility]      Aston Devine MD  [facility]  [address]  [facility] [address]      Grabiel Floyd MD  Department of Radiation Oncology  [facility]

## 2021-09-29 NOTE — PATIENT INSTRUCTIONS
F/u in [facility] in conjunction with Dr Floyd in April or May, scans on the same day

## 2021-09-29 NOTE — LETTER
9/29/2021       RE: Eric Andrew  [address] [facility] [place] [address]     Dear Colleague,    Thank you for referring your patient, Eric Andrew, to the [facility] EAR NOSE AND [facility] [place] at [facility]. Please see a copy of my visit note below.    Dear Dr. Jimenez:    I had the pleasure of seeing Eric Andrew in follow-up today at the [facility].     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  He underwent surgery by dermatologist in [place] for this.  Per his report this was not done with Mohs surgery but was told that he had negative margins.  He says that shortly after the surgery he noticed a lump along his mandible/below the ear.  He went in for his 1 month follow-up with a dermatologist and at that time the mass had increased in size.  She thought it was a reactive node but offered a biopsy. Per review of the notes they proceeded with a punch biopsy of the parotid mass.  This was consistent with invasive squamous cell carcinoma without any epidermal involvement.  His preoperative PET scan showed only involvement of the parotid. He was taken to the OR on 4/11/2019 for a left total parotidectomy with resection of skin, preservation of the facial nerve, sacrifice of the greater auricular nerve, level I-V neck dissection, cervicofacial flap. Final pathology showed a 2.1 cm moderately differentiated SCC within the subcutaneous tissues and parotid, PNI, no LVSI, negative margins, 0/47 cervical nodes. He had postoperative radiation with Dr Floyd from 5/13/2019 to 6/24/2019 for a total of 6000 cGy. He had his 3 month post treatment PET scan in October 2019 which only showed a stable sub-cm nodule in the lung.    Interval history:  He comes in  today for follow-up. He was last seen in April 2021. He had repeat imaging at that time. He saw the liver clinic in May 2021. He has been traveling the last month. He is leaving for [place] in October. He was started on synthroid by radiation oncology but had issues with low blood sugar, having issues with taking it with his other medications. He has not been taking the medication because of the travel and concerns for side effects. He saw the dermatologist every 3 months. He has no new skin spots, no biopsies, did have some cryotherapy. He is wearing sunscreen and hat. His lip is doing well. He has no problems with it. He has no new masses. He is having tightness constantly in the neck, no cramping. He occasionally has problems with things getting stuck swallow, just happens when he is not being careful about eating. Did have an episode [facility] with radhaeye getting stuck for about 2 hrs. He says that it happens about every 3-6 months. He is seeing the dentist.     He is playing in a band.       MEDICATIONS:     Current Outpatient Medications   Medication Sig Dispense Refill     AMLODIPINE BESYLATE PO Take 10 mg by mouth every morning        atorvastatin (LIPITOR) 20 MG tablet Take 20 mg by mouth every evening        BASAGLAR 100 UNIT/ML injection Inject 45 Units Subcutaneous At Bedtime        Calcium Carbonate-Vitamin D (CALCIUM + D PO) Take 1 tablet by mouth every morning        levothyroxine (SYNTHROID/LEVOTHROID) 50 MCG tablet Take 1 tablet (50 mcg) by mouth daily 90 tablet 3     metFORMIN (GLUCOPHAGE) 1000 MG tablet Take 1,000 mg by mouth 2 times daily (with meals)        metoprolol succinate (TOPROL-XL) 25 MG 24 hr tablet Take 25 mg by mouth every morning        Multiple Vitamins-Minerals (MULTIVITAL) TABS Take 1 tablet by mouth every morning        niacinamide (NIACIN) 500 MG tablet Take 1,000 mg by mouth 2 times daily (with meals)       sildenafil (VIAGRA) 50 MG tablet Take 50 mg by mouth daily as  needed for erectile dysfunction       tacrolimus (GENERIC EQUIVALENT) 1 MG capsule Take 1 capsule (1 mg) by mouth 2 times daily 180 capsule 3     losartan (COZAAR) 25 MG tablet Take 1 tablet (25 mg) by mouth daily (Patient not taking: Reported on 9/29/2021) 90 tablet 3     neomycin-polymyxin-dexamethasone (MAXITROL) 3.5-58512-6.1 SUSP ophthalmic susp Place 1-2 drops Into the left eye 3 times daily 1 Bottle 1     Sodium Fluoride 1.1 % PSTE Apply 1 Application to affected area daily 112 g 11       ALLERGIES:    Allergies   Allergen Reactions     Cefazolin Swelling     Lips swelled while patient was in the OR      Lisinopril Cough     Meropenem Hives and Swelling     Developed hives and lip swelling while on meropenem and micafungin.  Resolved with discontinuation.  Unclear which was cause.     Micafungin Hives and Swelling     Developed hives and lip swelling while on meropenem and micafungin.  Resolved with discontinuation.  Unclear which was cause.       HABITS/SOCIAL HISTORY:   Spends the shin in [place].  .  He is a retired .   He smokes for a few years and quit back in 1973.  He has no chewing tobacco use.  He has 1 alcoholic beverage about 3 times per week.   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    Social History     Socioeconomic History     Marital status:      Spouse name: Not on file     Number of children: Not on file     Years of education: Not on file     Highest education level: Not on file   Occupational History     Not on file   Tobacco Use     Smoking status: Never Smoker     Smokeless tobacco: Never Used     Tobacco comment: 1361-1102 occational smoker   Substance and Sexual Activity     Alcohol use: Yes     Alcohol/week: 0.0 standard drinks     Comment: 2-3 glass of wine per week. At most 1 per day     Drug use: No     Sexual activity: Never   Other Topics Concern     Parent/sibling w/ CABG, MI or angioplasty before 65F 55M? Not Asked   Social History Narrative     Not on  file     Social Determinants of Health     Financial Resource Strain:      Difficulty of Paying Living Expenses:    Food Insecurity:      Worried About Running Out of Food in the Last Year:      Ran Out of Food in the Last Year:    Transportation Needs:      Lack of Transportation (Medical):      Lack of Transportation (Non-Medical):    Physical Activity:      Days of Exercise per Week:      Minutes of Exercise per Session:    Stress:      Feeling of Stress :    Social Connections:      Frequency of Communication with Friends and Family:      Frequency of Social Gatherings with Friends and Family:      Attends [employer] Services:      Active Member of Clubs or Organizations:      Attends Club or Organization Meetings:      Marital Status:    Intimate Partner Violence:      Fear of Current or Ex-Partner:      Emotionally Abused:      Physically Abused:      Sexually Abused:        PAST MEDICAL HISTORY:   Past Medical History:   Diagnosis Date     Alpha-1-antitrypsin deficiency (H)      Ascites     Pre-transplant     Chronic kidney disease, stage 3      Cirrhosis (H)     Alpha-1-antitrypsin deficiency, s/p liver transplant 3/31/15     Gout      HTN (hypertension)      Lentigo maligna melanoma (H) 2009    lentigo maligna melanoma excised on 01/29/2009 with a repeat wide excision on 03/30/2009.      Liver replaced by transplant (H) 03/31/2015     Nephrolithiasis      Osteoarthritis      Portal hypertension (H)     Pre-transplant        PAST SURGICAL HISTORY:   Past Surgical History:   Procedure Laterality Date     COLONOSCOPY N/A 12/18/2014    Procedure: COLONOSCOPY;  Surgeon: Katherine Jimenez MD;  Location:  GI     ESOPHAGOSCOPY, GASTROSCOPY, DUODENOSCOPY (EGD), COMBINED N/A 12/18/2014    Procedure: COMBINED ESOPHAGOSCOPY, GASTROSCOPY, DUODENOSCOPY (EGD), BIOPSY SINGLE OR MULTIPLE;  Surgeon: Katherine Jimenez MD;  Location:  GI     ESOPHAGOSCOPY, GASTROSCOPY, DUODENOSCOPY (EGD), COMBINED N/A  "2015    Procedure: COMBINED ESOPHAGOSCOPY, GASTROSCOPY, DUODENOSCOPY (EGD), REMOVE FOREIGN BODY;  Surgeon: Katherine Jimenez MD;  Location: [facility]     HERNIA REPAIR      abdominal     INSERT SHUNT PORTAL TRANSJUGULAR INTRAHEPTIC       ORTHOPEDIC SURGERY      bilateral knee surgery     PAROTIDECTOMY, RADICAL NECK DISSECTION Left 2019    Procedure: Total Parotidectomy, Excision of Skin, Neck Dissection Levels I - V,  And Local Advancement Flap;  Surgeon: Johanna Moreland MD;  Location: [facility] OR     TRANSPLANT LIVER RECIPIENT  DONOR N/A 3/31/2015    Procedure: TRANSPLANT LIVER RECIPIENT  DONOR;  Surgeon: Elia Mehta MD;  Location: [facility] OR       FAMILY HISTORY:    Family History   Problem Relation Age of Onset     Cardiovascular Father         MI     Glaucoma No family hx of      Macular Degeneration No family hx of        REVIEW OF SYSTEMS:  12 point ROS was negative other than the symptoms noted above in the HPI.  Patient Supplied Answers to Review of Systems  [facility] ENT ROS 2021   Gastrointestinal/Genitourinary Heartburn/indigestion   Musculoskeletal -   Skin -         PHYSICAL EXAMINATION:   Ht 1.905 m (6' 3\")   Wt 120.2 kg (265 lb)   BMI 33.12 kg/m     Appearance:   normal; NAD, age-appropriate appearance, well-developed, normal habitus   Communication:   normal; communicates verbally, normal voice quality   Head/Face:   inspection -  Normal; no scars or visible lesions   Salivary glands -  S/p left radical parotidectomy with cervicofacial advancement flap, radiation changes to the tissue, no palpable mass, well healed incision, minimal lymphedema, some fibrosis   Facial strength -  Normal and symmetric bilateral; H/B I/VI   Skin:  small crusted lesion along the right antihelix, another about 4 mm in size along left lobule   Ears:  auricle (AD) -  normal  EAC (AD) -  normal  TM (AD) -  Normal, no effusion  auricle (AS) -  normal  EAC (AS) -  normal  TM (AS) -  Normal, " no effusion  Normal clinical speech reception   Nose:  Ext. inspection -  Normal   Oral Cavity:  lips -  Normal mucosa, oral competence, and stoma size   Age-appropriate dentition, healthy gingival mucosa   Hard palate, buccal, floor of mouth mucosa normal   Tongue - normal movement, no lesions   Oropharynx:  mucosa -  Normal, no lesions  soft palate -  Normal, no lesions, no asymmetry, normal elevation   Neck: Well healed neck incision, well healed cervicofacial flap, no palpable masses  Normal range of motion  No significant lymphedema, mild fibrosis   Lymphatic:  no abnormal nodes   Cardiovascular:  warm, pink, well-perfused extremities without swelling, tenderness, or edema   Respiratory:  Normal respiratory effort, no stridor   Neuro/Psych.:  mood/affect -  normal  mental status -  normal       PROCEDURE:      RESULTS REVIEWED:   TSH mildly elevated, T4 normal    IMPRESSION AND PLAN:   Eric Andrew is a 71-year-old man who has a history of a liver transplant and recent squamous cell carcinoma of the left cheek who developed 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 He underwent total parotidectomy, skin resection, resection of greater auricular nerve, level I-V neck dissection, cervicofacial flap on 4/11/2019. Final pathology showed the 1 metastatic deposit in the parotid. He completed postoperative radiation on 6/24/2019 with Dr Floyd.     He has no signs of recurrence. He will need continued regular skin exams by dermatology which he is doing every 3 months both here and when in [place].    He had his TSH checked in September 2021, radiation oncology team started him on synthroid. He says he had issues with hypoglycemia with taking it. I asked him to talk to his PCP about this and any need for adjustment in his insulin.    We will repeat his imaging in May 2022.    He will return to [facility] when he returns from [place] in the spring with imaging at the same time.    Thank you very much for the  opportunity to participate in the care of your patient.      Johanna Moreland M.D.  Otolaryngology- Head & Neck Surgery      This note was dictated with voice recognition software and then edited. Please excuse any unintentional errors.       CC:  Katherine Jimenez MD  [address]  [facility] [address]      Naomi Rodriguez, RN  Liver Coordinator  [facility]      Aston Devine MD  [facility]  [address]  [facility] [address]      Grabiel Floyd MD  Department of Radiation Oncology  [facility]

## 2021-10-20 ENCOUNTER — TELEPHONE (OUTPATIENT)
Dept: OPTOMETRY | Facility: CLINIC | Age: 71
End: 2021-10-20

## 2021-10-20 NOTE — TELEPHONE ENCOUNTER
Eric Andrew left  stating he wanted to order contacts, I called him back to find out the quantity.      Eric Andrew returned my call and left  stating he wanted to get 4 boxes of 90 days supply. Total boxes, 4 ( his Rx is the same for both eyes)    Please order and have sent to [place] address as noted in the chart.

## 2021-10-21 ENCOUNTER — OFFICE VISIT (OUTPATIENT)
Dept: OPHTHALMOLOGY | Facility: CLINIC | Age: 71
End: 2021-10-21
Payer: MEDICARE

## 2021-10-21 DIAGNOSIS — H52.13 MYOPIA OF BOTH EYES: Primary | ICD-10-CM

## 2021-10-21 PROCEDURE — V2520 CONTACT LENS HYDROPHILIC: HCPCS | Performed by: OPTOMETRIST

## 2021-10-21 NOTE — PROGRESS NOTES
No office visit, CL order only.    Contact Lens Billing  V-Code:  - Soft spherical     # of units: 4  Price per Unit: $65  MTP: $7.95  Total: $267.95    [facility] ordering number: [id number]    [address] [facility] [address]       These are for cosmetic contact lenses.     Final Contact Lens Rx     Brand Base Curve Diameter Sphere   Right Dailies Aquacomfort Plus 8.6 13.8 -6.50   Left Dailies Aquacomfort Plus 8.6 13.8 -6.50   Expiration Date: 6/3/22       Encounter Diagnosis   Name Primary?     Myopia of both eyes Yes        Date of last eye exam: 6/3/20    Thor Reed, COT COT 3:28 PM October 21, 2021

## 2021-12-20 ENCOUNTER — LAB (OUTPATIENT)
Dept: LAB | Facility: CLINIC | Age: 71
End: 2021-12-20
Payer: MEDICARE

## 2021-12-20 DIAGNOSIS — E03.9 HYPOTHYROIDISM, UNSPECIFIED TYPE: ICD-10-CM

## 2021-12-20 DIAGNOSIS — Z94.4 LIVER REPLACED BY TRANSPLANT (H): ICD-10-CM

## 2021-12-20 DIAGNOSIS — Z13.220 LIPID SCREENING: ICD-10-CM

## 2021-12-20 LAB
ALBUMIN SERPL-MCNC: 3.4 G/DL (ref 3.4–5)
ALP SERPL-CCNC: 85 U/L (ref 40–150)
ALT SERPL W P-5'-P-CCNC: 18 U/L (ref 0–70)
ANION GAP SERPL CALCULATED.3IONS-SCNC: 7 MMOL/L (ref 3–14)
AST SERPL W P-5'-P-CCNC: 11 U/L (ref 0–45)
BILIRUB DIRECT SERPL-MCNC: 0.1 MG/DL (ref 0–0.2)
BILIRUB SERPL-MCNC: 0.5 MG/DL (ref 0.2–1.3)
BUN SERPL-MCNC: 29 MG/DL (ref 7–30)
CALCIUM SERPL-MCNC: 9 MG/DL (ref 8.5–10.1)
CHLORIDE BLD-SCNC: 109 MMOL/L (ref 94–109)
CO2 SERPL-SCNC: 24 MMOL/L (ref 20–32)
CREAT SERPL-MCNC: 1.4 MG/DL (ref 0.66–1.25)
ERYTHROCYTE [DISTWIDTH] IN BLOOD BY AUTOMATED COUNT: 14.8 % (ref 10–15)
GFR SERPL CREATININE-BSD FRML MDRD: 51 ML/MIN/1.73M2
GLUCOSE BLD-MCNC: 197 MG/DL (ref 70–99)
HCT VFR BLD AUTO: 41.3 % (ref 40–53)
HGB BLD-MCNC: 13.9 G/DL (ref 13.3–17.7)
MCH RBC QN AUTO: 32.3 PG (ref 26.5–33)
MCHC RBC AUTO-ENTMCNC: 33.7 G/DL (ref 31.5–36.5)
MCV RBC AUTO: 96 FL (ref 78–100)
PLATELET # BLD AUTO: 115 10E3/UL (ref 150–450)
POTASSIUM BLD-SCNC: 4.2 MMOL/L (ref 3.4–5.3)
PROT SERPL-MCNC: 6.8 G/DL (ref 6.8–8.8)
RBC # BLD AUTO: 4.3 10E6/UL (ref 4.4–5.9)
SODIUM SERPL-SCNC: 140 MMOL/L (ref 133–144)
T4 FREE SERPL-MCNC: 1.07 NG/DL (ref 0.76–1.46)
TACROLIMUS BLD-MCNC: 3.4 UG/L (ref 5–15)
TME LAST DOSE: ABNORMAL H
TME LAST DOSE: ABNORMAL H
TSH SERPL DL<=0.005 MIU/L-ACNC: 4.01 MU/L (ref 0.4–4)
WBC # BLD AUTO: 4.9 10E3/UL (ref 4–11)

## 2021-12-20 PROCEDURE — 84443 ASSAY THYROID STIM HORMONE: CPT

## 2021-12-20 PROCEDURE — 80197 ASSAY OF TACROLIMUS: CPT

## 2021-12-20 PROCEDURE — 82374 ASSAY BLOOD CARBON DIOXIDE: CPT

## 2021-12-20 PROCEDURE — 85027 COMPLETE CBC AUTOMATED: CPT

## 2021-12-20 PROCEDURE — 82248 BILIRUBIN DIRECT: CPT

## 2021-12-20 PROCEDURE — 36415 COLL VENOUS BLD VENIPUNCTURE: CPT

## 2021-12-20 PROCEDURE — 84439 ASSAY OF FREE THYROXINE: CPT

## 2021-12-26 ENCOUNTER — HEALTH MAINTENANCE LETTER (OUTPATIENT)
Age: 71
End: 2021-12-26

## 2022-02-11 ENCOUNTER — LAB (OUTPATIENT)
Dept: LAB | Facility: CLINIC | Age: 72
End: 2022-02-11
Payer: MEDICARE

## 2022-02-11 DIAGNOSIS — E03.4 HYPOTHYROIDISM DUE TO ACQUIRED ATROPHY OF THYROID: ICD-10-CM

## 2022-02-11 DIAGNOSIS — Z94.4 LIVER REPLACED BY TRANSPLANT (H): ICD-10-CM

## 2022-02-11 DIAGNOSIS — Z13.220 LIPID SCREENING: ICD-10-CM

## 2022-02-11 LAB
ALBUMIN SERPL-MCNC: 3.7 G/DL (ref 3.4–5)
ALP SERPL-CCNC: 76 U/L (ref 40–150)
ALT SERPL W P-5'-P-CCNC: 21 U/L (ref 0–70)
ANION GAP SERPL CALCULATED.3IONS-SCNC: 6 MMOL/L (ref 3–14)
AST SERPL W P-5'-P-CCNC: 10 U/L (ref 0–45)
BILIRUB DIRECT SERPL-MCNC: 0.2 MG/DL (ref 0–0.2)
BILIRUB SERPL-MCNC: 0.5 MG/DL (ref 0.2–1.3)
BUN SERPL-MCNC: 25 MG/DL (ref 7–30)
CALCIUM SERPL-MCNC: 9.2 MG/DL (ref 8.5–10.1)
CHLORIDE BLD-SCNC: 109 MMOL/L (ref 94–109)
CO2 SERPL-SCNC: 27 MMOL/L (ref 20–32)
CREAT SERPL-MCNC: 1.45 MG/DL (ref 0.66–1.25)
ERYTHROCYTE [DISTWIDTH] IN BLOOD BY AUTOMATED COUNT: 15 % (ref 10–15)
GFR SERPL CREATININE-BSD FRML MDRD: 52 ML/MIN/1.73M2
GLUCOSE BLD-MCNC: 98 MG/DL (ref 70–99)
HCT VFR BLD AUTO: 42.6 % (ref 40–53)
HGB BLD-MCNC: 14.2 G/DL (ref 13.3–17.7)
MCH RBC QN AUTO: 32.6 PG (ref 26.5–33)
MCHC RBC AUTO-ENTMCNC: 33.3 G/DL (ref 31.5–36.5)
MCV RBC AUTO: 98 FL (ref 78–100)
PLATELET # BLD AUTO: 123 10E3/UL (ref 150–450)
POTASSIUM BLD-SCNC: 4 MMOL/L (ref 3.4–5.3)
PROT SERPL-MCNC: 7.2 G/DL (ref 6.8–8.8)
RBC # BLD AUTO: 4.36 10E6/UL (ref 4.4–5.9)
SODIUM SERPL-SCNC: 142 MMOL/L (ref 133–144)
TACROLIMUS BLD-MCNC: 4.1 UG/L (ref 5–15)
TME LAST DOSE: ABNORMAL H
TME LAST DOSE: ABNORMAL H
TSH SERPL DL<=0.005 MIU/L-ACNC: 3.46 MU/L (ref 0.4–4)
WBC # BLD AUTO: 5.2 10E3/UL (ref 4–11)

## 2022-02-11 PROCEDURE — 82248 BILIRUBIN DIRECT: CPT

## 2022-02-11 PROCEDURE — 80053 COMPREHEN METABOLIC PANEL: CPT

## 2022-02-11 PROCEDURE — 36415 COLL VENOUS BLD VENIPUNCTURE: CPT

## 2022-02-11 PROCEDURE — 80197 ASSAY OF TACROLIMUS: CPT

## 2022-02-11 PROCEDURE — 85027 COMPLETE CBC AUTOMATED: CPT

## 2022-02-11 PROCEDURE — 84443 ASSAY THYROID STIM HORMONE: CPT

## 2022-02-20 ENCOUNTER — HEALTH MAINTENANCE LETTER (OUTPATIENT)
Age: 72
End: 2022-02-20

## 2022-03-15 ENCOUNTER — DOCUMENTATION ONLY (OUTPATIENT)
Dept: TRANSPLANT | Facility: CLINIC | Age: 72
End: 2022-03-15
Payer: MEDICARE

## 2022-04-17 ENCOUNTER — HEALTH MAINTENANCE LETTER (OUTPATIENT)
Age: 72
End: 2022-04-17

## 2022-04-28 ENCOUNTER — LAB (OUTPATIENT)
Dept: LAB | Facility: CLINIC | Age: 72
End: 2022-04-28
Payer: MEDICARE

## 2022-04-28 ENCOUNTER — TELEPHONE (OUTPATIENT)
Dept: TRANSPLANT | Facility: CLINIC | Age: 72
End: 2022-04-28

## 2022-04-28 DIAGNOSIS — Z94.4 LIVER REPLACED BY TRANSPLANT (H): ICD-10-CM

## 2022-04-28 DIAGNOSIS — Z13.220 LIPID SCREENING: ICD-10-CM

## 2022-04-28 LAB
ALBUMIN SERPL-MCNC: 3.7 G/DL (ref 3.4–5)
ALP SERPL-CCNC: 87 U/L (ref 40–150)
ALT SERPL W P-5'-P-CCNC: 22 U/L (ref 0–70)
ANION GAP SERPL CALCULATED.3IONS-SCNC: 6 MMOL/L (ref 3–14)
AST SERPL W P-5'-P-CCNC: 10 U/L (ref 0–45)
BILIRUB DIRECT SERPL-MCNC: 0.2 MG/DL (ref 0–0.2)
BILIRUB SERPL-MCNC: 0.6 MG/DL (ref 0.2–1.3)
BUN SERPL-MCNC: 30 MG/DL (ref 7–30)
CALCIUM SERPL-MCNC: 8.8 MG/DL (ref 8.5–10.1)
CHLORIDE BLD-SCNC: 109 MMOL/L (ref 94–109)
CHOLEST SERPL-MCNC: 132 MG/DL
CO2 SERPL-SCNC: 26 MMOL/L (ref 20–32)
CREAT SERPL-MCNC: 1.48 MG/DL (ref 0.66–1.25)
ERYTHROCYTE [DISTWIDTH] IN BLOOD BY AUTOMATED COUNT: 14.6 % (ref 10–15)
FASTING STATUS PATIENT QL REPORTED: ABNORMAL
GFR SERPL CREATININE-BSD FRML MDRD: 50 ML/MIN/1.73M2
GLUCOSE BLD-MCNC: 161 MG/DL (ref 70–99)
HCT VFR BLD AUTO: 42.7 % (ref 40–53)
HDLC SERPL-MCNC: 37 MG/DL
HGB BLD-MCNC: 14.2 G/DL (ref 13.3–17.7)
LDLC SERPL CALC-MCNC: 69 MG/DL
MCH RBC QN AUTO: 32.5 PG (ref 26.5–33)
MCHC RBC AUTO-ENTMCNC: 33.3 G/DL (ref 31.5–36.5)
MCV RBC AUTO: 98 FL (ref 78–100)
NONHDLC SERPL-MCNC: 95 MG/DL
PLATELET # BLD AUTO: 141 10E3/UL (ref 150–450)
POTASSIUM BLD-SCNC: 4 MMOL/L (ref 3.4–5.3)
PROT SERPL-MCNC: 7 G/DL (ref 6.8–8.8)
RBC # BLD AUTO: 4.37 10E6/UL (ref 4.4–5.9)
SODIUM SERPL-SCNC: 141 MMOL/L (ref 133–144)
TACROLIMUS BLD-MCNC: 3.3 UG/L (ref 5–15)
TME LAST DOSE: ABNORMAL H
TME LAST DOSE: ABNORMAL H
TRIGL SERPL-MCNC: 128 MG/DL
WBC # BLD AUTO: 5.4 10E3/UL (ref 4–11)

## 2022-04-28 PROCEDURE — 80053 COMPREHEN METABOLIC PANEL: CPT

## 2022-04-28 PROCEDURE — 80197 ASSAY OF TACROLIMUS: CPT

## 2022-04-28 PROCEDURE — 85027 COMPLETE CBC AUTOMATED: CPT

## 2022-04-28 PROCEDURE — 80061 LIPID PANEL: CPT

## 2022-04-28 PROCEDURE — 82248 BILIRUBIN DIRECT: CPT

## 2022-04-28 PROCEDURE — 36415 COLL VENOUS BLD VENIPUNCTURE: CPT

## 2022-04-28 NOTE — LETTER
OUTPATIENT OR TRANSITIONAL CARE  LABORATORY TEST ORDER  Patient copy     Patient Name:    Eric Andrew                                      Transplant Date:   3/31/2015   YOB: 1950                                               Issue Date:            04/29/2022   [facility] MR#:    [id number]                                           Expiration Date:   04/29/2023         Diagnoses:            [x]?      Liver Transplant (ICD-10 Z94.4)                                 [x]?      Long term use of medications (ICD-10 Z79.899)         Please fax results to [phone]     Frequency: Every 2 months, and PRN        [x]??         CBC with Platelets   [x]??         Basic Metabolic Panel (Sodium, Potassium, Chloride, CO2, Creatinine, Urea Nitrogen, Glucose, Calcium)  [x]??         Hepatic panel (Albumin, Alk Phos, ALT, AST, Direct and Total Bili)  [x]??         Tacrolimus drug level - 12 hour trough, please document time of last dose         Other Frequency: annually ONCE in May 2023  [x]??         Fasting lipid panel    Other Frequency: annually ONCE in May 2022  [x]??         Urine protein/creatinine ratio  [x]??         Urinalysis with reflex to micro    If you have questions, please call [phone] or [phone].    .  Hepatology/Liver Transplant  Medical Director, Liver Transplantation  [facility]

## 2022-04-28 NOTE — TELEPHONE ENCOUNTER
Patient Call: Transplant Lab/Orders  Route to LPN  Post Transplant Days: 2585  When patient is less than 60 days post-transplant, route high priority    Reason for Call: Annual lab reorder  Callback needed? Yes labs drawn  Needs orders call lab when done     Return Call Needed  Same as documented in contacts section  When to return call?: Same day: Route High Priority

## 2022-04-28 NOTE — PROGRESS NOTES
Dear Dr. Jimenez:    I had the pleasure of seeing Eric Andrew in follow-up today at the [facility].     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  He underwent surgery by dermatologist in [place] for this.  Per his report this was not done with Mohs surgery but was told that he had negative margins.  He says that shortly after the surgery he noticed a lump along his mandible/below the ear.  He went in for his 1 month follow-up with a dermatologist and at that time the mass had increased in size.  She thought it was a reactive node but offered a biopsy. Per review of the notes they proceeded with a punch biopsy of the parotid mass.  This was consistent with invasive squamous cell carcinoma without any epidermal involvement.  His preoperative PET scan showed only involvement of the parotid. He was taken to the OR on 4/11/2019 for a left total parotidectomy with resection of skin, preservation of the facial nerve, sacrifice of the greater auricular nerve, level I-V neck dissection, cervicofacial flap. Final pathology showed a 2.1 cm moderately differentiated SCC within the subcutaneous tissues and parotid, PNI, no LVSI, negative margins, 0/47 cervical nodes. He had postoperative radiation with Dr Floyd from 5/13/2019 to 6/24/2019 for a total of 6000 cGy. He had his 3 month post treatment PET scan in October 2019 which only showed a stable sub-cm nodule in the lung.    Interval history:  He comes in today for follow-up. He was last seen in September 2021. His TSH was normal in February 2022. He comes in with repeat imaging. He says that he is doing well. He and his wife are leaving for [place] on Tuesday. He has seen dermatology recently, did require Mohs surgery, nothing on the H&N area. He has no new spots in the H&N.  He neck masses. He is having some issues with neck cramping. He is seeing the dentist. He continues to be able to play the trombone.    He is accompanied by his wife today.      MEDICATIONS:     Current Outpatient Medications   Medication Sig Dispense Refill     AMLODIPINE BESYLATE PO Take 10 mg by mouth every morning        atorvastatin (LIPITOR) 20 MG tablet Take 20 mg by mouth every evening        BASAGLAR 100 UNIT/ML injection Inject 45 Units Subcutaneous At Bedtime        Calcium Carbonate-Vitamin D (CALCIUM + D PO) Take 1 tablet by mouth every morning        levothyroxine (SYNTHROID/LEVOTHROID) 50 MCG tablet Take 1 tablet (50 mcg) by mouth daily 90 tablet 3     metFORMIN (GLUCOPHAGE) 1000 MG tablet Take 1,000 mg by mouth 2 times daily (with meals)        metoprolol succinate (TOPROL-XL) 25 MG 24 hr tablet Take 25 mg by mouth every morning        Multiple Vitamins-Minerals (MULTIVITAL) TABS Take 1 tablet by mouth every morning        niacinamide (NIACIN) 500 MG tablet Take 1,000 mg by mouth 2 times daily (with meals)       sildenafil (VIAGRA) 50 MG tablet Take 50 mg by mouth daily as needed for erectile dysfunction       tacrolimus (GENERIC EQUIVALENT) 1 MG capsule Take 1 capsule (1 mg) by mouth 2 times daily 180 capsule 3     losartan (COZAAR) 25 MG tablet Take 1 tablet (25 mg) by mouth daily (Patient not taking: No sig reported) 90 tablet 3     neomycin-polymyxin-dexamethasone (MAXITROL) 3.5-06097-7.1 SUSP ophthalmic susp Place 1-2 drops Into the left eye 3 times daily 1 Bottle 1     Sodium Fluoride 1.1 % PSTE Apply 1 Application to affected area daily 112 g 11       ALLERGIES:    Allergies   Allergen Reactions     Cefazolin Swelling     Lips swelled while patient was in the OR      Lisinopril Cough     Meropenem Hives and Swelling     Developed hives and lip swelling while on meropenem and micafungin.  Resolved with discontinuation.  Unclear which was cause.     Micafungin Hives and Swelling     Developed  hives and lip swelling while on meropenem and micafungin.  Resolved with discontinuation.  Unclear which was cause.       HABITS/SOCIAL HISTORY:   Spends the shin in [place].  .  He is a retired .   He smokes for a few years and quit back in 1973.  He has no chewing tobacco use.  He has 1 alcoholic beverage about 3 times per week.   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    Social History     Socioeconomic History     Marital status:      Spouse name: Not on file     Number of children: Not on file     Years of education: Not on file     Highest education level: Not on file   Occupational History     Not on file   Tobacco Use     Smoking status: Never Smoker     Smokeless tobacco: Never Used     Tobacco comment: [id number] occational smoker   Substance and Sexual Activity     Alcohol use: Yes     Alcohol/week: 0.0 standard drinks     Comment: 2-3 glass of wine per week. At most 1 per day     Drug use: No     Sexual activity: Never   Other Topics Concern     Parent/sibling w/ CABG, MI or angioplasty before 65F 55M? Not Asked   Social History Narrative     Not on file     Social Determinants of Health     Financial Resource Strain: Not on file   Food Insecurity: Not on file   Transportation Needs: Not on file   Physical Activity: Not on file   Stress: Not on file   Social Connections: Not on file   Intimate Partner Violence: Not on file   Housing Stability: Not on file       PAST MEDICAL HISTORY:   Past Medical History:   Diagnosis Date     Alpha-1-antitrypsin deficiency (H)      Ascites     Pre-transplant     Chronic kidney disease, stage 3      Cirrhosis (H)     Alpha-1-antitrypsin deficiency, s/p liver transplant 3/31/15     Gout      HTN (hypertension)      Lentigo maligna melanoma (H) 2009    lentigo maligna melanoma excised on 01/29/2009 with a repeat wide excision on 03/30/2009.      Liver replaced by transplant (H) 03/31/2015     Nephrolithiasis      Osteoarthritis      Portal  "hypertension (H)     Pre-transplant        PAST SURGICAL HISTORY:   Past Surgical History:   Procedure Laterality Date     COLONOSCOPY N/A 2014    Procedure: COLONOSCOPY;  Surgeon: Katherine Jimenez MD;  Location:  GI     ESOPHAGOSCOPY, GASTROSCOPY, DUODENOSCOPY (EGD), COMBINED N/A 2014    Procedure: COMBINED ESOPHAGOSCOPY, GASTROSCOPY, DUODENOSCOPY (EGD), BIOPSY SINGLE OR MULTIPLE;  Surgeon: Katherine Jimenez MD;  Location:  GI     ESOPHAGOSCOPY, GASTROSCOPY, DUODENOSCOPY (EGD), COMBINED N/A 2015    Procedure: COMBINED ESOPHAGOSCOPY, GASTROSCOPY, DUODENOSCOPY (EGD), REMOVE FOREIGN BODY;  Surgeon: Katherine Jimenez MD;  Location:  GI     HERNIA REPAIR      abdominal     INSERT SHUNT PORTAL TRANSJUGULAR INTRAHEPTIC       ORTHOPEDIC SURGERY      bilateral knee surgery     PAROTIDECTOMY, RADICAL NECK DISSECTION Left 2019    Procedure: Total Parotidectomy, Excision of Skin, Neck Dissection Levels I - V,  And Local Advancement Flap;  Surgeon: Johanna Moreland MD;  Location:  OR     TRANSPLANT LIVER RECIPIENT  DONOR N/A 3/31/2015    Procedure: TRANSPLANT LIVER RECIPIENT  DONOR;  Surgeon: Elia Mehta MD;  Location:  OR       FAMILY HISTORY:    Family History   Problem Relation Age of Onset     Cardiovascular Father         MI     Glaucoma No family hx of      Macular Degeneration No family hx of        REVIEW OF SYSTEMS:  12 point ROS was negative other than the symptoms noted above in the HPI.  Patient Supplied Answers to Review of Systems  [facility] ROS 2021   Gastrointestinal/Genitourinary Heartburn/indigestion   Musculoskeletal -   Skin -         PHYSICAL EXAMINATION:   BP (!) 150/82 (BP Location: Right arm, Patient Position: Sitting, Cuff Size: Adult Large)   Pulse 79   Temp 97.7  F (36.5  C) (Temporal)   Ht 1.905 m (6' 3\")   Wt 122.9 kg (271 lb)   BMI 33.87 kg/m     Appearance:   normal; NAD, age-appropriate " appearance, well-developed, normal habitus   Communication:   normal; communicates verbally, normal voice quality   Head/Face:   inspection -  Expected concavity of left parotid defect   Salivary glands -  S/p left radical parotidectomy with cervicofacial advancement flap, radiation changes to the tissue, no palpable mass, well healed incision, minimal lymphedema, some fibrosis, expected concavity   Facial strength -  Normal and symmetric bilateral; H/B I/VI   Skin:  no concerning skin lesions   Ears:  auricle (AD) -  normal  EAC (AD) -  normal  TM (AD) -  Normal, no effusion  auricle (AS) -  normal  EAC (AS) -  normal  TM (AS) -  Normal, no effusion  Normal clinical speech reception   Nose:  Ext. inspection -  Normal   Oral Cavity:  lips -  Normal mucosa, oral competence, and stoma size   Age-appropriate dentition, healthy gingival mucosa   Hard palate, buccal, floor of mouth mucosa normal, soft to palpation  Moist mucus membranes   Tongue - normal movement, no lesions   Oropharynx:  mucosa -  Normal, no lesions  soft palate -  Normal, no lesions, no asymmetry, normal elevation   Neck: Well healed neck incision, well healed cervicofacial flap, no palpable masses  Concavity/tissue retraction  Hyper and hypopigmentation of neck skin  Normal range of motion  No significant lymphedema, moderate fibrosis   Lymphatic:  no abnormal nodes   Cardiovascular:  warm, pink, well-perfused extremities without swelling, tenderness, or edema   Respiratory:  Normal respiratory effort, no stridor   Neuro/Psych.:  mood/affect -  normal  mental status -  normal       PROCEDURE:      RESULTS REVIEWED:   TSH - normal in February 2022    CT neck and chest imaging independently reviewed     CT neck and chest report reviewed     Care discussed with Dr Floyd      IMPRESSION AND PLAN:   Eric Andrew is a 72-year-old man who has a history of a liver transplant and recent squamous cell carcinoma of the left cheek who developed metastatic  disease in his left parotid. He underwent total parotidectomy, skin resection, resection of greater auricular nerve, level I-V neck dissection, cervicofacial flap on 4/11/2019. Final pathology showed the 1 metastatic deposit in the parotid. He completed postoperative radiation on 6/24/2019 with Dr Floyd.     He has no signs of recurrence. He shoulder continue regular skin exams by dermatology.    We discussed that he has the expected concavity of the neck and parotid from his surgery/radiation.    He is having some neck cramping secondary to his treatment. A referral was placed for Dr Camejo in PM&R.    He had his TSH checked in February 2022, normal, on synthroid. Recheck in August 2022.    He had repeat imaging today. We will repeat in 1 year. Results from today sent to [facility].    I will see him back in 6 months with repeat labs. He will return to [facility] clinic in 12 months with repeat scans and labs.    Thank you very much for the opportunity to participate in the care of your patient.      Johanna Moreland M.D.  Otolaryngology- Head & Neck Surgery      This note was dictated with voice recognition software and then edited. Please excuse any unintentional errors.         CC:  Katherine Jimenez MD  [address]  [facility] [address]      Naomi Rodriguez, RN  Liver Coordinator  [facility]      Aston Devine MD  [facility]  [address]  [facility] [address]      Grabiel Floyd MD  Department of Radiation Oncology  [facility]

## 2022-04-29 ENCOUNTER — OFFICE VISIT (OUTPATIENT)
Dept: OTOLARYNGOLOGY | Facility: CLINIC | Age: 72
End: 2022-04-29
Payer: MEDICARE

## 2022-04-29 ENCOUNTER — ANCILLARY PROCEDURE (OUTPATIENT)
Dept: CT IMAGING | Facility: CLINIC | Age: 72
End: 2022-04-29
Attending: OTOLARYNGOLOGY
Payer: MEDICARE

## 2022-04-29 ENCOUNTER — PATIENT OUTREACH (OUTPATIENT)
Dept: ONCOLOGY | Facility: CLINIC | Age: 72
End: 2022-04-29

## 2022-04-29 VITALS
SYSTOLIC BLOOD PRESSURE: 150 MMHG | DIASTOLIC BLOOD PRESSURE: 82 MMHG | HEIGHT: 75 IN | HEART RATE: 79 BPM | WEIGHT: 271 LBS | TEMPERATURE: 97.7 F | BODY MASS INDEX: 33.69 KG/M2

## 2022-04-29 DIAGNOSIS — C44.320 SQUAMOUS CELL CARCINOMA OF SKIN OF FACE: Primary | ICD-10-CM

## 2022-04-29 DIAGNOSIS — C44.320 SQUAMOUS CELL CARCINOMA OF SKIN OF FACE: ICD-10-CM

## 2022-04-29 DIAGNOSIS — E03.4 HYPOTHYROIDISM DUE TO ACQUIRED ATROPHY OF THYROID: Primary | ICD-10-CM

## 2022-04-29 PROCEDURE — G1004 CDSM NDSC: HCPCS | Performed by: STUDENT IN AN ORGANIZED HEALTH CARE EDUCATION/TRAINING PROGRAM

## 2022-04-29 PROCEDURE — 70491 CT SOFT TISSUE NECK W/DYE: CPT | Mod: MG | Performed by: STUDENT IN AN ORGANIZED HEALTH CARE EDUCATION/TRAINING PROGRAM

## 2022-04-29 PROCEDURE — 99214 OFFICE O/P EST MOD 30 MIN: CPT | Performed by: OTOLARYNGOLOGY

## 2022-04-29 PROCEDURE — G1004 CDSM NDSC: HCPCS | Mod: GC | Performed by: RADIOLOGY

## 2022-04-29 PROCEDURE — 99214 OFFICE O/P EST MOD 30 MIN: CPT | Performed by: RADIOLOGY

## 2022-04-29 PROCEDURE — 71260 CT THORAX DX C+: CPT | Mod: MG | Performed by: RADIOLOGY

## 2022-04-29 RX ORDER — IOPAMIDOL 755 MG/ML
125 INJECTION, SOLUTION INTRAVASCULAR ONCE
Status: COMPLETED | OUTPATIENT
Start: 2022-04-29 | End: 2022-04-29

## 2022-04-29 RX ADMIN — IOPAMIDOL 125 ML: 755 INJECTION, SOLUTION INTRAVASCULAR at 12:24

## 2022-04-29 ASSESSMENT — PAIN SCALES - GENERAL: PAINLEVEL: NO PAIN (0)

## 2022-04-29 NOTE — PROGRESS NOTES
received referral for Dr Camejo in the PM&R clinic. Scheduling instructions updated and sent to New Patient Scheduling for completion.

## 2022-04-29 NOTE — NURSING NOTE
"Chief Complaint   Patient presents with     RECHECK     Follow up with scans prior       Blood pressure (!) 150/82, pulse 79, temperature 97.7  F (36.5  C), temperature source Temporal, height 1.905 m (6' 3\"), weight 122.9 kg (271 lb).    Kelly Steinberg, EMT    "

## 2022-04-29 NOTE — LETTER
4/29/2022       RE: Eric Andrew  [address] [facility] [address]     Dear Colleague,    Thank you for referring your patient, Eric Andrew, to the [facility] [place] [facility]. Please see a copy of my visit note below.    Dear Dr. Jimenez:    I had the pleasure of seeing Eric Andrew in follow-up today at the [facility].     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  He underwent surgery by dermatologist in [place] for this.  Per his report this was not done with Mohs surgery but was told that he had negative margins.  He says that shortly after the surgery he noticed a lump along his mandible/below the ear.  He went in for his 1 month follow-up with a dermatologist and at that time the mass had increased in size.  She thought it was a reactive node but offered a biopsy. Per review of the notes they proceeded with a punch biopsy of the parotid mass.  This was consistent with invasive squamous cell carcinoma without any epidermal involvement.  His preoperative PET scan showed only involvement of the parotid. He was taken to the OR on 4/11/2019 for a left total parotidectomy with resection of skin, preservation of the facial nerve, sacrifice of the greater auricular nerve, level I-V neck dissection, cervicofacial flap. Final pathology showed a 2.1 cm moderately differentiated SCC within the subcutaneous tissues and parotid, PNI, no LVSI, negative margins, 0/47 cervical nodes. He had postoperative radiation with Dr Floyd from 5/13/2019 to 6/24/2019 for a total of 6000 cGy. He had his 3 month post treatment PET scan in October 2019 which only showed a stable sub-cm nodule in the lung.    Interval history:  He comes in  today for follow-up. He was last seen in September 2021. His TSH was normal in February 2022. He comes in with repeat imaging. He says that he is doing well. He and his wife are leaving for [place] on Tuesday. He has seen dermatology recently, did require Mohs surgery, nothing on the H&N area. He has no new spots in the H&N. He neck masses. He is having some issues with neck cramping. He is seeing the dentist. He continues to be able to play the [employer].    He is accompanied by his wife today.      MEDICATIONS:     Current Outpatient Medications   Medication Sig Dispense Refill     AMLODIPINE BESYLATE PO Take 10 mg by mouth every morning        atorvastatin (LIPITOR) 20 MG tablet Take 20 mg by mouth every evening        BASAGLAR 100 UNIT/ML injection Inject 45 Units Subcutaneous At Bedtime        Calcium Carbonate-Vitamin D (CALCIUM + D PO) Take 1 tablet by mouth every morning        levothyroxine (SYNTHROID/LEVOTHROID) 50 MCG tablet Take 1 tablet (50 mcg) by mouth daily 90 tablet 3     metFORMIN (GLUCOPHAGE) 1000 MG tablet Take 1,000 mg by mouth 2 times daily (with meals)        metoprolol succinate (TOPROL-XL) 25 MG 24 hr tablet Take 25 mg by mouth every morning        Multiple Vitamins-Minerals (MULTIVITAL) TABS Take 1 tablet by mouth every morning        niacinamide (NIACIN) 500 MG tablet Take 1,000 mg by mouth 2 times daily (with meals)       sildenafil (VIAGRA) 50 MG tablet Take 50 mg by mouth daily as needed for erectile dysfunction       tacrolimus (GENERIC EQUIVALENT) 1 MG capsule Take 1 capsule (1 mg) by mouth 2 times daily 180 capsule 3     losartan (COZAAR) 25 MG tablet Take 1 tablet (25 mg) by mouth daily (Patient not taking: No sig reported) 90 tablet 3     neomycin-polymyxin-dexamethasone (MAXITROL) 3.5-97815-0.1 SUSP ophthalmic susp Place 1-2 drops Into the left eye 3 times daily 1 Bottle 1     Sodium Fluoride 1.1 % PSTE Apply 1 Application to affected area daily 112 g 11       ALLERGIES:    Allergies    Allergen Reactions     Cefazolin Swelling     Lips swelled while patient was in the OR      Lisinopril Cough     Meropenem Hives and Swelling     Developed hives and lip swelling while on meropenem and micafungin.  Resolved with discontinuation.  Unclear which was cause.     Micafungin Hives and Swelling     Developed hives and lip swelling while on meropenem and micafungin.  Resolved with discontinuation.  Unclear which was cause.       HABITS/SOCIAL HISTORY:   Spends the shin in [place].  .  He is a retired .   He smokes for a few years and quit back in 1973.  He has no chewing tobacco use.  He has 1 alcoholic beverage about 3 times per week.   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    Social History     Socioeconomic History     Marital status:      Spouse name: Not on file     Number of children: Not on file     Years of education: Not on file     Highest education level: Not on file   Occupational History     Not on file   Tobacco Use     Smoking status: Never Smoker     Smokeless tobacco: Never Used     Tobacco comment: [id number] occational smoker   Substance and Sexual Activity     Alcohol use: Yes     Alcohol/week: 0.0 standard drinks     Comment: 2-3 glass of wine per week. At most 1 per day     Drug use: No     Sexual activity: Never   Other Topics Concern     Parent/sibling w/ CABG, MI or angioplasty before 65F 55M? Not Asked   Social History Narrative     Not on file     Social Determinants of Health     Financial Resource Strain: Not on file   Food Insecurity: Not on file   Transportation Needs: Not on file   Physical Activity: Not on file   Stress: Not on file   Social Connections: Not on file   Intimate Partner Violence: Not on file   Housing Stability: Not on file       PAST MEDICAL HISTORY:   Past Medical History:   Diagnosis Date     Alpha-1-antitrypsin deficiency (H)      Ascites     Pre-transplant     Chronic kidney disease, stage 3      Cirrhosis (H)      Alpha-1-antitrypsin deficiency, s/p liver transplant 3/31/15     Gout      HTN (hypertension)      Lentigo maligna melanoma (H)     lentigo maligna melanoma excised on 2009 with a repeat wide excision on 2009.      Liver replaced by transplant (H) 2015     Nephrolithiasis      Osteoarthritis      Portal hypertension (H)     Pre-transplant        PAST SURGICAL HISTORY:   Past Surgical History:   Procedure Laterality Date     COLONOSCOPY N/A 2014    Procedure: COLONOSCOPY;  Surgeon: Katherine Jimenez MD;  Location:  GI     ESOPHAGOSCOPY, GASTROSCOPY, DUODENOSCOPY (EGD), COMBINED N/A 2014    Procedure: COMBINED ESOPHAGOSCOPY, GASTROSCOPY, DUODENOSCOPY (EGD), BIOPSY SINGLE OR MULTIPLE;  Surgeon: Katherine Jimenez MD;  Location:  GI     ESOPHAGOSCOPY, GASTROSCOPY, DUODENOSCOPY (EGD), COMBINED N/A 2015    Procedure: COMBINED ESOPHAGOSCOPY, GASTROSCOPY, DUODENOSCOPY (EGD), REMOVE FOREIGN BODY;  Surgeon: Katherine Jimenez MD;  Location:  GI     HERNIA REPAIR      abdominal     INSERT SHUNT PORTAL TRANSJUGULAR INTRAHEPTIC       ORTHOPEDIC SURGERY      bilateral knee surgery     PAROTIDECTOMY, RADICAL NECK DISSECTION Left 2019    Procedure: Total Parotidectomy, Excision of Skin, Neck Dissection Levels I - V,  And Local Advancement Flap;  Surgeon: Johanna Moreland MD;  Location:  OR     TRANSPLANT LIVER RECIPIENT  DONOR N/A 3/31/2015    Procedure: TRANSPLANT LIVER RECIPIENT  DONOR;  Surgeon: Elia Mehta MD;  Location:  OR       FAMILY HISTORY:    Family History   Problem Relation Age of Onset     Cardiovascular Father         MI     Glaucoma No family hx of      Macular Degeneration No family hx of        REVIEW OF SYSTEMS:  12 point ROS was negative other than the symptoms noted above in the HPI.  Patient Supplied Answers to Review of Systems  [facility] ENT ROS 2021   Gastrointestinal/Genitourinary  "Heartburn/indigestion   Musculoskeletal -   Skin -         PHYSICAL EXAMINATION:   BP (!) 150/82 (BP Location: Right arm, Patient Position: Sitting, Cuff Size: Adult Large)   Pulse 79   Temp 97.7  F (36.5  C) (Temporal)   Ht 1.905 m (6' 3\")   Wt 122.9 kg (271 lb)   BMI 33.87 kg/m     Appearance:   normal; NAD, age-appropriate appearance, well-developed, normal habitus   Communication:   normal; communicates verbally, normal voice quality   Head/Face:   inspection -  Expected concavity of left parotid defect   Salivary glands -  S/p left radical parotidectomy with cervicofacial advancement flap, radiation changes to the tissue, no palpable mass, well healed incision, minimal lymphedema, some fibrosis, expected concavity   Facial strength -  Normal and symmetric bilateral; H/B I/VI   Skin:  no concerning skin lesions   Ears:  auricle (AD) -  normal  EAC (AD) -  normal  TM (AD) -  Normal, no effusion  auricle (AS) -  normal  EAC (AS) -  normal  TM (AS) -  Normal, no effusion  Normal clinical speech reception   Nose:  Ext. inspection -  Normal   Oral Cavity:  lips -  Normal mucosa, oral competence, and stoma size   Age-appropriate dentition, healthy gingival mucosa   Hard palate, buccal, floor of mouth mucosa normal, soft to palpation  Moist mucus membranes   Tongue - normal movement, no lesions   Oropharynx:  mucosa -  Normal, no lesions  soft palate -  Normal, no lesions, no asymmetry, normal elevation   Neck: Well healed neck incision, well healed cervicofacial flap, no palpable masses  Concavity/tissue retraction  Hyper and hypopigmentation of neck skin  Normal range of motion  No significant lymphedema, moderate fibrosis   Lymphatic:  no abnormal nodes   Cardiovascular:  warm, pink, well-perfused extremities without swelling, tenderness, or edema   Respiratory:  Normal respiratory effort, no stridor   Neuro/Psych.:  mood/affect -  normal  mental status -  normal       PROCEDURE:      RESULTS REVIEWED:   TSH - " normal in February 2022    CT neck and chest imaging independently reviewed     CT neck and chest report reviewed     Care discussed with Dr Floyd    IMPRESSION AND PLAN:   Eric Andrew is a 72-year-old man who has a history of a liver transplant and recent squamous cell carcinoma of the left cheek who developed 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 He underwent total parotidectomy, skin resection, resection of greater auricular nerve, level I-V neck dissection, cervicofacial flap on 4/11/2019. Final pathology showed the 1 metastatic deposit in the parotid. He completed postoperative radiation on 6/24/2019 with Dr Floyd.     He has no signs of recurrence. He shoulder continue regular skin exams by dermatology.    We discussed that he has the expected concavity of the neck and parotid from his surgery/radiation.    He is having some neck cramping secondary to his treatment. A referral was placed for Dr Camejo in PM&R.    He had his TSH checked in February 2022, normal, on synthroid. Recheck in August 2022.    He had repeat imaging today. We will repeat in 1 year. Results from today sent to [facility].    I will see him back in 6 months with repeat labs. He will return to [facility] clinic in 12 months with repeat scans and labs.    Thank you very much for the opportunity to participate in the care of your patient.      Johanna Moreland M.D.  Otolaryngology- Head & Neck Surgery    This note was dictated with voice recognition software and then edited. Please excuse any unintentional errors.     CC:  Katherine Jimenez MD  [address]  [facility] [address]    Naomi Rodriguez, RN  Liver Coordinator  [facility]    Aston Devine MD  [facility]  [address]  [facility] [address]    Grabiel Floyd MD  Department of Radiation Oncology  [facility]

## 2022-05-09 NOTE — PROGRESS NOTES
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 Several months later, he developed a recurrence involving the deep margin with invasion into the superficial parotid gland. He had a left total parotidectomy and ipsilateral neck dissection of levels IB-V on 2019 with pathology revealing a 2.1 cm tumor centered within the subcutaneous tissues with extension into the parotid gland. There was perineural invasion involving both small and large caliber nerves as well as involvement of the distal greater auricular nerve which was resected. Postoperatively, he received adjuvant radiotherapy as described above for improved local disease control.    Mr. Andrew returns to ENT multiD clinic today for a routine posttreatment disease surveillance visit. Overall, he continues to do well and has no pressing concerns or complaints. He is following up regularly with his primary dermatologist and has not had any recurrent cutaneous lesions within the head and neck. Outside of mild stiffness within the left neck, he otherwise denies any significant residual effects of his previous cancer therapy as he is eating a regular diet without difficulty, his energy level is normal and he describes no oral cavity or oropharyngeal pain. He underwent repeat  imaging with a CT neck and chest prior to examination today. Although the official read is currently pending, review in clinic demonstrates no evidence of recurrent disease.    PHYSICAL EXAM:  Weight: 122.9 kg  BP: 150/82  Pulse: 79    General: Healthy-appearing 72-year-old gentleman seated comfortably in an examination chair in no acute distress  HEENT: NC/AT. EOMI. No rhinorrhea or epistaxis. Moist mucous membranes. Palpation of the oral cavity reveals no nodularity, induration or masses.  Neck: Mild to moderate fibrosis of the left neck. No palpable cervical adenopathy bilaterally.  Pulmonary: No wheezing, stridor or respiratory distress  Skin: Scattered telangiectasias throughout the left neck. No concerning cutaneous lesions of the head and neck appreciated.    LABS AND IMAGIN2022 labs:  TSH: 3.46    2022 CT neck:  Official radiology read currently pending. Review in clinic demonstrates no obvious evidence of recurrent disease.    2022 CT chest:  Official radiology read currently pending. Review in clinic demonstrates no evidence of pulmonary metastases.    IMPRESSION:   Mr. 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 He is nearing 3 years out from completion of treatment and is doing well with no clinical evidence concerning for recurrent disease.    PLAN:   1. Follow-up with Dr. Moreland in 6 months and in ENT multi D clinic in 1 year  2. Follow-up results from today's CT neck and chest. If WNL, repeat in 2023.  3. TSH due in 2022    Grabiel Floyd MD/PhD    Department of Radiation Oncology  [facility]

## 2022-05-18 ENCOUNTER — PATIENT OUTREACH (OUTPATIENT)
Dept: ONCOLOGY | Facility: CLINIC | Age: 72
End: 2022-05-18
Payer: MEDICARE

## 2022-05-18 NOTE — PROGRESS NOTES
[facility]: Cancer Care Short Note                                    Discussion with Patient:                                                      Referral placed by  on 4/29/22 t Francoise Camejo in Oncology  PM&R for post treatment neck spasms    Referral indicated patient will be unreachable for 2 weeks due to travel.    LVM today encouraging  patient to call to schedule this appointment,  Offered my direct line    Will send My Chart with this information as well

## 2022-05-19 ENCOUNTER — TELEPHONE (OUTPATIENT)
Dept: TRANSPLANT | Facility: CLINIC | Age: 72
End: 2022-05-19
Payer: MEDICARE

## 2022-05-19 ENCOUNTER — VIRTUAL VISIT (OUTPATIENT)
Dept: TRANSPLANT | Facility: CLINIC | Age: 72
End: 2022-05-19
Attending: NURSE PRACTITIONER

## 2022-05-19 DIAGNOSIS — Z20.822 SUSPECTED COVID-19 VIRUS INFECTION: Primary | ICD-10-CM

## 2022-05-19 DIAGNOSIS — R05.9 COUGH: ICD-10-CM

## 2022-05-19 DIAGNOSIS — Z20.822 SUSPECTED COVID-19 VIRUS INFECTION: ICD-10-CM

## 2022-05-19 DIAGNOSIS — U07.1 INFECTION DUE TO 2019 NOVEL CORONAVIRUS: ICD-10-CM

## 2022-05-19 PROCEDURE — G0463 HOSPITAL OUTPT CLINIC VISIT: HCPCS | Mod: PN,RTG | Performed by: NURSE PRACTITIONER

## 2022-05-19 PROCEDURE — 99202 OFFICE O/P NEW SF 15 MIN: CPT | Mod: 95 | Performed by: NURSE PRACTITIONER

## 2022-05-19 NOTE — LETTER
"    5/19/2022         RE: Eric Andrew  [id number] [facility] [address]        Dear Colleague,    Thank you for referring your patient, Eric Andrew, to the [facility]. Please see a copy of my visit note below.      Assessment & Plan     There are no diagnoses linked to this encounter.    Review of the result(s) of each unique test - covid-19  No LOS data to display   Time spent doing chart review, history and exam, documentation and further activities per the note       BMI:   Estimated body mass index is 33.87 kg/m  as calculated from the following:    Height as of 4/29/22: 1.905 m (6' 3\").    Weight as of 4/29/22: 122.9 kg (271 lb).       MEDICATIONS:        - Hold: Tacro, Norvasc, and Lipitor       - Continue other medications without change  There are no Patient Instructions on file for this visit.  COVID-19 positive patient.  Encounter for consideration of medication intervention. Patient does qualify for a prescription. Full discussion with patient including medication options, risks and benefits. Potential drug interactions reviewed with patient.     Treatment [id number] Paxlovid, Rx sent to [place] pharmacy  [facility] [phone]    [address] [place] [address], [place] [id number]    Hours:  Mon-Fri: 8:00a - 6:00p  Sat-Sun: 8:00a - 4:00p    Drive-thru services available.  Temporary change to home medications: hold lipitor, norvasc, and tacro   None     Estimated body mass index is 33.87 kg/m  as calculated from the following:    Height as of 4/29/22: 1.905 m (6' 3\").    Weight as of 4/29/22: 122.9 kg (271 lb).  GFR Estimate   Date Value Ref Range Status   04/28/2022 50 (L) >60 mL/min/1.73m2 Final     Comment:     Effective December 21, 2021 eGFRcr in adults is calculated using the 2021 CKD-EPI creatinine equation which includes age and gender (Evelyn chowdhury al., NEJM, DOI: 10.1056/QUPGgg0579979)  This result was previously suppressed from the chart. "   05/26/2021 49 (L) >60 mL/min/[1.73_m2] Final     Comment:     Non  GFR Calc  Starting 12/18/2018, serum creatinine based estimated GFR (eGFR) will be   calculated using the Chronic Kidney Disease Epidemiology Collaboration   (CKD-EPI) equation.       No results found for: [id number]    No follow-ups on file.    Yahaira Davey NP  [facility]    Raymon Reyes is a 72 year old who presents for the following health issues  accompanied by his  none .    HPI         Review of Systems   Constitutional, HEENT, cardiovascular, pulmonary, gi and gu systems are negative, except as otherwise noted.      Objective           Vitals:  No vitals were obtained today due to virtual visit.    Physical Exam   Exam deferred     No results found for any visits on 05/19/22.    Ordered paxolovid.  Patient is unsure he wishes to take the medication and he will await and discuss with his wife before taking the medication.  Instructed patient on medication.      Again, thank you for allowing me to participate in the care of your patient.        Sincerely,        Yahaira Davey NP

## 2022-05-19 NOTE — TELEPHONE ENCOUNTER
Patient Call: General  Route to LPN    Reason for call: patient just returned from [place] and tested positive for COVID. Patient has cough and diarrhea.     Call back needed? Yes    Return Call Needed  Same as documented in contacts section  When to return call?: Greater than one day: Route standard priority   Planned C/S

## 2022-05-19 NOTE — PROGRESS NOTES
"Eric is a 72 year old who is being evaluated via a billable video visit.      How would you like to obtain your AVS? MyChart  If the video visit is dropped, the invitation should be resent by: Send to e-mail at: [email or web address]  Will anyone else be joining your video visit? No      Video Start Time:  1:33    Assessment & Plan     There are no diagnoses linked to this encounter.    Review of the result(s) of each unique test - covid-19  No LOS data to display   Time spent doing chart review, history and exam, documentation and further activities per the note       BMI:   Estimated body mass index is 33.87 kg/m  as calculated from the following:    Height as of 4/29/22: 1.905 m (6' 3\").    Weight as of 4/29/22: 122.9 kg (271 lb).       MEDICATIONS:        - Hold: Tacro, Norvasc, and Lipitor       - Continue other medications without change  There are no Patient Instructions on file for this visit.  COVID-19 positive patient.  Encounter for consideration of medication intervention. Patient does qualify for a prescription. Full discussion with patient including medication options, risks and benefits. Potential drug interactions reviewed with patient.     Treatment [id number] Paxlovid, Rx sent to [place] pharmacy  [facility] [phone]    [address] [place] [address]    Hours:  Mon-Fri: 8:00a - 6:00p  Sat-Sun: 8:00a - 4:00p    Drive-thru services available.  Temporary change to home medications: hold lipitor, norvasc, and tacro   None     Estimated body mass index is 33.87 kg/m  as calculated from the following:    Height as of 4/29/22: 1.905 m (6' 3\").    Weight as of 4/29/22: 122.9 kg (271 lb).  GFR Estimate   Date Value Ref Range Status   04/28/2022 50 (L) >60 mL/min/1.73m2 Final     Comment:     Effective December 21, 2021 eGFRcr in adults is calculated using the 2021 CKD-EPI creatinine equation which includes age and gender (Evelyn chowdhury al., NEJM, DOI: 10.1056/SHNHwc7088986)  This " result was previously suppressed from the chart.   05/26/2021 49 (L) >60 mL/min/[1.73_m2] Final     Comment:     Non  GFR Calc  Starting 12/18/2018, serum creatinine based estimated GFR (eGFR) will be   calculated using the Chronic Kidney Disease Epidemiology Collaboration   (CKD-EPI) equation.       No results found for: [id number]    No follow-ups on file.    Yahaira Davey NP  [facility]    Raymon Reyes is a 72 year old who presents for the following health issues  accompanied by his  none .    HPI         Review of Systems   Constitutional, HEENT, cardiovascular, pulmonary, gi and gu systems are negative, except as otherwise noted.      Objective           Vitals:  No vitals were obtained today due to virtual visit.    Physical Exam   Exam deferred     No results found for any visits on 05/19/22.    Ordered paxolovid.  Patient is unsure he wishes to take the medication and he will await and discuss with his wife before taking the medication.  Instructed patient on medication.      Video-Visit Details    Type of service:  Video Visit    Video End Time:    Originating Location (pt. Location): Home    Distant Location (provider location):  [facility]     Platform used for Video Visit: Other: phone visit

## 2022-05-19 NOTE — TELEPHONE ENCOUNTER
Spoke with patient. He did home test today and was positive. Tuesday night he tested in [place] and was negative. Cough started yesterday and diarrhea. Went to the store and was fatigued and tested positive upon return home. Patient aware will have video visit at 130 pm today to discuss treatment options. Eric aware if he becomes short of breath and unable to breath to go to the ED. Pt can take tylenol for fever and no more than 2,000 mg in 24 hour period. Pt encouraged to stay well hydrated.

## 2022-05-26 ENCOUNTER — TELEPHONE (OUTPATIENT)
Dept: TRANSPLANT | Facility: CLINIC | Age: 72
End: 2022-05-26
Payer: MEDICARE

## 2022-05-26 DIAGNOSIS — Z94.4 LIVER TRANSPLANTED (H): Primary | ICD-10-CM

## 2022-05-26 RX ORDER — TACROLIMUS 1 MG/1
1 CAPSULE ORAL 2 TIMES DAILY
Qty: 60 CAPSULE | Refills: 11 | Status: SHIPPED | OUTPATIENT
Start: 2022-05-26 | End: 2023-06-29

## 2022-06-01 DIAGNOSIS — E03.9 HYPOTHYROIDISM, UNSPECIFIED TYPE: ICD-10-CM

## 2022-06-01 RX ORDER — LEVOTHYROXINE SODIUM 50 UG/1
50 TABLET ORAL DAILY
Qty: 90 TABLET | Refills: 3 | Status: SHIPPED | OUTPATIENT
Start: 2022-06-01

## 2022-06-10 ENCOUNTER — LAB (OUTPATIENT)
Dept: LAB | Facility: CLINIC | Age: 72
End: 2022-06-10
Payer: MEDICARE

## 2022-06-10 DIAGNOSIS — Z94.4 LIVER REPLACED BY TRANSPLANT (H): ICD-10-CM

## 2022-06-10 DIAGNOSIS — Z13.220 LIPID SCREENING: ICD-10-CM

## 2022-06-10 LAB
ALBUMIN SERPL-MCNC: 3.8 G/DL (ref 3.4–5)
ALBUMIN UR-MCNC: 30 MG/DL
ALP SERPL-CCNC: 83 U/L (ref 40–150)
ALT SERPL W P-5'-P-CCNC: 18 U/L (ref 0–70)
ANION GAP SERPL CALCULATED.3IONS-SCNC: 6 MMOL/L (ref 3–14)
APPEARANCE UR: CLEAR
AST SERPL W P-5'-P-CCNC: 8 U/L (ref 0–45)
BACTERIA #/AREA URNS HPF: ABNORMAL /HPF
BILIRUB DIRECT SERPL-MCNC: <0.1 MG/DL (ref 0–0.2)
BILIRUB SERPL-MCNC: 0.6 MG/DL (ref 0.2–1.3)
BILIRUB UR QL STRIP: NEGATIVE
BUN SERPL-MCNC: 28 MG/DL (ref 7–30)
CALCIUM SERPL-MCNC: 9.2 MG/DL (ref 8.5–10.1)
CHLORIDE BLD-SCNC: 108 MMOL/L (ref 94–109)
CO2 SERPL-SCNC: 26 MMOL/L (ref 20–32)
COLOR UR AUTO: ABNORMAL
CREAT SERPL-MCNC: 1.38 MG/DL (ref 0.66–1.25)
CREAT UR-MCNC: 95 MG/DL
ERYTHROCYTE [DISTWIDTH] IN BLOOD BY AUTOMATED COUNT: 14.6 % (ref 10–15)
GFR SERPL CREATININE-BSD FRML MDRD: 55 ML/MIN/1.73M2
GLUCOSE BLD-MCNC: 151 MG/DL (ref 70–99)
GLUCOSE UR STRIP-MCNC: NEGATIVE MG/DL
HCT VFR BLD AUTO: 41.3 % (ref 40–53)
HGB BLD-MCNC: 14 G/DL (ref 13.3–17.7)
HGB UR QL STRIP: NEGATIVE
KETONES UR STRIP-MCNC: NEGATIVE MG/DL
LEUKOCYTE ESTERASE UR QL STRIP: NEGATIVE
MCH RBC QN AUTO: 32.3 PG (ref 26.5–33)
MCHC RBC AUTO-ENTMCNC: 33.9 G/DL (ref 31.5–36.5)
MCV RBC AUTO: 95 FL (ref 78–100)
MUCOUS THREADS #/AREA URNS LPF: PRESENT /LPF
NITRATE UR QL: NEGATIVE
PH UR STRIP: 5 [PH] (ref 5–7)
PLATELET # BLD AUTO: 123 10E3/UL (ref 150–450)
POTASSIUM BLD-SCNC: 4.3 MMOL/L (ref 3.4–5.3)
PROT SERPL-MCNC: 7.1 G/DL (ref 6.8–8.8)
PROT UR-MCNC: 0.41 G/L
PROT/CREAT 24H UR: 0.43 G/G CR (ref 0–0.2)
RBC # BLD AUTO: 4.34 10E6/UL (ref 4.4–5.9)
RBC URINE: 0 /HPF
SODIUM SERPL-SCNC: 140 MMOL/L (ref 133–144)
SP GR UR STRIP: 1.02 (ref 1–1.03)
TACROLIMUS BLD-MCNC: 3.7 UG/L (ref 5–15)
TME LAST DOSE: ABNORMAL H
TME LAST DOSE: ABNORMAL H
UROBILINOGEN UR STRIP-MCNC: NORMAL MG/DL
WBC # BLD AUTO: 4.9 10E3/UL (ref 4–11)
WBC URINE: <1 /HPF

## 2022-06-10 PROCEDURE — 82310 ASSAY OF CALCIUM: CPT

## 2022-06-10 PROCEDURE — 82248 BILIRUBIN DIRECT: CPT

## 2022-06-10 PROCEDURE — 80197 ASSAY OF TACROLIMUS: CPT

## 2022-06-10 PROCEDURE — 36415 COLL VENOUS BLD VENIPUNCTURE: CPT

## 2022-06-10 PROCEDURE — 85014 HEMATOCRIT: CPT

## 2022-06-10 PROCEDURE — 81001 URINALYSIS AUTO W/SCOPE: CPT

## 2022-06-10 PROCEDURE — 84156 ASSAY OF PROTEIN URINE: CPT

## 2022-06-20 NOTE — PROGRESS NOTES
RECORDS STATUS - ALL OTHER DIAGNOSIS      RECORDS RECEIVED FROM: [facility]   DATE RECEIVED: 6/28/2022   NOTES STATUS DETAILS   OFFICE NOTE from referring provider Complete Epic   Johanna Moreland MD   MEDICATION LIST Complete [facility]   CLINICAL TRIAL TREATMENTS TO DATE     LABS     PATHOLOGY REPORTS     ANYTHING RELATED TO DIAGNOSIS Complete Labs last updated on 6/10/2022   GENONOMIC TESTING     TYPE:     IMAGING (NEED IMAGES & REPORT)     CT SCANS Complete CT Soft Tissue Neck 4/29/2022 more in PACS    CT Chest 4/29/2022 more in PACS   MRI     MAMMO     ULTRASOUND     PET

## 2022-06-28 ENCOUNTER — PRE VISIT (OUTPATIENT)
Dept: ONCOLOGY | Facility: CLINIC | Age: 72
End: 2022-06-28

## 2022-06-28 ENCOUNTER — ONCOLOGY VISIT (OUTPATIENT)
Dept: ONCOLOGY | Facility: CLINIC | Age: 72
End: 2022-06-28
Attending: OTOLARYNGOLOGY
Payer: MEDICARE

## 2022-06-28 VITALS
OXYGEN SATURATION: 97 % | HEART RATE: 69 BPM | HEIGHT: 75 IN | RESPIRATION RATE: 16 BRPM | SYSTOLIC BLOOD PRESSURE: 143 MMHG | DIASTOLIC BLOOD PRESSURE: 88 MMHG | BODY MASS INDEX: 34.19 KG/M2 | WEIGHT: 275 LBS

## 2022-06-28 DIAGNOSIS — C77.0 SECONDARY MALIGNANCY OF LYMPH NODES OF HEAD, FACE AND NECK (H): Primary | ICD-10-CM

## 2022-06-28 DIAGNOSIS — C44.320 SQUAMOUS CELL CARCINOMA OF SKIN OF FACE: ICD-10-CM

## 2022-06-28 DIAGNOSIS — Z90.49 H/O PAROTIDECTOMY: ICD-10-CM

## 2022-06-28 PROCEDURE — 99204 OFFICE O/P NEW MOD 45 MIN: CPT | Performed by: STUDENT IN AN ORGANIZED HEALTH CARE EDUCATION/TRAINING PROGRAM

## 2022-06-28 PROCEDURE — G0463 HOSPITAL OUTPT CLINIC VISIT: HCPCS

## 2022-06-28 ASSESSMENT — PAIN SCALES - GENERAL: PAINLEVEL: NO PAIN (0)

## 2022-06-28 NOTE — PROGRESS NOTES
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    -He underwent surgery by dermatologist in [place] for this.  Per his report this was not done with Mohs surgery but was told that he had negative margins.    -Shortly after the surgery he noticed a lump along his mandible/below the ear.  He went in for his 1 month follow-up with a dermatologist and at that time the mass had increased in size.  She thought it was a reactive node but offered a biopsy.   -Proceeded with a punch biopsy of the parotid mass.  This was consistent with invasive squamous cell carcinoma without any epidermal involvement.    -His preoperative PET scan showed only involvement of the parotid. He was taken to the OR on 2019 for a left total parotidectomy with resection of skin, preservation of the facial nerve, sacrifice of the greater auricular nerve, level I-V neck dissection, cervicofacial flap.   -Final pathology showed a 2.1 cm moderately differentiated SCC within the subcutaneous tissues and parotid, PNI, no LVSI, negative margins, 0/47 cervical nodes.   -He had postoperative radiation with Dr Floyd from 2019 to 2019 for a total of 6000 cGy. He had his 3 month post treatment PET scan in 2019 which only showed a stable sub-cm nodule in the lung.  -Last saw Dr. Moreland on 22 for follow up. TSH normal in 2022. Had some issues with neck cramping.      Symptoms,  Patient presents for an initial evaluation today.  He complains of very occasional left-sided neck cramping that started a  few years ago shortly after his surgery in 2019.  Cramps are very mild per his history, and occur only once monthly or once every 2 months.  He does endorse some neck stiffness, but states that the stiffness is occasional and is relieved by stretching which she does every day, multiple times a day.  He does not complain of any overt pain.  He also does not complain of any limited range of motion of his neck.  He does not endorse a history of lymphedema, though has been told by therapist in the past that he did have mild lymphedema.  He was taught the manual drainage techniques by his previous therapist, and does them occasionally.  He does not endorse any noticeable swelling currently.  He has been followed by lymphedema therapy in the past, initially after his surgery for a course of therapy to help reduce swelling and address scar tissue.  He has not had any therapy since then.  He denies that the occasional cramping affects his daily activities or interferes with his travel.  He denies using any over-the-counter medications to help with the discomfort, as he states that it only lasts a minute or so and then is gone when it does come on.  Patient presents for an initial evaluation.       Therapies/HEP,  Patient has participated in outpatient lymphedema therapy in the past after his surgery, has not done so more recently.      Functionally,   Patient is independent for mobility, ADLs and IADLs.             Past Medical and Surgical History:     Past Medical History:   Diagnosis Date     Alpha-1-antitrypsin deficiency (H)      Ascites     Pre-transplant     Chronic kidney disease, stage 3      Cirrhosis (H)     Alpha-1-antitrypsin deficiency, s/p liver transplant 3/31/15     Gout      HTN (hypertension)      Lentigo maligna melanoma (H) 2009    lentigo maligna melanoma excised on 01/29/2009 with a repeat wide excision on 03/30/2009.      Liver replaced by transplant (H) 03/31/2015     Nephrolithiasis       Osteoarthritis      Portal hypertension (H)     Pre-transplant     Past Surgical History:   Procedure Laterality Date     COLONOSCOPY N/A 2014    Procedure: COLONOSCOPY;  Surgeon: Katherine Jimenez MD;  Location:  GI     ESOPHAGOSCOPY, GASTROSCOPY, DUODENOSCOPY (EGD), COMBINED N/A 2014    Procedure: COMBINED ESOPHAGOSCOPY, GASTROSCOPY, DUODENOSCOPY (EGD), BIOPSY SINGLE OR MULTIPLE;  Surgeon: Katherine Jimenez MD;  Location:  GI     ESOPHAGOSCOPY, GASTROSCOPY, DUODENOSCOPY (EGD), COMBINED N/A 2015    Procedure: COMBINED ESOPHAGOSCOPY, GASTROSCOPY, DUODENOSCOPY (EGD), REMOVE FOREIGN BODY;  Surgeon: Katherine Jimenez MD;  Location:  GI     HERNIA REPAIR      abdominal     INSERT SHUNT PORTAL TRANSJUGULAR INTRAHEPTIC       ORTHOPEDIC SURGERY      bilateral knee surgery     PAROTIDECTOMY, RADICAL NECK DISSECTION Left 2019    Procedure: Total Parotidectomy, Excision of Skin, Neck Dissection Levels I - V,  And Local Advancement Flap;  Surgeon: Johanna Moreland MD;  Location:  OR     TRANSPLANT LIVER RECIPIENT  DONOR N/A 3/31/2015    Procedure: TRANSPLANT LIVER RECIPIENT  DONOR;  Surgeon: Elia Mehta MD;  Location:  OR            Social History:     Social History     Tobacco Use     Smoking status: Never Smoker     Smokeless tobacco: Never Used     Tobacco comment: [id number] occational smoker   Substance Use Topics     Alcohol use: Yes     Alcohol/week: 0.0 standard drinks     Comment: 2-3 glass of wine per week. At most 1 per day                Functional history:     Eric Andrew is independent with all aspects of his life.    ADLs: Independent  Assistive devices: None  iADLs (medication management and finances): Independent             Family History:     Family History   Problem Relation Age of Onset     Cardiovascular Father         MI     Glaucoma No family hx of      Macular Degeneration No family hx of              Medications:     Current Outpatient Medications   Medication Sig Dispense Refill     AMLODIPINE BESYLATE PO Take 10 mg by mouth every morning        BASAGLAR 100 UNIT/ML injection Inject 45 Units Subcutaneous At Bedtime        Calcium Carbonate-Vitamin D (CALCIUM + D PO) Take 1 tablet by mouth every morning        levothyroxine (SYNTHROID/LEVOTHROID) 50 MCG tablet Take 1 tablet (50 mcg) by mouth daily 90 tablet 3     losartan (COZAAR) 25 MG tablet Take 1 tablet (25 mg) by mouth daily (Patient not taking: No sig reported) 90 tablet 3     metFORMIN (GLUCOPHAGE) 1000 MG tablet Take 1,000 mg by mouth 2 times daily (with meals)        metoprolol succinate (TOPROL-XL) 25 MG 24 hr tablet Take 25 mg by mouth every morning        Multiple Vitamins-Minerals (MULTIVITAL) TABS Take 1 tablet by mouth every morning        neomycin-polymyxin-dexamethasone (MAXITROL) 3.5-08500-2.1 SUSP ophthalmic susp Place 1-2 drops Into the left eye 3 times daily 1 Bottle 1     niacinamide (NIACIN) 500 MG tablet Take 1,000 mg by mouth 2 times daily (with meals)       Sodium Fluoride 1.1 % PSTE Apply 1 Application to affected area daily 112 g 11     tacrolimus (GENERIC EQUIVALENT) 1 MG capsule Take 1 capsule (1 mg) by mouth 2 times daily 60 capsule 11            Allergies:     Allergies   Allergen Reactions     Cefazolin Swelling     Lips swelled while patient was in the OR      Lisinopril Cough     Meropenem Hives and Swelling     Developed hives and lip swelling while on meropenem and micafungin.  Resolved with discontinuation.  Unclear which was cause.     Micafungin Hives and Swelling     Developed hives and lip swelling while on meropenem and micafungin.  Resolved with discontinuation.  Unclear which was cause.              ROS:     A focused ROS is negative other than the symptoms noted above in the HPI.           Physical Examiniation:     VITAL SIGNS: There were no vitals taken for this visit.  BMI: Estimated body mass index is 33.87 kg/m   "as calculated from the following:    Height as of 4/29/22: 1.905 m (6' 3\").    Weight as of 4/29/22: 122.9 kg (271 lb).    Gen: NAD, pleasant and cooperative  HEENT: Normocephalic, atraumatic, extra-ocular movements appear intact  Pulm: non-labored breathing in room air  Ext: no edema in BLE  Neuro/MSK:   Orientation: Oriented to person, place, time, situation. Exhibits good insight into his/her condition and ongoing management/symptoms.  Cervical Exam:  Range of motion: Full range of motion with cervical flexion, extension, sidebending and lateral rotation.  No obvious lymphedema or swelling on inspection or palpation.  There is some mild scar tissue present over the left lateral neck in the area of the left SCM         Laboratory/Imaging:     CT Soft Tissue Neck W Contrast (4/29/22):  Findings:   Post surgical changes of left neck dissection. Resection of left  submandibular and left parotid glands. Surgical defect in the left  cheek. No evidence of local recurrence in the left aspect of the face.  No cervical lymphadenopathy. Evaluation of the mucosal space  demonstrates no evident abnormality in the nasopharynx, oropharynx,  hypopharynx or the glottis. The tongue base appears normal. Unchanged  6 mm right thyroid nodule. Right submandibular gland is heterogeneous.  Likely due to prior radiotherapy.  The fascial spaces in the neck are intact bilaterally. Chronic  occlusion of right internal carotid artery unchanged. Grossly no  intracranial space-occupying lesion. No infarct  Evaluation of the osseous structures demonstrate no worrisome lytic or  sclerotic lesion. No overt spinal canal or neuroforaminal stenosis.  The visualized paranasal sinuses are clear. The mastoid air cells are  clear.   Please refer to same-day chest CT for detailed evaluation of the lung  parenchyma.                                                         Impression:  Stable CT exam with no evidence of local recurrence or " metastatic  cervical lymphadenopathy.               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  As discussed with Ria, based on his history and exam at this time, his symptoms are very mild and very infrequent occurring only once monthly or once every other month.  I do not think that he needs a muscle relaxant at this point or physical therapy as well.  He was instructed to continue stretching, and we implemented daily to help address and maintain his range of motion and relieve scar tissue fibrosis tightness.  He does not have any evident signs of lymphedema on exam today.  He was instructed that if his cramps become more frequent or more severe or he develops lymphedema/swelling, he should contact the clinic as I would recommend sending him back to lymphedema therapy and possibly trying a muscle relaxant at that time to help with symptom relief.  We will plan for return to clinic visit as needed.  Patient is in agreement with the above plan.    1. Patient education: In depth discussion and education was provided about the assessment and implications of each of the below recommendations for management. Patient indicated readiness to learn, all questions were answered and understanding of material presented was confirmed.  2. Therapy/equipment/braces:  1. Patient should reimplement regular daily stretching to address scar tissue and maintain range of motion.  2. He does not need lymphedema therapy at this point, however should contact the clinic if swelling develops or tightness/cramping become worse.  3. Medications:  1. Could consider muscle relaxants in the future if needed, however at this time he does not need these.  4. Follow up: As needed    Marion Camejo MD  Physical Medicine & Rehabilitation    I appreciate the opportunity to participate in the care of  your patient.     50 minutes spent on the date of the encounter doing chart review, history and exam, documentation and further activities as noted above.

## 2022-06-28 NOTE — PATIENT INSTRUCTIONS
1.  We implement daily stretches to help maintain your neck range of motion and relieve any discomfort.  2.  Reach out to Dr. Camejo if cramping becomes more frequent or more severe or you develop any swelling in the neck.  3.  Follow-up with Dr. Camejo as needed.

## 2022-06-28 NOTE — LETTER
2022         RE: Eric Andrew  [address] [facility] [address]        Dear Colleague,    Thank you for referring your patient, Eric Andrew, to the [facility]. Please see a copy of my visit note below.           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    -He underwent surgery by dermatologist in [place] for this.  Per his report this was not done with Mohs surgery but was told that he had negative margins.    -Shortly after the surgery he noticed a lump along his mandible/below the ear.  He went in for his 1 month follow-up with a dermatologist and at that time the mass had increased in size.  She thought it was a reactive node but offered a biopsy.   -Proceeded with a punch biopsy of the parotid mass.  This was consistent with invasive squamous cell carcinoma without any epidermal involvement.    -His preoperative PET scan showed only involvement of the parotid. He was taken to the OR on 2019 for a left total parotidectomy with resection of skin, preservation of the facial nerve, sacrifice of the greater auricular nerve, level I-V neck dissection, cervicofacial flap.   -Final pathology showed a 2.1 cm moderately differentiated SCC within the subcutaneous tissues and parotid, PNI, no LVSI, negative margins, 0/47 cervical nodes.   -He had postoperative radiation with Dr Floyd from 2019 to 2019 for a total of 6000 cGy. He had his 3 month post treatment PET scan in 2019 which only showed a stable sub-cm nodule in  the lung.  -Last saw Dr. Moreland on 4/29/22 for follow up. TSH normal in Feb 2022. Had some issues with neck cramping.      Symptoms,  Patient presents for an initial evaluation today.  He complains of very occasional left-sided neck cramping that started a few years ago shortly after his surgery in 2019.  Cramps are very mild per his history, and occur only once monthly or once every 2 months.  He does endorse some neck stiffness, but states that the stiffness is occasional and is relieved by stretching which she does every day, multiple times a day.  He does not complain of any overt pain.  He also does not complain of any limited range of motion of his neck.  He does not endorse a history of lymphedema, though has been told by therapist in the past that he did have mild lymphedema.  He was taught the manual drainage techniques by his previous therapist, and does them occasionally.  He does not endorse any noticeable swelling currently.  He has been followed by lymphedema therapy in the past, initially after his surgery for a course of therapy to help reduce swelling and address scar tissue.  He has not had any therapy since then.  He denies that the occasional cramping affects his daily activities or interferes with his travel.  He denies using any over-the-counter medications to help with the discomfort, as he states that it only lasts a minute or so and then is gone when it does come on.  Patient presents for an initial evaluation.       Therapies/HEP,  Patient has participated in outpatient lymphedema therapy in the past after his surgery, has not done so more recently.      Functionally,   Patient is independent for mobility, ADLs and IADLs.             Past Medical and Surgical History:     Past Medical History:   Diagnosis Date     Alpha-1-antitrypsin deficiency (H)      Ascites     Pre-transplant     Chronic kidney disease, stage 3      Cirrhosis (H)     Alpha-1-antitrypsin deficiency, s/p liver transplant  3/31/15     Gout      HTN (hypertension)      Lentigo maligna melanoma (H)     lentigo maligna melanoma excised on 2009 with a repeat wide excision on 2009.      Liver replaced by transplant (H) 2015     Nephrolithiasis      Osteoarthritis      Portal hypertension (H)     Pre-transplant     Past Surgical History:   Procedure Laterality Date     COLONOSCOPY N/A 2014    Procedure: COLONOSCOPY;  Surgeon: Katherine Jimenez MD;  Location:  GI     ESOPHAGOSCOPY, GASTROSCOPY, DUODENOSCOPY (EGD), COMBINED N/A 2014    Procedure: COMBINED ESOPHAGOSCOPY, GASTROSCOPY, DUODENOSCOPY (EGD), BIOPSY SINGLE OR MULTIPLE;  Surgeon: Katherine Jimenez MD;  Location:  GI     ESOPHAGOSCOPY, GASTROSCOPY, DUODENOSCOPY (EGD), COMBINED N/A 2015    Procedure: COMBINED ESOPHAGOSCOPY, GASTROSCOPY, DUODENOSCOPY (EGD), REMOVE FOREIGN BODY;  Surgeon: Katherine Jimenez MD;  Location:  GI     HERNIA REPAIR      abdominal     INSERT SHUNT PORTAL TRANSJUGULAR INTRAHEPTIC       ORTHOPEDIC SURGERY      bilateral knee surgery     PAROTIDECTOMY, RADICAL NECK DISSECTION Left 2019    Procedure: Total Parotidectomy, Excision of Skin, Neck Dissection Levels I - V,  And Local Advancement Flap;  Surgeon: Johanna Moreland MD;  Location:  OR     TRANSPLANT LIVER RECIPIENT  DONOR N/A 3/31/2015    Procedure: TRANSPLANT LIVER RECIPIENT  DONOR;  Surgeon: Elia Mehta MD;  Location:  OR            Social History:     Social History     Tobacco Use     Smoking status: Never Smoker     Smokeless tobacco: Never Used     Tobacco comment: [id number] occational smoker   Substance Use Topics     Alcohol use: Yes     Alcohol/week: 0.0 standard drinks     Comment: 2-3 glass of wine per week. At most 1 per day                Functional history:     Eric Andrew is independent with all aspects of his life.    ADLs: Independent  Assistive devices: None  iADLs  (medication management and finances): Independent             Family History:     Family History   Problem Relation Age of Onset     Cardiovascular Father         MI     Glaucoma No family hx of      Macular Degeneration No family hx of             Medications:     Current Outpatient Medications   Medication Sig Dispense Refill     AMLODIPINE BESYLATE PO Take 10 mg by mouth every morning        BASAGLAR 100 UNIT/ML injection Inject 45 Units Subcutaneous At Bedtime        Calcium Carbonate-Vitamin D (CALCIUM + D PO) Take 1 tablet by mouth every morning        levothyroxine (SYNTHROID/LEVOTHROID) 50 MCG tablet Take 1 tablet (50 mcg) by mouth daily 90 tablet 3     losartan (COZAAR) 25 MG tablet Take 1 tablet (25 mg) by mouth daily (Patient not taking: No sig reported) 90 tablet 3     metFORMIN (GLUCOPHAGE) 1000 MG tablet Take 1,000 mg by mouth 2 times daily (with meals)        metoprolol succinate (TOPROL-XL) 25 MG 24 hr tablet Take 25 mg by mouth every morning        Multiple Vitamins-Minerals (MULTIVITAL) TABS Take 1 tablet by mouth every morning        neomycin-polymyxin-dexamethasone (MAXITROL) 3.5-27739-1.1 SUSP ophthalmic susp Place 1-2 drops Into the left eye 3 times daily 1 Bottle 1     niacinamide (NIACIN) 500 MG tablet Take 1,000 mg by mouth 2 times daily (with meals)       Sodium Fluoride 1.1 % PSTE Apply 1 Application to affected area daily 112 g 11     tacrolimus (GENERIC EQUIVALENT) 1 MG capsule Take 1 capsule (1 mg) by mouth 2 times daily 60 capsule 11            Allergies:     Allergies   Allergen Reactions     Cefazolin Swelling     Lips swelled while patient was in the OR      Lisinopril Cough     Meropenem Hives and Swelling     Developed hives and lip swelling while on meropenem and micafungin.  Resolved with discontinuation.  Unclear which was cause.     Micafungin Hives and Swelling     Developed hives and lip swelling while on meropenem and micafungin.  Resolved with discontinuation.  Unclear  "which was cause.              ROS:     A focused ROS is negative other than the symptoms noted above in the HPI.           Physical Examiniation:     VITAL SIGNS: There were no vitals taken for this visit.  BMI: Estimated body mass index is 33.87 kg/m  as calculated from the following:    Height as of 4/29/22: 1.905 m (6' 3\").    Weight as of 4/29/22: 122.9 kg (271 lb).    Gen: NAD, pleasant and cooperative  HEENT: Normocephalic, atraumatic, extra-ocular movements appear intact  Pulm: non-labored breathing in room air  Ext: no edema in BLE  Neuro/MSK:   Orientation: Oriented to person, place, time, situation. Exhibits good insight into his/her condition and ongoing management/symptoms.  Cervical Exam:  Range of motion: Full range of motion with cervical flexion, extension, sidebending and lateral rotation.  No obvious lymphedema or swelling on inspection or palpation.  There is some mild scar tissue present over the left lateral neck in the area of the left SCM         Laboratory/Imaging:     CT Soft Tissue Neck W Contrast (4/29/22):  Findings:   Post surgical changes of left neck dissection. Resection of left  submandibular and left parotid glands. Surgical defect in the left  cheek. No evidence of local recurrence in the left aspect of the face.  No cervical lymphadenopathy. Evaluation of the mucosal space  demonstrates no evident abnormality in the nasopharynx, oropharynx,  hypopharynx or the glottis. The tongue base appears normal. Unchanged  6 mm right thyroid nodule. Right submandibular gland is heterogeneous.  Likely due to prior radiotherapy.  The fascial spaces in the neck are intact bilaterally. Chronic  occlusion of right internal carotid artery unchanged. Grossly no  intracranial space-occupying lesion. No infarct  Evaluation of the osseous structures demonstrate no worrisome lytic or  sclerotic lesion. No overt spinal canal or neuroforaminal stenosis.  The visualized paranasal sinuses are clear. The " mastoid air cells are  clear.   Please refer to same-day chest CT for detailed evaluation of the lung  parenchyma.                                                         Impression:  Stable CT exam with no evidence of local recurrence or metastatic  cervical lymphadenopathy.               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  As discussed with Ria, based on his history and exam at this time, his symptoms are very mild and very infrequent occurring only once monthly or once every other month.  I do not think that he needs a muscle relaxant at this point or physical therapy as well.  He was instructed to continue stretching, and we implemented daily to help address and maintain his range of motion and relieve scar tissue fibrosis tightness.  He does not have any evident signs of lymphedema on exam today.  He was instructed that if his cramps become more frequent or more severe or he develops lymphedema/swelling, he should contact the clinic as I would recommend sending him back to lymphedema therapy and possibly trying a muscle relaxant at that time to help with symptom relief.  We will plan for return to clinic visit as needed.  Patient is in agreement with the above plan.    1. Patient education: In depth discussion and education was provided about the assessment and implications of each of the below recommendations for management. Patient indicated readiness to learn, all questions were answered and understanding of material presented was confirmed.  2. Therapy/equipment/braces:  1. Patient should reimplement regular daily stretching to address scar tissue and maintain range of motion.  2. He does not need lymphedema therapy at this point, however should contact the clinic if swelling develops or tightness/cramping become worse.  3. Medications:  1. Could  "consider muscle relaxants in the future if needed, however at this time he does not need these.  4. Follow up: As needed    Marion Camejo MD  Physical Medicine & Rehabilitation    I appreciate the opportunity to participate in the care of your patient.     50 minutes spent on the date of the encounter doing chart review, history and exam, documentation and further activities as noted above.            Oncology Rooming Note    June 28, 2022 9:07 AM   Eric Andrew is a 72 year old male who presents for:    Chief Complaint   Patient presents with     Oncology Clinic Visit     Initial Vitals: BP (!) 143/88   Pulse 69   Resp 16   Ht 1.905 m (6' 3\")   Wt 124.7 kg (275 lb)   SpO2 97%   BMI 34.37 kg/m   Estimated body mass index is 34.37 kg/m  as calculated from the following:    Height as of this encounter: 1.905 m (6' 3\").    Weight as of this encounter: 124.7 kg (275 lb). Body surface area is 2.57 meters squared.  No Pain (0) Comment: Data Unavailable   No LMP for male patient.  Allergies reviewed: Yes  Medications reviewed: Yes    Medications: Medication refills not needed today.  Pharmacy name entered into Ortho-tag:    [id number] - [place], [place] [facility] [address] [facility] - [place] [phone]  [facility] - [place], [place] - [address] [place] [address] [facility] - [place], [place] - [address] AT PORTAL TO REGISTERED [facility] SITES    Clinical concerns: no      Aurea Mejias, [facility]                Again, thank you for allowing me to participate in the care of your patient.        Sincerely,        Marion Camejo MD    "

## 2022-06-28 NOTE — PROGRESS NOTES
"Oncology Rooming Note    June 28, 2022 9:07 AM   Eric Andrew is a 72 year old male who presents for:    Chief Complaint   Patient presents with     Oncology Clinic Visit     Initial Vitals: BP (!) 143/88   Pulse 69   Resp 16   Ht 1.905 m (6' 3\")   Wt 124.7 kg (275 lb)   SpO2 97%   BMI 34.37 kg/m   Estimated body mass index is 34.37 kg/m  as calculated from the following:    Height as of this encounter: 1.905 m (6' 3\").    Weight as of this encounter: 124.7 kg (275 lb). Body surface area is 2.57 meters squared.  No Pain (0) Comment: Data Unavailable   No LMP for male patient.  Allergies reviewed: Yes  Medications reviewed: Yes    Medications: Medication refills not needed today.  Pharmacy name entered into EPIC:    [facility]ACY #[id number] - [place], [place] - [address] [facility] [place], [place] - [address] [place] [address] [phone]  [facility] - [place], [place] - [address] [place] [address] [facility] - [place], [place] - [address] AT PORTAL TO REGISTERED [facility] SITES    Clinical concerns: no      Aurea Mejias CMA            "

## 2022-08-09 ENCOUNTER — OFFICE VISIT (OUTPATIENT)
Dept: OPTOMETRY | Facility: CLINIC | Age: 72
End: 2022-08-09
Payer: MEDICARE

## 2022-08-09 DIAGNOSIS — H52.203 MYOPIA OF BOTH EYES WITH ASTIGMATISM AND PRESBYOPIA: ICD-10-CM

## 2022-08-09 DIAGNOSIS — H52.13 MYOPIA OF BOTH EYES WITH ASTIGMATISM AND PRESBYOPIA: ICD-10-CM

## 2022-08-09 DIAGNOSIS — H52.4 MYOPIA OF BOTH EYES WITH ASTIGMATISM AND PRESBYOPIA: ICD-10-CM

## 2022-08-09 DIAGNOSIS — H25.13 NUCLEAR AGE-RELATED CATARACT, BOTH EYES: Primary | ICD-10-CM

## 2022-08-09 ASSESSMENT — CUP TO DISC RATIO
OS_RATIO: 0.20
OD_RATIO: 0.20

## 2022-08-09 ASSESSMENT — REFRACTION_CURRENTRX
OD_SPHERE: -6.50
OD_BASECURVE: 8.7
OD_BRAND: DAILIES AQUACOMFORT PLUS
OD_DIAMETER: 14.0
OS_BRAND: DAILIES AQUACOMFORT PLUS
OS_DIAMETER: 14.0
OS_BASECURVE: 8.7
OS_SPHERE: -6.50

## 2022-08-09 ASSESSMENT — REFRACTION_WEARINGRX
OS_ADD: +2.50
OD_SPHERE: -7.75
OD_CYLINDER: +1.25
OS_AXIS: 015
OD_AXIS: 165
OS_CYLINDER: +1.00
OS_SPHERE: -9.00
OD_ADD: +2.50

## 2022-08-09 ASSESSMENT — VISUAL ACUITY
OD_CC: 20/25
OS_CC: 20/70
METHOD: SNELLEN - LINEAR
OS_CC: 0.75M
CORRECTION_TYPE: CONTACTS

## 2022-08-09 ASSESSMENT — REFRACTION_MANIFEST
OS_AXIS: 020
OD_CYLINDER: +1.25
OS_CYLINDER: +1.00
OS_SPHERE: -9.50
OD_SPHERE: -8.25
OD_AXIS: 170

## 2022-08-09 ASSESSMENT — TONOMETRY
OS_IOP_MMHG: 12
OD_IOP_MMHG: 11
IOP_METHOD: ICARE

## 2022-08-09 ASSESSMENT — SLIT LAMP EXAM - LIDS
COMMENTS: 1+ BLEPHARITIS
COMMENTS: 1+ BLEPHARITIS

## 2022-08-09 ASSESSMENT — CONF VISUAL FIELD: OS_NORMAL: 1

## 2022-08-09 ASSESSMENT — EXTERNAL EXAM - RIGHT EYE: OD_EXAM: NORMAL

## 2022-08-09 ASSESSMENT — EXTERNAL EXAM - LEFT EYE: OS_EXAM: NORMAL

## 2022-08-09 NOTE — PROGRESS NOTES
A/P  1.) Cataracts OU  -Moderate, not visually significant at this time  -BCVA 20/20 both eyes. They are causing mild myopic shift  -Reviewed findings with pt - observe for now    2.) H/o liver transplant, borderline diabetes previously  -Recent A1c's have been excellent. No concern for retinopathy at this time  -Monitor annually with dilation    3.) h/o Melanoma   -No ocular abnormalities today  -Iris nevus OS, nonsuspicious  -Continue to monitor. Reviewed with pt to self-monitor ocular surface at home. We will continue to monitor whole eye annually    4.) Myopia/Astigmatism/Presbyopia OU  -Overall doing well in monovision left eye near. Hast noticed slight decreased distance vision. Improved with toric Rx today    CL trial dispensed right eye, okay to order either right lens per pt preference. Monitor 1 year comprehensive eye exam

## 2022-08-27 NOTE — LETTER
8/19/2019       RE: Eric Andrew  [address] [place] [address]     Dear Colleague,    Thank you for referring your patient, Eric Andrew, to the [facility] EAR NOSE AND THROAT at [facility]. Please see a copy of my visit note below.    Dear Dr. Jimenez:    I had the pleasure of seeing Eric Andrew in follow-up today at the [facility].     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  He underwent surgery by dermatologist in [place] for this.  Per his report this was not done with Mohs surgery but was told that he had negative margins.  He says that shortly after the surgery he noticed a lump along his mandible/below the ear.  He went in for his 1 month follow-up with a dermatologist and at that time the mass had increased in size.  She thought it was a reactive node but offered a biopsy. Per review of the notes they proceeded with a punch biopsy of the parotid mass.  This was consistent with invasive squamous cell carcinoma without any epidermal involvement.  His preoperative PET scan showed only involvement of the parotid. He was taken to the OR on 4/11/2019 for a left total parotidectomy with resection of skin, preservation of the facial nerve, sacrifice of the greater auricular nerve, level I-V neck dissection, cervicofacial flap. Final pathology showed a 2.1 cm moderately differentiated SCC within the subcutaneous tissues and parotid, PNI, no LVSI, negative margins, 0/47 cervical nodes.    Interval history:  He comes in today for follow-up. He was last seen May 2019. He had postoperative radiation with Dr Floyd from 5/13/2019 to 6/24/2019 for a total of 6000 cGy. He missed 2 treatments due to machine down time and then a holiday which were made up over the  weekend.  He says he is doing quite well.  He was able to go to [place] for 3 weeks for his trip that he had been planning prior to treatment.  He felt like his energy was okay for the trip and he was able to do lots of walking.  He is still struggling with playing the trombone due to air leakage on the higher notes.  He is working on lip exercises to try and improve this.  He says that he is swallowing without any limitations, does have some dry mouth.  He recently gained some weight during his trip, but otherwise did not have weight loss during his treatment.  He has not been doing any swallowing exercises.  He just started lymphedema therapy.  He denies any ear pain.  He has no new skin lesions or neck masses.  He is seeing dermatology tomorrow.  He and his wife are planning a trip to [place] from September 5 to October 4.        MEDICATIONS:     Current Outpatient Medications   Medication Sig Dispense Refill     AMLODIPINE BESYLATE PO Take 10 mg by mouth every morning        atorvastatin (LIPITOR) 20 MG tablet Take 20 mg by mouth every evening        BASAGLAR 100 UNIT/ML injection Inject 45 Units Subcutaneous At Bedtime        Calcium Carbonate-Vitamin D (CALCIUM + D PO) Take 1 tablet by mouth every morning        diphenhydrAMINE HCl (BENADRYL PO) Take by mouth nightly as needed       losartan (COZAAR) 25 MG tablet Take 1 tablet (25 mg) by mouth daily (Patient taking differently: Take 50 mg by mouth every morning ) 90 tablet 3     metFORMIN (GLUCOPHAGE) 1000 MG tablet Take 1,000 mg by mouth 2 times daily (with meals)        metoprolol succinate (TOPROL-XL) 25 MG 24 hr tablet Take 25 mg by mouth every morning        Multiple Vitamins-Minerals (MULTIVITAL) TABS Take 1 tablet by mouth every morning        sildenafil (VIAGRA) 50 MG tablet Take 50 mg by mouth daily as needed for erectile dysfunction       tacrolimus (GENERIC EQUIVALENT) 1 MG capsule Take 1 capsule (1 mg) by mouth every 12 hours (Patient taking  differently: Take 1 mg by mouth 2 times daily ) 180 capsule 3     mupirocin (BACTROBAN) 2 % external ointment Apply topically 3 times daily (Patient not taking: Reported on 7/19/2019) 30 g 0       ALLERGIES:    Allergies   Allergen Reactions     Cefazolin Swelling     Lips swelled while patient was in the OR      Lisinopril Cough     Meropenem Hives and Swelling     Developed hives and lip swelling while on meropenem and micafungin.  Resolved with discontinuation.  Unclear which was cause.     Micafungin Hives and Swelling     Developed hives and lip swelling while on meropenem and micafungin.  Resolved with discontinuation.  Unclear which was cause.       HABITS/SOCIAL HISTORY:   Spends the shin in [place].  .  He is a retired .   He smokes for a few years and quit back in 1973.  He has no chewing tobacco use.  He has 1 alcoholic beverage about 3 times per week.   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    Social History     Socioeconomic History     Marital status:      Spouse name: Not on file     Number of children: Not on file     Years of education: Not on file     Highest education level: Not on file   Occupational History     Not on file   Social Needs     Financial resource strain: Not on file     Food insecurity:     Worry: Not on file     Inability: Not on file     Transportation needs:     Medical: Not on file     Non-medical: Not on file   Tobacco Use     Smoking status: Never Smoker     Smokeless tobacco: Never Used     Tobacco comment: [id number] occational smoker   Substance and Sexual Activity     Alcohol use: Yes     Alcohol/week: 0.0 oz     Comment: 2-3 glass of wine per week. At most 1 per day     Drug use: No     Sexual activity: Never   Lifestyle     Physical activity:     Days per week: Not on file     Minutes per session: Not on file     Stress: Not on file   Relationships     Social connections:     Talks on phone: Not on file     Gets together: Not on file     Attends  Yarsanism service: Not on file     Active member of club or organization: Not on file     Attends meetings of clubs or organizations: Not on file     Relationship status: Not on file     Intimate partner violence:     Fear of current or ex partner: Not on file     Emotionally abused: Not on file     Physically abused: Not on file     Forced sexual activity: Not on file   Other Topics Concern     Parent/sibling w/ CABG, MI or angioplasty before 65F 55M? Not Asked   Social History Narrative     Not on file       PAST MEDICAL HISTORY:   Past Medical History:   Diagnosis Date     Alpha-1-antitrypsin deficiency (H)      Ascites     Pre-transplant     Chronic kidney disease, stage 3 (H)      Cirrhosis (H)     Alpha-1-antitrypsin deficiency, s/p liver transplant 3/31/15     Gout      HTN (hypertension)      Lentigo maligna melanoma (H) 2009    lentigo maligna melanoma excised on 01/29/2009 with a repeat wide excision on 03/30/2009.      Liver replaced by transplant (H) 03/31/2015     Nephrolithiasis      Osteoarthritis      Portal hypertension (H)     Pre-transplant        PAST SURGICAL HISTORY:   Past Surgical History:   Procedure Laterality Date     COLONOSCOPY N/A 12/18/2014    Procedure: COLONOSCOPY;  Surgeon: Katherine Jimenez MD;  Location:  GI     ESOPHAGOSCOPY, GASTROSCOPY, DUODENOSCOPY (EGD), COMBINED N/A 12/18/2014    Procedure: COMBINED ESOPHAGOSCOPY, GASTROSCOPY, DUODENOSCOPY (EGD), BIOPSY SINGLE OR MULTIPLE;  Surgeon: Katherine Jimenez MD;  Location:  GI     ESOPHAGOSCOPY, GASTROSCOPY, DUODENOSCOPY (EGD), COMBINED N/A 5/28/2015    Procedure: COMBINED ESOPHAGOSCOPY, GASTROSCOPY, DUODENOSCOPY (EGD), REMOVE FOREIGN BODY;  Surgeon: Katherine Jimenez MD;  Location:  GI     HERNIA REPAIR      abdominal     INSERT SHUNT PORTAL TRANSJUGULAR INTRAHEPTIC       ORTHOPEDIC SURGERY      bilateral knee surgery     PAROTIDECTOMY, RADICAL NECK DISSECTION Left 4/11/2019    Procedure:  "Total Parotidectomy, Excision of Skin, Neck Dissection Levels I - V,  And Local Advancement Flap;  Surgeon: Johanna Moreland MD;  Location: [facility] OR     TRANSPLANT LIVER RECIPIENT  DONOR N/A 3/31/2015    Procedure: TRANSPLANT LIVER RECIPIENT  DONOR;  Surgeon: Elia Mehta MD;  Location: [facility] OR       FAMILY HISTORY:    Family History   Problem Relation Age of Onset     Cardiovascular Father         MI     Glaucoma No family hx of      Macular Degeneration No family hx of        REVIEW OF SYSTEMS:  12 point ROS was negative other than the symptoms noted above in the HPI.  Patient Supplied Answers to Review of Systems  [facility] ENT ROS 5/3/2019   Musculoskeletal Neck pain   Skin Skin lesions         PHYSICAL EXAMINATION:   BP (!) 150/81 (BP Location: Right arm, Patient Position: Sitting, Cuff Size: Adult Regular)   Pulse 75   Resp 15   Ht 1.905 m (6' 3\")   Wt 125.2 kg (276 lb)   BMI 34.50 kg/m      Appearance:   normal; NAD, age-appropriate appearance, well-developed, obese   Communication:   normal; communicates verbally, normal voice quality   Head/Face:   inspection -  Normal; no scars or visible lesions   Salivary glands -  S/p left radical parotidectomy with cervicofacial advancement flap, radiation changes to the tissue, no palpable mass, well healed incision   Facial strength -  Normal and symmetric bilateral; H/B I/VI - significantly improved lower lip function, very subtle asymmetry but good movement   Skin:  normal, no rash   Ocular Motility:  normal occular movements   Ears:  auricle (AD) -  normal  EAC (AD) -  normal  TM (AD) -  Normal, no effusion  auricle (AS) -  normal  EAC (AS) -  normal  TM (AS) -  Normal, no effusion  Normal clinical speech reception   Nose:  Ext. inspection -  Normal   Oral Cavity:  lips -  Normal mucosa, oral competence, and stoma size   Age-appropriate dentition, healthy gingival mucosa   Hard palate, buccal, floor of mouth mucosa normal   Tongue - normal " movement, no lesions   Oropharynx:  mucosa -  Normal, no lesions  soft palate -  Normal, no lesions, no asymmetry, normal elevation   Neck: Well healed neck incision, well healed cervicofacial flap, no palpable masses  Normal range of motion   Lymphatic:  no abnormal nodes   Cardiovascular: warm, pink, well-perfused extremities without swelling, tenderness, or edema   Respiratory: Normal respiratory effort, no stridor   Neuro/Psych.:  mood/affect -  normal  mental status -  normal  cranial nerves -  normal - lower lip function on left significantly improved         PROCEDURE:      RESULTS REVIEWED:   I reviewed the treatment records from radiation oncology which are summarized above    IMPRESSION AND PLAN:   Eric Andrew is a 69-year-old man who has a history of a liver transplant and recent squamous cell carcinoma of the left cheek who developed 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 He underwent total parotidectomy, skin resection, resection of greater auricular nerve, level I-V neck dissection, cervicofacial flap on 4/11/2019. Final pathology showed the 1 metastatic deposit in the parotid. He compelted postoperative radiation on 6/24/2019 with Dr Floyd.    He is overall doing quite well.  He was encouraged to work on lymphedema therapy to help with the submental lymphedema.  His lip function has significantly improved and I am optimistic that this will continue to improve with his exercises.  He was seen by speech pathology today to reinforce the importance of swallowing exercises.    We will plan on obtaining a TSH in the next few months.    I will see him back in about 6 weeks. His 3 month post-treatment PET is due at the end of September 2019 and was ordered but with attempts to try and obtain it in between his trips.    Thank you very much for the opportunity to participate in the care of your patient.      Johanna Moreland M.D.  Otolaryngology- Head & Neck Surgery      This note was dictated with voice  recognition software and then edited. Please excuse any unintentional errors.       CC:  Katherine Jimenez MD  [address]  [facility] [address]      Naomi Rodriguez, RN  Liver Coordinator  [facility]      Aston Devine MD  [facility]  [address]  [facility] [address]           [employer]

## 2022-09-08 NOTE — Clinical Note
Medicare Certification - Add your Attestation 1. Review the Certification Documentation below 2. Click 'QuickNote' on your toolbar 3. Add P MEDICARE CERTIFICATION-[facility] as a recipient 4. Add your attestation using [email or web address] and choose Rehab Services Attestation - Physician (at the bottom) 5. Click 'Save as QuickAction' and name the Button '[facility] Rehab Cert.' Click 'Accept.' This is a onetime set up. You will now have a [facility] Rehab Cert button on the right side of the inbasket toolbar so the next time you need to add your attestation just, click the button. You will not have to go through any other steps. 6. Click 'Sign and Route'
Negative

## 2022-09-22 ENCOUNTER — LAB (OUTPATIENT)
Dept: LAB | Facility: CLINIC | Age: 72
End: 2022-09-22
Payer: MEDICARE

## 2022-09-22 DIAGNOSIS — E03.4 HYPOTHYROIDISM DUE TO ACQUIRED ATROPHY OF THYROID: ICD-10-CM

## 2022-09-22 DIAGNOSIS — Z13.220 LIPID SCREENING: ICD-10-CM

## 2022-09-22 DIAGNOSIS — Z94.4 LIVER REPLACED BY TRANSPLANT (H): ICD-10-CM

## 2022-09-22 LAB
ALBUMIN SERPL BCG-MCNC: 3.9 G/DL (ref 3.5–5.2)
ALP SERPL-CCNC: 78 U/L (ref 40–129)
ALT SERPL W P-5'-P-CCNC: 15 U/L (ref 10–50)
ANION GAP SERPL CALCULATED.3IONS-SCNC: 12 MMOL/L (ref 7–15)
AST SERPL W P-5'-P-CCNC: 17 U/L (ref 10–50)
BILIRUB DIRECT SERPL-MCNC: <0.2 MG/DL (ref 0–0.3)
BILIRUB SERPL-MCNC: 0.5 MG/DL
BUN SERPL-MCNC: 19.3 MG/DL (ref 8–23)
CALCIUM SERPL-MCNC: 9.4 MG/DL (ref 8.8–10.2)
CHLORIDE SERPL-SCNC: 102 MMOL/L (ref 98–107)
CREAT SERPL-MCNC: 1.43 MG/DL (ref 0.67–1.17)
DEPRECATED HCO3 PLAS-SCNC: 25 MMOL/L (ref 22–29)
ERYTHROCYTE [DISTWIDTH] IN BLOOD BY AUTOMATED COUNT: 14.5 % (ref 10–15)
GFR SERPL CREATININE-BSD FRML MDRD: 52 ML/MIN/1.73M2
GLUCOSE SERPL-MCNC: 226 MG/DL (ref 70–99)
HCT VFR BLD AUTO: 39.5 % (ref 40–53)
HGB BLD-MCNC: 13.3 G/DL (ref 13.3–17.7)
MCH RBC QN AUTO: 32.8 PG (ref 26.5–33)
MCHC RBC AUTO-ENTMCNC: 33.7 G/DL (ref 31.5–36.5)
MCV RBC AUTO: 98 FL (ref 78–100)
PLATELET # BLD AUTO: 107 10E3/UL (ref 150–450)
POTASSIUM SERPL-SCNC: 4.6 MMOL/L (ref 3.4–5.3)
PROT SERPL-MCNC: 6.4 G/DL (ref 6.4–8.3)
RBC # BLD AUTO: 4.05 10E6/UL (ref 4.4–5.9)
SODIUM SERPL-SCNC: 139 MMOL/L (ref 136–145)
TACROLIMUS BLD-MCNC: 2.5 UG/L (ref 5–15)
TME LAST DOSE: ABNORMAL H
TME LAST DOSE: ABNORMAL H
TSH SERPL DL<=0.005 MIU/L-ACNC: 3.54 UIU/ML (ref 0.3–4.2)
WBC # BLD AUTO: 5.4 10E3/UL (ref 4–11)

## 2022-09-22 PROCEDURE — 80197 ASSAY OF TACROLIMUS: CPT

## 2022-09-22 PROCEDURE — 82248 BILIRUBIN DIRECT: CPT

## 2022-09-22 PROCEDURE — 84443 ASSAY THYROID STIM HORMONE: CPT

## 2022-09-22 PROCEDURE — 36415 COLL VENOUS BLD VENIPUNCTURE: CPT

## 2022-09-22 PROCEDURE — 85027 COMPLETE CBC AUTOMATED: CPT

## 2022-10-20 ENCOUNTER — TELEPHONE (OUTPATIENT)
Dept: OPTOMETRY | Facility: CLINIC | Age: 72
End: 2022-10-20

## 2022-10-20 ENCOUNTER — OFFICE VISIT (OUTPATIENT)
Dept: OPTOMETRY | Facility: CLINIC | Age: 72
End: 2022-10-20
Payer: MEDICARE

## 2022-10-20 DIAGNOSIS — H52.13 MYOPIA OF BOTH EYES WITH ASTIGMATISM AND PRESBYOPIA: Primary | ICD-10-CM

## 2022-10-20 DIAGNOSIS — H52.203 MYOPIA OF BOTH EYES WITH ASTIGMATISM AND PRESBYOPIA: Primary | ICD-10-CM

## 2022-10-20 DIAGNOSIS — H52.4 MYOPIA OF BOTH EYES WITH ASTIGMATISM AND PRESBYOPIA: Primary | ICD-10-CM

## 2022-10-21 ASSESSMENT — REFRACTION_CURRENTRX
OD_BASECURVE: 8.8
OD_SPHERE: -6.50
OD_BRAND: DAILIES AQUACOMFORT PLUS TORIC
OD_BASECURVE: 8.7
OS_DIAMETER: 14.0
OD_AXIS: 080
OS_SPHERE: -6.50
OD_SPHERE: -6.50
OD_BRAND: DAILIES AQUACOMFORT PLUS
OD_DIAMETER: 14.0
OD_DIAMETER: 14.4
OD_CYLINDER: -0.75
OS_BASECURVE: 8.7
OS_BRAND: DAILIES AQUACOMFORT PLUS

## 2022-10-21 NOTE — PROGRESS NOTES
No office visit. CL order only. Pt would like some of the toric Rx for the right eye    Ordered and mailed to [place] address:   [address] [place] [address]    Contact Lens Billing  V-Code:  - Soft toric  Final Contact Lens Rx       Brand Base Curve Diameter Sphere Cylinder Axis Lens    Right Dailies Aquacomfort Plus 8.7 14.0 -6.50       Left Dailies Aquacomfort Plus 8.7 14.0 -6.50   Monovision/near      Final Contact Lens Rx #2       Brand Base Curve Diameter Sphere Cylinder Axis Lens    Right Dailies Aquacomfort Plus Toric 8.8 14.4 -6.50 -0.75 080 Far distance/golfing    Left                [facility] order mailed to [place]: [id number]   # of units: 1 90-pack  Price per Unit: $80 per 90-pack + $7.95 shipping = $87.95 total    These are for cosmetic contact lenses.    Encounter Diagnosis   Name Primary?     Myopia of both eyes with astigmatism and presbyopia Yes        Date of last eye exam: 8/9/22

## 2022-10-23 ENCOUNTER — HEALTH MAINTENANCE LETTER (OUTPATIENT)
Age: 72
End: 2022-10-23

## 2022-12-09 ENCOUNTER — OFFICE VISIT (OUTPATIENT)
Dept: OTOLARYNGOLOGY | Facility: CLINIC | Age: 72
End: 2022-12-09
Payer: MEDICARE

## 2022-12-09 VITALS
OXYGEN SATURATION: 97 % | DIASTOLIC BLOOD PRESSURE: 73 MMHG | SYSTOLIC BLOOD PRESSURE: 151 MMHG | HEART RATE: 73 BPM | HEIGHT: 75 IN | WEIGHT: 278 LBS | BODY MASS INDEX: 34.57 KG/M2 | TEMPERATURE: 97.5 F

## 2022-12-09 DIAGNOSIS — C44.320 SQUAMOUS CELL CARCINOMA OF SKIN OF FACE: Primary | ICD-10-CM

## 2022-12-09 DIAGNOSIS — E03.4 HYPOTHYROIDISM DUE TO ACQUIRED ATROPHY OF THYROID: ICD-10-CM

## 2022-12-09 PROCEDURE — 99214 OFFICE O/P EST MOD 30 MIN: CPT | Performed by: OTOLARYNGOLOGY

## 2022-12-09 ASSESSMENT — PAIN SCALES - GENERAL: PAINLEVEL: NO PAIN (0)

## 2022-12-09 NOTE — LETTER
12/9/2022       RE: Eric Andrew  [address] [facility] [address]     Dear Colleague,    Thank you for referring your patient, Eric Andrew, to the [facility] EAR [facility]ROAT [facility] [place] [facility]. Please see a copy of my visit note below.    Dear Dr. Jimenez:    I had the pleasure of seeing rEic Andrew in follow-up today at the [facility].     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  He underwent surgery by dermatologist in [place] for this.  Per his report this was not done with Mohs surgery but was told that he had negative margins.  He says that shortly after the surgery he noticed a lump along his mandible/below the ear.  He went in for his 1 month follow-up with a dermatologist and at that time the mass had increased in size.  She thought it was a reactive node but offered a biopsy. Per review of the notes they proceeded with a punch biopsy of the parotid mass.  This was consistent with invasive squamous cell carcinoma without any epidermal involvement.  His preoperative PET scan showed only involvement of the parotid. He was taken to the OR on 4/11/2019 for a left total parotidectomy with resection of skin, preservation of the facial nerve, sacrifice of the greater auricular nerve, level I-V neck dissection, cervicofacial flap. Final pathology showed a 2.1 cm moderately differentiated SCC within the subcutaneous tissues and parotid, PNI, no LVSI, negative margins, 0/47 cervical nodes. He had postoperative radiation with Dr Floyd from 5/13/2019 to 6/24/2019 for a total of 6000 cGy. He had his 3 month post treatment PET scan in October 2019 which only showed a stable sub-cm nodule in the lung.    Interval history:  He comes in  today for follow-up. He was last seen in April 2022.  He was diagnosed with COVID in May 2022. He saw Dr Camejo in June 2022 for his neck cramping, advised for stretching exercises. His TSH was normal in November 2022. He says he is overall doing well. He had mohs for a left ear SCC this week down in [place]. He had surgery on his right hand this week for Dupuytren contracture. He has a BCC of his right ear that he is supposed to have Mohs surgery on. He says he was aware of the skin lesions and then they were addressed at his routine derm appointment. He is eating and drinking without issues other than having to be careful with swallowing. He finds that sometimes things get stuck, has not happened for the last few months, tends to be drier foods. He says his weight is fine. He has some stiffness in his neck, happening a bit more frequently, can stretch it out. He has no numbness or weakness. He is seeing the dentist. He thinks his trombone playing has improved. He has no other changes in his health.    He is accompanied by his wife today.      MEDICATIONS:     Current Outpatient Medications   Medication Sig Dispense Refill     AMLODIPINE BESYLATE PO Take 10 mg by mouth every morning        BASAGLAR 100 UNIT/ML injection Inject 45 Units Subcutaneous At Bedtime        Calcium Carbonate-Vitamin D (CALCIUM + D PO) Take 1 tablet by mouth every morning        levothyroxine (SYNTHROID/LEVOTHROID) 50 MCG tablet Take 1 tablet (50 mcg) by mouth daily 90 tablet 3     losartan (COZAAR) 25 MG tablet Take 1 tablet (25 mg) by mouth daily 90 tablet 3     metFORMIN (GLUCOPHAGE) 1000 MG tablet Take 1,000 mg by mouth 2 times daily (with meals)        metoprolol succinate (TOPROL-XL) 25 MG 24 hr tablet Take 50 mg by mouth every morning       Multiple Vitamins-Minerals (MULTIVITAL) TABS Take 1 tablet by mouth every morning        niacinamide (NIACIN) 500 MG tablet Take 1,000 mg by mouth 2 times daily (with meals)       tacrolimus  (GENERIC EQUIVALENT) 1 MG capsule Take 1 capsule (1 mg) by mouth 2 times daily 60 capsule 11       ALLERGIES:    Allergies   Allergen Reactions     Cefazolin Swelling     Lips swelled while patient was in the OR      Lisinopril Cough     Meropenem Hives and Swelling     Developed hives and lip swelling while on meropenem and micafungin.  Resolved with discontinuation.  Unclear which was cause.     Micafungin Hives and Swelling     Developed hives and lip swelling while on meropenem and micafungin.  Resolved with discontinuation.  Unclear which was cause.       HABITS/SOCIAL HISTORY:   Spends the shin in [place].  .  He is a retired .   He smokes for a few years and quit back in 1973.  He has no chewing tobacco use.  He has 1 alcoholic beverage about 3 times per week.   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    Social History     Socioeconomic History     Marital status:      Spouse name: Not on file     Number of children: Not on file     Years of education: Not on file     Highest education level: Not on file   Occupational History     Not on file   Tobacco Use     Smoking status: Never     Smokeless tobacco: Never     Tobacco comments:     [id number] occational smoker   Substance and Sexual Activity     Alcohol use: Yes     Alcohol/week: 0.0 standard drinks     Comment: 2-3 glass of wine per week. At most 1 per day     Drug use: No     Sexual activity: Never   Other Topics Concern     Parent/sibling w/ CABG, MI or angioplasty before 65F 55M? Not Asked   Social History Narrative     Not on file     Social Determinants of Health     Financial Resource Strain: Not on file   Food Insecurity: Not on file   Transportation Needs: Not on file   Physical Activity: Not on file   Stress: Not on file   Social Connections: Not on file   Intimate Partner Violence: Not on file   Housing Stability: Not on file       PAST MEDICAL HISTORY:   Past Medical History:   Diagnosis Date     Alpha-1-antitrypsin  deficiency (H)      Ascites     Pre-transplant     Chronic kidney disease, stage 3 (H)      Cirrhosis (H)     Alpha-1-antitrypsin deficiency, s/p liver transplant 3/31/15     Gout      HTN (hypertension)      Lentigo maligna melanoma (H)     lentigo maligna melanoma excised on 2009 with a repeat wide excision on 2009.      Liver replaced by transplant (H) 2015     Nephrolithiasis      Osteoarthritis      Portal hypertension (H)     Pre-transplant        PAST SURGICAL HISTORY:   Past Surgical History:   Procedure Laterality Date     COLONOSCOPY N/A 2014    Procedure: COLONOSCOPY;  Surgeon: Katherine Jimenez MD;  Location:  GI     ESOPHAGOSCOPY, GASTROSCOPY, DUODENOSCOPY (EGD), COMBINED N/A 2014    Procedure: COMBINED ESOPHAGOSCOPY, GASTROSCOPY, DUODENOSCOPY (EGD), BIOPSY SINGLE OR MULTIPLE;  Surgeon: Katherine Jimenez MD;  Location:  GI     ESOPHAGOSCOPY, GASTROSCOPY, DUODENOSCOPY (EGD), COMBINED N/A 2015    Procedure: COMBINED ESOPHAGOSCOPY, GASTROSCOPY, DUODENOSCOPY (EGD), REMOVE FOREIGN BODY;  Surgeon: Katherine Jimenez MD;  Location:  GI     HERNIA REPAIR      abdominal     INSERT SHUNT PORTAL TRANSJUGULAR INTRAHEPTIC       ORTHOPEDIC SURGERY      bilateral knee surgery     PAROTIDECTOMY, RADICAL NECK DISSECTION Left 2019    Procedure: Total Parotidectomy, Excision of Skin, Neck Dissection Levels I - V,  And Local Advancement Flap;  Surgeon: Johanna Moreland MD;  Location:  OR     TRANSPLANT LIVER RECIPIENT  DONOR N/A 3/31/2015    Procedure: TRANSPLANT LIVER RECIPIENT  DONOR;  Surgeon: Elia Mehta MD;  Location:  OR       FAMILY HISTORY:    Family History   Problem Relation Age of Onset     Cardiovascular Father         MI     Glaucoma No family hx of      Macular Degeneration No family hx of        REVIEW OF SYSTEMS:  12 point ROS was negative other than the symptoms noted above in the  "HPI.  Patient Supplied Answers to Review of Systems   ENT ROS 9/29/2021   Gastrointestinal/Genitourinary: Heartburn/indigestion   Musculoskeletal: -   Skin: -         PHYSICAL EXAMINATION:   BP (!) 151/73   Pulse 73   Temp 97.5  F (36.4  C)   Ht 1.905 m (6' 3\")   Wt 126.1 kg (278 lb)   SpO2 97%   BMI 34.75 kg/m     Appearance:   normal; NAD, age-appropriate appearance, well-developed, normal habitus   Communication:   normal; communicates verbally, normal voice quality   Head/Face:   inspection -  Expected concavity of left parotid defect   Salivary glands -  S/p left radical parotidectomy with cervicofacial advancement flap, radiation changes to the tissue, no palpable mass, well healed incision, minimal lymphedema, some fibrosis, some telangiectasias, expected concavity   Facial strength -  Normal and symmetric bilateral; H/B I/VI   Skin:  no new lesions   Ears:  auricle (AD) -  Normal - unable to see the BCC (reportedly completely removed on bx)  EAC (AD) -  normal  TM (AD) -  Normal, no effusion  auricle (AS) -  Healing helical incision, some crusting removed, healing appropriately, no signs of infection  EAC (AS) -  normal  TM (AS) -  Normal, no effusion  Normal clinical speech reception   Nose:  Ext. inspection -  Normal   Oral Cavity:  lips -  Normal mucosa, oral competence, and stoma size   Age-appropriate dentition, healthy gingival mucosa   Hard palate, buccal, floor of mouth mucosa normal  Moist mucus membranes   Tongue - normal movement, no lesions   Oropharynx:  mucosa -  Normal, no lesions  soft palate -  Normal, no lesions, no asymmetry, normal elevation   Neck: Well healed neck incision, well healed cervicofacial flap, no palpable masses  Concavity/tissue retraction  Hyper and hypopigmentation of neck skin with some telangiectasias in level V  Normal range of motion  No significant lymphedema, moderate fibrosis   Lymphatic:  no abnormal nodes   Cardiovascular:  warm, pink, well-perfused " extremities without swelling, tenderness, or edema   Respiratory:  Normal respiratory effort, no stridor   Neuro/Psych.:  mood/affect -  normal  mental status -  normal       PROCEDURE:      RESULTS REVIEWED:   TSH - normal in November 2022    Note from Dr Camejo reviewed      IMPRESSION AND PLAN:   Eric Andrew is a 72-year-old man who has a history of a liver transplant and recent squamous cell carcinoma of the left cheek who developed 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 He underwent total parotidectomy, skin resection, resection of greater auricular nerve, level I-V neck dissection, cervicofacial flap on 4/11/2019. Final pathology showed the 1 metastatic deposit in the parotid. He completed postoperative radiation on 6/24/2019 with Dr Floyd.     He recently underwent excision of SCC of his left ear. We discussed that the helix can be a location at high risk for metastatic disease depending on the pathologic features. We do not have the pathology results from his recent Mohs procedure. That parotid neck (including level V) have already been surgically treated and radiated and therefore I would not be inclined to do any elective osiris management. However, we did discuss that auricular SCC can behave aggressively with osiris metastasis and recurrences.    He had his TSH checked in November 2022, normal, on synthroid. Recheck in May 2023.    Repeat imaging in April 2023.    I will see him back in 6 months with repeat labs and scans.    Thank you very much for the opportunity to participate in the care of your patient.      Johanna Moreland M.D.  Otolaryngology- Head & Neck Surgery      This note was dictated with voice recognition software and then edited. Please excuse any unintentional errors.     CC:  Katherine Jimenez MD  [address] [facility] [facility] [address]  [facility] [address]      Naomi Rodriguez RN  Liver Coordinator  [facility]      Aston Devine MD  [facility]  [address] [facility] [address]  [facility]  [address]      Grabiel Floyd MD  Department of Radiation Oncology  [facility]

## 2022-12-09 NOTE — PROGRESS NOTES
Dear Dr. Jimenez:    I had the pleasure of seeing Eric Andrew in follow-up today at the [facility] Ot[facility].     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  He underwent surgery by dermatologist in [place] for this.  Per his report this was not done with Mohs surgery but was told that he had negative margins.  He says that shortly after the surgery he noticed a lump along his mandible/below the ear.  He went in for his 1 month follow-up with a dermatologist and at that time the mass had increased in size.  She thought it was a reactive node but offered a biopsy. Per review of the notes they proceeded with a punch biopsy of the parotid mass.  This was consistent with invasive squamous cell carcinoma without any epidermal involvement.  His preoperative PET scan showed only involvement of the parotid. He was taken to the OR on 4/11/2019 for a left total parotidectomy with resection of skin, preservation of the facial nerve, sacrifice of the greater auricular nerve, level I-V neck dissection, cervicofacial flap. Final pathology showed a 2.1 cm moderately differentiated SCC within the subcutaneous tissues and parotid, PNI, no LVSI, negative margins, 0/47 cervical nodes. He had postoperative radiation with Dr Floyd from 5/13/2019 to 6/24/2019 for a total of 6000 cGy. He had his 3 month post treatment PET scan in October 2019 which only showed a stable sub-cm nodule in the lung.    Interval history:  He comes in today for follow-up. He was last seen in April 2022.  He was diagnosed with COVID in May 2022. He saw Dr Camejo in June 2022 for his neck cramping, advised for stretching exercises. His TSH was normal in November 2022. He says he is overall doing well. He had mohs for a left ear SCC this week down in [place]. He had surgery  on his right hand this week for Dupuytren contracture. He has a BCC of his right ear that he is supposed to have Mohs surgery on. He says he was aware of the skin lesions and then they were addressed at his routine derm appointment. He is eating and drinking without issues other than having to be careful with swallowing. He finds that sometimes things get stuck, has not happened for the last few months, tends to be drier foods. He says his weight is fine. He has some stiffness in his neck, happening a bit more frequently, can stretch it out. He has no numbness or weakness. He is seeing the dentist. He thinks his trombone playing has improved. He has no other changes in his health.    He is accompanied by his wife today.      MEDICATIONS:     Current Outpatient Medications   Medication Sig Dispense Refill     AMLODIPINE BESYLATE PO Take 10 mg by mouth every morning        BASAGLAR 100 UNIT/ML injection Inject 45 Units Subcutaneous At Bedtime        Calcium Carbonate-Vitamin D (CALCIUM + D PO) Take 1 tablet by mouth every morning        levothyroxine (SYNTHROID/LEVOTHROID) 50 MCG tablet Take 1 tablet (50 mcg) by mouth daily 90 tablet 3     losartan (COZAAR) 25 MG tablet Take 1 tablet (25 mg) by mouth daily 90 tablet 3     metFORMIN (GLUCOPHAGE) 1000 MG tablet Take 1,000 mg by mouth 2 times daily (with meals)        metoprolol succinate (TOPROL-XL) 25 MG 24 hr tablet Take 50 mg by mouth every morning       Multiple Vitamins-Minerals (MULTIVITAL) TABS Take 1 tablet by mouth every morning        niacinamide (NIACIN) 500 MG tablet Take 1,000 mg by mouth 2 times daily (with meals)       tacrolimus (GENERIC EQUIVALENT) 1 MG capsule Take 1 capsule (1 mg) by mouth 2 times daily 60 capsule 11       ALLERGIES:    Allergies   Allergen Reactions     Cefazolin Swelling     Lips swelled while patient was in the OR      Lisinopril Cough     Meropenem Hives and Swelling     Developed hives and lip swelling while on meropenem and  micafungin.  Resolved with discontinuation.  Unclear which was cause.     Micafungin Hives and Swelling     Developed hives and lip swelling while on meropenem and micafungin.  Resolved with discontinuation.  Unclear which was cause.       HABITS/SOCIAL HISTORY:   Spends the shin in [place].  .  He is a retired .   He smokes for a few years and quit back in 1973.  He has no chewing tobacco use.  He has 1 alcoholic beverage about 3 times per week.   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    Social History     Socioeconomic History     Marital status:      Spouse name: Not on file     Number of children: Not on file     Years of education: Not on file     Highest education level: Not on file   Occupational History     Not on file   Tobacco Use     Smoking status: Never     Smokeless tobacco: Never     Tobacco comments:     [id number] occational smoker   Substance and Sexual Activity     Alcohol use: Yes     Alcohol/week: 0.0 standard drinks     Comment: 2-3 glass of wine per week. At most 1 per day     Drug use: No     Sexual activity: Never   Other Topics Concern     Parent/sibling w/ CABG, MI or angioplasty before 65F 55M? Not Asked   Social History Narrative     Not on file     Social Determinants of Health     Financial Resource Strain: Not on file   Food Insecurity: Not on file   Transportation Needs: Not on file   Physical Activity: Not on file   Stress: Not on file   Social Connections: Not on file   Intimate Partner Violence: Not on file   Housing Stability: Not on file       PAST MEDICAL HISTORY:   Past Medical History:   Diagnosis Date     Alpha-1-antitrypsin deficiency (H)      Ascites     Pre-transplant     Chronic kidney disease, stage 3 (H)      Cirrhosis (H)     Alpha-1-antitrypsin deficiency, s/p liver transplant 3/31/15     Gout      HTN (hypertension)      Lentigo maligna melanoma (H) 2009    lentigo maligna melanoma excised on 01/29/2009 with a repeat wide excision on  "2009.      Liver replaced by transplant (H) 2015     Nephrolithiasis      Osteoarthritis      Portal hypertension (H)     Pre-transplant        PAST SURGICAL HISTORY:   Past Surgical History:   Procedure Laterality Date     COLONOSCOPY N/A 2014    Procedure: COLONOSCOPY;  Surgeon: Katherine Jimenez MD;  Location:  GI     ESOPHAGOSCOPY, GASTROSCOPY, DUODENOSCOPY (EGD), COMBINED N/A 2014    Procedure: COMBINED ESOPHAGOSCOPY, GASTROSCOPY, DUODENOSCOPY (EGD), BIOPSY SINGLE OR MULTIPLE;  Surgeon: Katherine Jimenez MD;  Location:  GI     ESOPHAGOSCOPY, GASTROSCOPY, DUODENOSCOPY (EGD), COMBINED N/A 2015    Procedure: COMBINED ESOPHAGOSCOPY, GASTROSCOPY, DUODENOSCOPY (EGD), REMOVE FOREIGN BODY;  Surgeon: Katherine Jimenez MD;  Location:  GI     HERNIA REPAIR      abdominal     INSERT SHUNT PORTAL TRANSJUGULAR INTRAHEPTIC       ORTHOPEDIC SURGERY      bilateral knee surgery     PAROTIDECTOMY, RADICAL NECK DISSECTION Left 2019    Procedure: Total Parotidectomy, Excision of Skin, Neck Dissection Levels I - V,  And Local Advancement Flap;  Surgeon: Johanna Moreland MD;  Location: [facility] OR     TRANSPLANT LIVER RECIPIENT  DONOR N/A 3/31/2015    Procedure: TRANSPLANT LIVER RECIPIENT  DONOR;  Surgeon: Elia Mehta MD;  Location:  OR       FAMILY HISTORY:    Family History   Problem Relation Age of Onset     Cardiovascular Father         MI     Glaucoma No family hx of      Macular Degeneration No family hx of        REVIEW OF SYSTEMS:  12 point ROS was negative other than the symptoms noted above in the HPI.  Patient Supplied Answers to Review of Systems  [facility]NT ROS 2021   Gastrointestinal/Genitourinary: Heartburn/indigestion   Musculoskeletal: -   Skin: -         PHYSICAL EXAMINATION:   BP (!) 151/73   Pulse 73   Temp 97.5  F (36.4  C)   Ht 1.905 m (6' 3\")   Wt 126.1 kg (278 lb)   SpO2 97%   BMI 34.75 kg/m     Appearance: "   normal; NAD, age-appropriate appearance, well-developed, normal habitus   Communication:   normal; communicates verbally, normal voice quality   Head/Face:   inspection -  Expected concavity of left parotid defect   Salivary glands -  S/p left radical parotidectomy with cervicofacial advancement flap, radiation changes to the tissue, no palpable mass, well healed incision, minimal lymphedema, some fibrosis, some telangiectasias, expected concavity   Facial strength -  Normal and symmetric bilateral; H/B I/VI   Skin:  no new lesions   Ears:  auricle (AD) -  Normal - unable to see the BCC (reportedly completely removed on bx)  EAC (AD) -  normal  TM (AD) -  Normal, no effusion  auricle (AS) -  Healing helical incision, some crusting removed, healing appropriately, no signs of infection  EAC (AS) -  normal  TM (AS) -  Normal, no effusion  Normal clinical speech reception   Nose:  Ext. inspection -  Normal   Oral Cavity:  lips -  Normal mucosa, oral competence, and stoma size   Age-appropriate dentition, healthy gingival mucosa   Hard palate, buccal, floor of mouth mucosa normal  Moist mucus membranes   Tongue - normal movement, no lesions   Oropharynx:  mucosa -  Normal, no lesions  soft palate -  Normal, no lesions, no asymmetry, normal elevation   Neck: Well healed neck incision, well healed cervicofacial flap, no palpable masses  Concavity/tissue retraction  Hyper and hypopigmentation of neck skin with some telangiectasias in level V  Normal range of motion  No significant lymphedema, moderate fibrosis   Lymphatic:  no abnormal nodes   Cardiovascular:  warm, pink, well-perfused extremities without swelling, tenderness, or edema   Respiratory:  Normal respiratory effort, no stridor   Neuro/Psych.:  mood/affect -  normal  mental status -  normal       PROCEDURE:      RESULTS REVIEWED:   TSH - normal in November 2022    Note from Dr Camejo reviewed      IMPRESSION AND PLAN:   Eric Andrew is a 72-year-old man who has a  history of a liver transplant and recent squamous cell carcinoma of the left cheek who developed 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 He underwent total parotidectomy, skin resection, resection of greater auricular nerve, level I-V neck dissection, cervicofacial flap on 4/11/2019. Final pathology showed the 1 metastatic deposit in the parotid. He completed postoperative radiation on 6/24/2019 with Dr Floyd.     He recently underwent excision of SCC of his left ear. We discussed that the helix can be a location at high risk for metastatic disease depending on the pathologic features. We do not have the pathology results from his recent Mohs procedure. That parotid neck (including level V) have already been surgically treated and radiated and therefore I would not be inclined to do any elective osiris management. However, we did discuss that auricular SCC can behave aggressively with osiris metastasis and recurrences.    He had his TSH checked in November 2022, normal, on synthroid. Recheck in May 2023.    Repeat imaging in April 2023.    I will see him back in 6 months with repeat labs and scans.    Thank you very much for the opportunity to participate in the care of your patient.      Johanna Moreland M.D.  Otolaryngology- Head & Neck Surgery      This note was dictated with voice recognition software and then edited. Please excuse any unintentional errors.         CC:  Katherine Jimenez MD  [address] [facility] [address]  [facility] [address]      Naomi Rodriguez, RN  Liver Coordinator  [facility]      Aston Devine MD  [facility]  [address]  [facility] [address]      Grabiel Floyd MD  Department of Radiation Oncology  [facility]

## 2022-12-09 NOTE — NURSING NOTE
"Chief Complaint   Patient presents with     RECHECK     6 MONTH FOLLOW UP WITH LABS PRIOR      Blood pressure (!) 151/73, pulse 73, temperature 97.5  F (36.4  C), height 1.905 m (6' 3\"), weight 126.1 kg (278 lb), SpO2 97 %.    Christos Gonzalez LPN    "

## 2022-12-29 ENCOUNTER — LAB (OUTPATIENT)
Dept: LAB | Facility: CLINIC | Age: 72
End: 2022-12-29
Payer: MEDICARE

## 2022-12-29 DIAGNOSIS — Z13.220 LIPID SCREENING: ICD-10-CM

## 2022-12-29 DIAGNOSIS — Z94.4 LIVER REPLACED BY TRANSPLANT (H): ICD-10-CM

## 2022-12-29 LAB
ALBUMIN SERPL BCG-MCNC: 4.3 G/DL (ref 3.5–5.2)
ALP SERPL-CCNC: 95 U/L (ref 40–129)
ALT SERPL W P-5'-P-CCNC: 17 U/L (ref 10–50)
ANION GAP SERPL CALCULATED.3IONS-SCNC: 13 MMOL/L (ref 7–15)
AST SERPL W P-5'-P-CCNC: 20 U/L (ref 10–50)
BILIRUB DIRECT SERPL-MCNC: <0.2 MG/DL (ref 0–0.3)
BILIRUB SERPL-MCNC: 0.5 MG/DL
BUN SERPL-MCNC: 26.8 MG/DL (ref 8–23)
CALCIUM SERPL-MCNC: 9.4 MG/DL (ref 8.8–10.2)
CHLORIDE SERPL-SCNC: 102 MMOL/L (ref 98–107)
CREAT SERPL-MCNC: 1.61 MG/DL (ref 0.67–1.17)
DEPRECATED HCO3 PLAS-SCNC: 24 MMOL/L (ref 22–29)
ERYTHROCYTE [DISTWIDTH] IN BLOOD BY AUTOMATED COUNT: 14.7 % (ref 10–15)
GFR SERPL CREATININE-BSD FRML MDRD: 45 ML/MIN/1.73M2
GLUCOSE SERPL-MCNC: 173 MG/DL (ref 70–99)
HCT VFR BLD AUTO: 40.5 % (ref 40–53)
HGB BLD-MCNC: 13.6 G/DL (ref 13.3–17.7)
MCH RBC QN AUTO: 32.6 PG (ref 26.5–33)
MCHC RBC AUTO-ENTMCNC: 33.6 G/DL (ref 31.5–36.5)
MCV RBC AUTO: 97 FL (ref 78–100)
PLATELET # BLD AUTO: 129 10E3/UL (ref 150–450)
POTASSIUM SERPL-SCNC: 4.1 MMOL/L (ref 3.4–5.3)
PROT SERPL-MCNC: 6.7 G/DL (ref 6.4–8.3)
RBC # BLD AUTO: 4.17 10E6/UL (ref 4.4–5.9)
SODIUM SERPL-SCNC: 139 MMOL/L (ref 136–145)
TACROLIMUS BLD-MCNC: 2.1 UG/L (ref 5–15)
TME LAST DOSE: ABNORMAL H
TME LAST DOSE: ABNORMAL H
WBC # BLD AUTO: 4.2 10E3/UL (ref 4–11)

## 2022-12-29 PROCEDURE — 36415 COLL VENOUS BLD VENIPUNCTURE: CPT

## 2022-12-29 PROCEDURE — 82248 BILIRUBIN DIRECT: CPT

## 2022-12-29 PROCEDURE — 80197 ASSAY OF TACROLIMUS: CPT

## 2022-12-29 PROCEDURE — 85027 COMPLETE CBC AUTOMATED: CPT

## 2022-12-30 ENCOUNTER — TELEPHONE (OUTPATIENT)
Dept: TRANSPLANT | Facility: CLINIC | Age: 72
End: 2022-12-30

## 2022-12-30 ENCOUNTER — DOCUMENTATION ONLY (OUTPATIENT)
Dept: TRANSPLANT | Facility: CLINIC | Age: 72
End: 2022-12-30

## 2022-12-30 NOTE — PROGRESS NOTES
Tac level 2.1.  Reviewed w/ maddy Doe to leave him there due to advanced age, elevated creatinine, cancer history.  No change to dose.

## 2022-12-30 NOTE — TELEPHONE ENCOUNTER
Call from Eric.  He and Naomi are going to [place], he was wondering if he needs to repeat his creatinine.  He notes also that he recently started Trulicity which he was told can increase creatinine.  He told me that he has been having some diarrhea but when I inquired more about this he told me that he has 1 loose stool per night  When Eric returns from [place] he will be in [place] until April.  I asked him to repeat labs when he comes home.

## 2023-03-09 ENCOUNTER — DOCUMENTATION ONLY (OUTPATIENT)
Dept: TRANSPLANT | Facility: CLINIC | Age: 73
End: 2023-03-09
Payer: MEDICARE

## 2023-03-09 NOTE — PROGRESS NOTES
Annual chart review completed.      Patient is up to date with labs but overdue for follow-up w/ Dr. Jimenez.     Please send letter reminding to schedule an appointment to see a liver doctor (hepatologist) here every year and to have lab testing every 3 months.

## 2023-04-02 ENCOUNTER — HEALTH MAINTENANCE LETTER (OUTPATIENT)
Age: 73
End: 2023-04-02

## 2023-04-05 ENCOUNTER — LAB (OUTPATIENT)
Dept: LAB | Facility: CLINIC | Age: 73
End: 2023-04-05
Payer: MEDICARE

## 2023-04-05 DIAGNOSIS — Z94.4 LIVER REPLACED BY TRANSPLANT (H): ICD-10-CM

## 2023-04-05 DIAGNOSIS — Z13.220 LIPID SCREENING: ICD-10-CM

## 2023-04-05 LAB
ALBUMIN SERPL BCG-MCNC: 4.2 G/DL (ref 3.5–5.2)
ALP SERPL-CCNC: 101 U/L (ref 40–129)
ALT SERPL W P-5'-P-CCNC: 12 U/L (ref 10–50)
ANION GAP SERPL CALCULATED.3IONS-SCNC: 11 MMOL/L (ref 7–15)
AST SERPL W P-5'-P-CCNC: 15 U/L (ref 10–50)
BILIRUB DIRECT SERPL-MCNC: <0.2 MG/DL (ref 0–0.3)
BILIRUB SERPL-MCNC: 0.5 MG/DL
BUN SERPL-MCNC: 21.2 MG/DL (ref 8–23)
CALCIUM SERPL-MCNC: 9.4 MG/DL (ref 8.8–10.2)
CHLORIDE SERPL-SCNC: 104 MMOL/L (ref 98–107)
CREAT SERPL-MCNC: 1.45 MG/DL (ref 0.67–1.17)
DEPRECATED HCO3 PLAS-SCNC: 25 MMOL/L (ref 22–29)
ERYTHROCYTE [DISTWIDTH] IN BLOOD BY AUTOMATED COUNT: 14.6 % (ref 10–15)
GFR SERPL CREATININE-BSD FRML MDRD: 52 ML/MIN/1.73M2
GLUCOSE SERPL-MCNC: 160 MG/DL (ref 70–99)
HCT VFR BLD AUTO: 39.4 % (ref 40–53)
HGB BLD-MCNC: 13.1 G/DL (ref 13.3–17.7)
MCH RBC QN AUTO: 31.6 PG (ref 26.5–33)
MCHC RBC AUTO-ENTMCNC: 33.2 G/DL (ref 31.5–36.5)
MCV RBC AUTO: 95 FL (ref 78–100)
PLATELET # BLD AUTO: 126 10E3/UL (ref 150–450)
POTASSIUM SERPL-SCNC: 4.1 MMOL/L (ref 3.4–5.3)
PROT SERPL-MCNC: 6.6 G/DL (ref 6.4–8.3)
RBC # BLD AUTO: 4.15 10E6/UL (ref 4.4–5.9)
SODIUM SERPL-SCNC: 140 MMOL/L (ref 136–145)
TACROLIMUS BLD-MCNC: 3 UG/L (ref 5–15)
TME LAST DOSE: ABNORMAL H
TME LAST DOSE: ABNORMAL H
WBC # BLD AUTO: 5.9 10E3/UL (ref 4–11)

## 2023-04-05 PROCEDURE — 85027 COMPLETE CBC AUTOMATED: CPT

## 2023-04-05 PROCEDURE — 80048 BASIC METABOLIC PNL TOTAL CA: CPT

## 2023-04-05 PROCEDURE — 82248 BILIRUBIN DIRECT: CPT

## 2023-04-05 PROCEDURE — 36415 COLL VENOUS BLD VENIPUNCTURE: CPT

## 2023-04-05 PROCEDURE — 80053 COMPREHEN METABOLIC PANEL: CPT

## 2023-04-05 PROCEDURE — 80197 ASSAY OF TACROLIMUS: CPT

## 2023-05-08 ENCOUNTER — TELEPHONE (OUTPATIENT)
Dept: GASTROENTEROLOGY | Facility: CLINIC | Age: 73
End: 2023-05-08
Payer: MEDICARE

## 2023-05-08 NOTE — TELEPHONE ENCOUNTER
LVM & sent mychart // pt needs to reschedule appt on 8.29.23 with Dr. Jimenez (can be in person or virtual) // first attempt, AN 5.8.23

## 2023-05-10 DIAGNOSIS — Z13.220 LIPID SCREENING: ICD-10-CM

## 2023-05-10 DIAGNOSIS — Z94.4 LIVER REPLACED BY TRANSPLANT (H): Primary | ICD-10-CM

## 2023-05-19 ENCOUNTER — TELEPHONE (OUTPATIENT)
Dept: TRANSPLANT | Facility: CLINIC | Age: 73
End: 2023-05-19
Payer: MEDICARE

## 2023-05-19 ENCOUNTER — VIRTUAL VISIT (OUTPATIENT)
Dept: TRANSPLANT | Facility: CLINIC | Age: 73
End: 2023-05-19
Attending: INTERNAL MEDICINE
Payer: MEDICARE

## 2023-05-19 ENCOUNTER — TELEPHONE (OUTPATIENT)
Dept: OPTOMETRY | Facility: CLINIC | Age: 73
End: 2023-05-19

## 2023-05-19 DIAGNOSIS — U07.1 INFECTION DUE TO 2019 NOVEL CORONAVIRUS: Primary | ICD-10-CM

## 2023-05-19 PROCEDURE — 99213 OFFICE O/P EST LOW 20 MIN: CPT | Mod: VID | Performed by: NURSE PRACTITIONER

## 2023-05-19 NOTE — TELEPHONE ENCOUNTER
PAtient called he has tested positive for covid what is his next step?  Please call him back when you are able.   ESRD on HD TTS no urgent need for HD today  Will HD estephania on schedule consent obtained   Anemia 2/2 CKD will check iron studies  Electrolytes acceptable 78 year old female seen in office today with noted 77% oxygen saturation and large right sided pleural effusion seen in ED for possible pigtail placement. S/P head injury with SAH 2 months ago.  Neuro intact except for right field cut which patient says is from time of head injury  Will get updated head CT  If nothing acute can be cleared from Neurological standpoint    Thank You

## 2023-05-19 NOTE — PATIENT INSTRUCTIONS
COVID-19 Outpatient Treatments  Your care team can help you find the best treatments for COVID-19. Talk to a health care provider or refer to the FDA medicine fact sheets below.    Important: You can't have Paxlovid or molnupiravir if you're starting the medicine more than 5 days after your symptoms have started.  Paxlovid: https://www.fda.gov/media/676676/download  Molnupiravir (Lagevrio): https://www.fda.gov/media/059962/download  Paxlovid (nimatrelvir and ritonavir)  How it works  Two medicines (nirmatrelvir and ritonavir) are taken together. They stop the virus from growing. Less amount of virus is easier for your body to fight.  Benefits  Lowers risk of a hospital stay or death from COVID-19.  How to take    Medicine comes in a daily container with both medicine tablets. Take by mouth twice daily (once in the morning, once at night) for 5 days.    The number of tablets to take varies by patient.    Don't chew or break capsules. Swallow whole.  When to take  Take as soon as possible after positive COVID-19 test result, and within 5 days of your first symptoms.  Who can take it  Patients must be 12 years or older, weigh at least 88 pounds, and have tested positive for COVID-19. Paxlovid is the preferred treatment for pregnant patients.  Possible side effects  Can cause altered sense of taste, diarrhea (loose, watery stools), high blood pressure, muscle aches.  Medicine conflicts    Some medicines may conflict with Paxlovid and may cause serious side effects.    Tell your care team about all the medicines you take, including prescription and over-the-counter medicines, vitamins, and herbal supplements.    Your care team will review your medicines to make sure that you can safely take Paxlovid.  Cautions    Paxlovid is not advised for patients with severe kidney or liver disease. If you have kidney or liver problems, the dose may need to be changed.    If you're pregnant or breastfeeding, talk to your care team  about your options.    If you take hormonal birth control (such as the Pill), then you or your partner should also use a non-hormonal form of birth control (such as a condom). Keep doing this for 1 menstrual cycle after your last dose of Paxlovid.  Molnupiravir (Lagevrio)  How it works  Stops the virus from growing. Less amount of virus is easier for your body to fight.  Benefits  Lowers risk of a hospital stay or death from COVID-19.  How to take  Take 4 capsules by mouth every 12 hours (4 in the morning and 4 at night) for 5 days. Don't chew or break capsules. Swallow whole.  When to take  Take as soon as possible after positive COVID-19 test result, and within 5 days of your first symptoms.  Who can take it  Patients must be 18 years or older and have tested positive for COVID-19.  Possible side effects  Diarrhea (loose, watery stools), nausea (feeling sick to your stomach), dizziness, headaches.  Medicine conflicts  Right now, there are no known conflicts with other drugs. But tell your care team about all medicines you take.  Cautions    This medicine is not advised for patients who are pregnant.    If you are someone who could become pregnant, use trusted birth control until 4 days after your last dose of molnupiravir.    If your partner could become pregnant, you should use trusted birth control until 3 months after your last dose of molnupiravir.  For informational purposes only. Not to replace the advice of your health care provider. Copyright   2022 [facility]. All rights reserved. Clinically reviewed by Jessica Zimmerman, PharmD, BCACP. [employer] [id number] - REV 12/22.

## 2023-05-19 NOTE — TELEPHONE ENCOUNTER
Eric just returned from [place].  He tested pos for COVID.   He has a runny nose and a mild headache.  He noticed symptoms for the past few mornings.  He tested positive 1 hour ago.  He will do a virtual visit w/ Aimee Rojas at 2:00 pm today.  His appt w/ Dr. Jimenez has been moved from July to August to now January 2024. He's wondering if this is OK, I told him this is fine as I trust that he will continue lab testing as we suggest and call me if  He has issues.  He agreed that he will be in touch if necessary.

## 2023-05-19 NOTE — LETTER
"    5/19/2023         RE: Eric Andrew  [id number] [facility] [id number]        Dear Colleague,    Thank you for referring your patient, Eric Andrew, to the [facility]. Please see a copy of my visit note below.    Eric is a 73 year old who is being evaluated via a billable video visit.      How would you like to obtain your AVS? MyChart  If the video visit is dropped, the invitation should be resent by: Other e-mail: NA  Will anyone else be joining your video visit? No          Assessment & Plan     Infection due to 2019 novel coronavirus  -discussed treatment options and patient opts for Rx Paxlovid. May start tomorrow or next day depending on symptom progression. Advised on Paxlovid benefit timeline and risks.   Patient instructed on holding tacrolimus during Rx and scheduling Tac level blood test on day 6 after completing paxlovid course and resume once level has been reviewed by coordinator.   -ER precautions reviewed: high fever, CP, SOB  - nirmatrelvir and ritonavir (PAXLOVID) 150 mg/100 mg therapy pack; Take 2 tablets by mouth 2 times daily for 5 days (Take one tablet of Nirmatelvir and 1 tablet of Ritonavir twice daily for 5 days)      BMI:   Estimated body mass index is 34.75 kg/m  as calculated from the following:    Height as of 12/9/22: 1.905 m (6' 3\").    Weight as of 12/9/22: 126.1 kg (278 lb).       COVID-19 positive patient.  Encounter for consideration of medication intervention. Patient does qualify for a prescription. Full discussion with patient including medication options, risks and benefits. Potential drug interactions reviewed with patient.     Treatment Planned Paxlovid Rx sent.   home pharmacy   Temporary change to home medications: Hold Tac during paxlovid course.     Estimated body mass index is 34.75 kg/m  as calculated from the following:    Height as of 12/9/22: 1.905 m (6' 3\").    Weight as of 12/9/22: 126.1 kg (278 lb).  GFR Estimate   Date Value " Ref Range Status   04/05/2023 52 (L) >60 mL/min/1.73m2 Final     Comment:     eGFR calculated using 2021 CKD-EPI equation.   05/26/2021 49 (L) >60 mL/min/[1.73_m2] Final     Comment:     Non  GFR Calc  Starting 12/18/2018, serum creatinine based estimated GFR (eGFR) will be   calculated using the Chronic Kidney Disease Epidemiology Collaboration   (CKD-EPI) equation.       No results found for: [id number]    No follow-ups on file.    BRONWYN Nicole [facility]    Subjective   Eric is a 73 year old, presenting for the following health issues:  No chief complaint on file.    HPI   Tested positive for COVID today.  Has been traveling internationally and just returned home yesterday.   Rhinorrhea for 1 week and headache with fatigue today. Also very jet lagged.    Wife tested postive 3 weeks ago, at that time he was negative.     No fever, CP, cough, or GI s/s.   Eating and drinking ok.     Review of Systems   Constitutional, HEENT, cardiovascular, pulmonary, gi and gu systems are negative, except as otherwise noted.     Objective           Vitals:  No vitals were obtained today due to virtual visit.    Physical Exam   GENERAL: Healthy, alert and no distress  EYES: Eyes grossly normal to inspection.  No discharge or erythema, or obvious scleral/conjunctival abnormalities.  RESP: No audible wheeze, cough, or visible cyanosis.  No visible retractions or increased work of breathing.    SKIN: Visible skin clear. No significant rash, abnormal pigmentation or lesions.  NEURO: Cranial nerves grossly intact.  Mentation and speech appropriate for age.  PSYCH: Mentation appears normal, affect normal/bright, judgement and insight intact, normal speech and appearance well-groomed.       Video-Visit Details    Type of service:  Video Visit   Video Start Time: 2:05  Video End Time:2:30    Originating Location (pt. Location): Home    Distant Location (provider location):   Off-site  Platform used for Video Visit: Linda        Again, thank you for allowing me to participate in the care of your patient.        Sincerely,        BRONWYN Nicole CNP

## 2023-05-19 NOTE — PROGRESS NOTES
"Eric is a 73 year old who is being evaluated via a billable video visit.      How would you like to obtain your AVS? MyChart  If the video visit is dropped, the invitation should be resent by: Other e-mail: NA  Will anyone else be joining your video visit? No          Assessment & Plan     Infection due to 2019 novel coronavirus  -discussed treatment options and patient opts for Rx Paxlovid. May start tomorrow or next day depending on symptom progression. Advised on Paxlovid benefit timeline and risks.   Patient instructed on holding tacrolimus during Rx and scheduling Tac level blood test on day 6 after completing paxlovid course and resume once level has been reviewed by coordinator.   -ER precautions reviewed: high fever, CP, SOB  - nirmatrelvir and ritonavir (PAXLOVID) 150 mg/100 mg therapy pack; Take 2 tablets by mouth 2 times daily for 5 days (Take one tablet of Nirmatelvir and 1 tablet of Ritonavir twice daily for 5 days)      BMI:   Estimated body mass index is 34.75 kg/m  as calculated from the following:    Height as of 12/9/22: 1.905 m (6' 3\").    Weight as of 12/9/22: 126.1 kg (278 lb).       COVID-19 positive patient.  Encounter for consideration of medication intervention. Patient does qualify for a prescription. Full discussion with patient including medication options, risks and benefits. Potential drug interactions reviewed with patient.     Treatment Planned Paxlovid Rx sent.   home pharmacy   Temporary change to home medications: Hold Tac during paxlovid course.     Estimated body mass index is 34.75 kg/m  as calculated from the following:    Height as of 12/9/22: 1.905 m (6' 3\").    Weight as of 12/9/22: 126.1 kg (278 lb).  GFR Estimate   Date Value Ref Range Status   04/05/2023 52 (L) >60 mL/min/1.73m2 Final     Comment:     eGFR calculated using 2021 CKD-EPI equation.   05/26/2021 49 (L) >60 mL/min/[1.73_m2] Final     Comment:     Non  GFR Calc  Starting 12/18/2018, serum " creatinine based estimated GFR (eGFR) will be   calculated using the Chronic Kidney Disease Epidemiology Collaboration   (CKD-EPI) equation.       No results found for: [id number]    No follow-ups on file.    BRONWYN Nicole CNP [facility]    Subjective   Eric is a 73 year old, presenting for the following health issues:  No chief complaint on file.    HPI   Tested positive for COVID today.  Has been traveling internationally and just returned home yesterday.   Rhinorrhea for 1 week and headache with fatigue today. Also very jet lagged.    Wife tested postive 3 weeks ago, at that time he was negative.     No fever, CP, cough, or GI s/s.   Eating and drinking ok.     Review of Systems   Constitutional, HEENT, cardiovascular, pulmonary, gi and gu systems are negative, except as otherwise noted.      Objective           Vitals:  No vitals were obtained today due to virtual visit.    Physical Exam   GENERAL: Healthy, alert and no distress  EYES: Eyes grossly normal to inspection.  No discharge or erythema, or obvious scleral/conjunctival abnormalities.  RESP: No audible wheeze, cough, or visible cyanosis.  No visible retractions or increased work of breathing.    SKIN: Visible skin clear. No significant rash, abnormal pigmentation or lesions.  NEURO: Cranial nerves grossly intact.  Mentation and speech appropriate for age.  PSYCH: Mentation appears normal, affect normal/bright, judgement and insight intact, normal speech and appearance well-groomed.        Video-Visit Details    Type of service:  Video Visit   Video Start Time: 2:05  Video End Time:2:30    Originating Location (pt. Location): Home    Distant Location (provider location):  Off-site  Platform used for Video Visit: [employer]

## 2023-05-23 ENCOUNTER — OFFICE VISIT (OUTPATIENT)
Dept: OPTOMETRY | Facility: CLINIC | Age: 73
End: 2023-05-23
Payer: MEDICARE

## 2023-05-23 DIAGNOSIS — H52.4 MYOPIA OF BOTH EYES WITH ASTIGMATISM AND PRESBYOPIA: Primary | ICD-10-CM

## 2023-05-23 DIAGNOSIS — H52.203 MYOPIA OF BOTH EYES WITH ASTIGMATISM AND PRESBYOPIA: Primary | ICD-10-CM

## 2023-05-23 DIAGNOSIS — H52.13 MYOPIA OF BOTH EYES WITH ASTIGMATISM AND PRESBYOPIA: Primary | ICD-10-CM

## 2023-05-23 ASSESSMENT — REFRACTION_CURRENTRX
OD_CYLINDER: -0.75
OS_DIAMETER: 14.0
OD_BASECURVE: 8.7
OD_DIAMETER: 14.0
OD_AXIS: 080
OD_SPHERE: -6.50
OD_BASECURVE: 8.8
OD_SPHERE: -6.50
OS_BRAND: DAILIES AQUACOMFORT PLUS
OD_BRAND: DAILIES AQUACOMFORT PLUS
OD_DIAMETER: 14.4
OS_BASECURVE: 8.7
OS_SPHERE: -6.50
OD_BRAND: DAILIES AQUACOMFORT PLUS TORIC

## 2023-05-23 NOTE — PROGRESS NOTES
No office visit. CL order only. Pt would like some of the toric Rx for the right eye    Contact Lens Billing  V-Code:  - Soft toric  Final Contact Lens Rx       Brand Base Curve Diameter Sphere Cylinder Axis Lens    Right Dailies Aquacomfort Plus 8.7 14.0 -6.50       Left Dailies Aquacomfort Plus 8.7 14.0 -6.50   Monovision/near      Final Contact Lens Rx #2       Brand Base Curve Diameter Sphere Cylinder Axis Lens    Right Dailies Aquacomfort Plus Toric 8.8 14.4 -6.50 -0.75 080 Far distance/golfing    Left                [facility] order mailed direct: [id number]   # of units: 1 90-pack  Price per Unit: $80 per 90-pack + $7.95 shipping = $87.95 total    These are for cosmetic contact lenses.    Encounter Diagnosis   Name Primary?     Myopia of both eyes with astigmatism and presbyopia Yes        Date of last eye exam: 8/9/22

## 2023-05-31 PROBLEM — U07.1 CLINICAL DIAGNOSIS OF COVID-19: Status: ACTIVE | Noted: 2023-05-19

## 2023-06-12 NOTE — PROGRESS NOTES
Dear Dr. Jimenez:    I had the pleasure of seeing Eric Andrew in follow-up today at the [facility].     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  He underwent surgery by dermatologist in [place] for this.  Per his report this was not done with Mohs surgery but was told that he had negative margins.  He says that shortly after the surgery he noticed a lump along his mandible/below the ear.  He went in for his 1 month follow-up with a dermatologist and at that time the mass had increased in size.  She thought it was a reactive node but offered a biopsy. Per review of the notes they proceeded with a punch biopsy of the parotid mass.  This was consistent with invasive squamous cell carcinoma without any epidermal involvement.  His preoperative PET scan showed only involvement of the parotid. He was taken to the OR on 4/11/2019 for a left total parotidectomy with resection of skin, preservation of the facial nerve, sacrifice of the greater auricular nerve, level I-V neck dissection, cervicofacial flap. Final pathology showed a 2.1 cm moderately differentiated SCC within the subcutaneous tissues and parotid, PNI, no LVSI, negative margins, 0/47 cervical nodes. He had postoperative radiation with Dr Floyd from 5/13/2019 to 6/24/2019 for a total of 6000 cGy. He had his 3 month post treatment PET scan in October 2019 which only showed a stable sub-cm nodule in the lung.    Interval history:  He comes in today for follow-up. He was last seen in December 2022. He comes in with labs and imaging. He did have COVID in May 2023. He has been doing lots of travel. He was sick from COVID for 2 weeks, was in quarantine for 3 weeks. He has no skin spots, recently saw dermatology with no concerns. He has no neck masses. He feels  there are no major issues with swallowing. He has to be careful with meals, has to do lots of water. Anything that gets partially stuck he can clear with water. He has no serious episodes. He does not think it is getting worse. He is cautious about what he eats. He has no pain with swallowing. His weight is down about 10 lbs, related to his recent illness. He has no neck masses. His energy is recovering, poor when ill, but fine with traveling. He is seeing the dentist regularly. He has no other major changes in his health.       MEDICATIONS:     Current Outpatient Medications   Medication Sig Dispense Refill     AMLODIPINE BESYLATE PO Take 10 mg by mouth every morning        BASAGLAR 100 UNIT/ML injection Inject 45 Units Subcutaneous At Bedtime        Calcium Carbonate-Vitamin D (CALCIUM + D PO) Take 1 tablet by mouth every morning        dulaglutide (TRULICITY) 1.5 MG/0.5ML pen Inject 1.5 mg Subcutaneous       levothyroxine (SYNTHROID/LEVOTHROID) 50 MCG tablet Take 1 tablet (50 mcg) by mouth daily 90 tablet 3     losartan (COZAAR) 25 MG tablet Take 1 tablet (25 mg) by mouth daily 90 tablet 3     metFORMIN (GLUCOPHAGE) 1000 MG tablet Take 1,000 mg by mouth 2 times daily (with meals)        metoprolol succinate (TOPROL-XL) 25 MG 24 hr tablet Take 50 mg by mouth every morning       Multiple Vitamins-Minerals (MULTIVITAL) TABS Take 1 tablet by mouth every morning        niacinamide (NIACIN) 500 MG tablet Take 1,000 mg by mouth 2 times daily (with meals)       tacrolimus (GENERIC EQUIVALENT) 1 MG capsule Take 1 capsule (1 mg) by mouth 2 times daily 60 capsule 11       ALLERGIES:    Allergies   Allergen Reactions     Cefazolin Swelling     Lips swelled while patient was in the OR      Lisinopril Cough     Meropenem Hives and Swelling     Developed hives and lip swelling while on meropenem and micafungin.  Resolved with discontinuation.  Unclear which was cause.     Micafungin Hives and Swelling     Developed hives and  lip swelling while on meropenem and micafungin.  Resolved with discontinuation.  Unclear which was cause.       HABITS/SOCIAL HISTORY:   Spends the shin in [place].  .  He is a retired .   He smokes for a few years and quit back in 1973.  He has no chewing tobacco use.  He has 1 alcoholic beverage about 3 times per week.   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    Social History     Socioeconomic History     Marital status:      Spouse name: Not on file     Number of children: Not on file     Years of education: Not on file     Highest education level: Not on file   Occupational History     Not on file   Tobacco Use     Smoking status: Never     Smokeless tobacco: Never     Tobacco comments:     [id number] occational smoker   Vaping Use     Vaping status: Not on file   Substance and Sexual Activity     Alcohol use: Yes     Alcohol/week: 0.0 standard drinks of alcohol     Comment: 2-3 glass of wine per week. At most 1 per day     Drug use: No     Sexual activity: Never   Other Topics Concern     Parent/sibling w/ CABG, MI or angioplasty before 65F 55M? Not Asked   Social History Narrative     Not on file     Social Determinants of Health     Financial Resource Strain: Not on file   Food Insecurity: Not on file   Transportation Needs: Not on file   Physical Activity: Not on file   Stress: Not on file   Social Connections: Not on file   Intimate Partner Violence: Not on file   Housing Stability: Not on file       PAST MEDICAL HISTORY:   Past Medical History:   Diagnosis Date     Alpha-1-antitrypsin deficiency (H)      Ascites     Pre-transplant     Chronic kidney disease, stage 3 (H)      Cirrhosis (H)     Alpha-1-antitrypsin deficiency, s/p liver transplant 3/31/15     Gout      HTN (hypertension)      Lentigo maligna melanoma (H) 2009    lentigo maligna melanoma excised on 01/29/2009 with a repeat wide excision on 03/30/2009.      Liver replaced by transplant (H) 03/31/2015     Nephrolithiasis   "    Osteoarthritis      Portal hypertension (H)     Pre-transplant        PAST SURGICAL HISTORY:   Past Surgical History:   Procedure Laterality Date     COLONOSCOPY N/A 2014    Procedure: COLONOSCOPY;  Surgeon: Katherine Jimenez MD;  Location:  GI     ESOPHAGOSCOPY, GASTROSCOPY, DUODENOSCOPY (EGD), COMBINED N/A 2014    Procedure: COMBINED ESOPHAGOSCOPY, GASTROSCOPY, DUODENOSCOPY (EGD), BIOPSY SINGLE OR MULTIPLE;  Surgeon: Katherine Jimenez MD;  Location:  GI     ESOPHAGOSCOPY, GASTROSCOPY, DUODENOSCOPY (EGD), COMBINED N/A 2015    Procedure: COMBINED ESOPHAGOSCOPY, GASTROSCOPY, DUODENOSCOPY (EGD), REMOVE FOREIGN BODY;  Surgeon: Katherine Jimenez MD;  Location:  GI     HERNIA REPAIR      abdominal     INSERT SHUNT PORTAL TRANSJUGULAR INTRAHEPTIC       ORTHOPEDIC SURGERY      bilateral knee surgery     PAROTIDECTOMY, RADICAL NECK DISSECTION Left 2019    Procedure: Total Parotidectomy, Excision of Skin, Neck Dissection Levels I - V,  And Local Advancement Flap;  Surgeon: Johanna Moreland MD;  Location:  OR     TRANSPLANT LIVER RECIPIENT  DONOR N/A 3/31/2015    Procedure: TRANSPLANT LIVER RECIPIENT  DONOR;  Surgeon: Elia Mehta MD;  Location:  OR       FAMILY HISTORY:    Family History   Problem Relation Age of Onset     Cardiovascular Father         MI     Glaucoma No family hx of      Macular Degeneration No family hx of        REVIEW OF SYSTEMS:  12 point ROS was negative other than the symptoms noted above in the HPI.  Patient Supplied Answers to Review of Systems      2021    11:51 AM   [facility] ROS   Gastrointestinal/Genitourinary Heartburn/indigestion         PHYSICAL EXAMINATION:   /72 (BP Location: Right arm, Patient Position: Sitting, Cuff Size: Adult Large)   Pulse 77   Ht 1.905 m (6' 3\")   Wt 121.6 kg (268 lb)   BMI 33.50 kg/m     Appearance:   normal; NAD, age-appropriate appearance, well-developed, " normal habitus   Communication:   normal; communicates verbally, normal voice quality   Head/Face:   inspection -  Expected concavity of left parotid defect   Salivary glands -  S/p left radical parotidectomy with cervicofacial advancement flap, radiation changes to the tissue, no palpable mass, well healed incision, minimal lymphedema, some fibrosis, some telangiectasias, expected concavity   Facial strength -  Normal and symmetric bilateral; H/B I/VI   Skin:  no new lesions   Ears:  auricle (AD) -  Normal   EAC (AD) -  normal  TM (AD) -  Normal, no effusion  auricle (AS) -  Well healed surgical incision on helix  EAC (AS) -  normal  TM (AS) -  Normal, no effusion  Normal clinical speech reception   Nose:  Ext. inspection -  Normal   Oral Cavity:  lips -  Normal mucosa, oral competence, and stoma size   Age-appropriate dentition, healthy gingival mucosa   Hard palate, buccal, floor of mouth mucosa normal  Moist mucus membranes   Tongue - normal movement, no lesions   Oropharynx:  mucosa -  Normal, no lesions  soft palate -  Normal, no lesions, no asymmetry, normal elevation   Neck: Well healed neck incision, well healed cervicofacial flap, no palpable masses  Concavity/tissue retraction  Hyper and hypopigmentation of neck skin with some telangiectasias in level V  Normal range of motion  No significant lymphedema, moderate fibrosis   Lymphatic:  no abnormal nodes   Cardiovascular:  warm, pink, well-perfused extremities without swelling, tenderness, or edema   Respiratory:  Normal respiratory effort, no stridor   Neuro/Psych.:  mood/affect -  normal  mental status -  normal       PROCEDURE:      RESULTS REVIEWED:   TSH normal    CT neck and chest imaging independently reviewed    CT neck and chest reports reviewed      IMPRESSION AND PLAN:   Eric Andrew is a 73-year-old man who has a history of a liver transplant and recent squamous cell carcinoma of the left cheek who developed metastatic disease in his left  parotid. He underwent total parotidectomy, skin resection, resection of greater auricular nerve, level I-V neck dissection, cervicofacial flap on 4/11/2019. Final pathology showed the 1 metastatic deposit in the parotid. He completed postoperative radiation on 6/24/2019 with Dr Floyd.     He is doing well with no signs of recurrence. We discussed PT for his neck vs stretching.     He is on synthroid. Recheck TSH today was normal. Repeat in 6 months.    Repeat imaging today was without recurrent disease. Repeat in 1 year.    He is due for carotid duplex in June 2024.    I will see him back in 6 months with labs pending his travel plans.     Thank you very much for the opportunity to participate in the care of your patient.      Johanna Moreland M.D.  Otolaryngology- Head & Neck Surgery      This note was dictated with voice recognition software and then edited. Please excuse any unintentional errors.         CC:  Katherine Jimenez MD  [address]  [place] [address]      Naomi Rodriguez, RN  Liver Coordinator  [facility]      Aston Devine MD  [facility]  [address]  [facility] [address]      Grabiel Floyd MD  Department of Radiation Oncology  [facility]

## 2023-06-14 ENCOUNTER — ANCILLARY PROCEDURE (OUTPATIENT)
Dept: CT IMAGING | Facility: CLINIC | Age: 73
End: 2023-06-14
Attending: OTOLARYNGOLOGY
Payer: MEDICARE

## 2023-06-14 ENCOUNTER — LAB (OUTPATIENT)
Dept: LAB | Facility: CLINIC | Age: 73
End: 2023-06-14
Payer: MEDICARE

## 2023-06-14 ENCOUNTER — OFFICE VISIT (OUTPATIENT)
Dept: OTOLARYNGOLOGY | Facility: CLINIC | Age: 73
End: 2023-06-14
Payer: MEDICARE

## 2023-06-14 VITALS
HEIGHT: 75 IN | SYSTOLIC BLOOD PRESSURE: 132 MMHG | HEART RATE: 77 BPM | BODY MASS INDEX: 33.32 KG/M2 | DIASTOLIC BLOOD PRESSURE: 72 MMHG | WEIGHT: 268 LBS

## 2023-06-14 DIAGNOSIS — C44.320 SQUAMOUS CELL CARCINOMA OF SKIN OF FACE: ICD-10-CM

## 2023-06-14 DIAGNOSIS — E03.4 HYPOTHYROIDISM DUE TO ACQUIRED ATROPHY OF THYROID: ICD-10-CM

## 2023-06-14 DIAGNOSIS — Z13.220 LIPID SCREENING: ICD-10-CM

## 2023-06-14 DIAGNOSIS — E11.9 DIABETES MELLITUS (H): Primary | ICD-10-CM

## 2023-06-14 DIAGNOSIS — Z79.4 ENCOUNTER FOR LONG-TERM (CURRENT) USE OF INSULIN (H): ICD-10-CM

## 2023-06-14 DIAGNOSIS — Z94.4 LIVER REPLACED BY TRANSPLANT (H): ICD-10-CM

## 2023-06-14 DIAGNOSIS — C44.320 SQUAMOUS CELL CARCINOMA OF SKIN OF FACE: Primary | ICD-10-CM

## 2023-06-14 LAB
ALBUMIN MFR UR ELPH: 10.2 MG/DL
ALBUMIN SERPL BCG-MCNC: 4.1 G/DL (ref 3.5–5.2)
ALBUMIN UR-MCNC: NEGATIVE MG/DL
ALP SERPL-CCNC: 95 U/L (ref 40–129)
ALT SERPL W P-5'-P-CCNC: 11 U/L (ref 0–70)
ANION GAP SERPL CALCULATED.3IONS-SCNC: 10 MMOL/L (ref 7–15)
APPEARANCE UR: CLEAR
AST SERPL W P-5'-P-CCNC: 16 U/L (ref 0–45)
BILIRUB DIRECT SERPL-MCNC: <0.2 MG/DL (ref 0–0.3)
BILIRUB SERPL-MCNC: 0.4 MG/DL
BILIRUB UR QL STRIP: NEGATIVE
BUN SERPL-MCNC: 23.9 MG/DL (ref 8–23)
CALCIUM SERPL-MCNC: 9.2 MG/DL (ref 8.8–10.2)
CHLORIDE SERPL-SCNC: 104 MMOL/L (ref 98–107)
CHOLEST SERPL-MCNC: 127 MG/DL
COLOR UR AUTO: ABNORMAL
CREAT SERPL-MCNC: 1.5 MG/DL (ref 0.67–1.17)
CREAT UR-MCNC: 81.9 MG/DL
DEPRECATED HCO3 PLAS-SCNC: 23 MMOL/L (ref 22–29)
ERYTHROCYTE [DISTWIDTH] IN BLOOD BY AUTOMATED COUNT: 14.3 % (ref 10–15)
GFR SERPL CREATININE-BSD FRML MDRD: 49 ML/MIN/1.73M2
GLUCOSE SERPL-MCNC: 116 MG/DL (ref 70–99)
GLUCOSE UR STRIP-MCNC: NEGATIVE MG/DL
HBA1C MFR BLD: 5.8 %
HCT VFR BLD AUTO: 40.7 % (ref 40–53)
HDLC SERPL-MCNC: 39 MG/DL
HGB BLD-MCNC: 13.9 G/DL (ref 13.3–17.7)
HGB UR QL STRIP: NEGATIVE
KETONES UR STRIP-MCNC: NEGATIVE MG/DL
LDLC SERPL CALC-MCNC: 68 MG/DL
LEUKOCYTE ESTERASE UR QL STRIP: NEGATIVE
MAGNESIUM SERPL-MCNC: 1.7 MG/DL (ref 1.7–2.3)
MCH RBC QN AUTO: 31.5 PG (ref 26.5–33)
MCHC RBC AUTO-ENTMCNC: 34.2 G/DL (ref 31.5–36.5)
MCV RBC AUTO: 92 FL (ref 78–100)
MUCOUS THREADS #/AREA URNS LPF: PRESENT /LPF
NITRATE UR QL: NEGATIVE
NONHDLC SERPL-MCNC: 88 MG/DL
PH UR STRIP: 5 [PH] (ref 5–7)
PHOSPHATE SERPL-MCNC: 2.9 MG/DL (ref 2.5–4.5)
PLATELET # BLD AUTO: 129 10E3/UL (ref 150–450)
POTASSIUM SERPL-SCNC: 4.1 MMOL/L (ref 3.4–5.3)
PROT SERPL-MCNC: 6.6 G/DL (ref 6.4–8.3)
PROT/CREAT 24H UR: 0.12 MG/MG CR (ref 0–0.2)
RBC # BLD AUTO: 4.41 10E6/UL (ref 4.4–5.9)
RBC URINE: <1 /HPF
SODIUM SERPL-SCNC: 137 MMOL/L (ref 136–145)
SP GR UR STRIP: 1.01 (ref 1–1.03)
TACROLIMUS BLD-MCNC: 4.2 UG/L (ref 5–15)
TME LAST DOSE: ABNORMAL H
TME LAST DOSE: ABNORMAL H
TRIGL SERPL-MCNC: 100 MG/DL
TSH SERPL DL<=0.005 MIU/L-ACNC: 3.32 UIU/ML (ref 0.3–4.2)
UROBILINOGEN UR STRIP-MCNC: NORMAL MG/DL
WBC # BLD AUTO: 4.9 10E3/UL (ref 4–11)
WBC URINE: 0 /HPF

## 2023-06-14 PROCEDURE — 84156 ASSAY OF PROTEIN URINE: CPT

## 2023-06-14 PROCEDURE — 80197 ASSAY OF TACROLIMUS: CPT

## 2023-06-14 PROCEDURE — 83036 HEMOGLOBIN GLYCOSYLATED A1C: CPT

## 2023-06-14 PROCEDURE — 83735 ASSAY OF MAGNESIUM: CPT

## 2023-06-14 PROCEDURE — 70491 CT SOFT TISSUE NECK W/DYE: CPT | Mod: MG | Performed by: RADIOLOGY

## 2023-06-14 PROCEDURE — 82248 BILIRUBIN DIRECT: CPT

## 2023-06-14 PROCEDURE — 80048 BASIC METABOLIC PNL TOTAL CA: CPT

## 2023-06-14 PROCEDURE — G1010 CDSM STANSON: HCPCS | Mod: GC | Performed by: RADIOLOGY

## 2023-06-14 PROCEDURE — 85027 COMPLETE CBC AUTOMATED: CPT

## 2023-06-14 PROCEDURE — 71260 CT THORAX DX C+: CPT | Mod: MG | Performed by: RADIOLOGY

## 2023-06-14 PROCEDURE — 80061 LIPID PANEL: CPT

## 2023-06-14 PROCEDURE — 99214 OFFICE O/P EST MOD 30 MIN: CPT | Performed by: OTOLARYNGOLOGY

## 2023-06-14 PROCEDURE — 84100 ASSAY OF PHOSPHORUS: CPT

## 2023-06-14 PROCEDURE — 81001 URINALYSIS AUTO W/SCOPE: CPT

## 2023-06-14 PROCEDURE — 36415 COLL VENOUS BLD VENIPUNCTURE: CPT

## 2023-06-14 PROCEDURE — 84443 ASSAY THYROID STIM HORMONE: CPT

## 2023-06-14 RX ORDER — IOPAMIDOL 755 MG/ML
125 INJECTION, SOLUTION INTRAVASCULAR ONCE
Status: COMPLETED | OUTPATIENT
Start: 2023-06-14 | End: 2023-06-14

## 2023-06-14 RX ORDER — DULAGLUTIDE 1.5 MG/.5ML
1.5 INJECTION, SOLUTION SUBCUTANEOUS
COMMUNITY
Start: 2023-05-15 | End: 2023-12-15

## 2023-06-14 RX ADMIN — IOPAMIDOL 125 ML: 755 INJECTION, SOLUTION INTRAVASCULAR at 15:11

## 2023-06-14 ASSESSMENT — PAIN SCALES - GENERAL: PAINLEVEL: NO PAIN (0)

## 2023-06-14 NOTE — LETTER
6/14/2023       RE: Eric Andrew  [address] [facility] [address]     Dear Colleague,    Thank you for referring your patient, Eric Andrew, to the [facility] [place] [facility]. Please see a copy of my visit note below.    Dear Dr. Jimenez:    I had the pleasure of seeing Eric Andrew in follow-up today at the [facility].     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  He underwent surgery by dermatologist in [place] for this.  Per his report this was not done with Mohs surgery but was told that he had negative margins.  He says that shortly after the surgery he noticed a lump along his mandible/below the ear.  He went in for his 1 month follow-up with a dermatologist and at that time the mass had increased in size.  She thought it was a reactive node but offered a biopsy. Per review of the notes they proceeded with a punch biopsy of the parotid mass.  This was consistent with invasive squamous cell carcinoma without any epidermal involvement.  His preoperative PET scan showed only involvement of the parotid. He was taken to the OR on 4/11/2019 for a left total parotidectomy with resection of skin, preservation of the facial nerve, sacrifice of the greater auricular nerve, level I-V neck dissection, cervicofacial flap. Final pathology showed a 2.1 cm moderately differentiated SCC within the subcutaneous tissues and parotid, PNI, no LVSI, negative margins, 0/47 cervical nodes. He had postoperative radiation with Dr Floyd from 5/13/2019 to 6/24/2019 for a total of 6000 cGy. He had his 3 month post treatment PET scan in October 2019 which only showed a stable sub-cm nodule in the lung.    Interval history:  He comes in  today for follow-up. He was last seen in December 2022. He comes in with labs and imaging. He did have COVID in May 2023. He has been doing lots of travel. He was sick from COVID for 2 weeks, was in quarantine for 3 weeks. He has no skin spots, recently saw dermatology with no concerns. He has no neck masses. He feels there are no major issues with swallowing. He has to be careful with meals, has to do lots of water. Anything that gets partially stuck he can clear with water. He has no serious episodes. He does not think it is getting worse. He is cautious about what he eats. He has no pain with swallowing. His weight is down about 10 lbs, related to his recent illness. He has no neck masses. His energy is recovering, poor when ill, but fine with traveling. He is seeing the dentist regularly. He has no other major changes in his health.       MEDICATIONS:     Current Outpatient Medications   Medication Sig Dispense Refill    AMLODIPINE BESYLATE PO Take 10 mg by mouth every morning       BASAGLAR 100 UNIT/ML injection Inject 45 Units Subcutaneous At Bedtime       Calcium Carbonate-Vitamin D (CALCIUM + D PO) Take 1 tablet by mouth every morning       dulaglutide (TRULICITY) 1.5 MG/0.5ML pen Inject 1.5 mg Subcutaneous      levothyroxine (SYNTHROID/LEVOTHROID) 50 MCG tablet Take 1 tablet (50 mcg) by mouth daily 90 tablet 3    losartan (COZAAR) 25 MG tablet Take 1 tablet (25 mg) by mouth daily 90 tablet 3    metFORMIN (GLUCOPHAGE) 1000 MG tablet Take 1,000 mg by mouth 2 times daily (with meals)       metoprolol succinate (TOPROL-XL) 25 MG 24 hr tablet Take 50 mg by mouth every morning      Multiple Vitamins-Minerals (MULTIVITAL) TABS Take 1 tablet by mouth every morning       niacinamide (NIACIN) 500 MG tablet Take 1,000 mg by mouth 2 times daily (with meals)      tacrolimus (GENERIC EQUIVALENT) 1 MG capsule Take 1 capsule (1 mg) by mouth 2 times daily 60 capsule 11       ALLERGIES:    Allergies   Allergen Reactions     Cefazolin Swelling     Lips swelled while patient was in the OR     Lisinopril Cough    Meropenem Hives and Swelling     Developed hives and lip swelling while on meropenem and micafungin.  Resolved with discontinuation.  Unclear which was cause.    Micafungin Hives and Swelling     Developed hives and lip swelling while on meropenem and micafungin.  Resolved with discontinuation.  Unclear which was cause.       HABITS/SOCIAL HISTORY:   Spends the shin in [place].  .  He is a retired .   He smokes for a few years and quit back in 1973.  He has no chewing tobacco use.  He has 1 alcoholic beverage about 3 times per week.   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    Social History     Socioeconomic History    Marital status:      Spouse name: Not on file    Number of children: Not on file    Years of education: Not on file    Highest education level: Not on file   Occupational History    Not on file   Tobacco Use    Smoking status: Never    Smokeless tobacco: Never    Tobacco comments:     [id number] occational smoker   Vaping Use    Vaping status: Not on file   Substance and Sexual Activity    Alcohol use: Yes     Alcohol/week: 0.0 standard drinks of alcohol     Comment: 2-3 glass of wine per week. At most 1 per day    Drug use: No    Sexual activity: Never   Other Topics Concern    Parent/sibling w/ CABG, MI or angioplasty before 65F 55M? Not Asked   Social History Narrative    Not on file     Social Determinants of Health     Financial Resource Strain: Not on file   Food Insecurity: Not on file   Transportation Needs: Not on file   Physical Activity: Not on file   Stress: Not on file   Social Connections: Not on file   Intimate Partner Violence: Not on file   Housing Stability: Not on file       PAST MEDICAL HISTORY:   Past Medical History:   Diagnosis Date    Alpha-1-antitrypsin deficiency (H)     Ascites     Pre-transplant    Chronic kidney disease, stage 3 (H)     Cirrhosis (H)      Alpha-1-antitrypsin deficiency, s/p liver transplant 3/31/15    Gout     HTN (hypertension)     Lentigo maligna melanoma (H)     lentigo maligna melanoma excised on 2009 with a repeat wide excision on 2009.     Liver replaced by transplant (H) 2015    Nephrolithiasis     Osteoarthritis     Portal hypertension (H)     Pre-transplant        PAST SURGICAL HISTORY:   Past Surgical History:   Procedure Laterality Date    COLONOSCOPY N/A 2014    Procedure: COLONOSCOPY;  Surgeon: Katherine Jimenez MD;  Location:  GI    ESOPHAGOSCOPY, GASTROSCOPY, DUODENOSCOPY (EGD), COMBINED N/A 2014    Procedure: COMBINED ESOPHAGOSCOPY, GASTROSCOPY, DUODENOSCOPY (EGD), BIOPSY SINGLE OR MULTIPLE;  Surgeon: Katherine Jimenez MD;  Location:  GI    ESOPHAGOSCOPY, GASTROSCOPY, DUODENOSCOPY (EGD), COMBINED N/A 2015    Procedure: COMBINED ESOPHAGOSCOPY, GASTROSCOPY, DUODENOSCOPY (EGD), REMOVE FOREIGN BODY;  Surgeon: Katherine Jimenez MD;  Location:  GI    HERNIA REPAIR      abdominal    INSERT SHUNT PORTAL TRANSJUGULAR INTRAHEPTIC      ORTHOPEDIC SURGERY      bilateral knee surgery    PAROTIDECTOMY, RADICAL NECK DISSECTION Left 2019    Procedure: Total Parotidectomy, Excision of Skin, Neck Dissection Levels I - V,  And Local Advancement Flap;  Surgeon: Johanna Moreland MD;  Location:  OR    TRANSPLANT LIVER RECIPIENT  DONOR N/A 3/31/2015    Procedure: TRANSPLANT LIVER RECIPIENT  DONOR;  Surgeon: Elia Mehta MD;  Location:  OR       FAMILY HISTORY:    Family History   Problem Relation Age of Onset    Cardiovascular Father         MI    Glaucoma No family hx of     Macular Degeneration No family hx of        REVIEW OF SYSTEMS:  12 point ROS was negative other than the symptoms noted above in the HPI.  Patient Supplied Answers to Review of Systems      2021    11:51 AM   [facility] ROS   Gastrointestinal/Genitourinary  "Heartburn/indigestion         PHYSICAL EXAMINATION:   /72 (BP Location: Right arm, Patient Position: Sitting, Cuff Size: Adult Large)   Pulse 77   Ht 1.905 m (6' 3\")   Wt 121.6 kg (268 lb)   BMI 33.50 kg/m     Appearance:   normal; NAD, age-appropriate appearance, well-developed, normal habitus   Communication:   normal; communicates verbally, normal voice quality   Head/Face:   inspection -  Expected concavity of left parotid defect   Salivary glands -  S/p left radical parotidectomy with cervicofacial advancement flap, radiation changes to the tissue, no palpable mass, well healed incision, minimal lymphedema, some fibrosis, some telangiectasias, expected concavity   Facial strength -  Normal and symmetric bilateral; H/B I/VI   Skin:  no new lesions   Ears:  auricle (AD) -  Normal   EAC (AD) -  normal  TM (AD) -  Normal, no effusion  auricle (AS) -  Well healed surgical incision on helix  EAC (AS) -  normal  TM (AS) -  Normal, no effusion  Normal clinical speech reception   Nose:  Ext. inspection -  Normal   Oral Cavity:  lips -  Normal mucosa, oral competence, and stoma size   Age-appropriate dentition, healthy gingival mucosa   Hard palate, buccal, floor of mouth mucosa normal  Moist mucus membranes   Tongue - normal movement, no lesions   Oropharynx:  mucosa -  Normal, no lesions  soft palate -  Normal, no lesions, no asymmetry, normal elevation   Neck: Well healed neck incision, well healed cervicofacial flap, no palpable masses  Concavity/tissue retraction  Hyper and hypopigmentation of neck skin with some telangiectasias in level V  Normal range of motion  No significant lymphedema, moderate fibrosis   Lymphatic:  no abnormal nodes   Cardiovascular:  warm, pink, well-perfused extremities without swelling, tenderness, or edema   Respiratory:  Normal respiratory effort, no stridor   Neuro/Psych.:  mood/affect -  normal  mental status -  normal       PROCEDURE:      RESULTS REVIEWED:   TSH " normal    CT neck and chest imaging independently reviewed    CT neck and chest reports reviewed      IMPRESSION AND PLAN:   Eric Andrew is a 73-year-old man who has a history of a liver transplant and recent squamous cell carcinoma of the left cheek who developed 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 He underwent total parotidectomy, skin resection, resection of greater auricular nerve, level I-V neck dissection, cervicofacial flap on 4/11/2019. Final pathology showed the 1 metastatic deposit in the parotid. He completed postoperative radiation on 6/24/2019 with Dr Floyd.     He is doing well with no signs of recurrence. We discussed PT for his neck vs stretching.     He is on synthroid. Recheck TSH today was normal. Repeat in 6 months.    Repeat imaging today was without recurrent disease. Repeat in 1 year.    He is due for carotid duplex in June 2024.    I will see him back in 6 months with labs pending his travel plans.     Thank you very much for the opportunity to participate in the care of your patient.      Johanna Moreland M.D.  Otolaryngology- Head & Neck Surgery      This note was dictated with voice recognition software and then edited. Please excuse any unintentional errors.         CC:  Katherine Jimenez MD  [address]  [place] [facility] [address]      Naomi Rodriguez, RN  Liver Coordinator  [facility]      Aston Devine MD  [facility]  [address]  [facility] [address]      Grabiel Floyd MD  Department of Radiation Oncology  [facility]        Again, thank you for allowing me to participate in the care of your patient.      Sincerely,    Johanna Moreland MD

## 2023-06-14 NOTE — NURSING NOTE
"Chief Complaint   Patient presents with     RECHECK     6 month follow up       Blood pressure 132/72, pulse 77, height 1.905 m (6' 3\"), weight 121.6 kg (268 lb).    Kelly Steinberg, EMT    "

## 2023-06-28 DIAGNOSIS — Z94.4 LIVER TRANSPLANTED (H): ICD-10-CM

## 2023-06-29 RX ORDER — TACROLIMUS 1 MG/1
CAPSULE ORAL
Qty: 60 CAPSULE | Refills: 11 | Status: SHIPPED | OUTPATIENT
Start: 2023-06-29 | End: 2024-07-29

## 2023-08-07 ENCOUNTER — LAB (OUTPATIENT)
Dept: LAB | Facility: CLINIC | Age: 73
End: 2023-08-07
Payer: MEDICARE

## 2023-08-07 DIAGNOSIS — Z94.4 LIVER REPLACED BY TRANSPLANT (H): ICD-10-CM

## 2023-08-07 LAB
ALBUMIN SERPL BCG-MCNC: 4.1 G/DL (ref 3.5–5.2)
ALP SERPL-CCNC: 94 U/L (ref 40–129)
ALT SERPL W P-5'-P-CCNC: 15 U/L (ref 0–70)
ANION GAP SERPL CALCULATED.3IONS-SCNC: 9 MMOL/L (ref 7–15)
AST SERPL W P-5'-P-CCNC: 15 U/L (ref 0–45)
BILIRUB DIRECT SERPL-MCNC: <0.2 MG/DL (ref 0–0.3)
BILIRUB SERPL-MCNC: 0.4 MG/DL
BUN SERPL-MCNC: 28.3 MG/DL (ref 8–23)
CALCIUM SERPL-MCNC: 9.2 MG/DL (ref 8.8–10.2)
CHLORIDE SERPL-SCNC: 108 MMOL/L (ref 98–107)
CREAT SERPL-MCNC: 1.55 MG/DL (ref 0.67–1.17)
DEPRECATED HCO3 PLAS-SCNC: 22 MMOL/L (ref 22–29)
ERYTHROCYTE [DISTWIDTH] IN BLOOD BY AUTOMATED COUNT: 14.6 % (ref 10–15)
GFR SERPL CREATININE-BSD FRML MDRD: 47 ML/MIN/1.73M2
GLUCOSE SERPL-MCNC: 131 MG/DL (ref 70–99)
HCT VFR BLD AUTO: 40.3 % (ref 40–53)
HGB BLD-MCNC: 13.1 G/DL (ref 13.3–17.7)
MCH RBC QN AUTO: 31.4 PG (ref 26.5–33)
MCHC RBC AUTO-ENTMCNC: 32.5 G/DL (ref 31.5–36.5)
MCV RBC AUTO: 97 FL (ref 78–100)
PLATELET # BLD AUTO: 154 10E3/UL (ref 150–450)
POTASSIUM SERPL-SCNC: 4.4 MMOL/L (ref 3.4–5.3)
PROT SERPL-MCNC: 6.4 G/DL (ref 6.4–8.3)
RBC # BLD AUTO: 4.17 10E6/UL (ref 4.4–5.9)
SODIUM SERPL-SCNC: 139 MMOL/L (ref 136–145)
TACROLIMUS BLD-MCNC: 2.5 UG/L (ref 5–15)
TME LAST DOSE: ABNORMAL H
TME LAST DOSE: ABNORMAL H
WBC # BLD AUTO: 7.5 10E3/UL (ref 4–11)

## 2023-08-07 PROCEDURE — 82248 BILIRUBIN DIRECT: CPT

## 2023-08-07 PROCEDURE — 85027 COMPLETE CBC AUTOMATED: CPT

## 2023-08-07 PROCEDURE — 80053 COMPREHEN METABOLIC PANEL: CPT

## 2023-08-07 PROCEDURE — 36415 COLL VENOUS BLD VENIPUNCTURE: CPT

## 2023-08-07 PROCEDURE — 80197 ASSAY OF TACROLIMUS: CPT

## 2023-08-10 ENCOUNTER — TELEPHONE (OUTPATIENT)
Dept: OPTOMETRY | Facility: CLINIC | Age: 73
End: 2023-08-10

## 2023-08-10 ENCOUNTER — OFFICE VISIT (OUTPATIENT)
Dept: OPTOMETRY | Facility: CLINIC | Age: 73
End: 2023-08-10
Payer: MEDICARE

## 2023-08-10 DIAGNOSIS — H52.13 MYOPIA OF BOTH EYES WITH ASTIGMATISM AND PRESBYOPIA: Primary | ICD-10-CM

## 2023-08-10 DIAGNOSIS — H52.4 MYOPIA OF BOTH EYES WITH ASTIGMATISM AND PRESBYOPIA: Primary | ICD-10-CM

## 2023-08-10 DIAGNOSIS — H52.203 MYOPIA OF BOTH EYES WITH ASTIGMATISM AND PRESBYOPIA: Primary | ICD-10-CM

## 2023-08-10 ASSESSMENT — REFRACTION_CURRENTRX
OD_BASECURVE: 8.8
OD_SPHERE: -6.50
OD_BRAND: DAILIES AQUACOMFORT PLUS
OD_CYLINDER: -0.75
OD_DIAMETER: 14.0
OS_DIAMETER: 14.0
OD_AXIS: 080
OD_BRAND: DAILIES AQUACOMFORT PLUS TORIC
OD_SPHERE: -6.50
OD_BASECURVE: 8.7
OD_DIAMETER: 14.4
OS_BRAND: DAILIES AQUACOMFORT PLUS
OS_SPHERE: -6.50
OS_BASECURVE: 8.7

## 2023-08-10 NOTE — PROGRESS NOTES
No office visit. CL order only. Pt would like more toric Rx lenses (Rx 2). Mailed to [place] address    Contact Lens Billing  V-Code:  - Soft toric  Final Contact Lens Rx         Brand Base Curve Diameter Sphere Cylinder Axis Lens    Right Dailies Aquacomfort Plus 8.7 14.0 -6.50       Left Dailies Aquacomfort Plus 8.7 14.0 -6.50   Monovision/near          Final Contact Lens Rx #2         Brand Base Curve Diameter Sphere Cylinder Axis Lens    Right Dailies Aquacomfort Plus Toric 8.8 14.4 -6.50 -0.75 080 Far distance/golfing    Left                  [facility] order mailed direct: [id number]   # of units: 2 90-pack  Price per Unit: $80 per 90-pack + $7.95 shipping = $167.95 total    These are for cosmetic contact lenses.    Encounter Diagnosis   Name Primary?    Myopia of both eyes with astigmatism and presbyopia Yes        Date of last eye exam: 8/9/22

## 2023-10-04 ENCOUNTER — TELEPHONE (OUTPATIENT)
Dept: GASTROENTEROLOGY | Facility: CLINIC | Age: 73
End: 2023-10-04
Payer: MEDICARE

## 2023-11-05 ENCOUNTER — HEALTH MAINTENANCE LETTER (OUTPATIENT)
Age: 73
End: 2023-11-05

## 2023-11-13 ENCOUNTER — OFFICE VISIT (OUTPATIENT)
Dept: OPTOMETRY | Facility: CLINIC | Age: 73
End: 2023-11-13
Payer: MEDICARE

## 2023-11-13 ENCOUNTER — TELEPHONE (OUTPATIENT)
Dept: OPTOMETRY | Facility: CLINIC | Age: 73
End: 2023-11-13

## 2023-11-13 DIAGNOSIS — H52.203 MYOPIA OF BOTH EYES WITH ASTIGMATISM AND PRESBYOPIA: Primary | ICD-10-CM

## 2023-11-13 DIAGNOSIS — H52.13 MYOPIA OF BOTH EYES WITH ASTIGMATISM AND PRESBYOPIA: Primary | ICD-10-CM

## 2023-11-13 DIAGNOSIS — H52.4 MYOPIA OF BOTH EYES WITH ASTIGMATISM AND PRESBYOPIA: Primary | ICD-10-CM

## 2023-11-14 ASSESSMENT — REFRACTION_CURRENTRX
OD_BRAND: DAILIES AQUACOMFORT PLUS
OD_SPHERE: -6.50
OD_AXIS: 080
OS_BRAND: DAILIES AQUACOMFORT PLUS
OD_CYLINDER: -0.75
OS_DIAMETER: 14.0
OD_BRAND: DAILIES AQUACOMFORT PLUS TORIC
OD_BASECURVE: 8.7
OD_BASECURVE: 8.8
OS_SPHERE: -6.50
OD_DIAMETER: 14.0
OS_BASECURVE: 8.7
OD_DIAMETER: 14.4
OD_SPHERE: -6.50

## 2023-11-14 NOTE — PROGRESS NOTES
No office visit. CL order only. Pt would like more toric Rx lenses. Mailed to [place] address    Contact Lens Billing  V-Code:  - Soft toric  Final Contact Lens Rx         Brand Base Curve Diameter Sphere Cylinder Axis Lens    Right Dailies Aquacomfort Plus Toric 8.8 14.4 -6.50 -0.75 080 Far distance/golfing    Left                 Final Contact Lens Rx #2         Brand Base Curve Diameter Sphere Cylinder Axis Lens    Right Dailies Aquacomfort Plus 8.7 14.0 -6.50       Left Dailies Aquacomfort Plus 8.7 14.0 -6.50   Monovision/near           [facility] order mailed direct: [phone]   # of units: 1 90-pack  Price per Unit: $90 per 90-pack + $7.95 shipping = $97.95 total    These are for cosmetic contact lenses.    Encounter Diagnosis   Name Primary?    Myopia of both eyes with astigmatism and presbyopia Yes        Date of last eye exam: 8/9/22

## 2023-12-14 NOTE — PROGRESS NOTES
Dear Dr. Jimenez:    I had the pleasure of seeing Eric Andrew in follow-up today at the [facility].     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  He underwent surgery by dermatologist in [place] for this.  Per his report this was not done with Mohs surgery but was told that he had negative margins.  He says that shortly after the surgery he noticed a lump along his mandible/below the ear.  He went in for his 1 month follow-up with a dermatologist and at that time the mass had increased in size.  She thought it was a reactive node but offered a biopsy. Per review of the notes they proceeded with a punch biopsy of the parotid mass.  This was consistent with invasive squamous cell carcinoma without any epidermal involvement.  His preoperative PET scan showed only involvement of the parotid. He was taken to the OR on 4/11/2019 for a left total parotidectomy with resection of skin, preservation of the facial nerve, sacrifice of the greater auricular nerve, level I-V neck dissection, cervicofacial flap. Final pathology showed a 2.1 cm moderately differentiated SCC within the subcutaneous tissues and parotid, PNI, no LVSI, negative margins, 0/47 cervical nodes. He had postoperative radiation with Dr Floyd from 5/13/2019 to 6/24/2019 for a total of 6000 cGy. He had his 3 month post treatment PET scan in October 2019 which only showed a stable sub-cm nodule in the lung.    Interval history:  He comes in today for follow-up. He was last seen in June 2023. He says he is overall doing well. He is back from [place] since Thanksgiving, going back after [place]. He has no problems or concerns. He continues to have ongoing stiffness in the neck which is not debilitating. He has challenges swallowing which are also not  debilitating. He occasionally has to cough things up, has to be careful. He is not doing any swallowing exercises. He is eating a normal diet. He is not coughing on liquids. His weight is stable. He sees dermatology every 4-6 months. He has no skin concerns. He has no neck masses. His facial movement is not of concern, continues to be able to play the trombone. His energy is ok. He continues on routine health maintenance and sees the dentist.     He and his wife are going to [facility], [facility], [place], and [place].     He is accompanied by his wife.      MEDICATIONS:     Current Outpatient Medications   Medication Sig Dispense Refill    AMLODIPINE BESYLATE PO Take 10 mg by mouth every morning       BASAGLAR 100 UNIT/ML injection Inject 45 Units Subcutaneous At Bedtime       Calcium Carbonate-Vitamin D (CALCIUM + D PO) Take 1 tablet by mouth every morning       levothyroxine (SYNTHROID/LEVOTHROID) 50 MCG tablet Take 1 tablet (50 mcg) by mouth daily 90 tablet 3    losartan (COZAAR) 25 MG tablet Take 1 tablet (25 mg) by mouth daily 90 tablet 3    metFORMIN (GLUCOPHAGE) 1000 MG tablet Take 1,000 mg by mouth 2 times daily (with meals)       metoprolol succinate (TOPROL-XL) 25 MG 24 hr tablet Take 50 mg by mouth every morning      Multiple Vitamins-Minerals (MULTIVITAL) TABS Take 1 tablet by mouth every morning       niacinamide (NIACIN) 500 MG tablet Take 1,000 mg by mouth 2 times daily (with meals)      tacrolimus (GENERIC EQUIVALENT) 1 MG capsule TAKE ONE CAPSULE BY MOUTH TWICE A DAY 60 capsule 11       ALLERGIES:    Allergies   Allergen Reactions    Cefazolin Swelling     Lips swelled while patient was in the OR     Lisinopril Cough    Meropenem Hives and Swelling     Developed hives and lip swelling while on meropenem and micafungin.  Resolved with discontinuation.  Unclear which was cause.    Micafungin Hives and Swelling     Developed hives and lip swelling while on meropenem and micafungin.  Resolved with  discontinuation.  Unclear which was cause.       HABITS/SOCIAL HISTORY:   Spends the shin in [place].  .  He is a retired .   He smokes for a few years and quit back in 1973.  He has no chewing tobacco use.  He has 1 alcoholic beverage about 3 times per week.   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    Social History     Socioeconomic History    Marital status:      Spouse name: Not on file    Number of children: Not on file    Years of education: Not on file    Highest education level: Not on file   Occupational History    Not on file   Tobacco Use    Smoking status: Never    Smokeless tobacco: Never    Tobacco comments:     [id number] occational smoker   Substance and Sexual Activity    Alcohol use: Yes     Alcohol/week: 0.0 standard drinks of alcohol     Comment: 2-3 glass of wine per week. At most 1 per day    Drug use: No    Sexual activity: Never   Other Topics Concern    Parent/sibling w/ CABG, MI or angioplasty before 65F 55M? Not Asked   Social History Narrative    Not on file     Social Determinants of Health     Financial Resource Strain: Not on file   Food Insecurity: Not on file   Transportation Needs: Not on file   Physical Activity: Not on file   Stress: Not on file   Social Connections: Not on file   Interpersonal Safety: Not on file   Housing Stability: Not on file       PAST MEDICAL HISTORY:   Past Medical History:   Diagnosis Date    Alpha-1-antitrypsin deficiency (H)     Ascites     Pre-transplant    Chronic kidney disease, stage 3 (H)     Cirrhosis (H)     Alpha-1-antitrypsin deficiency, s/p liver transplant 3/31/15    Gout     HTN (hypertension)     Lentigo maligna melanoma (H) 2009    lentigo maligna melanoma excised on 01/29/2009 with a repeat wide excision on 03/30/2009.     Liver replaced by transplant (H) 03/31/2015    Nephrolithiasis     Osteoarthritis     Portal hypertension (H)     Pre-transplant        PAST SURGICAL HISTORY:   Past Surgical History:   Procedure  Laterality Date    COLONOSCOPY N/A 12/18/2014    Procedure: COLONOSCOPY;  Surgeon: Katherine Jimenez MD;  Location: [facility]    ESOPHAGOSCOPY, GASTROSCOPY, DUODENOSCOPY (EGD), COMBINED N/A 12/18/2014    Procedure: COMBINED ESOPHAGOSCOPY, GASTROSCOPY, DUODENOSCOPY (EGD), BIOPSY SINGLE OR MULTIPLE;  Surgeon: Katherine Jimenez MD;  Location: [facility]    ESOPHAGOSCOPY, GASTROSCOPY, DUODENOSCOPY (EGD), COMBINED N/A 5/28/2015    Procedure: COMBINED ESOPHAGOSCOPY, GASTROSCOPY, DUODENOSCOPY (EGD), REMOVE FOREIGN BODY;  Surgeon: Katherine Jimenez MD;  Location: [facility]    HERNIA REPAIR      abdominal    INSERT SHUNT PORTAL TRANSJUGULAR INTRAHEPTIC      IR PARACENTESIS  4/18/2014    IR PARACENTESIS  4/29/2014    IR PARACENTESIS  5/12/2014    IR PARACENTESIS  5/20/2014    IR PARACENTESIS  5/29/2014    IR PARACENTESIS  6/9/2014    IR PARACENTESIS  6/18/2014    IR PARACENTESIS  6/25/2014    IR PARACENTESIS  7/3/2014    IR PARACENTESIS  7/10/2014    IR PARACENTESIS  7/17/2014    IR PARACENTESIS  7/29/2014    IR PARACENTESIS  8/6/2014    IR PARACENTESIS  8/15/2014    IR PARACENTESIS  8/25/2014    IR PARACENTESIS  9/5/2014    IR PARACENTESIS  9/15/2014    IR PARACENTESIS  9/22/2014    IR PARACENTESIS  10/1/2014    IR PARACENTESIS  10/10/2014    IR PARACENTESIS  10/21/2014    IR PARACENTESIS  10/28/2014    IR PARACENTESIS  11/5/2014    IR PARACENTESIS  11/12/2014    IR PARACENTESIS  11/19/2014    IR PARACENTESIS  11/26/2014    IR PARACENTESIS  12/3/2014    IR PARACENTESIS  12/10/2014    IR PARACENTESIS  12/16/2014    IR PARACENTESIS  12/23/2014    IR PARACENTESIS  12/30/2014    IR PARACENTESIS  1/6/2015    IR PARACENTESIS  1/14/2015    IR PARACENTESIS  1/23/2015    IR PARACENTESIS  1/30/2015    IR PARACENTESIS  2/6/2015    IR PARACENTESIS  2/13/2015    IR PARACENTESIS  2/20/2015    IR PARACENTESIS  2/26/2015    IR PARACENTESIS  3/6/2015    IR PARACENTESIS  3/16/2015    ORTHOPEDIC SURGERY      bilateral knee  "surgery    PAROTIDECTOMY, RADICAL NECK DISSECTION Left 2019    Procedure: Total Parotidectomy, Excision of Skin, Neck Dissection Levels I - V,  And Local Advancement Flap;  Surgeon: Johanna Moreland MD;  Location: [facility]    TRANSPLANT LIVER RECIPIENT  DONOR N/A 3/31/2015    Procedure: TRANSPLANT LIVER RECIPIENT  DONOR;  Surgeon: Elia Mehta MD;  Location: [facility]       FAMILY HISTORY:    Family History   Problem Relation Age of Onset    Cardiovascular Father         MI    Glaucoma No family hx of     Macular Degeneration No family hx of        REVIEW OF SYSTEMS:  12 point ROS was negative other than the symptoms noted above in the HPI.  Patient Supplied Answers to Review of Systems      2021    11:51 AM    ENT ROS   Gastrointestinal/Genitourinary Heartburn/indigestion         PHYSICAL EXAMINATION:   BP (!) 142/76   Pulse 74   Temp 98.2  F (36.8  C)   Ht 1.905 m (6' 3\")   Wt 126.1 kg (278 lb)   SpO2 96%   BMI 34.75 kg/m     Appearance:   normal; NAD, age-appropriate appearance, well-developed, normal habitus   Communication:   normal; communicates verbally, normal voice quality   Head/Face:   inspection -  Expected concavity of left parotid defect  No facial numbness   Salivary glands -  S/p left radical parotidectomy with cervicofacial advancement flap, radiation changes to the tissue, no palpable mass, well healed incision, minimal lymphedema, some fibrosis, some telangiectasias, expected concavity   Facial strength -  Normal and symmetric bilateral; H/B I/VI   Skin:  no new lesions   Ears:  auricle (AD) -  Normal   EAC (AD) -  normal  TM (AD) -  Normal, no effusion  auricle (AS) -  Well healed surgical incision on helix  EAC (AS) -  normal  TM (AS) -  Normal, no effusion  Normal clinical speech reception   Nose:  Ext. inspection -  Normal   Oral Cavity:  lips -  Normal mucosa, oral competence, and stoma size   Age-appropriate dentition, healthy gingival mucosa   Hard " palate, buccal, floor of mouth mucosa normal  Moist mucus membranes   Tongue - normal movement, no lesions   Oropharynx:  mucosa -  Normal, no lesions  soft palate -  Normal, no lesions, no asymmetry, normal elevation   Neck: Well healed neck incision, well healed cervicofacial flap, no palpable masses  Concavity/tissue retraction  Hyper and hypopigmentation of neck skin with some telangiectasias in level V  Normal range of motion  No significant lymphedema, moderate fibrosis   Lymphatic:  no abnormal nodes   Cardiovascular:  warm, pink, well-perfused extremities without swelling, tenderness, or edema   Respiratory:  Normal respiratory effort, no stridor   Neuro/Psych.:  mood/affect -  normal  mental status -  normal       PROCEDURE:      RESULTS REVIEWED:         IMPRESSION AND PLAN:   Eric Andrew is a 73-year-old man who has a history of a liver transplant and recent squamous cell carcinoma of the left cheek who developed 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 He underwent total parotidectomy, skin resection, resection of greater auricular nerve, level I-V neck dissection, cervicofacial flap on 4/11/2019. Final pathology showed the 1 metastatic deposit in the parotid. He completed postoperative radiation on 6/24/2019 with Dr Floyd.     He is doing well with no signs of recurrence.     He is on synthroid. Recheck TSH with other labs next week. Pending results repeat in 6 months.    Repeat imaging in 6 months.    He is due for carotid duplex in June 2024.    I will see him back in 6 months with labs, CT scans, and carotid duplex. At that time will discuss post-5 year treatment surveillance plan.     Thank you very much for the opportunity to participate in the care of your patient.      Johanna Moreland M.D.  Otolaryngology- Head & Neck Surgery      This note was dictated with voice recognition software and then edited. Please excuse any unintentional errors.         CC:  Katherine Jimenez MD  [address] [place] [address]  [address]  [facility] [id number]      Naomi Rodriguez, RN  Liver Coordinator  [facility]      Aston Devine MD  [facility]  [address]  [facility] [address]      Grabiel Floyd MD  Department of Radiation Oncology  [facility]

## 2023-12-15 ENCOUNTER — OFFICE VISIT (OUTPATIENT)
Dept: OTOLARYNGOLOGY | Facility: CLINIC | Age: 73
End: 2023-12-15
Payer: MEDICARE

## 2023-12-15 VITALS
HEART RATE: 74 BPM | BODY MASS INDEX: 34.57 KG/M2 | HEIGHT: 75 IN | DIASTOLIC BLOOD PRESSURE: 76 MMHG | SYSTOLIC BLOOD PRESSURE: 142 MMHG | WEIGHT: 278 LBS | TEMPERATURE: 98.2 F | OXYGEN SATURATION: 96 %

## 2023-12-15 DIAGNOSIS — C44.320 SQUAMOUS CELL CARCINOMA OF SKIN OF FACE: Primary | ICD-10-CM

## 2023-12-15 DIAGNOSIS — I65.29 CAROTID ATHEROSCLEROSIS, UNSPECIFIED LATERALITY: ICD-10-CM

## 2023-12-15 DIAGNOSIS — E03.4 HYPOTHYROIDISM DUE TO ACQUIRED ATROPHY OF THYROID: ICD-10-CM

## 2023-12-15 PROCEDURE — 99213 OFFICE O/P EST LOW 20 MIN: CPT | Performed by: OTOLARYNGOLOGY

## 2023-12-15 ASSESSMENT — PAIN SCALES - GENERAL: PAINLEVEL: NO PAIN (0)

## 2023-12-15 NOTE — LETTER
12/15/2023       RE: Eric Andrew  [address] [facility] [address]     Dear Colleague,    Thank you for referring your patient, Eric Andrew, to the [facility] AND [facility]AT [facility]IC [place] at [facility]. Please see a copy of my visit note below.    Dear Dr. Jimenez:    I had the pleasure of seeing Eric Andrew in follow-up today at the [facility].     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  He underwent surgery by dermatologist in [place] for this.  Per his report this was not done with Mohs surgery but was told that he had negative margins.  He says that shortly after the surgery he noticed a lump along his mandible/below the ear.  He went in for his 1 month follow-up with a dermatologist and at that time the mass had increased in size.  She thought it was a reactive node but offered a biopsy. Per review of the notes they proceeded with a punch biopsy of the parotid mass.  This was consistent with invasive squamous cell carcinoma without any epidermal involvement.  His preoperative PET scan showed only involvement of the parotid. He was taken to the OR on 4/11/2019 for a left total parotidectomy with resection of skin, preservation of the facial nerve, sacrifice of the greater auricular nerve, level I-V neck dissection, cervicofacial flap. Final pathology showed a 2.1 cm moderately differentiated SCC within the subcutaneous tissues and parotid, PNI, no LVSI, negative margins, 0/47 cervical nodes. He had postoperative radiation with Dr Floyd from 5/13/2019 to 6/24/2019 for a total of 6000 cGy. He had his 3 month post treatment PET scan in October 2019 which only showed a stable sub-cm nodule in the lung.    Interval history:  He comes  in today for follow-up. He was last seen in June 2023. He says he is overall doing well. He is back from [place] since Thanksgiving, going back after [place]. He has no problems or concerns. He continues to have ongoing stiffness in the neck which is not debilitating. He has challenges swallowing which are also not debilitating. He occasionally has to cough things up, has to be careful. He is not doing any swallowing exercises. He is eating a normal diet. He is not coughing on liquids. His weight is stable. He sees dermatology every 4-6 months. He has no skin concerns. He has no neck masses. His facial movement is not of concern, continues to be able to play the [employer]. His energy is ok. He continues on routine health maintenance and sees the dentist.     He and his wife are going to [facility], [facility], [place], and [place].     He is accompanied by his wife.      MEDICATIONS:     Current Outpatient Medications   Medication Sig Dispense Refill    AMLODIPINE BESYLATE PO Take 10 mg by mouth every morning       BASAGLAR 100 UNIT/ML injection Inject 45 Units Subcutaneous At Bedtime       Calcium Carbonate-Vitamin D (CALCIUM + D PO) Take 1 tablet by mouth every morning       levothyroxine (SYNTHROID/LEVOTHROID) 50 MCG tablet Take 1 tablet (50 mcg) by mouth daily 90 tablet 3    losartan (COZAAR) 25 MG tablet Take 1 tablet (25 mg) by mouth daily 90 tablet 3    metFORMIN (GLUCOPHAGE) 1000 MG tablet Take 1,000 mg by mouth 2 times daily (with meals)       metoprolol succinate (TOPROL-XL) 25 MG 24 hr tablet Take 50 mg by mouth every morning      Multiple Vitamins-Minerals (MULTIVITAL) TABS Take 1 tablet by mouth every morning       niacinamide (NIACIN) 500 MG tablet Take 1,000 mg by mouth 2 times daily (with meals)      tacrolimus (GENERIC EQUIVALENT) 1 MG capsule TAKE ONE CAPSULE BY MOUTH TWICE A DAY 60 capsule 11       ALLERGIES:    Allergies   Allergen Reactions    Cefazolin Swelling     Lips swelled while patient was in the  OR     Lisinopril Cough    Meropenem Hives and Swelling     Developed hives and lip swelling while on meropenem and micafungin.  Resolved with discontinuation.  Unclear which was cause.    Micafungin Hives and Swelling     Developed hives and lip swelling while on meropenem and micafungin.  Resolved with discontinuation.  Unclear which was cause.       HABITS/SOCIAL HISTORY:   Spends the shin in [place].  .  He is a retired .   He smokes for a few years and quit back in 1973.  He has no chewing tobacco use.  He has 1 alcoholic beverage about 3 times per week.   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    Social History     Socioeconomic History    Marital status:      Spouse name: Not on file    Number of children: Not on file    Years of education: Not on file    Highest education level: Not on file   Occupational History    Not on file   Tobacco Use    Smoking status: Never    Smokeless tobacco: Never    Tobacco comments:     [id number] occational smoker   Substance and Sexual Activity    Alcohol use: Yes     Alcohol/week: 0.0 standard drinks of alcohol     Comment: 2-3 glass of wine per week. At most 1 per day    Drug use: No    Sexual activity: Never   Other Topics Concern    Parent/sibling w/ CABG, MI or angioplasty before 65F 55M? Not Asked   Social History Narrative    Not on file     Social Determinants of Health     Financial Resource Strain: Not on file   Food Insecurity: Not on file   Transportation Needs: Not on file   Physical Activity: Not on file   Stress: Not on file   Social Connections: Not on file   Interpersonal Safety: Not on file   Housing Stability: Not on file       PAST MEDICAL HISTORY:   Past Medical History:   Diagnosis Date    Alpha-1-antitrypsin deficiency (H)     Ascites     Pre-transplant    Chronic kidney disease, stage 3 (H)     Cirrhosis (H)     Alpha-1-antitrypsin deficiency, s/p liver transplant 3/31/15    Gout     HTN (hypertension)     Lentigo maligna  melanoma (H) 2009    lentigo maligna melanoma excised on 01/29/2009 with a repeat wide excision on 03/30/2009.     Liver replaced by transplant (H) 03/31/2015    Nephrolithiasis     Osteoarthritis     Portal hypertension (H)     Pre-transplant        PAST SURGICAL HISTORY:   Past Surgical History:   Procedure Laterality Date    COLONOSCOPY N/A 12/18/2014    Procedure: COLONOSCOPY;  Surgeon: Katherine Jimenez MD;  Location: [facility]    ESOPHAGOSCOPY, GASTROSCOPY, DUODENOSCOPY (EGD), COMBINED N/A 12/18/2014    Procedure: COMBINED ESOPHAGOSCOPY, GASTROSCOPY, DUODENOSCOPY (EGD), BIOPSY SINGLE OR MULTIPLE;  Surgeon: Katherine Jimenez MD;  Location: [facility]    ESOPHAGOSCOPY, GASTROSCOPY, DUODENOSCOPY (EGD), COMBINED N/A 5/28/2015    Procedure: COMBINED ESOPHAGOSCOPY, GASTROSCOPY, DUODENOSCOPY (EGD), REMOVE FOREIGN BODY;  Surgeon: Katherine Jimenez MD;  Location: [facility]    HERNIA REPAIR      abdominal    INSERT SHUNT PORTAL TRANSJUGULAR INTRAHEPTIC      IR PARACENTESIS  4/18/2014    IR PARACENTESIS  4/29/2014    IR PARACENTESIS  5/12/2014    IR PARACENTESIS  5/20/2014    IR PARACENTESIS  5/29/2014    IR PARACENTESIS  6/9/2014    IR PARACENTESIS  6/18/2014    IR PARACENTESIS  6/25/2014    IR PARACENTESIS  7/3/2014    IR PARACENTESIS  7/10/2014    IR PARACENTESIS  7/17/2014    IR PARACENTESIS  7/29/2014    IR PARACENTESIS  8/6/2014    IR PARACENTESIS  8/15/2014    IR PARACENTESIS  8/25/2014    IR PARACENTESIS  9/5/2014    IR PARACENTESIS  9/15/2014    IR PARACENTESIS  9/22/2014    IR PARACENTESIS  10/1/2014    IR PARACENTESIS  10/10/2014    IR PARACENTESIS  10/21/2014    IR PARACENTESIS  10/28/2014    IR PARACENTESIS  11/5/2014    IR PARACENTESIS  11/12/2014    IR PARACENTESIS  11/19/2014    IR PARACENTESIS  11/26/2014    IR PARACENTESIS  12/3/2014    IR PARACENTESIS  12/10/2014    IR PARACENTESIS  12/16/2014    IR PARACENTESIS  12/23/2014    IR PARACENTESIS  12/30/2014    IR PARACENTESIS  1/6/2015  "   IR PARACENTESIS  2015    IR PARACENTESIS  2015    IR PARACENTESIS  2015    IR PARACENTESIS  2015    IR PARACENTESIS  2015    IR PARACENTESIS  2015    IR PARACENTESIS  2015    IR PARACENTESIS  3/6/2015    IR PARACENTESIS  3/16/2015    ORTHOPEDIC SURGERY      bilateral knee surgery    PAROTIDECTOMY, RADICAL NECK DISSECTION Left 2019    Procedure: Total Parotidectomy, Excision of Skin, Neck Dissection Levels I - V,  And Local Advancement Flap;  Surgeon: Johanna Moreland MD;  Location: [facility]    TRANSPLANT LIVER RECIPIENT  DONOR N/A 3/31/2015    Procedure: TRANSPLANT LIVER RECIPIENT  DONOR;  Surgeon: Elia Mehta MD;  Location: [facility]       FAMILY HISTORY:    Family History   Problem Relation Age of Onset    Cardiovascular Father         MI    Glaucoma No family hx of     Macular Degeneration No family hx of        REVIEW OF SYSTEMS:  12 point ROS was negative other than the symptoms noted above in the HPI.  Patient Supplied Answers to Review of Systems      2021    11:51 AM   [facility] ROS   Gastrointestinal/Genitourinary Heartburn/indigestion         PHYSICAL EXAMINATION:   BP (!) 142/76   Pulse 74   Temp 98.2  F (36.8  C)   Ht 1.905 m (6' 3\")   Wt 126.1 kg (278 lb)   SpO2 96%   BMI 34.75 kg/m     Appearance:   normal; NAD, age-appropriate appearance, well-developed, normal habitus   Communication:   normal; communicates verbally, normal voice quality   Head/Face:   inspection -  Expected concavity of left parotid defect  No facial numbness   Salivary glands -  S/p left radical parotidectomy with cervicofacial advancement flap, radiation changes to the tissue, no palpable mass, well healed incision, minimal lymphedema, some fibrosis, some telangiectasias, expected concavity   Facial strength -  Normal and symmetric bilateral; H/B I/VI   Skin:  no new lesions   Ears:  auricle (AD) -  Normal   EAC (AD) -  normal  TM (AD) -  Normal, no " effusion  auricle (AS) -  Well healed surgical incision on helix  EAC (AS) -  normal  TM (AS) -  Normal, no effusion  Normal clinical speech reception   Nose:  Ext. inspection -  Normal   Oral Cavity:  lips -  Normal mucosa, oral competence, and stoma size   Age-appropriate dentition, healthy gingival mucosa   Hard palate, buccal, floor of mouth mucosa normal  Moist mucus membranes   Tongue - normal movement, no lesions   Oropharynx:  mucosa -  Normal, no lesions  soft palate -  Normal, no lesions, no asymmetry, normal elevation   Neck: Well healed neck incision, well healed cervicofacial flap, no palpable masses  Concavity/tissue retraction  Hyper and hypopigmentation of neck skin with some telangiectasias in level V  Normal range of motion  No significant lymphedema, moderate fibrosis   Lymphatic:  no abnormal nodes   Cardiovascular:  warm, pink, well-perfused extremities without swelling, tenderness, or edema   Respiratory:  Normal respiratory effort, no stridor   Neuro/Psych.:  mood/affect -  normal  mental status -  normal       PROCEDURE:      RESULTS REVIEWED:         IMPRESSION AND PLAN:   Eric Andrew is a 73-year-old man who has a history of a liver transplant and recent squamous cell carcinoma of the left cheek who developed 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 He underwent total parotidectomy, skin resection, resection of greater auricular nerve, level I-V neck dissection, cervicofacial flap on 4/11/2019. Final pathology showed the 1 metastatic deposit in the parotid. He completed postoperative radiation on 6/24/2019 with Dr Floyd.     He is doing well with no signs of recurrence.     He is on synthroid. Recheck TSH with other labs next week. Pending results repeat in 6 months.    Repeat imaging in 6 months.    He is due for carotid duplex in June 2024.    I will see him back in 6 months with labs, CT scans, and carotid duplex. At that time will discuss post-5 year treatment surveillance plan.      Thank you very much for the opportunity to participate in the care of your patient.      Johanna Moreland M.D.  Otolaryngology- Head & Neck Surgery      This note was dictated with voice recognition software and then edited. Please excuse any unintentional errors.         CC:  Katherine Jimenez MD  [address] [facility] [address] [facility]  [place] [address]      Naomi Rodriguez, RN  Liver Coordinator  [facility]      Aston Devine MD  [facility]  [address] [facility] [address]  [facility] [address]      Grabiel Floyd MD  Department of Radiation Oncology  [facility]

## 2023-12-15 NOTE — NURSING NOTE
"Chief Complaint   Patient presents with    RECHECK     6 month follow up with labs     Blood pressure (!) 142/76, pulse 74, temperature 98.2  F (36.8  C), height 1.905 m (6' 3\"), weight 126.1 kg (278 lb), SpO2 96%.  Christos Gonzalez LPN    "

## 2023-12-28 ENCOUNTER — LAB (OUTPATIENT)
Dept: LAB | Facility: CLINIC | Age: 73
End: 2023-12-28
Payer: MEDICARE

## 2023-12-28 DIAGNOSIS — Z94.4 LIVER REPLACED BY TRANSPLANT (H): ICD-10-CM

## 2023-12-28 DIAGNOSIS — E03.4 HYPOTHYROIDISM DUE TO ACQUIRED ATROPHY OF THYROID: ICD-10-CM

## 2023-12-28 LAB
ALBUMIN SERPL BCG-MCNC: 4.2 G/DL (ref 3.5–5.2)
ALP SERPL-CCNC: 99 U/L (ref 40–150)
ALT SERPL W P-5'-P-CCNC: 15 U/L (ref 0–70)
ANION GAP SERPL CALCULATED.3IONS-SCNC: 12 MMOL/L (ref 7–15)
AST SERPL W P-5'-P-CCNC: 15 U/L (ref 0–45)
BILIRUB DIRECT SERPL-MCNC: <0.2 MG/DL (ref 0–0.3)
BILIRUB SERPL-MCNC: 0.3 MG/DL
BUN SERPL-MCNC: 27.3 MG/DL (ref 8–23)
CALCIUM SERPL-MCNC: 9.4 MG/DL (ref 8.8–10.2)
CHLORIDE SERPL-SCNC: 103 MMOL/L (ref 98–107)
CREAT SERPL-MCNC: 1.61 MG/DL (ref 0.67–1.17)
DEPRECATED HCO3 PLAS-SCNC: 23 MMOL/L (ref 22–29)
EGFRCR SERPLBLD CKD-EPI 2021: 45 ML/MIN/1.73M2
ERYTHROCYTE [DISTWIDTH] IN BLOOD BY AUTOMATED COUNT: 14.6 % (ref 10–15)
GLUCOSE SERPL-MCNC: 137 MG/DL (ref 70–99)
HCT VFR BLD AUTO: 42.2 % (ref 40–53)
HGB BLD-MCNC: 14.4 G/DL (ref 13.3–17.7)
MCH RBC QN AUTO: 31.6 PG (ref 26.5–33)
MCHC RBC AUTO-ENTMCNC: 34.1 G/DL (ref 31.5–36.5)
MCV RBC AUTO: 93 FL (ref 78–100)
PLATELET # BLD AUTO: 148 10E3/UL (ref 150–450)
POTASSIUM SERPL-SCNC: 4.1 MMOL/L (ref 3.4–5.3)
PROT SERPL-MCNC: 6.7 G/DL (ref 6.4–8.3)
RBC # BLD AUTO: 4.56 10E6/UL (ref 4.4–5.9)
SODIUM SERPL-SCNC: 138 MMOL/L (ref 135–145)
T4 FREE SERPL-MCNC: 1.26 NG/DL (ref 0.9–1.7)
TACROLIMUS BLD-MCNC: 2.9 UG/L (ref 5–15)
TME LAST DOSE: ABNORMAL H
TME LAST DOSE: ABNORMAL H
TSH SERPL DL<=0.005 MIU/L-ACNC: 4.41 UIU/ML (ref 0.3–4.2)
WBC # BLD AUTO: 5.2 10E3/UL (ref 4–11)

## 2023-12-28 PROCEDURE — 82565 ASSAY OF CREATININE: CPT

## 2023-12-28 PROCEDURE — 84439 ASSAY OF FREE THYROXINE: CPT

## 2023-12-28 PROCEDURE — 84450 TRANSFERASE (AST) (SGOT): CPT

## 2023-12-28 PROCEDURE — 85041 AUTOMATED RBC COUNT: CPT

## 2023-12-28 PROCEDURE — 36415 COLL VENOUS BLD VENIPUNCTURE: CPT

## 2023-12-28 PROCEDURE — 80197 ASSAY OF TACROLIMUS: CPT

## 2023-12-28 PROCEDURE — 84443 ASSAY THYROID STIM HORMONE: CPT

## 2024-01-22 ENCOUNTER — VIRTUAL VISIT (OUTPATIENT)
Dept: GASTROENTEROLOGY | Facility: CLINIC | Age: 74
End: 2024-01-22
Attending: INTERNAL MEDICINE
Payer: MEDICARE

## 2024-01-22 DIAGNOSIS — D84.9 IMMUNOSUPPRESSED STATUS (H): Primary | ICD-10-CM

## 2024-01-22 DIAGNOSIS — Z94.4 LIVER REPLACED BY TRANSPLANT (H): ICD-10-CM

## 2024-01-22 PROCEDURE — 99214 OFFICE O/P EST MOD 30 MIN: CPT | Mod: 95 | Performed by: INTERNAL MEDICINE

## 2024-01-22 RX ORDER — ALLOPURINOL 100 MG/1
200 TABLET ORAL
COMMUNITY
Start: 2023-12-15

## 2024-01-22 NOTE — LETTER
1/22/2024         RE: Eric Andrew  [address] [facility] [address]        Dear Colleague,    Thank you for referring your patient, Eric Andrew, to the [facility]PAT[facility] CLINIC [place]. Please see a copy of my visit note below.    [facility]  LIVER CLINIC FOLLOW UP      A/P  Mr. Andrew is a 73 Y M s/p LT 2015 for A1AT.   Doing well.  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 Recent check in for this and things are going well.    IS Running tac as low as we can (as close to 3 as possible).Ok with the last 2 values of 2.5 and 2.9.   No strong compelling indication to change. Kidney function stable . Continue to run tac close to 3. Continue monitoring labs and IS level every 3 months to assess for appropriate dosing and side effects from IS including EZEQUIEL, pancytopenia, hyperkalemia, hypomagnesemia.  Graft function excellent     CKD 3b stable.  Creat 1.4-1.8  Vaccinations Discussed influenza, RSV and COVID. He received all 3.    Will see back 1 y in person or video     Katherine Jimenez MD  Hepatology/Liver Transplant  Medical Director, Liver Transplantation  [facility]  =========================================================     S: Mr. Andrew is a 73 Y M s/p DDLT 3/31/15 for alpha-1-antitrypsin deficiency.    2018 diagnosed with cutaneous squamous cell carcinoma  Arising from the L cheek and recurrence after excision, invasive in to the L parotid gland. No s/p L total parotidectomy of 2.1 cm tumor. Also received radiation. Recent MRI clear.    He is doing well. No new issues.  Thinks he might need a new knee replacement..    EXPLANT: NO HCC.  IS: Prograf. Kidney function has not been normal, but has been stable. Has mild proteinuria. Tac level was 3.2 4/30/21 but this was not a trough.  LABS: Up to date. Liver tests look great, normal.     Liver Function Studies - Recent Labs   Lab Test 12/28/23  0811   PROTTOTAL 6.7    ALBUMIN 4.2   BILITOTAL 0.3   ALKPHOS 99   AST 15   ALT 15     CBC RESULTS: Recent Labs   Lab Test 12/28/23  0811   WBC 5.2   RBC 4.56   HGB 14.4   HCT 42.2   MCV 93   MCH 31.6   MCHC 34.1   RDW 14.6   *       Lab Test 04/30/21  1241   PROTTOTAL 6.5*   ALBUMIN 3.6   BILITOTAL 0.4   ALKPHOS 73   AST 7   ALT 20     Lab Test 04/30/21  1241   WBC 5.2   RBC 3.90*   HGB 12.6*   HCT 36.6*   MCV 94   MCH 32.3   MCHC 34.4   RDW 14.1   *     REJECTION: None.  BILIARY ISSUES: None  STENT: Removed 5/28/15 by endoscopy  KIDNEY FUNCTION:  Sees Dr. Bonner. Elevated creatinine since just prior to LT. Chornic elevateion 1.4-1.8.   Creatinine   Date Value Ref Range Status   12/28/2023 1.61 (H) 0.67 - 1.17 mg/dL Final   05/26/2021 1.44 (H) 0.66 - 1.25 mg/dL Final     BP: Good. Amlodipine. Managed by PCP and nephrology. BP at home in 130s.   PREV: UTD on screening (colonoscopy 2014)  DISEASE RECURRENCE: N/A  SOC: travels with wife.  ROS: 10 point ROS neg other than the symptoms noted above in the HPI.  Exam  Gen Alert pleasant NAD  Resp No difficulty breathing. No cough  Skin No Jaundice  Eyes No icterus  Neuro CARTY  MSK no muscle wasting    Eric Andrew is a 73 year old male who is being evaluated via a billable video visit.    Video-Visit Details  Type of service:  Video Visit  Video Start Time: 1037  Video End Time: 1048  Originating Location (pt. Location):home  Distant Location (provider location):  [facility]PATOLOGY CL[facility]IC [place]      Platform used for Video Visit: ConnXus or Marilynn            Again, thank you for allowing me to participate in the care of your patient.        Sincerely,        Katherine Jimenez MD

## 2024-01-22 NOTE — PROGRESS NOTES
"Virtual Visit Details    Type of service:  Video Visit   Video Start Time: {video visit start/end time for provider to select:105335}  Video End Time:{video visit start/end time for provider to select:270129}    Originating Location (pt. Location): {video visit patient location:942378::\"Home\"}  {PROVIDER LOCATION On-site should be selected for visits conducted from your clinic location or adjoining [facility] hospital, academic office, or other nearby [facility] building. Off-site should be selected for all other provider locations, including home:103565}  Distant Location (provider location):  {virtual location provider:456149}  Platform used for Video Visit: {Virtual Visit Platforms:006878::\"[employer]\"}    "

## 2024-01-22 NOTE — PROGRESS NOTES
[facility]  LIVER CLINIC FOLLOW UP      A/P  Mr. Andrew is a 73 Y M s/p LT 2015 for A1AT.   Doing well.  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 Recent check in for this and things are going well.    IS Running tac as low as we can (as close to 3 as possible).Ok with the last 2 values of 2.5 and 2.9.   No strong compelling indication to change. Kidney function stable . Continue to run tac close to 3. Continue monitoring labs and IS level every 3 months to assess for appropriate dosing and side effects from IS including EZEQUIEL, pancytopenia, hyperkalemia, hypomagnesemia.  Graft function excellent     CKD 3b stable.  Creat 1.4-1.8  Vaccinations Discussed influenza, RSV and COVID. He received all 3.    Will see back 1 y in person or video     Katherine Jimenez MD  Hepatology/Liver Transplant  Medical Director, Liver Transplantation  [facility]  =========================================================     S: Mr. Andrew is a 73 Y M s/p DDLT 3/31/15 for alpha-1-antitrypsin deficiency.    2018 diagnosed with cutaneous squamous cell carcinoma  Arising from the L cheek and recurrence after excision, invasive in to the L parotid gland. No s/p L total parotidectomy of 2.1 cm tumor. Also received radiation. Recent MRI clear.    He is doing well. No new issues.  Thinks he might need a new knee replacement..    EXPLANT: NO HCC.  IS: Prograf. Kidney function has not been normal, but has been stable. Has mild proteinuria. Tac level was 3.2 4/30/21 but this was not a trough.  LABS: Up to date. Liver tests look great, normal.     Liver Function Studies - Recent Labs   Lab Test 12/28/23  0811   PROTTOTAL 6.7   ALBUMIN 4.2   BILITOTAL 0.3   ALKPHOS 99   AST 15   ALT 15     CBC RESULTS: Recent Labs   Lab Test 12/28/23  0811   WBC 5.2   RBC 4.56   HGB 14.4   HCT 42.2   MCV 93   MCH 31.6   MCHC 34.1   RDW 14.6   *       Lab Test 04/30/21  1241   PROTTOTAL 6.5*    ALBUMIN 3.6   BILITOTAL 0.4   ALKPHOS 73   AST 7   ALT 20     Lab Test 04/30/21  1241   WBC 5.2   RBC 3.90*   HGB 12.6*   HCT 36.6*   MCV 94   MCH 32.3   MCHC 34.4   RDW 14.1   *     REJECTION: None.  BILIARY ISSUES: None  STENT: Removed 5/28/15 by endoscopy  KIDNEY FUNCTION:  Sees Dr. Bonner. Elevated creatinine since just prior to LT. Chornic elevateion 1.4-1.8.   Creatinine   Date Value Ref Range Status   12/28/2023 1.61 (H) 0.67 - 1.17 mg/dL Final   05/26/2021 1.44 (H) 0.66 - 1.25 mg/dL Final     BP: Good. Amlodipine. Managed by PCP and nephrology. BP at home in 130s.   PREV: UTD on screening (colonoscopy 2014)  DISEASE RECURRENCE: N/A  SOC: travels with wife.  ROS: 10 point ROS neg other than the symptoms noted above in the HPI.  Exam  Gen Alert pleasant NAD  Resp No difficulty breathing. No cough  Skin No Jaundice  Eyes No icterus  Neuro CARTY  MSK no muscle wasting    Eric Andrew is a 73 year old male who is being evaluated via a billable video visit.    Video-Visit Details  Type of service:  Video Visit  Video Start Time: 1037  Video End Time: 1048  Originating Location (pt. Location):home  Distant Location (provider location):  [facility] HEPATOLOGY CLINIC [place]      Platform used for Video Visit: Loccie or [employer]iant

## 2024-01-22 NOTE — NURSING NOTE
Is the patient currently in the state of [place]? NO    Visit mode:VIDEO    If the visit is dropped, the patient can be reconnected by: VIDEO VISIT: Send to e-mail at: [email or web address]    Will anyone else be joining the visit? NO  (If patient encounters technical issues they should call [phone])    How would you like to obtain your AVS? MyChart    Are changes needed to the allergy or medication list? No    Reason for visit: RECHECK    Zahida LYNN

## 2024-03-24 ENCOUNTER — HEALTH MAINTENANCE LETTER (OUTPATIENT)
Age: 74
End: 2024-03-24

## 2024-04-03 ENCOUNTER — LAB (OUTPATIENT)
Dept: LAB | Facility: CLINIC | Age: 74
End: 2024-04-03
Payer: MEDICARE

## 2024-04-03 DIAGNOSIS — Z94.4 LIVER REPLACED BY TRANSPLANT (H): ICD-10-CM

## 2024-04-03 LAB
ALBUMIN SERPL BCG-MCNC: 4.2 G/DL (ref 3.5–5.2)
ALP SERPL-CCNC: 96 U/L (ref 40–150)
ALT SERPL W P-5'-P-CCNC: 18 U/L (ref 0–70)
ANION GAP SERPL CALCULATED.3IONS-SCNC: 17 MMOL/L (ref 7–15)
AST SERPL W P-5'-P-CCNC: 18 U/L (ref 0–45)
BILIRUB DIRECT SERPL-MCNC: <0.2 MG/DL (ref 0–0.3)
BILIRUB SERPL-MCNC: 0.4 MG/DL
BUN SERPL-MCNC: 21 MG/DL (ref 8–23)
CALCIUM SERPL-MCNC: 9.2 MG/DL (ref 8.8–10.2)
CHLORIDE SERPL-SCNC: 104 MMOL/L (ref 98–107)
CREAT SERPL-MCNC: 1.43 MG/DL (ref 0.67–1.17)
DEPRECATED HCO3 PLAS-SCNC: 20 MMOL/L (ref 22–29)
EGFRCR SERPLBLD CKD-EPI 2021: 52 ML/MIN/1.73M2
ERYTHROCYTE [DISTWIDTH] IN BLOOD BY AUTOMATED COUNT: 14.5 % (ref 10–15)
GLUCOSE SERPL-MCNC: 160 MG/DL (ref 70–99)
HCT VFR BLD AUTO: 42.8 % (ref 40–53)
HGB BLD-MCNC: 14.8 G/DL (ref 13.3–17.7)
MCH RBC QN AUTO: 32.2 PG (ref 26.5–33)
MCHC RBC AUTO-ENTMCNC: 34.6 G/DL (ref 31.5–36.5)
MCV RBC AUTO: 93 FL (ref 78–100)
PLATELET # BLD AUTO: 128 10E3/UL (ref 150–450)
POTASSIUM SERPL-SCNC: 4.3 MMOL/L (ref 3.4–5.3)
PROT SERPL-MCNC: 6.7 G/DL (ref 6.4–8.3)
RBC # BLD AUTO: 4.59 10E6/UL (ref 4.4–5.9)
SODIUM SERPL-SCNC: 141 MMOL/L (ref 135–145)
TACROLIMUS BLD-MCNC: 4.1 UG/L (ref 5–15)
TME LAST DOSE: ABNORMAL H
TME LAST DOSE: ABNORMAL H
WBC # BLD AUTO: 5.1 10E3/UL (ref 4–11)

## 2024-04-03 PROCEDURE — 85027 COMPLETE CBC AUTOMATED: CPT

## 2024-04-03 PROCEDURE — 36415 COLL VENOUS BLD VENIPUNCTURE: CPT

## 2024-04-03 PROCEDURE — 80053 COMPREHEN METABOLIC PANEL: CPT

## 2024-04-03 PROCEDURE — 80197 ASSAY OF TACROLIMUS: CPT

## 2024-05-03 ENCOUNTER — OFFICE VISIT (OUTPATIENT)
Dept: OPHTHALMOLOGY | Facility: CLINIC | Age: 74
End: 2024-05-03
Payer: MEDICARE

## 2024-05-03 DIAGNOSIS — H25.13 NUCLEAR AGE-RELATED CATARACT, BOTH EYES: Primary | ICD-10-CM

## 2024-05-03 DIAGNOSIS — B30.9 VIRAL CONJUNCTIVITIS OF LEFT EYE: ICD-10-CM

## 2024-05-03 DIAGNOSIS — H52.4 MYOPIA WITH REGULAR ASTIGMATISM AND PRESBYOPIA: ICD-10-CM

## 2024-05-03 DIAGNOSIS — H52.229 MYOPIA WITH REGULAR ASTIGMATISM AND PRESBYOPIA: ICD-10-CM

## 2024-05-03 DIAGNOSIS — E11.9 TYPE 2 DIABETES MELLITUS WITHOUT COMPLICATION, WITHOUT LONG-TERM CURRENT USE OF INSULIN (H): ICD-10-CM

## 2024-05-03 DIAGNOSIS — H52.10 MYOPIA WITH REGULAR ASTIGMATISM AND PRESBYOPIA: ICD-10-CM

## 2024-05-03 PROBLEM — M72.0 DUPUYTREN'S CONTRACTURE: Status: ACTIVE | Noted: 2022-12-05

## 2024-05-03 PROBLEM — Z79.4 TYPE 2 DIABETES MELLITUS WITH STAGE 3A CHRONIC KIDNEY DISEASE, WITH LONG-TERM CURRENT USE OF INSULIN (H): Status: ACTIVE | Noted: 2022-11-27

## 2024-05-03 PROBLEM — E11.22 TYPE 2 DIABETES MELLITUS WITH STAGE 3A CHRONIC KIDNEY DISEASE, WITH LONG-TERM CURRENT USE OF INSULIN (H): Status: ACTIVE | Noted: 2022-11-27

## 2024-05-03 PROBLEM — R73.9 HYPERGLYCEMIA: Status: ACTIVE | Noted: 2017-01-12

## 2024-05-03 PROBLEM — M71.20 BAKERS CYST: Status: ACTIVE | Noted: 2024-01-15

## 2024-05-03 PROBLEM — N18.31 TYPE 2 DIABETES MELLITUS WITH STAGE 3A CHRONIC KIDNEY DISEASE, WITH LONG-TERM CURRENT USE OF INSULIN (H): Status: ACTIVE | Noted: 2022-11-27

## 2024-05-03 PROBLEM — R94.39 ABNORMAL NUCLEAR STRESS TEST: Status: ACTIVE | Noted: 2017-11-28

## 2024-05-03 PROBLEM — D23.4: Status: ACTIVE | Noted: 2019-02-07

## 2024-05-03 PROCEDURE — 92014 COMPRE OPH EXAM EST PT 1/>: CPT | Performed by: OPTOMETRIST

## 2024-05-03 PROCEDURE — 92015 DETERMINE REFRACTIVE STATE: CPT | Mod: GY | Performed by: OPTOMETRIST

## 2024-05-03 RX ORDER — ATORVASTATIN CALCIUM 20 MG/1
20 TABLET, FILM COATED ORAL DAILY
COMMUNITY

## 2024-05-03 ASSESSMENT — TONOMETRY
OS_IOP_MMHG: 10
OD_IOP_MMHG: 12
IOP_METHOD: ICARE

## 2024-05-03 ASSESSMENT — VISUAL ACUITY
OS_CC: 20/50
METHOD: SNELLEN - LINEAR
CORRECTION_TYPE: GLASSES
OD_CC: 20/30

## 2024-05-03 ASSESSMENT — REFRACTION_CURRENTRX
OD_BRAND: DAILIES AQUACOMFORT PLUS TORIC
OS_SPHERE: -6.50
OD_AXIS: 080
OD_CYLINDER: -0.75
OD_BRAND: DAILIES AQUACOMFORT PLUS
OD_DIAMETER: 14.4
OS_DIAMETER: 14.0
OD_SPHERE: -6.50
OD_BASECURVE: 8.8
OS_BRAND: DAILIES AQUACOMFORT PLUS
OD_DIAMETER: 14.0
OS_BASECURVE: 8.7
OD_SPHERE: -6.50
OD_BASECURVE: 8.7

## 2024-05-03 ASSESSMENT — CONF VISUAL FIELD
OS_NORMAL: 1
OS_SUPERIOR_NASAL_RESTRICTION: 0
OD_INFERIOR_NASAL_RESTRICTION: 0
OS_INFERIOR_TEMPORAL_RESTRICTION: 0
OD_SUPERIOR_TEMPORAL_RESTRICTION: 0
OD_NORMAL: 1
METHOD: COUNTING FINGERS
OS_SUPERIOR_TEMPORAL_RESTRICTION: 0
OS_INFERIOR_NASAL_RESTRICTION: 0
OD_SUPERIOR_NASAL_RESTRICTION: 0
OD_INFERIOR_TEMPORAL_RESTRICTION: 0

## 2024-05-03 ASSESSMENT — REFRACTION_WEARINGRX
OD_ADD: +2.50
OD_SPHERE: -7.75
OS_SPHERE: -9.00
OD_CYLINDER: +1.25
OS_AXIS: 015
OD_AXIS: 165
OS_ADD: +2.50
OS_CYLINDER: +1.00

## 2024-05-03 ASSESSMENT — REFRACTION_MANIFEST
OD_SPHERE: -8.50
OS_AXIS: 020
OD_CYLINDER: SPHERE
OS_ADD: +2.50
OS_CYLINDER: +0.50
OS_SPHERE: -10.50
OD_ADD: +2.50

## 2024-05-03 ASSESSMENT — CUP TO DISC RATIO
OS_RATIO: 0.20
OD_RATIO: 0.20

## 2024-05-03 ASSESSMENT — SLIT LAMP EXAM - LIDS
COMMENTS: 1+ BLEPHARITIS, TELANGIECTASIA
COMMENTS: 1+ BLEPHARITIS, TELANGIECTASIA

## 2024-05-03 ASSESSMENT — EXTERNAL EXAM - LEFT EYE: OS_EXAM: NORMAL

## 2024-05-03 ASSESSMENT — EXTERNAL EXAM - RIGHT EYE: OD_EXAM: NORMAL

## 2024-05-03 NOTE — NURSING NOTE
"Chief Complaints and History of Present Illnesses   Patient presents with    Diabetic Eye Exam     Pt here for diabetic eye exam.     Chief Complaint(s) and History of Present Illness(es)       Diabetic Eye Exam              Comments: Pt here for diabetic eye exam.              Comments    Pt has largely unchanged vision since last exam. Does note left eye is \"weepy\". Pt does need CL updated- is not wearing any due to the weeping eye. Pt feels he may have an infection.     Lab Results       Component                Value               Date                       A1C                      5.8                 06/14/2023                 A1C                      5.4                 06/23/2020                 A1C                      5.4                 11/20/2019                 A1C                      6.4                 04/12/2019                 A1C                      6.2                 04/05/2019                 A1C                      5.5                 06/02/2017            Porsche Silva, COA on 5/3/2024 at 10:34 AM                     "

## 2024-05-03 NOTE — PROGRESS NOTES
A/P  1.) Cataracts OU  -Moderate, causing significant myopic shift. Still corrects to 20/25  -Reviewed findings with pt including option for CE/IOL referral. He is functioning well for now but will consider in the future    2.) H/o liver transplant, borderline diabetes previously  -Recent A1c's have been excellent. No concern for retinopathy at this time  -Monitor annually with dilation    3.) h/o Melanoma   -No ocular abnormalities today  -Iris nevus OS, nonsuspicious  -Continue to monitor. Reviewed with pt to self-monitor ocular surface at home. We will continue to monitor whole eye annually    4.) Myopia/Astigmatism/Presbyopia OU  -Continue CL's monovision left eye near. Updated today  -Also updated spec Rx.     5.) Viral conjuncitivitis left eye  -Has been sick recently, now with redness x several days  -Viral type appearance today, cornea not involved. Palliative therapy (AT, cool compress etc)    CL trials given, okay to order if working well. Follow-up annually, or when desired for CE/IOL eval with ophthalmology    I have confirmed the patient's CC, HPI and reviewed Past Medical History, Past Surgical History, Social History, Family History, Problem List, Medication List and agree with Tech note.     Vicki Mantilla, AMANDA BERNALO ZOLTANS

## 2024-05-03 NOTE — PATIENT INSTRUCTIONS
You can take your glasses prescription to any optical shop to be filled. If you are trying to use insurance please check directly with your insurance on coverage/locations prior to filling your prescription.     Some patients have liked these opticals:    [facility]  [address] [place] [address] [phone]    [facility]  [address] [place] [address] [phone]      [facility]  [address] [place] [address] [phone]    [address] [place] [address] [phone]    [address] [place] [address] [phone]

## 2024-05-07 ENCOUNTER — OFFICE VISIT (OUTPATIENT)
Dept: OPTOMETRY | Facility: CLINIC | Age: 74
End: 2024-05-07
Payer: MEDICARE

## 2024-05-07 ENCOUNTER — TELEPHONE (OUTPATIENT)
Dept: OPTOMETRY | Facility: CLINIC | Age: 74
End: 2024-05-07

## 2024-05-07 DIAGNOSIS — H52.203 MYOPIA OF BOTH EYES WITH ASTIGMATISM AND PRESBYOPIA: Primary | ICD-10-CM

## 2024-05-07 DIAGNOSIS — H52.4 MYOPIA OF BOTH EYES WITH ASTIGMATISM AND PRESBYOPIA: Primary | ICD-10-CM

## 2024-05-07 DIAGNOSIS — H52.13 MYOPIA OF BOTH EYES WITH ASTIGMATISM AND PRESBYOPIA: Primary | ICD-10-CM

## 2024-05-08 ASSESSMENT — REFRACTION_CURRENTRX
OD_BASECURVE: 8.7
OS_SPHERE: -7.00
OD_SPHERE: -7.50
OD_DIAMETER: 14.0
OD_BRAND: DAILIES AQUACOMFORT PLUS
OS_BRAND: DAILIES AQUACOMFORT PLUS
OS_BASECURVE: 8.7
OS_DIAMETER: 14.0

## 2024-05-08 NOTE — PROGRESS NOTES
No office visit. CL order only.     Contact Lens Billing  V-Code:  - Soft toric  Final Contact Lens Rx         Brand Base Curve Diameter Sphere Lens    Right Dailies Aquacomfort Plus 8.7 14.0 -7.50 Distance    Left Dailies Aquacomfort Plus 8.7 14.0 -7.00 Monovision/near           Fait order mailed direct: [id number]   # of units: 2 90-packs  Price per Unit: $75 per 90-pack + $7.95 shipping = $157.95 total    These are for cosmetic contact lenses.    Encounter Diagnosis   Name Primary?    Myopia of both eyes with astigmatism and presbyopia Yes        Date of last eye exam: 5/3/24

## 2024-05-13 ENCOUNTER — MYC MEDICAL ADVICE (OUTPATIENT)
Dept: OTOLARYNGOLOGY | Facility: CLINIC | Age: 74
End: 2024-05-13
Payer: MEDICARE

## 2024-06-12 DIAGNOSIS — Z13.220 LIPID SCREENING: ICD-10-CM

## 2024-06-12 DIAGNOSIS — Z94.4 LIVER REPLACED BY TRANSPLANT (H): Primary | ICD-10-CM

## 2024-06-20 ENCOUNTER — LAB (OUTPATIENT)
Dept: LAB | Facility: CLINIC | Age: 74
End: 2024-06-20
Payer: MEDICARE

## 2024-06-20 DIAGNOSIS — Z13.220 LIPID SCREENING: ICD-10-CM

## 2024-06-20 DIAGNOSIS — Z94.4 LIVER REPLACED BY TRANSPLANT (H): ICD-10-CM

## 2024-06-20 DIAGNOSIS — C44.320 SQUAMOUS CELL CARCINOMA OF SKIN OF FACE: ICD-10-CM

## 2024-06-20 DIAGNOSIS — E03.4 HYPOTHYROIDISM DUE TO ACQUIRED ATROPHY OF THYROID: ICD-10-CM

## 2024-06-20 LAB
ALBUMIN MFR UR ELPH: 16.6 MG/DL
ALBUMIN SERPL BCG-MCNC: 4.3 G/DL (ref 3.5–5.2)
ALBUMIN UR-MCNC: 10 MG/DL
ALP SERPL-CCNC: 100 U/L (ref 40–150)
ALT SERPL W P-5'-P-CCNC: 12 U/L (ref 0–70)
ANION GAP SERPL CALCULATED.3IONS-SCNC: 13 MMOL/L (ref 7–15)
APPEARANCE UR: CLEAR
AST SERPL W P-5'-P-CCNC: 16 U/L (ref 0–45)
BACTERIA #/AREA URNS HPF: ABNORMAL /HPF
BILIRUB DIRECT SERPL-MCNC: <0.2 MG/DL (ref 0–0.3)
BILIRUB SERPL-MCNC: 0.4 MG/DL
BILIRUB UR QL STRIP: NEGATIVE
BUN SERPL-MCNC: 21.5 MG/DL (ref 8–23)
CALCIUM SERPL-MCNC: 9.2 MG/DL (ref 8.8–10.2)
CHLORIDE SERPL-SCNC: 105 MMOL/L (ref 98–107)
CHOLEST SERPL-MCNC: 143 MG/DL
COLOR UR AUTO: ABNORMAL
CREAT SERPL-MCNC: 1.49 MG/DL (ref 0.67–1.17)
CREAT UR-MCNC: 123.8 MG/DL
DEPRECATED HCO3 PLAS-SCNC: 22 MMOL/L (ref 22–29)
EGFRCR SERPLBLD CKD-EPI 2021: 49 ML/MIN/1.73M2
ERYTHROCYTE [DISTWIDTH] IN BLOOD BY AUTOMATED COUNT: 14.6 % (ref 10–15)
FASTING STATUS PATIENT QL REPORTED: YES
GLUCOSE SERPL-MCNC: 123 MG/DL (ref 70–99)
GLUCOSE UR STRIP-MCNC: NEGATIVE MG/DL
HCT VFR BLD AUTO: 41.4 % (ref 40–53)
HDLC SERPL-MCNC: 40 MG/DL
HGB BLD-MCNC: 14.3 G/DL (ref 13.3–17.7)
HGB UR QL STRIP: NEGATIVE
KETONES UR STRIP-MCNC: NEGATIVE MG/DL
LDLC SERPL CALC-MCNC: 72 MG/DL
LEUKOCYTE ESTERASE UR QL STRIP: NEGATIVE
MAGNESIUM SERPL-MCNC: 1.7 MG/DL (ref 1.7–2.3)
MCH RBC QN AUTO: 31.9 PG (ref 26.5–33)
MCHC RBC AUTO-ENTMCNC: 34.5 G/DL (ref 31.5–36.5)
MCV RBC AUTO: 92 FL (ref 78–100)
MUCOUS THREADS #/AREA URNS LPF: PRESENT /LPF
NITRATE UR QL: NEGATIVE
NONHDLC SERPL-MCNC: 103 MG/DL
PH UR STRIP: 5 [PH] (ref 5–7)
PHOSPHATE SERPL-MCNC: 3.3 MG/DL (ref 2.5–4.5)
PLATELET # BLD AUTO: 125 10E3/UL (ref 150–450)
POTASSIUM SERPL-SCNC: 3.9 MMOL/L (ref 3.4–5.3)
PROT SERPL-MCNC: 6.8 G/DL (ref 6.4–8.3)
PROT/CREAT 24H UR: 0.13 MG/MG CR (ref 0–0.2)
RBC # BLD AUTO: 4.48 10E6/UL (ref 4.4–5.9)
RBC URINE: 0 /HPF
SODIUM SERPL-SCNC: 140 MMOL/L (ref 135–145)
SP GR UR STRIP: 1.02 (ref 1–1.03)
SQUAMOUS EPITHELIAL: <1 /HPF
T4 FREE SERPL-MCNC: 1.39 NG/DL (ref 0.9–1.7)
TACROLIMUS BLD-MCNC: 3.1 UG/L (ref 5–15)
TME LAST DOSE: ABNORMAL H
TME LAST DOSE: ABNORMAL H
TRIGL SERPL-MCNC: 155 MG/DL
TSH SERPL DL<=0.005 MIU/L-ACNC: 5.25 UIU/ML (ref 0.3–4.2)
UROBILINOGEN UR STRIP-MCNC: NORMAL MG/DL
WBC # BLD AUTO: 5.5 10E3/UL (ref 4–11)
WBC URINE: <1 /HPF

## 2024-06-20 PROCEDURE — 82248 BILIRUBIN DIRECT: CPT

## 2024-06-20 PROCEDURE — 84156 ASSAY OF PROTEIN URINE: CPT

## 2024-06-20 PROCEDURE — 84443 ASSAY THYROID STIM HORMONE: CPT

## 2024-06-20 PROCEDURE — 84439 ASSAY OF FREE THYROXINE: CPT

## 2024-06-20 PROCEDURE — 81003 URINALYSIS AUTO W/O SCOPE: CPT

## 2024-06-20 PROCEDURE — 80197 ASSAY OF TACROLIMUS: CPT

## 2024-06-20 PROCEDURE — 83735 ASSAY OF MAGNESIUM: CPT

## 2024-06-20 PROCEDURE — 80061 LIPID PANEL: CPT

## 2024-06-20 PROCEDURE — 85041 AUTOMATED RBC COUNT: CPT

## 2024-06-20 PROCEDURE — 36415 COLL VENOUS BLD VENIPUNCTURE: CPT

## 2024-06-20 PROCEDURE — 80053 COMPREHEN METABOLIC PANEL: CPT

## 2024-06-20 PROCEDURE — 84100 ASSAY OF PHOSPHORUS: CPT

## 2024-06-25 NOTE — PROGRESS NOTES
Dear Dr. Jimenez:    I had the pleasure of seeing Eric Andrew in follow-up today at the [facility].     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  He underwent surgery by dermatologist in [place] for this.  Per his report this was not done with Mohs surgery but was told that he had negative margins.  He says that shortly after the surgery he noticed a lump along his mandible/below the ear.  He went in for his 1 month follow-up with a dermatologist and at that time the mass had increased in size.  She thought it was a reactive node but offered a biopsy. Per review of the notes they proceeded with a punch biopsy of the parotid mass.  This was consistent with invasive squamous cell carcinoma without any epidermal involvement.  His preoperative PET scan showed only involvement of the parotid. He was taken to the OR on 4/11/2019 for a left total parotidectomy with resection of skin, preservation of the facial nerve, sacrifice of the greater auricular nerve, level I-V neck dissection, cervicofacial flap. Final pathology showed a 2.1 cm moderately differentiated SCC within the subcutaneous tissues and parotid, PNI, no LVSI, negative margins, 0/47 cervical nodes. He had postoperative radiation with Dr Floyd from 5/13/2019 to 6/24/2019 for a total of 6000 cGy. He had his 3 month post treatment PET scan in October 2019 which only showed a stable sub-cm nodule in the lung.    Interval history:  He comes in today for follow-up. He was last seen in December 2023. He comes in with labs, scans and carotid duplex. He is doing well. He has had a few episodes where he has more trouble swallowing. He finds this happens when having meals with other people and getting distracted and is less careful. He feels most of the time he can  get it down with water but sometimes has a harder time. This tends to happen more frequently. This can happen with no particular foods. Sometimes happens with bread. He would like to wait on any swallow study. He sees the liver team regularly. He sees dermatology regularly, no biopsies this time. No weight concerns. He has minor hearing changes. He is noticing more tightness of the neck, rare cramping. He is seeing the dentist.     He is accompanied by his wife.      MEDICATIONS:     Current Outpatient Medications   Medication Sig Dispense Refill    allopurinol (ZYLOPRIM) 100 MG tablet Take 200 mg by mouth      AMLODIPINE BESYLATE PO Take 10 mg by mouth every morning       atorvastatin (LIPITOR) 20 MG tablet Take 20 mg by mouth daily      BASAGLAR 100 UNIT/ML injection Inject 45 Units Subcutaneous At Bedtime       Calcium Carbonate-Vitamin D (CALCIUM + D PO) Take 1 tablet by mouth every morning       levothyroxine (SYNTHROID/LEVOTHROID) 50 MCG tablet Take 1 tablet (50 mcg) by mouth daily 90 tablet 3    losartan (COZAAR) 25 MG tablet Take 1 tablet (25 mg) by mouth daily 90 tablet 3    metFORMIN (GLUCOPHAGE) 1000 MG tablet Take 1,000 mg by mouth 2 times daily (with meals)       metoprolol succinate (TOPROL-XL) 25 MG 24 hr tablet Take 50 mg by mouth every morning      Multiple Vitamins-Minerals (MULTIVITAL) TABS Take 1 tablet by mouth every morning       niacinamide (NIACIN) 500 MG tablet Take 1,000 mg by mouth 2 times daily (with meals)      tacrolimus (GENERIC EQUIVALENT) 1 MG capsule TAKE ONE CAPSULE BY MOUTH TWICE A DAY 60 capsule 11       ALLERGIES:    Allergies   Allergen Reactions    Cefazolin Swelling     Lips swelled while patient was in the OR     Lisinopril Cough    Meropenem Hives and Swelling     Developed hives and lip swelling while on meropenem and micafungin.  Resolved with discontinuation.  Unclear which was cause.    Micafungin Hives and Swelling     Developed hives and lip swelling while on  meropenem and micafungin.  Resolved with discontinuation.  Unclear which was cause.       HABITS/SOCIAL HISTORY:   Spends the shin in [place].  .  He is a retired .   He smokes for a few years and quit back in 1973.  He has no chewing tobacco use.  He has 1 alcoholic beverage about 3 times per week.   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    Social History     Socioeconomic History    Marital status:      Spouse name: Not on file    Number of children: Not on file    Years of education: Not on file    Highest education level: Not on file   Occupational History    Not on file   Tobacco Use    Smoking status: Never    Smokeless tobacco: Never    Tobacco comments:     [id number] occational smoker   Substance and Sexual Activity    Alcohol use: Yes     Alcohol/week: 0.0 standard drinks of alcohol     Comment: 2-3 glass of wine per week. At most 1 per day    Drug use: No    Sexual activity: Never   Other Topics Concern    Parent/sibling w/ CABG, MI or angioplasty before 65F 55M? Not Asked   Social History Narrative    Not on file     Social Determinants of Health     Financial Resource Strain: High Risk (12/26/2021)    [facility]    Financial Resource Strain     Difficulty of Paying Living Expenses: Not on file     Difficulty of Paying Living Expenses: Not on file   Food Insecurity: Not on file   Transportation Needs: Not on file   Physical Activity: Not on file   Stress: Not on file   Social Connections: Unknown (12/26/2021)    [facility]    Social Connections     Frequency of Communication with Friends and Family: Not on file   Interpersonal Safety: Not on file   Housing Stability: Not on file       PAST MEDICAL HISTORY:   Past Medical History:   Diagnosis Date    Alpha-1-antitrypsin deficiency (H)     Ascites      Pre-transplant    Chronic kidney disease, stage 3 (H)     Cirrhosis (H)     Alpha-1-antitrypsin deficiency, s/p liver transplant 3/31/15    Gout     HTN (hypertension)     Lentigo maligna melanoma (H) 2009    lentigo maligna melanoma excised on 01/29/2009 with a repeat wide excision on 03/30/2009.     Liver replaced by transplant (H) 03/31/2015    Nephrolithiasis     Osteoarthritis     Portal hypertension (H)     Pre-transplant        PAST SURGICAL HISTORY:   Past Surgical History:   Procedure Laterality Date    COLONOSCOPY N/A 12/18/2014    Procedure: COLONOSCOPY;  Surgeon: Katherine Jimenez MD;  Location: [facility]    ESOPHAGOSCOPY, GASTROSCOPY, DUODENOSCOPY (EGD), COMBINED N/A 12/18/2014    Procedure: COMBINED ESOPHAGOSCOPY, GASTROSCOPY, DUODENOSCOPY (EGD), BIOPSY SINGLE OR MULTIPLE;  Surgeon: Katherine Jimenez MD;  Location: [facility]    ESOPHAGOSCOPY, GASTROSCOPY, DUODENOSCOPY (EGD), COMBINED N/A 5/28/2015    Procedure: COMBINED ESOPHAGOSCOPY, GASTROSCOPY, DUODENOSCOPY (EGD), REMOVE FOREIGN BODY;  Surgeon: Katherine Jimenez MD;  Location: [facility]    HERNIA REPAIR      abdominal    INSERT SHUNT PORTAL TRANSJUGULAR INTRAHEPTIC      IR PARACENTESIS  4/18/2014    IR PARACENTESIS  4/29/2014    IR PARACENTESIS  5/12/2014    IR PARACENTESIS  5/20/2014    IR PARACENTESIS  5/29/2014    IR PARACENTESIS  6/9/2014    IR PARACENTESIS  6/18/2014    IR PARACENTESIS  6/25/2014    IR PARACENTESIS  7/3/2014    IR PARACENTESIS  7/10/2014    IR PARACENTESIS  7/17/2014    IR PARACENTESIS  7/29/2014    IR PARACENTESIS  8/6/2014    IR PARACENTESIS  8/15/2014    IR PARACENTESIS  8/25/2014    IR PARACENTESIS  9/5/2014    IR PARACENTESIS  9/15/2014    IR PARACENTESIS  9/22/2014    IR PARACENTESIS  10/1/2014    IR PARACENTESIS  10/10/2014    IR PARACENTESIS  10/21/2014    IR PARACENTESIS  10/28/2014    IR PARACENTESIS  11/5/2014    IR PARACENTESIS  11/12/2014    IR PARACENTESIS  11/19/2014    IR PARACENTESIS   "2014    IR PARACENTESIS  12/3/2014    IR PARACENTESIS  12/10/2014    IR PARACENTESIS  2014    IR PARACENTESIS  2014    IR PARACENTESIS  2014    IR PARACENTESIS  2015    IR PARACENTESIS  2015    IR PARACENTESIS  2015    IR PARACENTESIS  2015    IR PARACENTESIS  2015    IR PARACENTESIS  2015    IR PARACENTESIS  2015    IR PARACENTESIS  2015    IR PARACENTESIS  3/6/2015    IR PARACENTESIS  3/16/2015    ORTHOPEDIC SURGERY      bilateral knee surgery    PAROTIDECTOMY, RADICAL NECK DISSECTION Left 2019    Procedure: Total Parotidectomy, Excision of Skin, Neck Dissection Levels I - V,  And Local Advancement Flap;  Surgeon: Johanna Moreland MD;  Location: [facility]    TRANSPLANT LIVER RECIPIENT  DONOR N/A 3/31/2015    Procedure: TRANSPLANT LIVER RECIPIENT  DONOR;  Surgeon: Elia Mehta MD;  Location: [facility]       FAMILY HISTORY:    Family History   Problem Relation Age of Onset    Cardiovascular Father         MI    Glaucoma No family hx of     Macular Degeneration No family hx of        REVIEW OF SYSTEMS:  12 point ROS was negative other than the symptoms noted above in the HPI.  Patient Supplied Answers to Review of Systems      2024     6:59 PM   [facility] ROS   Ears, Nose, Throat Trouble swallowing         PHYSICAL EXAMINATION:   BP (!) 146/78   Pulse 75   Ht 1.905 m (6' 3\")   Wt 125.2 kg (276 lb)   SpO2 97%   BMI 34.50 kg/m     Appearance:   normal; NAD, age-appropriate appearance, well-developed, normal habitus   Communication:   normal; communicates verbally, normal voice quality   Head/Face:   inspection -  Expected concavity of left parotid defect  No facial numbness   Salivary glands -  S/p left radical parotidectomy with cervicofacial advancement flap, radiation changes to the tissue, no palpable mass, well healed incision, minimal lymphedema, increased fibrosis, some telangiectasias, expected concavity   Facial " strength -  Normal and symmetric bilateral; H/B I/VI   Skin:  no new lesions   Ears:  auricle (AD) -  Normal   EAC (AD) -  normal  TM (AD) -  Normal, no effusion  auricle (AS) -  Well healed surgical incision on helix  EAC (AS) -  normal  TM (AS) -  Normal, no effusion  Normal clinical speech reception   Nose:  Ext. inspection -  Normal   Oral Cavity:  lips -  Normal mucosa, oral competence, and stoma size   Age-appropriate dentition, healthy gingival mucosa   Hard palate, buccal, floor of mouth mucosa normal  Moist mucus membranes   Tongue - normal movement, no lesions   Oropharynx:  mucosa -  Normal, no lesions  soft palate -  Normal, no lesions, no asymmetry, normal elevation   Neck: Well healed neck incision, well healed cervicofacial flap, no palpable masses  Concavity/tissue retraction  Hyper and hypopigmentation of neck skin with some telangiectasias in level V  Normal range of motion  No significant lymphedema, moderate fibrosis   Lymphatic:  no abnormal nodes   Cardiovascular:  warm, pink, well-perfused extremities without swelling, tenderness, or edema   Respiratory:  Normal respiratory effort, no stridor   Neuro/Psych.:  mood/affect -  normal  mental status -  normal       PROCEDURE:      RESULTS REVIEWED:   TSH/T4 reviewed    CT neck and chest imaging independently reviewed     CT neck and chest reports reviewed     Carotid duplex report reviewed       IMPRESSION AND PLAN:   Eric Andrew is a 74-year-old man who has a history of a liver transplant and recent squamous cell carcinoma of the left cheek who developed 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 He underwent total parotidectomy, skin resection, resection of greater auricular nerve, level I-V neck dissection, cervicofacial flap on 4/11/2019. Final pathology showed the 1 metastatic deposit in the parotid. He completed postoperative radiation on 6/24/2019 with Dr Floyd.     He is doing well with no signs of recurrence.     He is having some  swallowing issues, discussed option for video swallow study with esophagram but he would like to wait at this time.    He would just like to observe his hearing changes at this time.     He is on synthroid. Recheck TSH mildly elevated, T4 normal. Discussed transitioning to PCP.    He had repeat scans today. He is now 5 years out from treatment, will discontinue routine scans. He has findings for possible hepatic steatosis on scan, sees liver team annually given transplant history, most recent LFTs normal.     He had carotid duplex today due to history of radiation.    He is now 5 years out from treatment. Discussed follow-up options. Follow-up in 1 year.     Thank you very much for the opportunity to participate in the care of your patient.      Johanna Moreland M.D.  Otolaryngology- Head & Neck Surgery      This note was dictated with voice recognition software and then edited. Please excuse any unintentional errors.       The longitudinal plan of care for the diagnosis(es)/condition(s) as documented were addressed during this visit. Due to the added complexity in care, I will continue to support Eric in the subsequent management and with ongoing continuity of care.        CC:  Katherine Jimenez MD  [address]  [facility] [address]      Naomi Rodriguez, RN  Liver Coordinator  [facility]      Aston Devine MD  [facility]  [address]  [facility] [address]      Grabiel Floyd MD  Department of Radiation Oncology  [facility]

## 2024-06-28 ENCOUNTER — ANCILLARY PROCEDURE (OUTPATIENT)
Dept: CT IMAGING | Facility: CLINIC | Age: 74
End: 2024-06-28
Attending: OTOLARYNGOLOGY
Payer: MEDICARE

## 2024-06-28 ENCOUNTER — OFFICE VISIT (OUTPATIENT)
Dept: OTOLARYNGOLOGY | Facility: CLINIC | Age: 74
End: 2024-06-28
Payer: MEDICARE

## 2024-06-28 ENCOUNTER — ANCILLARY PROCEDURE (OUTPATIENT)
Dept: ULTRASOUND IMAGING | Facility: CLINIC | Age: 74
End: 2024-06-28
Attending: OTOLARYNGOLOGY
Payer: MEDICARE

## 2024-06-28 VITALS
WEIGHT: 276 LBS | HEART RATE: 75 BPM | SYSTOLIC BLOOD PRESSURE: 146 MMHG | OXYGEN SATURATION: 97 % | HEIGHT: 75 IN | DIASTOLIC BLOOD PRESSURE: 78 MMHG | BODY MASS INDEX: 34.32 KG/M2

## 2024-06-28 DIAGNOSIS — I65.29 CAROTID ATHEROSCLEROSIS, UNSPECIFIED LATERALITY: ICD-10-CM

## 2024-06-28 DIAGNOSIS — C44.320 SQUAMOUS CELL CARCINOMA OF SKIN OF FACE: Primary | ICD-10-CM

## 2024-06-28 DIAGNOSIS — C44.320 SQUAMOUS CELL CARCINOMA OF SKIN OF FACE: ICD-10-CM

## 2024-06-28 DIAGNOSIS — E03.4 HYPOTHYROIDISM DUE TO ACQUIRED ATROPHY OF THYROID: ICD-10-CM

## 2024-06-28 PROCEDURE — G2211 COMPLEX E/M VISIT ADD ON: HCPCS | Performed by: OTOLARYNGOLOGY

## 2024-06-28 PROCEDURE — 99214 OFFICE O/P EST MOD 30 MIN: CPT | Performed by: OTOLARYNGOLOGY

## 2024-06-28 PROCEDURE — G1010 CDSM STANSON: HCPCS | Mod: GC | Performed by: RADIOLOGY

## 2024-06-28 PROCEDURE — 93880 EXTRACRANIAL BILAT STUDY: CPT | Mod: GC | Performed by: RADIOLOGY

## 2024-06-28 PROCEDURE — 70491 CT SOFT TISSUE NECK W/DYE: CPT | Mod: MG | Performed by: RADIOLOGY

## 2024-06-28 PROCEDURE — 71260 CT THORAX DX C+: CPT | Mod: MG | Performed by: RADIOLOGY

## 2024-06-28 RX ORDER — IOPAMIDOL 755 MG/ML
125 INJECTION, SOLUTION INTRAVASCULAR ONCE
Status: COMPLETED | OUTPATIENT
Start: 2024-06-28 | End: 2024-06-28

## 2024-06-28 RX ADMIN — IOPAMIDOL 125 ML: 755 INJECTION, SOLUTION INTRAVASCULAR at 10:27

## 2024-06-28 ASSESSMENT — PAIN SCALES - GENERAL: PAINLEVEL: NO PAIN (0)

## 2024-06-28 NOTE — DISCHARGE INSTRUCTIONS

## 2024-06-28 NOTE — LETTER
6/28/2024       RE: Eric Andrew  [id number] [facility] [address]     Dear Colleague,    Thank you for referring your patient, Eric Andrew, to the [facility] [place] at [facility]. Please see a copy of my visit note below.    Dear Dr. Jimenez:    I had the pleasure of seeing Eric Andrew in follow-up today at the [facility].     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  He underwent surgery by dermatologist in [place] for this.  Per his report this was not done with Mohs surgery but was told that he had negative margins.  He says that shortly after the surgery he noticed a lump along his mandible/below the ear.  He went in for his 1 month follow-up with a dermatologist and at that time the mass had increased in size.  She thought it was a reactive node but offered a biopsy. Per review of the notes they proceeded with a punch biopsy of the parotid mass.  This was consistent with invasive squamous cell carcinoma without any epidermal involvement.  His preoperative PET scan showed only involvement of the parotid. He was taken to the OR on 4/11/2019 for a left total parotidectomy with resection of skin, preservation of the facial nerve, sacrifice of the greater auricular nerve, level I-V neck dissection, cervicofacial flap. Final pathology showed a 2.1 cm moderately differentiated SCC within the subcutaneous tissues and parotid, PNI, no LVSI, negative margins, 0/47 cervical nodes. He had postoperative radiation with Dr Floyd from 5/13/2019 to 6/24/2019 for a total of 6000 cGy. He had his 3 month post treatment PET scan in October 2019 which only showed a stable sub-cm nodule in the lung.    Interval history:  He comes in  today for follow-up. He was last seen in December 2023. He comes in with labs, scans and carotid duplex. He is doing well. He has had a few episodes where he has more trouble swallowing. He finds this happens when having meals with other people and getting distracted and is less careful. He feels most of the time he can get it down with water but sometimes has a harder time. This tends to happen more frequently. This can happen with no particular foods. Sometimes happens with bread. He would like to wait on any swallow study. He sees the liver team regularly. He sees dermatology regularly, no biopsies this time. No weight concerns. He has minor hearing changes. He is noticing more tightness of the neck, rare cramping. He is seeing the dentist.     He is accompanied by his wife.      MEDICATIONS:     Current Outpatient Medications   Medication Sig Dispense Refill    allopurinol (ZYLOPRIM) 100 MG tablet Take 200 mg by mouth      AMLODIPINE BESYLATE PO Take 10 mg by mouth every morning       atorvastatin (LIPITOR) 20 MG tablet Take 20 mg by mouth daily      BASAGLAR 100 UNIT/ML injection Inject 45 Units Subcutaneous At Bedtime       Calcium Carbonate-Vitamin D (CALCIUM + D PO) Take 1 tablet by mouth every morning       levothyroxine (SYNTHROID/LEVOTHROID) 50 MCG tablet Take 1 tablet (50 mcg) by mouth daily 90 tablet 3    losartan (COZAAR) 25 MG tablet Take 1 tablet (25 mg) by mouth daily 90 tablet 3    metFORMIN (GLUCOPHAGE) 1000 MG tablet Take 1,000 mg by mouth 2 times daily (with meals)       metoprolol succinate (TOPROL-XL) 25 MG 24 hr tablet Take 50 mg by mouth every morning      Multiple Vitamins-Minerals (MULTIVITAL) TABS Take 1 tablet by mouth every morning       niacinamide (NIACIN) 500 MG tablet Take 1,000 mg by mouth 2 times daily (with meals)      tacrolimus (GENERIC EQUIVALENT) 1 MG capsule TAKE ONE CAPSULE BY MOUTH TWICE A DAY 60 capsule 11       ALLERGIES:    Allergies   Allergen Reactions    Cefazolin  Swelling     Lips swelled while patient was in the OR     Lisinopril Cough    Meropenem Hives and Swelling     Developed hives and lip swelling while on meropenem and micafungin.  Resolved with discontinuation.  Unclear which was cause.    Micafungin Hives and Swelling     Developed hives and lip swelling while on meropenem and micafungin.  Resolved with discontinuation.  Unclear which was cause.       HABITS/SOCIAL HISTORY:   Spends the shin in [place].  .  He is a retired .   He smokes for a few years and quit back in 1973.  He has no chewing tobacco use.  He has 1 alcoholic beverage about 3 times per week.   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    Social History     Socioeconomic History    Marital status:      Spouse name: Not on file    Number of children: Not on file    Years of education: Not on file    Highest education level: Not on file   Occupational History    Not on file   Tobacco Use    Smoking status: Never    Smokeless tobacco: Never    Tobacco comments:     [id number] occational smoker   Substance and Sexual Activity    Alcohol use: Yes     Alcohol/week: 0.0 standard drinks of alcohol     Comment: 2-3 glass of wine per week. At most 1 per day    Drug use: No    Sexual activity: Never   Other Topics Concern    Parent/sibling w/ CABG, MI or angioplasty before 65F 55M? Not Asked   Social History Narrative    Not on file     Social Determinants of Health     Financial Resource Strain: High Risk (12/26/2021)    Received from [facility], [facility]    Financial Resource Strain     Difficulty of Paying Living Expenses: Not on file     Difficulty of Paying Living Expenses: Not on file   Food Insecurity: Not on file   Transportation Needs: Not on file   Physical Activity: Not on file   Stress: Not on file   Social Connections: Unknown (12/26/2021)    [facility]    Social Connections     Frequency of Communication with Friends and Family: Not on file   Interpersonal Safety: Not on file   Housing Stability: Not on file       PAST MEDICAL HISTORY:   Past Medical History:   Diagnosis Date    Alpha-1-antitrypsin deficiency (H)     Ascites     Pre-transplant    Chronic kidney disease, stage 3 (H)     Cirrhosis (H)     Alpha-1-antitrypsin deficiency, s/p liver transplant 3/31/15    Gout     HTN (hypertension)     Lentigo maligna melanoma (H) 2009    lentigo maligna melanoma excised on 01/29/2009 with a repeat wide excision on 03/30/2009.     Liver replaced by transplant (H) 03/31/2015    Nephrolithiasis     Osteoarthritis     Portal hypertension (H)     Pre-transplant        PAST SURGICAL HISTORY:   Past Surgical History:   Procedure Laterality Date    COLONOSCOPY N/A 12/18/2014    Procedure: COLONOSCOPY;  Surgeon: Katherine Jimenez MD;  Location: [facility]    ESOPHAGOSCOPY, GASTROSCOPY, DUODENOSCOPY (EGD), COMBINED N/A 12/18/2014    Procedure: COMBINED ESOPHAGOSCOPY, GASTROSCOPY, DUODENOSCOPY (EGD), BIOPSY SINGLE OR MULTIPLE;  Surgeon: Katherine Jimenez MD;  Location: [facility]    ESOPHAGOSCOPY, GASTROSCOPY, DUODENOSCOPY (EGD), COMBINED N/A 5/28/2015    Procedure: COMBINED ESOPHAGOSCOPY, GASTROSCOPY, DUODENOSCOPY (EGD), REMOVE FOREIGN BODY;  Surgeon: Katherine Jimenez MD;  Location: [facility]    HERNIA REPAIR      abdominal    INSERT SHUNT PORTAL TRANSJUGULAR INTRAHEPTIC      IR PARACENTESIS  4/18/2014    IR PARACENTESIS  4/29/2014    IR PARACENTESIS  5/12/2014    IR PARACENTESIS  5/20/2014    IR PARACENTESIS  5/29/2014    IR PARACENTESIS  6/9/2014    IR PARACENTESIS  6/18/2014    IR PARACENTESIS  6/25/2014    IR PARACENTESIS  7/3/2014    IR PARACENTESIS  7/10/2014    IR PARACENTESIS  7/17/2014    IR PARACENTESIS  7/29/2014    IR PARACENTESIS  8/6/2014    IR PARACENTESIS  8/15/2014    IR PARACENTESIS  8/25/2014    IR  "PARACENTESIS  2014    IR PARACENTESIS  9/15/2014    IR PARACENTESIS  2014    IR PARACENTESIS  10/1/2014    IR PARACENTESIS  10/10/2014    IR PARACENTESIS  10/21/2014    IR PARACENTESIS  10/28/2014    IR PARACENTESIS  2014    IR PARACENTESIS  2014    IR PARACENTESIS  2014    IR PARACENTESIS  2014    IR PARACENTESIS  12/3/2014    IR PARACENTESIS  12/10/2014    IR PARACENTESIS  2014    IR PARACENTESIS  2014    IR PARACENTESIS  2014    IR PARACENTESIS  2015    IR PARACENTESIS  2015    IR PARACENTESIS  2015    IR PARACENTESIS  2015    IR PARACENTESIS  2015    IR PARACENTESIS  2015    IR PARACENTESIS  2015    IR PARACENTESIS  2015    IR PARACENTESIS  3/6/2015    IR PARACENTESIS  3/16/2015    ORTHOPEDIC SURGERY      bilateral knee surgery    PAROTIDECTOMY, RADICAL NECK DISSECTION Left 2019    Procedure: Total Parotidectomy, Excision of Skin, Neck Dissection Levels I - V,  And Local Advancement Flap;  Surgeon: Johanna Moreland MD;  Location: [facility] OR    TRANSPLANT LIVER RECIPIENT  DONOR N/A 3/31/2015    Procedure: TRANSPLANT LIVER RECIPIENT  DONOR;  Surgeon: Elia eMhta MD;  Location:  OR       FAMILY HISTORY:    Family History   Problem Relation Age of Onset    Cardiovascular Father         MI    Glaucoma No family hx of     Macular Degeneration No family hx of        REVIEW OF SYSTEMS:  12 point ROS was negative other than the symptoms noted above in the HPI.  Patient Supplied Answers to Review of Systems      2024     6:59 PM    ENT ROS   Ears, Nose, Throat Trouble swallowing         PHYSICAL EXAMINATION:   BP (!) 146/78   Pulse 75   Ht 1.905 m (6' 3\")   Wt 125.2 kg (276 lb)   SpO2 97%   BMI 34.50 kg/m     Appearance:   normal; NAD, age-appropriate appearance, well-developed, normal habitus   Communication:   normal; communicates verbally, normal voice quality   Head/Face:   inspection -  " Expected concavity of left parotid defect  No facial numbness   Salivary glands -  S/p left radical parotidectomy with cervicofacial advancement flap, radiation changes to the tissue, no palpable mass, well healed incision, minimal lymphedema, increased fibrosis, some telangiectasias, expected concavity   Facial strength -  Normal and symmetric bilateral; H/B I/VI   Skin:  no new lesions   Ears:  auricle (AD) -  Normal   EAC (AD) -  normal  TM (AD) -  Normal, no effusion  auricle (AS) -  Well healed surgical incision on helix  EAC (AS) -  normal  TM (AS) -  Normal, no effusion  Normal clinical speech reception   Nose:  Ext. inspection -  Normal   Oral Cavity:  lips -  Normal mucosa, oral competence, and stoma size   Age-appropriate dentition, healthy gingival mucosa   Hard palate, buccal, floor of mouth mucosa normal  Moist mucus membranes   Tongue - normal movement, no lesions   Oropharynx:  mucosa -  Normal, no lesions  soft palate -  Normal, no lesions, no asymmetry, normal elevation   Neck: Well healed neck incision, well healed cervicofacial flap, no palpable masses  Concavity/tissue retraction  Hyper and hypopigmentation of neck skin with some telangiectasias in level V  Normal range of motion  No significant lymphedema, moderate fibrosis   Lymphatic:  no abnormal nodes   Cardiovascular:  warm, pink, well-perfused extremities without swelling, tenderness, or edema   Respiratory:  Normal respiratory effort, no stridor   Neuro/Psych.:  mood/affect -  normal  mental status -  normal       PROCEDURE:      RESULTS REVIEWED:   TSH/T4 reviewed    CT neck and chest imaging independently reviewed     CT neck and chest reports reviewed     Carotid duplex report reviewed       IMPRESSION AND PLAN:   Eric Andrew is a 74-year-old man who has a history of a liver transplant and recent squamous cell carcinoma of the left cheek who developed 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 He underwent total parotidectomy, skin  resection, resection of greater auricular nerve, level I-V neck dissection, cervicofacial flap on 4/11/2019. Final pathology showed the 1 metastatic deposit in the parotid. He completed postoperative radiation on 6/24/2019 with Dr Floyd.     He is doing well with no signs of recurrence.     He is having some swallowing issues, discussed option for video swallow study with esophagram but he would like to wait at this time.    He would just like to observe his hearing changes at this time.     He is on synthroid. Recheck TSH mildly elevated, T4 normal. Discussed transitioning to PCP.    He had repeat scans today. He is now 5 years out from treatment, will discontinue routine scans. He has findings for possible hepatic steatosis on scan, sees liver team annually given transplant history, most recent LFTs normal.     He had carotid duplex today due to history of radiation.    He is now 5 years out from treatment. Discussed follow-up options. Follow-up in 1 year.     Thank you very much for the opportunity to participate in the care of your patient.      Johanna Moreland M.D.  Otolaryngology- Head & Neck Surgery      This note was dictated with voice recognition software and then edited. Please excuse any unintentional errors.       The longitudinal plan of care for the diagnosis(es)/condition(s) as documented were addressed during this visit. Due to the added complexity in care, I will continue to support Eric in the subsequent management and with ongoing continuity of care.        CC:  Katherine Jimenez MD  [address] [facility] [address]  [facility] [address]      Naomi Rodriguez, RN  Liver Coordinator  [facility]      Aston Devine MD  [facility]  [address]  [facility] [address]      Grabiel Floyd MD  Department of Radiation Oncology  [facility]

## 2024-07-29 ENCOUNTER — TELEPHONE (OUTPATIENT)
Dept: TRANSPLANT | Facility: CLINIC | Age: 74
End: 2024-07-29
Payer: MEDICARE

## 2024-07-29 DIAGNOSIS — Z94.4 LIVER TRANSPLANTED (H): ICD-10-CM

## 2024-07-29 RX ORDER — TACROLIMUS 1 MG/1
1 CAPSULE ORAL 2 TIMES DAILY
Qty: 60 CAPSULE | Refills: 11 | Status: SHIPPED | OUTPATIENT
Start: 2024-07-29

## 2024-07-29 NOTE — TELEPHONE ENCOUNTER
Patient Call: Medication Refill  Route to LPN  Instruct the patient to first contact their pharmacy. If they have called their pharmacy and require further assistance, route to LPN.    Pharmacy Name: [facility]  Pharmacy Location: [facility] Mail order  Name of Medication: Tacro  Dose: 1 mg  90 day with refills   When will the patient be out of this medication?: Less than 24 hours ([place]ita LPN, then page if no answer) Pharmacy will mail out today per pt

## 2024-07-30 ENCOUNTER — OFFICE VISIT (OUTPATIENT)
Dept: OPTOMETRY | Facility: CLINIC | Age: 74
End: 2024-07-30
Payer: COMMERCIAL

## 2024-07-30 ENCOUNTER — TELEPHONE (OUTPATIENT)
Dept: OPTOMETRY | Facility: CLINIC | Age: 74
End: 2024-07-30

## 2024-07-30 DIAGNOSIS — H52.4 MYOPIA OF BOTH EYES WITH ASTIGMATISM AND PRESBYOPIA: Primary | ICD-10-CM

## 2024-07-30 DIAGNOSIS — H52.13 MYOPIA OF BOTH EYES WITH ASTIGMATISM AND PRESBYOPIA: Primary | ICD-10-CM

## 2024-07-30 DIAGNOSIS — H52.203 MYOPIA OF BOTH EYES WITH ASTIGMATISM AND PRESBYOPIA: Primary | ICD-10-CM

## 2024-07-31 ASSESSMENT — REFRACTION_CURRENTRX
OS_DIAMETER: 14.0
OS_BRAND: DAILIES AQUACOMFORT PLUS
OS_BASECURVE: 8.7
OD_BRAND: DAILIES AQUACOMFORT PLUS
OD_BASECURVE: 8.7
OD_DIAMETER: 14.0
OD_SPHERE: -7.50
OS_SPHERE: -7.00

## 2024-07-31 NOTE — PROGRESS NOTES
No office visit. CL order only.     Contact Lens Billing  V-Code:  Soft Sphere  Final Contact Lens Rx         Brand Base Curve Diameter Sphere Lens    Right Dailies Aquacomfort Plus 8.7 14.0 -7.50 Distance    Left Dailies Aquacomfort Plus 8.7 14.0 -7.00 Monovision/near           Fait order mailed direct: [phone]   # of units: 2 90-packs  Price per Unit: $75 per 90-pack + $7.95 shipping = $157.95 total    These are for cosmetic contact lenses.    Encounter Diagnosis   Name Primary?    Myopia of both eyes with astigmatism and presbyopia Yes        Date of last eye exam: 5/3/24

## 2024-08-11 ENCOUNTER — HEALTH MAINTENANCE LETTER (OUTPATIENT)
Age: 74
End: 2024-08-11

## 2024-10-01 ENCOUNTER — LAB (OUTPATIENT)
Dept: LAB | Facility: CLINIC | Age: 74
End: 2024-10-01
Payer: MEDICARE

## 2024-10-01 DIAGNOSIS — Z94.4 LIVER REPLACED BY TRANSPLANT (H): ICD-10-CM

## 2024-10-01 LAB
ALBUMIN SERPL BCG-MCNC: 4.2 G/DL (ref 3.5–5.2)
ALP SERPL-CCNC: 107 U/L (ref 40–150)
ALT SERPL W P-5'-P-CCNC: 18 U/L (ref 0–70)
ANION GAP SERPL CALCULATED.3IONS-SCNC: 12 MMOL/L (ref 7–15)
AST SERPL W P-5'-P-CCNC: 24 U/L (ref 0–45)
BILIRUB DIRECT SERPL-MCNC: <0.2 MG/DL (ref 0–0.3)
BILIRUB SERPL-MCNC: 0.4 MG/DL
BUN SERPL-MCNC: 27 MG/DL (ref 8–23)
CALCIUM SERPL-MCNC: 9.5 MG/DL (ref 8.8–10.4)
CHLORIDE SERPL-SCNC: 106 MMOL/L (ref 98–107)
CREAT SERPL-MCNC: 1.52 MG/DL (ref 0.67–1.17)
EGFRCR SERPLBLD CKD-EPI 2021: 48 ML/MIN/1.73M2
ERYTHROCYTE [DISTWIDTH] IN BLOOD BY AUTOMATED COUNT: 13.8 % (ref 10–15)
GLUCOSE SERPL-MCNC: 148 MG/DL (ref 70–99)
HCO3 SERPL-SCNC: 23 MMOL/L (ref 22–29)
HCT VFR BLD AUTO: 43.1 % (ref 40–53)
HGB BLD-MCNC: 14.7 G/DL (ref 13.3–17.7)
MCH RBC QN AUTO: 32.7 PG (ref 26.5–33)
MCHC RBC AUTO-ENTMCNC: 34.1 G/DL (ref 31.5–36.5)
MCV RBC AUTO: 96 FL (ref 78–100)
PLATELET # BLD AUTO: 132 10E3/UL (ref 150–450)
POTASSIUM SERPL-SCNC: 4.5 MMOL/L (ref 3.4–5.3)
PROT SERPL-MCNC: 6.8 G/DL (ref 6.4–8.3)
RBC # BLD AUTO: 4.5 10E6/UL (ref 4.4–5.9)
SODIUM SERPL-SCNC: 141 MMOL/L (ref 135–145)
TACROLIMUS BLD-MCNC: 3.3 UG/L (ref 5–15)
TME LAST DOSE: ABNORMAL H
TME LAST DOSE: ABNORMAL H
WBC # BLD AUTO: 6.2 10E3/UL (ref 4–11)

## 2024-10-01 PROCEDURE — 80048 BASIC METABOLIC PNL TOTAL CA: CPT

## 2024-10-01 PROCEDURE — 85014 HEMATOCRIT: CPT

## 2024-10-01 PROCEDURE — 80197 ASSAY OF TACROLIMUS: CPT

## 2024-10-01 PROCEDURE — 82248 BILIRUBIN DIRECT: CPT

## 2024-10-01 PROCEDURE — 36415 COLL VENOUS BLD VENIPUNCTURE: CPT

## 2024-12-22 ENCOUNTER — HEALTH MAINTENANCE LETTER (OUTPATIENT)
Age: 74
End: 2024-12-22

## 2024-12-26 ENCOUNTER — TELEPHONE (OUTPATIENT)
Dept: GASTROENTEROLOGY | Facility: CLINIC | Age: 74
End: 2024-12-26
Payer: MEDICARE

## 2024-12-26 NOTE — TELEPHONE ENCOUNTER
Was the patient contacted by phone and reminded of the upcoming visit? message left    Were ordered labs and tests completed prior to the appointment? vm left reminding to get transplant labs drawn at least 2 days prior to visit. Also reminded to get a 12 hour draw for tacrolimus.     Were the needed lab orders placed? Yes    Jessica Rice [facility]  12/26/2024 11:13 AM

## 2024-12-30 ENCOUNTER — LAB (OUTPATIENT)
Dept: LAB | Facility: CLINIC | Age: 74
End: 2024-12-30
Payer: MEDICARE

## 2024-12-30 DIAGNOSIS — Z94.4 LIVER REPLACED BY TRANSPLANT (H): ICD-10-CM

## 2024-12-30 LAB
ALBUMIN SERPL BCG-MCNC: 4.2 G/DL (ref 3.5–5.2)
ALP SERPL-CCNC: 119 U/L (ref 40–150)
ALT SERPL W P-5'-P-CCNC: 19 U/L (ref 0–70)
ANION GAP SERPL CALCULATED.3IONS-SCNC: 17 MMOL/L (ref 7–15)
AST SERPL W P-5'-P-CCNC: 24 U/L (ref 0–45)
BILIRUB DIRECT SERPL-MCNC: <0.2 MG/DL (ref 0–0.3)
BILIRUB SERPL-MCNC: 0.3 MG/DL
BUN SERPL-MCNC: 26.8 MG/DL (ref 8–23)
CALCIUM SERPL-MCNC: 9.4 MG/DL (ref 8.8–10.4)
CHLORIDE SERPL-SCNC: 106 MMOL/L (ref 98–107)
CREAT SERPL-MCNC: 1.61 MG/DL (ref 0.67–1.17)
EGFRCR SERPLBLD CKD-EPI 2021: 45 ML/MIN/1.73M2
ERYTHROCYTE [DISTWIDTH] IN BLOOD BY AUTOMATED COUNT: 14 % (ref 10–15)
GLUCOSE SERPL-MCNC: 196 MG/DL (ref 70–99)
HCO3 SERPL-SCNC: 18 MMOL/L (ref 22–29)
HCT VFR BLD AUTO: 43.5 % (ref 40–53)
HGB BLD-MCNC: 14.7 G/DL (ref 13.3–17.7)
MCH RBC QN AUTO: 31.1 PG (ref 26.5–33)
MCHC RBC AUTO-ENTMCNC: 33.8 G/DL (ref 31.5–36.5)
MCV RBC AUTO: 92 FL (ref 78–100)
PLATELET # BLD AUTO: 161 10E3/UL (ref 150–450)
POTASSIUM SERPL-SCNC: 4.4 MMOL/L (ref 3.4–5.3)
PROT SERPL-MCNC: 7 G/DL (ref 6.4–8.3)
RBC # BLD AUTO: 4.72 10E6/UL (ref 4.4–5.9)
SODIUM SERPL-SCNC: 141 MMOL/L (ref 135–145)
TACROLIMUS BLD-MCNC: 3.3 UG/L (ref 5–15)
TME LAST DOSE: ABNORMAL H
TME LAST DOSE: ABNORMAL H
WBC # BLD AUTO: 6.7 10E3/UL (ref 4–11)

## 2024-12-30 PROCEDURE — 82040 ASSAY OF SERUM ALBUMIN: CPT

## 2024-12-30 PROCEDURE — 36415 COLL VENOUS BLD VENIPUNCTURE: CPT

## 2024-12-30 PROCEDURE — 80197 ASSAY OF TACROLIMUS: CPT

## 2024-12-30 PROCEDURE — 82310 ASSAY OF CALCIUM: CPT

## 2024-12-30 PROCEDURE — 85018 HEMOGLOBIN: CPT

## 2024-12-30 PROCEDURE — 82248 BILIRUBIN DIRECT: CPT

## 2025-02-05 ENCOUNTER — DOCUMENTATION ONLY (OUTPATIENT)
Dept: TRANSPLANT | Facility: CLINIC | Age: 75
End: 2025-02-05
Payer: MEDICARE

## 2025-03-23 ENCOUNTER — HEALTH MAINTENANCE LETTER (OUTPATIENT)
Age: 75
End: 2025-03-23

## 2025-04-15 ENCOUNTER — LAB (OUTPATIENT)
Dept: LAB | Facility: CLINIC | Age: 75
End: 2025-04-15
Payer: MEDICARE

## 2025-04-15 DIAGNOSIS — Z94.4 LIVER REPLACED BY TRANSPLANT (H): ICD-10-CM

## 2025-04-15 LAB
ALBUMIN SERPL BCG-MCNC: 4.4 G/DL (ref 3.5–5.2)
ALP SERPL-CCNC: 107 U/L (ref 40–150)
ALT SERPL W P-5'-P-CCNC: 20 U/L (ref 0–70)
ANION GAP SERPL CALCULATED.3IONS-SCNC: 13 MMOL/L (ref 7–15)
AST SERPL W P-5'-P-CCNC: 20 U/L (ref 0–45)
BILIRUB DIRECT SERPL-MCNC: 0.16 MG/DL (ref 0–0.3)
BILIRUB SERPL-MCNC: 0.4 MG/DL
BUN SERPL-MCNC: 23.1 MG/DL (ref 8–23)
CALCIUM SERPL-MCNC: 9.5 MG/DL (ref 8.8–10.4)
CHLORIDE SERPL-SCNC: 105 MMOL/L (ref 98–107)
CREAT SERPL-MCNC: 1.6 MG/DL (ref 0.67–1.17)
EGFRCR SERPLBLD CKD-EPI 2021: 45 ML/MIN/1.73M2
ERYTHROCYTE [DISTWIDTH] IN BLOOD BY AUTOMATED COUNT: 14.2 % (ref 10–15)
GLUCOSE SERPL-MCNC: 166 MG/DL (ref 70–99)
HCO3 SERPL-SCNC: 21 MMOL/L (ref 22–29)
HCT VFR BLD AUTO: 42.4 % (ref 40–53)
HGB BLD-MCNC: 14.4 G/DL (ref 13.3–17.7)
MCH RBC QN AUTO: 31.2 PG (ref 26.5–33)
MCHC RBC AUTO-ENTMCNC: 34 G/DL (ref 31.5–36.5)
MCV RBC AUTO: 92 FL (ref 78–100)
PLATELET # BLD AUTO: 129 10E3/UL (ref 150–450)
POTASSIUM SERPL-SCNC: 4.3 MMOL/L (ref 3.4–5.3)
PROT SERPL-MCNC: 6.8 G/DL (ref 6.4–8.3)
RBC # BLD AUTO: 4.62 10E6/UL (ref 4.4–5.9)
SODIUM SERPL-SCNC: 139 MMOL/L (ref 135–145)
TACROLIMUS BLD-MCNC: 3.8 UG/L (ref 5–15)
TME LAST DOSE: ABNORMAL H
TME LAST DOSE: ABNORMAL H
WBC # BLD AUTO: 4.9 10E3/UL (ref 4–11)

## 2025-04-15 PROCEDURE — 85018 HEMOGLOBIN: CPT

## 2025-04-15 PROCEDURE — 36415 COLL VENOUS BLD VENIPUNCTURE: CPT

## 2025-04-15 PROCEDURE — 82248 BILIRUBIN DIRECT: CPT

## 2025-04-15 PROCEDURE — 80197 ASSAY OF TACROLIMUS: CPT

## 2025-04-15 PROCEDURE — 85041 AUTOMATED RBC COUNT: CPT

## 2025-04-15 PROCEDURE — 80048 BASIC METABOLIC PNL TOTAL CA: CPT

## 2025-05-09 NOTE — PROCEDURE: DEFER
100
Instructions (Optional): Will treat AKs at neck visit when CSE is performed.
Detail Level: Detailed
Procedure To Be Performed At Next Visit: Liquid nitrogen

## 2025-06-15 ENCOUNTER — HEALTH MAINTENANCE LETTER (OUTPATIENT)
Age: 75
End: 2025-06-15

## 2025-06-26 NOTE — PROGRESS NOTES
Dear Dr. Jimenez:    I had the pleasure of seeing Eric Andrew in follow-up today at the [facility].     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  He underwent surgery by dermatologist in [place] for this.  Per his report this was not done with Mohs surgery but was told that he had negative margins.  He says that shortly after the surgery he noticed a lump along his mandible/below the ear.  He went in for his 1 month follow-up with a dermatologist and at that time the mass had increased in size.  She thought it was a reactive node but offered a biopsy. Per review of the notes they proceeded with a punch biopsy of the parotid mass.  This was consistent with invasive squamous cell carcinoma without any epidermal involvement.  His preoperative PET scan showed only involvement of the parotid. He was taken to the OR on 4/11/2019 for a left total parotidectomy with resection of skin, preservation of the facial nerve, sacrifice of the greater auricular nerve, level I-V neck dissection, cervicofacial flap. Final pathology showed a 2.1 cm moderately differentiated SCC within the subcutaneous tissues and parotid, PNI, no LVSI, negative margins, 0/47 cervical nodes. He had postoperative radiation with Dr Floyd from 5/13/2019 to 6/24/2019 for a total of 6000 cGy. He had his 3 month post treatment PET scan in October 2019 which only showed a stable sub-cm nodule in the lung.    Interval history:  He comes in today for follow-up. He was last seen in June 2024.  He is doing well.  He has no concerns. He still has some stiffness of the neck, slightly worse than previous. It is not impacting his life but is inconvenient. He is not interested in PM&R referral at this time. He continues to stretch. He is learning to swallow  carefully. He feels the swallowing is improved because he is more aware. He has not had any sticking in awhile. He has no weight concerns. He is seeing the dentist regularly. He says the face movement is doing well, still playing the trombone. He has no neck or parotid masses. He is seeing the dermatology. He has no skin concerns. His energy level is good.      MEDICATIONS:     Current Outpatient Medications   Medication Sig Dispense Refill    allopurinol (ZYLOPRIM) 100 MG tablet Take 200 mg by mouth      AMLODIPINE BESYLATE PO Take 10 mg by mouth every morning       atorvastatin (LIPITOR) 20 MG tablet Take 20 mg by mouth daily      BASAGLAR 100 UNIT/ML injection Inject 45 Units Subcutaneous At Bedtime       Calcium Carbonate-Vitamin D (CALCIUM + D PO) Take 1 tablet by mouth every morning       levothyroxine (SYNTHROID/LEVOTHROID) 50 MCG tablet Take 1 tablet (50 mcg) by mouth daily 90 tablet 3    losartan (COZAAR) 25 MG tablet Take 1 tablet (25 mg) by mouth daily 90 tablet 3    metFORMIN (GLUCOPHAGE) 1000 MG tablet Take 1,000 mg by mouth 2 times daily (with meals)       metoprolol succinate (TOPROL-XL) 25 MG 24 hr tablet Take 50 mg by mouth every morning      Multiple Vitamins-Minerals (MULTIVITAL) TABS Take 1 tablet by mouth every morning       niacinamide (NIACIN) 500 MG tablet Take 1,000 mg by mouth 2 times daily (with meals)      tacrolimus (GENERIC-ACCORD BX-RATED) 1 MG capsule TAKE ONE CAPSULE BY MOUTH TWICE A DAY 60 capsule 11       ALLERGIES:    Allergies   Allergen Reactions    Cefazolin Swelling     Lips swelled while patient was in the OR     Lisinopril Cough    Meropenem Hives and Swelling     Developed hives and lip swelling while on meropenem and micafungin.  Resolved with discontinuation.  Unclear which was cause.    Micafungin Hives and Swelling     Developed hives and lip swelling while on meropenem and micafungin.  Resolved with discontinuation.  Unclear which was cause.       HABITS/SOCIAL  HISTORY:   Spends the shin in [place].  .  He is a retired .   He smokes for a few years and quit back in 1973.  He has no chewing tobacco use.  He has 1 alcoholic beverage about 3 times per week.   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    Social History     Socioeconomic History    Marital status:      Spouse name: Not on file    Number of children: Not on file    Years of education: Not on file    Highest education level: Not on file   Occupational History    Not on file   Tobacco Use    Smoking status: Never    Smokeless tobacco: Never    Tobacco comments:     [id number] occational smoker   Substance and Sexual Activity    Alcohol use: Yes     Alcohol/week: 0.0 standard drinks of alcohol     Comment: 2-3 glass of wine per week. At most 1 per day    Drug use: No    Sexual activity: Never   Other Topics Concern    Parent/sibling w/ CABG, MI or angioplasty before 65F 55M? Not Asked   Social History Narrative    Not on file     Social Drivers of Health     Financial Resource Strain: Low Risk  (12/16/2024)    Received from [facility]    Financial Resource Strain     Difficulty of Paying Living Expenses: 3     Difficulty of Paying Living Expenses: Not on file   Food Insecurity: No Food Insecurity (12/16/2024)    Received from [facility]    Food Insecurity     Do you worry your food will run out before you are able to buy more?: 1   Transportation Needs: No Transportation Needs (12/16/2024)    Received from [facility]    Transportation Needs     Does lack of transportation keep you from medical appointments?: 1     Does lack of transportation keep you from work, meetings or getting things that you need?: 1   Physical Activity: Not on file   Stress: Not on file   Social Connections: Socially Integrated (12/16/2024)    Received from [facility]    Social Connections      Do you often feel lonely or isolated from those around you?: 0   Interpersonal Safety: Not on file   Housing Stability: Low Risk  (12/16/2024)    Received from [facility]    Housing Stability     What is your housing situation today?: 1       PAST MEDICAL HISTORY:   Past Medical History:   Diagnosis Date    Alpha-1-antitrypsin deficiency (H)     Ascites     Pre-transplant    Chronic kidney disease, stage 3 (H)     Cirrhosis (H)     Alpha-1-antitrypsin deficiency, s/p liver transplant 3/31/15    Gout     HTN (hypertension)     Lentigo maligna melanoma (H) 2009    lentigo maligna melanoma excised on 01/29/2009 with a repeat wide excision on 03/30/2009.     Liver replaced by transplant (H) 03/31/2015    Nephrolithiasis     Osteoarthritis     Portal hypertension (H)     Pre-transplant        PAST SURGICAL HISTORY:   Past Surgical History:   Procedure Laterality Date    COLONOSCOPY N/A 12/18/2014    Procedure: COLONOSCOPY;  Surgeon: Katherine Jimenez MD;  Location: [facility]    ESOPHAGOSCOPY, GASTROSCOPY, DUODENOSCOPY (EGD), COMBINED N/A 12/18/2014    Procedure: COMBINED ESOPHAGOSCOPY, GASTROSCOPY, DUODENOSCOPY (EGD), BIOPSY SINGLE OR MULTIPLE;  Surgeon: Katherine Jimenez MD;  Location: [facility]    ESOPHAGOSCOPY, GASTROSCOPY, DUODENOSCOPY (EGD), COMBINED N/A 5/28/2015    Procedure: COMBINED ESOPHAGOSCOPY, GASTROSCOPY, DUODENOSCOPY (EGD), REMOVE FOREIGN BODY;  Surgeon: Katherine Jimenez MD;  Location: [facility]    HERNIA REPAIR      abdominal    INSERT SHUNT PORTAL TRANSJUGULAR INTRAHEPTIC      IR PARACENTESIS  4/18/2014    IR PARACENTESIS  4/29/2014    IR PARACENTESIS  5/12/2014    IR PARACENTESIS  5/20/2014    IR PARACENTESIS  5/29/2014    IR PARACENTESIS  6/9/2014    IR PARACENTESIS  6/18/2014    IR PARACENTESIS  6/25/2014    IR PARACENTESIS  7/3/2014    IR PARACENTESIS  7/10/2014    IR PARACENTESIS  7/17/2014    IR PARACENTESIS  7/29/2014    IR PARACENTESIS   "2014    IR PARACENTESIS  8/15/2014    IR PARACENTESIS  2014    IR PARACENTESIS  2014    IR PARACENTESIS  9/15/2014    IR PARACENTESIS  2014    IR PARACENTESIS  10/1/2014    IR PARACENTESIS  10/10/2014    IR PARACENTESIS  10/21/2014    IR PARACENTESIS  10/28/2014    IR PARACENTESIS  2014    IR PARACENTESIS  2014    IR PARACENTESIS  2014    IR PARACENTESIS  2014    IR PARACENTESIS  12/3/2014    IR PARACENTESIS  12/10/2014    IR PARACENTESIS  2014    IR PARACENTESIS  2014    IR PARACENTESIS  2014    IR PARACENTESIS  2015    IR PARACENTESIS  2015    IR PARACENTESIS  2015    IR PARACENTESIS  2015    IR PARACENTESIS  2015    IR PARACENTESIS  2015    IR PARACENTESIS  2015    IR PARACENTESIS  2015    IR PARACENTESIS  3/6/2015    IR PARACENTESIS  3/16/2015    ORTHOPEDIC SURGERY      bilateral knee surgery    PAROTIDECTOMY, RADICAL NECK DISSECTION Left 2019    Procedure: Total Parotidectomy, Excision of Skin, Neck Dissection Levels I - V,  And Local Advancement Flap;  Surgeon: Johanna Moreland MD;  Location:  OR    TRANSPLANT LIVER RECIPIENT  DONOR N/A 3/31/2015    Procedure: TRANSPLANT LIVER RECIPIENT  DONOR;  Surgeon: Elia Mehta MD;  Location:  OR       FAMILY HISTORY:    Family History   Problem Relation Age of Onset    Cardiovascular Father         MI    Glaucoma No family hx of     Macular Degeneration No family hx of        REVIEW OF SYSTEMS:  12 point ROS was negative other than the symptoms noted above in the HPI.  Patient Supplied Answers to Review of Systems      2024     6:59 PM   [facility] ROS   Ears, Nose, Throat Trouble swallowing         PHYSICAL EXAMINATION:   BP (!) 149/81   Pulse 72   Ht 1.905 m (6' 3\")   Wt 122.2 kg (269 lb 4.8 oz)   SpO2 96%   BMI 33.66 kg/m     Appearance:   normal; NAD, age-appropriate appearance, well-developed, normal habitus   Communication:   " normal; communicates verbally, normal voice quality   Head/Face:   inspection -  Expected concavity of left parotid defect  No facial numbness   Salivary glands -  S/p left radical parotidectomy with cervicofacial advancement flap, radiation changes to the tissue, no palpable mass, well healed incision, minimal lymphedema, increased fibrosis, some telangiectasias, expected concavity   Facial strength -  Normal and symmetric bilateral; H/B I/VI   Skin:  no new lesions   Ears:  auricle (AD) -  Normal   EAC (AD) -  normal  TM (AD) -  Normal, no effusion  auricle (AS) -  Well healed surgical incision on helix  EAC (AS) -  normal  TM (AS) -  Normal, no effusion  Normal clinical speech reception   Nose:  Ext. inspection -  Normal   Oral Cavity:  lips -  Normal mucosa, oral competence, and stoma size   Age-appropriate dentition, healthy gingival mucosa   Hard palate, buccal, floor of mouth mucosa normal  Moist mucus membranes   Tongue - normal movement, no lesions   Oropharynx:  mucosa -  Normal, no lesions  soft palate -  Normal, no lesions, no asymmetry, normal elevation   Neck: Well healed neck incision, well healed cervicofacial flap, no palpable masses  Concavity/tissue retraction  Hyper and hypopigmentation of neck skin with some telangiectasias in level V  No significant lymphedema, moderate fibrosis of left neck   Lymphatic:  no abnormal nodes   Cardiovascular:  warm, pink, well-perfused extremities without swelling, tenderness, or edema   Respiratory:  Normal respiratory effort, no stridor   Neuro/Psych.:  mood/affect -  normal  mental status -  normal       PROCEDURE:      RESULTS REVIEWED:   TSH reviewed      IMPRESSION AND PLAN:   Eric Andrew is a 75-year-old man who has a history of a liver transplant and recent squamous cell carcinoma of the left cheek who developed 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 He underwent total parotidectomy, skin resection, resection of greater auricular nerve, level I-V neck  dissection, cervicofacial flap on 4/11/2019. Final pathology showed the 1 metastatic deposit in the parotid. He completed postoperative radiation on 6/24/2019 with Dr Floyd.     He is doing well with no signs of recurrence.     We discussed that in the future if he develops swallowing issue we would start with a VSS with esophagram. If any stenosis, would consider procedure by GI given history of banding, varices etc.    We discussed PM&R referral, he is not interested at this time. He can always contact us if he changes his mind.    He is on synthroid. TSH normal by PCP in 9/2024. Continue routine labs with PCP.    He is > 5 years out from treatment, will not obtain routine scans at this time.     He had carotid duplex today in 2024.    He is now > 5 years out from treatment. Discussed follow-up options. He will let me know if any issues arise and prefers to hold on scheduling a specific follow-up.     Thank you very much for the opportunity to participate in the care of your patient.      Johanna Moreland M.D.  Otolaryngology- Head & Neck Surgery      This note was dictated with voice recognition software and then edited. Please excuse any unintentional errors.       The longitudinal plan of care for the diagnosis(es)/condition(s) as documented were addressed during this visit. Due to the added complexity in care, I will continue to support Eric in the subsequent management and with ongoing continuity of care.        CC:  Katherine Jimenez MD  [address] [facility] [address]  [facility] [address]      Naomi Rodriguez, RN  Liver Coordinator  [facility]      Aston Devine MD  [facility]  [address]  [facility] [address]      Grabiel Floyd MD  Department of Radiation Oncology  [facility]

## 2025-06-30 ENCOUNTER — OFFICE VISIT (OUTPATIENT)
Dept: OTOLARYNGOLOGY | Facility: CLINIC | Age: 75
End: 2025-06-30
Payer: MEDICARE

## 2025-06-30 VITALS
HEIGHT: 75 IN | SYSTOLIC BLOOD PRESSURE: 149 MMHG | BODY MASS INDEX: 33.48 KG/M2 | DIASTOLIC BLOOD PRESSURE: 81 MMHG | HEART RATE: 72 BPM | OXYGEN SATURATION: 96 % | WEIGHT: 269.3 LBS

## 2025-06-30 DIAGNOSIS — E03.4 HYPOTHYROIDISM DUE TO ACQUIRED ATROPHY OF THYROID: ICD-10-CM

## 2025-06-30 DIAGNOSIS — C44.320 SQUAMOUS CELL CARCINOMA OF SKIN OF FACE: Primary | ICD-10-CM

## 2025-06-30 ASSESSMENT — PAIN SCALES - GENERAL: PAINLEVEL_OUTOF10: NO PAIN (0)

## 2025-06-30 NOTE — LETTER
6/30/2025       RE: Eric Andrew  [address] [facility] [address]     Dear Colleague,    Thank you for referring your patient, Eric Andrew, to the [facility] [place] at [facility]. Please see a copy of my visit note below.    Dear Dr. Jimenez:    I had the pleasure of seeing Eric Andrew in follow-up today at the [facility].     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  He underwent surgery by dermatologist in [place] for this.  Per his report this was not done with Mohs surgery but was told that he had negative margins.  He says that shortly after the surgery he noticed a lump along his mandible/below the ear.  He went in for his 1 month follow-up with a dermatologist and at that time the mass had increased in size.  She thought it was a reactive node but offered a biopsy. Per review of the notes they proceeded with a punch biopsy of the parotid mass.  This was consistent with invasive squamous cell carcinoma without any epidermal involvement.  His preoperative PET scan showed only involvement of the parotid. He was taken to the OR on 4/11/2019 for a left total parotidectomy with resection of skin, preservation of the facial nerve, sacrifice of the greater auricular nerve, level I-V neck dissection, cervicofacial flap. Final pathology showed a 2.1 cm moderately differentiated SCC within the subcutaneous tissues and parotid, PNI, no LVSI, negative margins, 0/47 cervical nodes. He had postoperative radiation with Dr Floyd from 5/13/2019 to 6/24/2019 for a total of 6000 cGy. He had his 3 month post treatment PET scan in October 2019 which only showed a stable sub-cm nodule in the lung.    Interval history:  He comes in  today for follow-up. He was last seen in June 2024.  He is doing well.  He has no concerns. He still has some stiffness of the neck, slightly worse than previous. It is not impacting his life but is inconvenient. He is not interested in PM&R referral at this time. He continues to stretch. He is learning to swallow carefully. He feels the swallowing is improved because he is more aware. He has not had any sticking in awhile. He has no weight concerns. He is seeing the dentist regularly. He says the face movement is doing well, still playing the trombone. He has no neck or parotid masses. He is seeing the dermatology. He has no skin concerns. His energy level is good.      MEDICATIONS:     Current Outpatient Medications   Medication Sig Dispense Refill     allopurinol (ZYLOPRIM) 100 MG tablet Take 200 mg by mouth       AMLODIPINE BESYLATE PO Take 10 mg by mouth every morning        atorvastatin (LIPITOR) 20 MG tablet Take 20 mg by mouth daily       BASAGLAR 100 UNIT/ML injection Inject 45 Units Subcutaneous At Bedtime        Calcium Carbonate-Vitamin D (CALCIUM + D PO) Take 1 tablet by mouth every morning        levothyroxine (SYNTHROID/LEVOTHROID) 50 MCG tablet Take 1 tablet (50 mcg) by mouth daily 90 tablet 3     losartan (COZAAR) 25 MG tablet Take 1 tablet (25 mg) by mouth daily 90 tablet 3     metFORMIN (GLUCOPHAGE) 1000 MG tablet Take 1,000 mg by mouth 2 times daily (with meals)        metoprolol succinate (TOPROL-XL) 25 MG 24 hr tablet Take 50 mg by mouth every morning       Multiple Vitamins-Minerals (MULTIVITAL) TABS Take 1 tablet by mouth every morning        niacinamide (NIACIN) 500 MG tablet Take 1,000 mg by mouth 2 times daily (with meals)       tacrolimus (GENERIC-ACCORD BX-RATED) 1 MG capsule TAKE ONE CAPSULE BY MOUTH TWICE A DAY 60 capsule 11       ALLERGIES:    Allergies   Allergen Reactions     Cefazolin Swelling     Lips swelled while patient was in the OR      Lisinopril Cough     Meropenem Hives  and Swelling     Developed hives and lip swelling while on meropenem and micafungin.  Resolved with discontinuation.  Unclear which was cause.     Micafungin Hives and Swelling     Developed hives and lip swelling while on meropenem and micafungin.  Resolved with discontinuation.  Unclear which was cause.       HABITS/SOCIAL HISTORY:   Spends the shin in [place].  .  He is a retired .   He smokes for a few years and quit back in 1973.  He has no chewing tobacco use.  He has 1 alcoholic beverage about 3 times per week.   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    Social History     Socioeconomic History     Marital status:      Spouse name: Not on file     Number of children: Not on file     Years of education: Not on file     Highest education level: Not on file   Occupational History     Not on file   Tobacco Use     Smoking status: Never     Smokeless tobacco: Never     Tobacco comments:     [id number] occational smoker   Substance and Sexual Activity     Alcohol use: Yes     Alcohol/week: 0.0 standard drinks of alcohol     Comment: 2-3 glass of wine per week. At most 1 per day     Drug use: No     Sexual activity: Never   Other Topics Concern     Parent/sibling w/ CABG, MI or angioplasty before 65F 55M? Not Asked   Social History Narrative     Not on file     Social Drivers of Health     Financial Resource Strain: Low Risk  (12/16/2024)    Received from [facility]    Financial Resource Strain      Difficulty of Paying Living Expenses: 3      Difficulty of Paying Living Expenses: Not on file   Food Insecurity: No Food Insecurity (12/16/2024)    Received from [facility]    Food Insecurity      Do you worry your food will run out before you are able to buy more?: 1   Transportation Needs: No Transportation Needs (12/16/2024)    Received from [facility]    Transportation Needs      Does lack of  transportation keep you from medical appointments?: 1      Does lack of transportation keep you from work, meetings or getting things that you need?: 1   Physical Activity: Not on file   Stress: Not on file   Social Connections: Socially Integrated (12/16/2024)    Received from [facility]    Social Connections      Do you often feel lonely or isolated from those around you?: 0   Interpersonal Safety: Not on file   Housing Stability: Low Risk  (12/16/2024)    Received from [facility]    Housing Stability      What is your housing situation today?: 1       PAST MEDICAL HISTORY:   Past Medical History:   Diagnosis Date     Alpha-1-antitrypsin deficiency (H)      Ascites     Pre-transplant     Chronic kidney disease, stage 3 (H)      Cirrhosis (H)     Alpha-1-antitrypsin deficiency, s/p liver transplant 3/31/15     Gout      HTN (hypertension)      Lentigo maligna melanoma (H) 2009    lentigo maligna melanoma excised on 01/29/2009 with a repeat wide excision on 03/30/2009.      Liver replaced by transplant (H) 03/31/2015     Nephrolithiasis      Osteoarthritis      Portal hypertension (H)     Pre-transplant        PAST SURGICAL HISTORY:   Past Surgical History:   Procedure Laterality Date     COLONOSCOPY N/A 12/18/2014    Procedure: COLONOSCOPY;  Surgeon: Katherine Jimenez MD;  Location:  GI     ESOPHAGOSCOPY, GASTROSCOPY, DUODENOSCOPY (EGD), COMBINED N/A 12/18/2014    Procedure: COMBINED ESOPHAGOSCOPY, GASTROSCOPY, DUODENOSCOPY (EGD), BIOPSY SINGLE OR MULTIPLE;  Surgeon: Katherine Jimenez MD;  Location:  GI     ESOPHAGOSCOPY, GASTROSCOPY, DUODENOSCOPY (EGD), COMBINED N/A 5/28/2015    Procedure: COMBINED ESOPHAGOSCOPY, GASTROSCOPY, DUODENOSCOPY (EGD), REMOVE FOREIGN BODY;  Surgeon: Katherine Jimenez MD;  Location:  GI     HERNIA REPAIR      abdominal     INSERT SHUNT PORTAL TRANSJUGULAR INTRAHEPTIC       IR  PARACENTESIS  2014     IR PARACENTESIS  2014     IR PARACENTESIS  2014     IR PARACENTESIS  2014     IR PARACENTESIS  2014     IR PARACENTESIS  2014     IR PARACENTESIS  2014     IR PARACENTESIS  2014     IR PARACENTESIS  7/3/2014     IR PARACENTESIS  7/10/2014     IR PARACENTESIS  2014     IR PARACENTESIS  2014     IR PARACENTESIS  2014     IR PARACENTESIS  8/15/2014     IR PARACENTESIS  2014     IR PARACENTESIS  2014     IR PARACENTESIS  9/15/2014     IR PARACENTESIS  2014     IR PARACENTESIS  10/1/2014     IR PARACENTESIS  10/10/2014     IR PARACENTESIS  10/21/2014     IR PARACENTESIS  10/28/2014     IR PARACENTESIS  2014     IR PARACENTESIS  2014     IR PARACENTESIS  2014     IR PARACENTESIS  2014     IR PARACENTESIS  12/3/2014     IR PARACENTESIS  12/10/2014     IR PARACENTESIS  2014     IR PARACENTESIS  2014     IR PARACENTESIS  2014     IR PARACENTESIS  2015     IR PARACENTESIS  2015     IR PARACENTESIS  2015     IR PARACENTESIS  2015     IR PARACENTESIS  2015     IR PARACENTESIS  2015     IR PARACENTESIS  2015     IR PARACENTESIS  2015     IR PARACENTESIS  3/6/2015     IR PARACENTESIS  3/16/2015     ORTHOPEDIC SURGERY      bilateral knee surgery     PAROTIDECTOMY, RADICAL NECK DISSECTION Left 2019    Procedure: Total Parotidectomy, Excision of Skin, Neck Dissection Levels I - V,  And Local Advancement Flap;  Surgeon: Johanna Moreland MD;  Location: [facility] OR     TRANSPLANT LIVER RECIPIENT  DONOR N/A 3/31/2015    Procedure: TRANSPLANT LIVER RECIPIENT  DONOR;  Surgeon: Elia Mehta MD;  Location: [facility] OR       FAMILY HISTORY:    Family History   Problem Relation Age of Onset     Cardiovascular Father         MI     Glaucoma No family hx of      Macular Degeneration No family hx of        REVIEW OF SYSTEMS:  12 point ROS was negative other  "than the symptoms noted above in the HPI.  Patient Supplied Answers to Review of Systems      6/25/2024     6:59 PM    ENT ROS   Ears, Nose, Throat Trouble swallowing         PHYSICAL EXAMINATION:   BP (!) 149/81   Pulse 72   Ht 1.905 m (6' 3\")   Wt 122.2 kg (269 lb 4.8 oz)   SpO2 96%   BMI 33.66 kg/m     Appearance:   normal; NAD, age-appropriate appearance, well-developed, normal habitus   Communication:   normal; communicates verbally, normal voice quality   Head/Face:   inspection -  Expected concavity of left parotid defect  No facial numbness   Salivary glands -  S/p left radical parotidectomy with cervicofacial advancement flap, radiation changes to the tissue, no palpable mass, well healed incision, minimal lymphedema, increased fibrosis, some telangiectasias, expected concavity   Facial strength -  Normal and symmetric bilateral; H/B I/VI   Skin:  no new lesions   Ears:  auricle (AD) -  Normal   EAC (AD) -  normal  TM (AD) -  Normal, no effusion  auricle (AS) -  Well healed surgical incision on helix  EAC (AS) -  normal  TM (AS) -  Normal, no effusion  Normal clinical speech reception   Nose:  Ext. inspection -  Normal   Oral Cavity:  lips -  Normal mucosa, oral competence, and stoma size   Age-appropriate dentition, healthy gingival mucosa   Hard palate, buccal, floor of mouth mucosa normal  Moist mucus membranes   Tongue - normal movement, no lesions   Oropharynx:  mucosa -  Normal, no lesions  soft palate -  Normal, no lesions, no asymmetry, normal elevation   Neck: Well healed neck incision, well healed cervicofacial flap, no palpable masses  Concavity/tissue retraction  Hyper and hypopigmentation of neck skin with some telangiectasias in level V  No significant lymphedema, moderate fibrosis of left neck   Lymphatic:  no abnormal nodes   Cardiovascular:  warm, pink, well-perfused extremities without swelling, tenderness, or edema   Respiratory:  Normal respiratory effort, no stridor "   Neuro/Psych.:  mood/affect -  normal  mental status -  normal       PROCEDURE:      RESULTS REVIEWED:   TSH reviewed      IMPRESSION AND PLAN:   Eric Andrew is a 75-year-old man who has a history of a liver transplant and recent squamous cell carcinoma of the left cheek who developed 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 He underwent total parotidectomy, skin resection, resection of greater auricular nerve, level I-V neck dissection, cervicofacial flap on 4/11/2019. Final pathology showed the 1 metastatic deposit in the parotid. He completed postoperative radiation on 6/24/2019 with Dr Floyd.     He is doing well with no signs of recurrence.     We discussed that in the future if he develops swallowing issue we would start with a VSS with esophagram. If any stenosis, would consider procedure by GI given history of banding, varices etc.    We discussed PM&R referral, he is not interested at this time. He can always contact us if he changes his mind.    He is on synthroid. TSH normal by PCP in 9/2024. Continue routine labs with PCP.    He is > 5 years out from treatment, will not obtain routine scans at this time.     He had carotid duplex today in 2024.    He is now > 5 years out from treatment. Discussed follow-up options. He will let me know if any issues arise and prefers to hold on scheduling a specific follow-up.     Thank you very much for the opportunity to participate in the care of your patient.      Johanna Moreland M.D.  Otolaryngology- Head & Neck Surgery      This note was dictated with voice recognition software and then edited. Please excuse any unintentional errors.       The longitudinal plan of care for the diagnosis(es)/condition(s) as documented were addressed during this visit. Due to the added complexity in care, I will continue to support Eric in the subsequent management and with ongoing continuity of care.        CC:  Katherine Jimenez MD  [address] [place] [address] [address]  [facility]  [address]      Naomi Rodriguez, RN  Liver Coordinator  [facility]      Aston Devine MD  [facility]  [address] [facility] [address]  [facility] [address]      Grabiel Floyd MD  Department of Radiation Oncology  [facility]      Again, thank you for allowing me to participate in the care of your patient.      Sincerely,    Johanna Moreland MD

## 2025-07-06 ENCOUNTER — HEALTH MAINTENANCE LETTER (OUTPATIENT)
Age: 75
End: 2025-07-06

## 2025-07-07 DIAGNOSIS — Z94.4 LIVER TRANSPLANTED (H): ICD-10-CM

## 2025-07-07 RX ORDER — TACROLIMUS 1 MG/1
1 CAPSULE ORAL 2 TIMES DAILY
Qty: 60 CAPSULE | Refills: 11 | Status: SHIPPED | OUTPATIENT
Start: 2025-07-07

## 2025-07-15 ENCOUNTER — LAB (OUTPATIENT)
Dept: LAB | Facility: CLINIC | Age: 75
End: 2025-07-15
Payer: MEDICARE

## 2025-07-15 DIAGNOSIS — Z94.4 LIVER REPLACED BY TRANSPLANT (H): ICD-10-CM

## 2025-07-15 LAB
ALBUMIN MFR UR ELPH: 16.5 MG/DL
ALBUMIN SERPL BCG-MCNC: 4.3 G/DL (ref 3.5–5.2)
ALP SERPL-CCNC: 95 U/L (ref 40–150)
ALT SERPL W P-5'-P-CCNC: 12 U/L (ref 0–70)
ANION GAP SERPL CALCULATED.3IONS-SCNC: 14 MMOL/L (ref 7–15)
AST SERPL W P-5'-P-CCNC: 11 U/L (ref 0–45)
BILIRUB DIRECT SERPL-MCNC: 0.14 MG/DL (ref 0–0.3)
BILIRUB SERPL-MCNC: 0.4 MG/DL
BUN SERPL-MCNC: 28.8 MG/DL (ref 8–23)
CALCIUM SERPL-MCNC: 9.5 MG/DL (ref 8.8–10.4)
CHLORIDE SERPL-SCNC: 108 MMOL/L (ref 98–107)
CHOLEST SERPL-MCNC: 127 MG/DL
CREAT SERPL-MCNC: 1.64 MG/DL (ref 0.67–1.17)
CREAT UR-MCNC: 124.8 MG/DL
EGFRCR SERPLBLD CKD-EPI 2021: 44 ML/MIN/1.73M2
ERYTHROCYTE [DISTWIDTH] IN BLOOD BY AUTOMATED COUNT: 14.7 % (ref 10–15)
FASTING STATUS PATIENT QL REPORTED: NORMAL
GLUCOSE SERPL-MCNC: 145 MG/DL (ref 70–99)
HCO3 SERPL-SCNC: 21 MMOL/L (ref 22–29)
HCT VFR BLD AUTO: 44 % (ref 40–53)
HDLC SERPL-MCNC: 40 MG/DL
HGB BLD-MCNC: 14.8 G/DL (ref 13.3–17.7)
LDLC SERPL CALC-MCNC: 66 MG/DL
MAGNESIUM SERPL-MCNC: 1.8 MG/DL (ref 1.7–2.3)
MCH RBC QN AUTO: 31 PG (ref 26.5–33)
MCHC RBC AUTO-ENTMCNC: 33.6 G/DL (ref 31.5–36.5)
MCV RBC AUTO: 92 FL (ref 78–100)
NONHDLC SERPL-MCNC: 87 MG/DL
PHOSPHATE SERPL-MCNC: 3 MG/DL (ref 2.5–4.5)
PLATELET # BLD AUTO: 113 10E3/UL (ref 150–450)
POTASSIUM SERPL-SCNC: 4.2 MMOL/L (ref 3.4–5.3)
PROT SERPL-MCNC: 6.6 G/DL (ref 6.4–8.3)
PROT/CREAT 24H UR: 0.13 MG/MG CR (ref 0–0.2)
RBC # BLD AUTO: 4.77 10E6/UL (ref 4.4–5.9)
SODIUM SERPL-SCNC: 143 MMOL/L (ref 135–145)
TACROLIMUS BLD-MCNC: 3.2 UG/L (ref 5–15)
TME LAST DOSE: ABNORMAL H
TME LAST DOSE: ABNORMAL H
TRIGL SERPL-MCNC: 104 MG/DL
WBC # BLD AUTO: 5.6 10E3/UL (ref 4–11)

## 2025-07-15 PROCEDURE — 85014 HEMATOCRIT: CPT

## 2025-07-15 PROCEDURE — 82248 BILIRUBIN DIRECT: CPT

## 2025-07-15 PROCEDURE — 82465 ASSAY BLD/SERUM CHOLESTEROL: CPT

## 2025-07-15 PROCEDURE — 84156 ASSAY OF PROTEIN URINE: CPT

## 2025-07-15 PROCEDURE — 36415 COLL VENOUS BLD VENIPUNCTURE: CPT

## 2025-07-15 PROCEDURE — 83735 ASSAY OF MAGNESIUM: CPT

## 2025-07-15 PROCEDURE — 80197 ASSAY OF TACROLIMUS: CPT

## 2025-07-15 PROCEDURE — 82435 ASSAY OF BLOOD CHLORIDE: CPT

## 2025-07-15 PROCEDURE — 84100 ASSAY OF PHOSPHORUS: CPT

## 2025-07-29 ENCOUNTER — TELEPHONE (OUTPATIENT)
Dept: TRANSPLANT | Facility: CLINIC | Age: 75
End: 2025-07-29
Payer: MEDICARE

## 2025-07-29 NOTE — TELEPHONE ENCOUNTER
LVM to contact a travel clinic for the vaccination questions. And it is not necessary to have a letter for meds taken for transplant and to call with questions.

## 2025-07-29 NOTE — TELEPHONE ENCOUNTER
Pt planning to travel to [facility]- What vaccines can he have   Letter to carry meds  with him on flight   Call pt

## 2025-08-04 ENCOUNTER — OFFICE VISIT (OUTPATIENT)
Dept: OPTOMETRY | Facility: CLINIC | Age: 75
End: 2025-08-04
Payer: MEDICARE

## 2025-08-04 DIAGNOSIS — H25.13 NUCLEAR AGE-RELATED CATARACT, BOTH EYES: Primary | ICD-10-CM

## 2025-08-04 DIAGNOSIS — H52.203 MYOPIA OF BOTH EYES WITH ASTIGMATISM AND PRESBYOPIA: ICD-10-CM

## 2025-08-04 DIAGNOSIS — H52.13 MYOPIA OF BOTH EYES WITH ASTIGMATISM AND PRESBYOPIA: ICD-10-CM

## 2025-08-04 DIAGNOSIS — H52.4 MYOPIA OF BOTH EYES WITH ASTIGMATISM AND PRESBYOPIA: ICD-10-CM

## 2025-08-04 PROBLEM — M1A.0790 CHRONIC GOUT OF FOOT: Status: ACTIVE | Noted: 2024-12-19

## 2025-08-04 PROBLEM — I71.40 AAA (ABDOMINAL AORTIC ANEURYSM) WITHOUT RUPTURE: Status: ACTIVE | Noted: 2025-05-07

## 2025-08-04 PROBLEM — I25.10 ATHEROSCLEROSIS OF NATIVE CORONARY ARTERY OF NATIVE HEART WITHOUT ANGINA PECTORIS: Status: ACTIVE | Noted: 2017-11-28

## 2025-08-04 PROBLEM — E03.9 HYPOTHYROID: Status: ACTIVE | Noted: 2024-12-19

## 2025-08-04 ASSESSMENT — REFRACTION_CURRENTRX
OD_BRAND: DAILIES AQUACOMFORT PLUS
OS_BRAND: DAILIES AQUACOMFORT PLUS
OS_BASECURVE: 8.7
OS_SPHERE: -7.00
OS_DIAMETER: 14.0
OD_SPHERE: -7.50
OD_BASECURVE: 8.7
OD_DIAMETER: 14.0

## 2025-08-04 ASSESSMENT — CONF VISUAL FIELD
OS_SUPERIOR_TEMPORAL_RESTRICTION: 0
OD_NORMAL: 1
METHOD: COUNTING FINGERS
OD_INFERIOR_TEMPORAL_RESTRICTION: 0
OS_SUPERIOR_NASAL_RESTRICTION: 0
OD_INFERIOR_NASAL_RESTRICTION: 0
OD_SUPERIOR_NASAL_RESTRICTION: 0
OS_INFERIOR_TEMPORAL_RESTRICTION: 0
OS_NORMAL: 1
OD_SUPERIOR_TEMPORAL_RESTRICTION: 0
OS_INFERIOR_NASAL_RESTRICTION: 0

## 2025-08-04 ASSESSMENT — SLIT LAMP EXAM - LIDS
COMMENTS: 1+ BLEPHARITIS, TELANGIECTASIA
COMMENTS: 1+ BLEPHARITIS, TELANGIECTASIA

## 2025-08-04 ASSESSMENT — REFRACTION
OS_SPHERE: -10.50
OD_CYLINDER: +0.75
OS_CYLINDER: +1.00
OD_AXIS: 170
OS_AXIS: 020
OD_SPHERE: -8.50

## 2025-08-04 ASSESSMENT — REFRACTION_WEARINGRX
SPECS_TYPE: PAL
OS_SPHERE: -10.50
OD_ADD: +2.50
OS_ADD: +2.50
OD_CYLINDER: SPHERE
OS_AXIS: 020
OS_CYLINDER: +0.50
OD_SPHERE: -8.50

## 2025-08-04 ASSESSMENT — VISUAL ACUITY
METHOD: SNELLEN - LINEAR
CORRECTION_TYPE: CONTACTS
OD_CC: 20/25
OS_CC: 20/25
OS_CC: 20/50

## 2025-08-04 ASSESSMENT — REFRACTION_MANIFEST
OD_CYLINDER: +0.75
OD_SPHERE: -8.50
OD_AXIS: 160
OD_ADD: +2.50
OS_SPHERE: -10.50
OS_AXIS: 022
OS_ADD: +2.50
OS_CYLINDER: +0.50

## 2025-08-04 ASSESSMENT — TONOMETRY
OS_IOP_MMHG: 11
OD_IOP_MMHG: 12
IOP_METHOD: ICARE

## 2025-08-04 ASSESSMENT — EXTERNAL EXAM - LEFT EYE: OS_EXAM: NORMAL

## 2025-08-04 ASSESSMENT — CUP TO DISC RATIO
OD_RATIO: 0.20
OS_RATIO: 0.20

## 2025-08-04 ASSESSMENT — EXTERNAL EXAM - RIGHT EYE: OD_EXAM: NORMAL

## (undated) DEVICE — SU SILK 4-0 TIE 12X30" A303H

## (undated) DEVICE — PREP SKIN SCRUB TRAY 4461A

## (undated) DEVICE — BLADE KNIFE SURG 12 371112

## (undated) DEVICE — NIM ELEC SUBDERMAL NDL 3PAIR/BOX

## (undated) DEVICE — NIM PROBE PRASS INCREMENTING TIP 8225825

## (undated) DEVICE — LABEL MEDICATION SYSTEM 3303-P

## (undated) DEVICE — SUCTION SLEEVE NEPTUNE 2 125MM 0703-005-125

## (undated) DEVICE — BLADE KNIFE SURG 15 371115

## (undated) DEVICE — DRAIN JACKSON PRATT RESERVOIR 100ML SU130-1305

## (undated) DEVICE — DRSG TEGADERM 4X4 3/4" 1626W

## (undated) DEVICE — NDL 25GA 1.5" 305127

## (undated) DEVICE — DRAIN JACKSON PRATT 10MM FLAT 4/4 PERF SU130-1311

## (undated) DEVICE — CATH TRAY FOLEY SURESTEP 16FR W/TMP PRB STLK LATEX A319416AM

## (undated) DEVICE — CLIP HORIZON SM RED WIDE SLOT 001201

## (undated) DEVICE — GLOVE PROTEXIS BLUE W/NEU-THERA 6.5  2D73EB65

## (undated) DEVICE — PACK NEURO MINOR UMMC SNE32MNMU4

## (undated) DEVICE — SOL NACL 0.9% IRRIG 1000ML BOTTLE 2F7124

## (undated) DEVICE — DRAPE SLUSH/WARMER 66X44" ORS-320

## (undated) DEVICE — PACK GOWN 3/PK DISP XL SBA32GPFCB

## (undated) DEVICE — SU ETHILON 3-0 PS-1 18" 1663H

## (undated) DEVICE — SPONGE RAY-TEC 4X8" 7318

## (undated) DEVICE — ESU ELEC BLADE 2.75" COATED/INSULATED E1455

## (undated) DEVICE — DRSG TELFA 3X8" 1238

## (undated) DEVICE — LINEN TOWEL PACK X30 5481

## (undated) DEVICE — SU SILK 3-0 TIE 12X30" A304H

## (undated) DEVICE — SU SILK 0 TIE 6X18" A186H

## (undated) DEVICE — ESU GROUND PAD ADULT W/CORD E7507

## (undated) DEVICE — PREP POVIDONE IODINE SOLUTION 10% 4OZ

## (undated) DEVICE — SU SILK 2-0 SH CR 5X18" C0125

## (undated) DEVICE — SURGICEL HEMOSTAT 4X8" 1952

## (undated) DEVICE — GLOVE PROTEXIS MICRO 6.0  2D73PM60

## (undated) DEVICE — SPONGE KITTNER 30-101

## (undated) DEVICE — SU VICRYL 3-0 SH 8X18" UND J864D

## (undated) DEVICE — SUCTION MANIFOLD DORNOCH ULTRA CART UL-CL500

## (undated) DEVICE — BLADE CLIPPER SGL USE 9680

## (undated) DEVICE — SPONGE LAP 18X18" X8435

## (undated) DEVICE — DRAPE WARMER 66X44" ORS-300

## (undated) DEVICE — CLIP HORIZON MED BLUE 002200

## (undated) DEVICE — SPONGE COTTONOID 1/2X1/2" 20-04S

## (undated) DEVICE — ESU PENCIL SMOKE EVAC W/ROCKER SWITCH 0703-047-000

## (undated) DEVICE — SU SILK 2-0 TIE 12X30" A305H

## (undated) DEVICE — SOL WATER IRRIG 1000ML BOTTLE 2F7114

## (undated) DEVICE — Device

## (undated) DEVICE — RETR ELASTIC STAYS LONE STAR BLUNT DUAL LEAD 3550-1G

## (undated) DEVICE — SU PROLENE 5-0 P-3 18" 8698G

## (undated) RX ORDER — DEXAMETHASONE SODIUM PHOSPHATE 4 MG/ML
INJECTION, SOLUTION INTRA-ARTICULAR; INTRALESIONAL; INTRAMUSCULAR; INTRAVENOUS; SOFT TISSUE
Status: DISPENSED
Start: 2019-04-11

## (undated) RX ORDER — FENTANYL CITRATE 50 UG/ML
INJECTION, SOLUTION INTRAMUSCULAR; INTRAVENOUS
Status: DISPENSED
Start: 2019-04-11

## (undated) RX ORDER — GLYCOPYRROLATE 0.2 MG/ML
INJECTION, SOLUTION INTRAMUSCULAR; INTRAVENOUS
Status: DISPENSED
Start: 2019-04-11

## (undated) RX ORDER — OXYMETAZOLINE HYDROCHLORIDE 0.05 G/100ML
SPRAY NASAL
Status: DISPENSED
Start: 2019-04-11

## (undated) RX ORDER — HYDROMORPHONE HYDROCHLORIDE 1 MG/ML
INJECTION, SOLUTION INTRAMUSCULAR; INTRAVENOUS; SUBCUTANEOUS
Status: DISPENSED
Start: 2019-04-11

## (undated) RX ORDER — LIDOCAINE HYDROCHLORIDE 20 MG/ML
INJECTION, SOLUTION EPIDURAL; INFILTRATION; INTRACAUDAL; PERINEURAL
Status: DISPENSED
Start: 2019-04-11

## (undated) RX ORDER — PROPOFOL 10 MG/ML
INJECTION, EMULSION INTRAVENOUS
Status: DISPENSED
Start: 2019-04-11

## (undated) RX ORDER — HEPARIN SODIUM 1000 [USP'U]/ML
INJECTION, SOLUTION INTRAVENOUS; SUBCUTANEOUS
Status: DISPENSED
Start: 2019-04-11

## (undated) RX ORDER — ONDANSETRON 2 MG/ML
INJECTION INTRAMUSCULAR; INTRAVENOUS
Status: DISPENSED
Start: 2019-04-11

## (undated) RX ORDER — CEFAZOLIN SODIUM 1 G/3ML
INJECTION, POWDER, FOR SOLUTION INTRAMUSCULAR; INTRAVENOUS
Status: DISPENSED
Start: 2019-04-11